# Patient Record
Sex: FEMALE | Race: WHITE | NOT HISPANIC OR LATINO | Employment: OTHER | URBAN - METROPOLITAN AREA
[De-identification: names, ages, dates, MRNs, and addresses within clinical notes are randomized per-mention and may not be internally consistent; named-entity substitution may affect disease eponyms.]

---

## 2018-07-18 ENCOUNTER — HISTORICAL (OUTPATIENT)
Dept: LAB | Facility: HOSPITAL | Age: 68
End: 2018-07-18

## 2018-07-18 LAB
ALBUMIN SERPL-MCNC: 3.3 GM/DL (ref 3.4–5)
ALBUMIN/GLOB SERPL: 0.8 RATIO (ref 1.1–2)
ALP SERPL-CCNC: 92 UNIT/L (ref 46–116)
ALT SERPL-CCNC: 22 UNIT/L (ref 12–78)
AST SERPL-CCNC: 18 UNIT/L (ref 10–37)
BILIRUB SERPL-MCNC: 0.4 MG/DL (ref 0.2–1)
BILIRUBIN DIRECT+TOT PNL SERPL-MCNC: 0.11 MG/DL (ref 0–0.2)
BILIRUBIN DIRECT+TOT PNL SERPL-MCNC: 0.29 MG/DL
BUN SERPL-MCNC: 25 MG/DL (ref 7–18)
CALCIUM SERPL-MCNC: 9 MG/DL (ref 8.5–10.1)
CHLORIDE SERPL-SCNC: 108 MMOL/L (ref 98–107)
CHOLEST SERPL-MCNC: 180 MG/DL (ref 50–200)
CHOLEST/HDLC SERPL: 5 {RATIO} (ref 0–5)
CO2 SERPL-SCNC: 29.7 MMOL/L (ref 21–32)
CREAT SERPL-MCNC: 0.51 MG/DL (ref 0.55–1.02)
GLOBULIN SER-MCNC: 3.9 GM/DL (ref 2.4–3.5)
GLUCOSE SERPL-MCNC: 93 MG/DL (ref 74–106)
HDLC SERPL-MCNC: 39 MG/DL (ref 35–60)
LDLC SERPL CALC-MCNC: 113 MG/DL (ref 50–140)
POTASSIUM SERPL-SCNC: 3.6 MMOL/L (ref 3.5–5.1)
PROT SERPL-MCNC: 7.2 GM/DL (ref 6.4–8.2)
SODIUM SERPL-SCNC: 143 MMOL/L (ref 136–145)
TRIGL SERPL-MCNC: 138 MG/DL (ref 30–150)
TSH SERPL-ACNC: 3.85 MIU/ML (ref 0.35–3.75)
VLDLC SERPL CALC-MCNC: 28 MG/DL

## 2019-01-21 ENCOUNTER — HISTORICAL (OUTPATIENT)
Dept: LAB | Facility: HOSPITAL | Age: 69
End: 2019-01-21

## 2019-01-21 LAB
ALBUMIN SERPL-MCNC: 3.4 GM/DL (ref 3.4–5)
ALBUMIN/GLOB SERPL: 0.9 RATIO (ref 1.1–2)
ALP SERPL-CCNC: 99 UNIT/L (ref 46–116)
ALT SERPL-CCNC: 22 UNIT/L (ref 12–78)
AST SERPL-CCNC: 21 UNIT/L (ref 10–37)
BILIRUB SERPL-MCNC: 0.5 MG/DL (ref 0.2–1)
BILIRUBIN DIRECT+TOT PNL SERPL-MCNC: 0.14 MG/DL (ref 0–0.2)
BILIRUBIN DIRECT+TOT PNL SERPL-MCNC: 0.36 MG/DL
BUN SERPL-MCNC: 21 MG/DL (ref 7–18)
CALCIUM SERPL-MCNC: 8.9 MG/DL (ref 8.5–10.1)
CHLORIDE SERPL-SCNC: 106 MMOL/L (ref 98–107)
CHOLEST SERPL-MCNC: 149 MG/DL (ref 50–200)
CHOLEST/HDLC SERPL: 4 {RATIO} (ref 0–5)
CO2 SERPL-SCNC: 28 MMOL/L (ref 21–32)
CREAT SERPL-MCNC: 0.54 MG/DL (ref 0.55–1.02)
GLOBULIN SER-MCNC: 3.8 GM/DL (ref 2.4–3.5)
GLUCOSE SERPL-MCNC: 99 MG/DL (ref 74–106)
HDLC SERPL-MCNC: 42 MG/DL (ref 35–60)
LDLC SERPL CALC-MCNC: 90 MG/DL (ref 50–140)
POTASSIUM SERPL-SCNC: 3.5 MMOL/L (ref 3.5–5.1)
PROT SERPL-MCNC: 7.2 GM/DL (ref 6.4–8.2)
SODIUM SERPL-SCNC: 144 MMOL/L (ref 136–145)
T4 FREE SERPL-MCNC: 1.2 NG/DL (ref 0.76–1.46)
TRIGL SERPL-MCNC: 85 MG/DL (ref 30–150)
TSH SERPL-ACNC: 2.74 MIU/ML (ref 0.35–3.75)
VLDLC SERPL CALC-MCNC: 17 MG/DL

## 2019-04-15 ENCOUNTER — HISTORICAL (OUTPATIENT)
Dept: LAB | Facility: HOSPITAL | Age: 69
End: 2019-04-15

## 2019-07-02 ENCOUNTER — HISTORICAL (OUTPATIENT)
Dept: RADIOLOGY | Facility: HOSPITAL | Age: 69
End: 2019-07-02

## 2020-05-26 ENCOUNTER — HISTORICAL (OUTPATIENT)
Dept: LAB | Facility: HOSPITAL | Age: 70
End: 2020-05-26

## 2020-05-26 LAB
ALBUMIN SERPL-MCNC: 3.5 GM/DL (ref 3.4–4.8)
ALBUMIN/GLOB SERPL: 1 RATIO (ref 1.1–2)
ALP SERPL-CCNC: 71 UNIT/L (ref 40–150)
ALT SERPL-CCNC: 14 UNIT/L (ref 0–55)
AST SERPL-CCNC: 19 UNIT/L (ref 5–34)
BILIRUB SERPL-MCNC: 0.5 MG/DL
BILIRUBIN DIRECT+TOT PNL SERPL-MCNC: 0.2 MG/DL (ref 0–0.5)
BILIRUBIN DIRECT+TOT PNL SERPL-MCNC: 0.3 MG/DL
BUN SERPL-MCNC: 25 MG/DL (ref 9.8–20.1)
CALCIUM SERPL-MCNC: 10 MG/DL (ref 8.4–10.2)
CHLORIDE SERPL-SCNC: 109 MMOL/L (ref 98–107)
CHOLEST SERPL-MCNC: 216 MG/DL
CHOLEST/HDLC SERPL: 5 {RATIO} (ref 0–5)
CO2 SERPL-SCNC: 27 MEQ/L (ref 23–31)
CREAT SERPL-MCNC: 0.69 MG/DL (ref 0.55–1.02)
GLOBULIN SER-MCNC: 3.5 GM/DL (ref 2.4–3.5)
GLUCOSE SERPL-MCNC: 95 MG/DL (ref 82–115)
HDLC SERPL-MCNC: 41 MG/DL (ref 35–60)
LDLC SERPL CALC-MCNC: 149 MG/DL (ref 50–140)
POTASSIUM SERPL-SCNC: 4.8 MMOL/L (ref 3.5–5.1)
PROT SERPL-MCNC: 7 GM/DL (ref 5.8–7.6)
SODIUM SERPL-SCNC: 145 MMOL/L (ref 136–145)
TRIGL SERPL-MCNC: 129 MG/DL (ref 37–140)
TSH SERPL-ACNC: 2.47 UIU/ML (ref 0.35–4.94)
VLDLC SERPL CALC-MCNC: 26 MG/DL

## 2020-09-09 ENCOUNTER — HISTORICAL (OUTPATIENT)
Dept: RADIOLOGY | Facility: HOSPITAL | Age: 70
End: 2020-09-09

## 2020-10-30 ENCOUNTER — HISTORICAL (OUTPATIENT)
Dept: RADIOLOGY | Facility: HOSPITAL | Age: 70
End: 2020-10-30

## 2020-10-30 LAB
BUN SERPL-MCNC: 15 MG/DL (ref 9.8–20.1)
CALCIUM SERPL-MCNC: 9.5 MG/DL (ref 8.4–10.2)
CHLORIDE SERPL-SCNC: 109 MMOL/L (ref 98–107)
CO2 SERPL-SCNC: 27 MEQ/L (ref 23–31)
CREAT SERPL-MCNC: 0.66 MG/DL (ref 0.55–1.02)
CREAT/UREA NIT SERPL: 23
GLUCOSE SERPL-MCNC: 104 MG/DL (ref 82–115)
POTASSIUM SERPL-SCNC: 4.6 MMOL/L (ref 3.5–5.1)
SODIUM SERPL-SCNC: 144 MMOL/L (ref 136–145)

## 2021-01-30 ENCOUNTER — HOSPITAL ENCOUNTER (INPATIENT)
Facility: HOSPITAL | Age: 71
LOS: 2 days | Discharge: HOME | DRG: 247 | End: 2021-02-02
Attending: EMERGENCY MEDICINE | Admitting: INTERNAL MEDICINE
Payer: MEDICARE

## 2021-01-30 DIAGNOSIS — I25.10 CORONARY ARTERY DISEASE, ANGINA PRESENCE UNSPECIFIED, UNSPECIFIED VESSEL OR LESION TYPE, UNSPECIFIED WHETHER NATIVE OR TRANSPLANTED HEART: ICD-10-CM

## 2021-01-30 DIAGNOSIS — I20.9 ANGINA PECTORIS: ICD-10-CM

## 2021-01-30 DIAGNOSIS — I21.4 NSTEMI (NON-ST ELEVATED MYOCARDIAL INFARCTION): ICD-10-CM

## 2021-01-30 DIAGNOSIS — R79.89 ELEVATED TROPONIN: Primary | ICD-10-CM

## 2021-01-30 DIAGNOSIS — R07.9 CHEST PAIN: ICD-10-CM

## 2021-01-30 PROBLEM — F17.210 CIGARETTE SMOKER: Chronic | Status: ACTIVE | Noted: 2021-01-30

## 2021-01-30 PROBLEM — E78.5 HYPERLIPIDEMIA: Status: ACTIVE | Noted: 2021-01-30

## 2021-01-30 PROBLEM — F41.9 ANXIETY DISORDER, UNSPECIFIED: Status: ACTIVE | Noted: 2021-01-30

## 2021-01-30 PROBLEM — F41.9 ANXIETY DISORDER, UNSPECIFIED: Chronic | Status: ACTIVE | Noted: 2021-01-30

## 2021-01-30 PROBLEM — J44.9 CHRONIC OBSTRUCTIVE PULMONARY DISEASE: Chronic | Status: ACTIVE | Noted: 2021-01-30

## 2021-01-30 PROBLEM — I10 ESSENTIAL HYPERTENSION: Status: ACTIVE | Noted: 2019-01-15

## 2021-01-30 PROBLEM — E78.5 HYPERLIPIDEMIA: Chronic | Status: ACTIVE | Noted: 2021-01-30

## 2021-01-30 PROBLEM — R56.9 SEIZURE: Status: ACTIVE | Noted: 2019-01-15

## 2021-01-30 PROBLEM — I21.9 ACUTE MYOCARDIAL INFARCTION: Status: ACTIVE | Noted: 2021-01-30

## 2021-01-30 PROBLEM — I10 ACCELERATED HYPERTENSION: Status: ACTIVE | Noted: 2021-01-30

## 2021-01-30 PROBLEM — J44.9 CHRONIC OBSTRUCTIVE PULMONARY DISEASE: Status: ACTIVE | Noted: 2021-01-30

## 2021-01-30 PROBLEM — E03.9 HYPOTHYROIDISM: Status: ACTIVE | Noted: 2019-01-15

## 2021-01-30 PROBLEM — H35.30 MACULAR DEGENERATION: Status: ACTIVE | Noted: 2019-01-15

## 2021-01-30 PROBLEM — I10 ESSENTIAL HYPERTENSION: Chronic | Status: ACTIVE | Noted: 2019-01-15

## 2021-01-30 LAB
ALBUMIN SERPL BCP-MCNC: 3.5 G/DL (ref 3.5–5.2)
ALP SERPL-CCNC: 69 U/L (ref 55–135)
ALT SERPL W/O P-5'-P-CCNC: 10 U/L (ref 10–44)
ANION GAP SERPL CALC-SCNC: 11 MMOL/L (ref 8–16)
AST SERPL-CCNC: 16 U/L (ref 10–40)
BASOPHILS # BLD AUTO: 0.03 K/UL (ref 0–0.2)
BASOPHILS NFR BLD: 0.5 % (ref 0–1.9)
BILIRUB SERPL-MCNC: 0.5 MG/DL (ref 0.1–1)
BNP SERPL-MCNC: 136 PG/ML (ref 0–99)
BUN SERPL-MCNC: 33 MG/DL (ref 8–23)
CALCIUM SERPL-MCNC: 9.2 MG/DL (ref 8.7–10.5)
CHLORIDE SERPL-SCNC: 109 MMOL/L (ref 95–110)
CK MB SERPL-MCNC: 1.4 NG/ML (ref 0.1–6.5)
CK MB SERPL-RTO: 3.6 % (ref 0–5)
CK SERPL-CCNC: 39 U/L (ref 20–180)
CK SERPL-CCNC: 39 U/L (ref 20–180)
CO2 SERPL-SCNC: 24 MMOL/L (ref 23–29)
CREAT SERPL-MCNC: 0.7 MG/DL (ref 0.5–1.4)
DIFFERENTIAL METHOD: NORMAL
EOSINOPHIL # BLD AUTO: 0.1 K/UL (ref 0–0.5)
EOSINOPHIL NFR BLD: 0.8 % (ref 0–8)
ERYTHROCYTE [DISTWIDTH] IN BLOOD BY AUTOMATED COUNT: 13.2 % (ref 11.5–14.5)
EST. GFR  (AFRICAN AMERICAN): >60 ML/MIN/1.73 M^2
EST. GFR  (NON AFRICAN AMERICAN): >60 ML/MIN/1.73 M^2
GLUCOSE SERPL-MCNC: 91 MG/DL (ref 70–110)
HCT VFR BLD AUTO: 46.6 % (ref 37–48.5)
HCV AB SERPL QL IA: NEGATIVE
HGB BLD-MCNC: 15 G/DL (ref 12–16)
IMM GRANULOCYTES # BLD AUTO: 0.02 K/UL (ref 0–0.04)
IMM GRANULOCYTES NFR BLD AUTO: 0.3 % (ref 0–0.5)
LYMPHOCYTES # BLD AUTO: 1.7 K/UL (ref 1–4.8)
LYMPHOCYTES NFR BLD: 27.2 % (ref 18–48)
MAGNESIUM SERPL-MCNC: 2.3 MG/DL (ref 1.6–2.6)
MCH RBC QN AUTO: 28.7 PG (ref 27–31)
MCHC RBC AUTO-ENTMCNC: 32.2 G/DL (ref 32–36)
MCV RBC AUTO: 89 FL (ref 82–98)
MONOCYTES # BLD AUTO: 0.7 K/UL (ref 0.3–1)
MONOCYTES NFR BLD: 10.7 % (ref 4–15)
NEUTROPHILS # BLD AUTO: 3.9 K/UL (ref 1.8–7.7)
NEUTROPHILS NFR BLD: 60.5 % (ref 38–73)
NRBC BLD-RTO: 0 /100 WBC
PLATELET # BLD AUTO: 172 K/UL (ref 150–350)
PMV BLD AUTO: 11.5 FL (ref 9.2–12.9)
POTASSIUM SERPL-SCNC: 4.2 MMOL/L (ref 3.5–5.1)
PROT SERPL-MCNC: 6.9 G/DL (ref 6–8.4)
RBC # BLD AUTO: 5.22 M/UL (ref 4–5.4)
SODIUM SERPL-SCNC: 144 MMOL/L (ref 136–145)
TROPONIN I SERPL DL<=0.01 NG/ML-MCNC: 0.2 NG/ML (ref 0–0.03)
WBC # BLD AUTO: 6.37 K/UL (ref 3.9–12.7)

## 2021-01-30 PROCEDURE — 83880 ASSAY OF NATRIURETIC PEPTIDE: CPT

## 2021-01-30 PROCEDURE — G0378 HOSPITAL OBSERVATION PER HR: HCPCS

## 2021-01-30 PROCEDURE — 82550 ASSAY OF CK (CPK): CPT

## 2021-01-30 PROCEDURE — 84484 ASSAY OF TROPONIN QUANT: CPT

## 2021-01-30 PROCEDURE — 82553 CREATINE MB FRACTION: CPT

## 2021-01-30 PROCEDURE — 80053 COMPREHEN METABOLIC PANEL: CPT

## 2021-01-30 PROCEDURE — 25000003 PHARM REV CODE 250: Performed by: EMERGENCY MEDICINE

## 2021-01-30 PROCEDURE — 93010 ELECTROCARDIOGRAM REPORT: CPT | Mod: ,,, | Performed by: INTERNAL MEDICINE

## 2021-01-30 PROCEDURE — 85025 COMPLETE CBC W/AUTO DIFF WBC: CPT

## 2021-01-30 PROCEDURE — 93005 ELECTROCARDIOGRAM TRACING: CPT

## 2021-01-30 PROCEDURE — 86803 HEPATITIS C AB TEST: CPT

## 2021-01-30 PROCEDURE — 25000003 PHARM REV CODE 250: Performed by: NURSE PRACTITIONER

## 2021-01-30 PROCEDURE — 83735 ASSAY OF MAGNESIUM: CPT

## 2021-01-30 PROCEDURE — 36415 COLL VENOUS BLD VENIPUNCTURE: CPT

## 2021-01-30 PROCEDURE — 80061 LIPID PANEL: CPT

## 2021-01-30 PROCEDURE — 93010 EKG 12-LEAD: ICD-10-PCS | Mod: ,,, | Performed by: INTERNAL MEDICINE

## 2021-01-30 PROCEDURE — 99285 EMERGENCY DEPT VISIT HI MDM: CPT | Mod: 25

## 2021-01-30 RX ORDER — LEVOTHYROXINE SODIUM 25 UG/1
25 TABLET ORAL
Status: DISCONTINUED | OUTPATIENT
Start: 2021-01-31 | End: 2021-02-02 | Stop reason: HOSPADM

## 2021-01-30 RX ORDER — METOPROLOL TARTRATE 50 MG/1
50 TABLET ORAL 2 TIMES DAILY
COMMUNITY
End: 2022-10-31 | Stop reason: SDUPTHER

## 2021-01-30 RX ORDER — ACETAMINOPHEN 325 MG/1
650 TABLET ORAL EVERY 6 HOURS PRN
Status: DISCONTINUED | OUTPATIENT
Start: 2021-01-30 | End: 2021-02-02 | Stop reason: HOSPADM

## 2021-01-30 RX ORDER — DOCUSATE SODIUM 100 MG/1
100 CAPSULE, LIQUID FILLED ORAL 2 TIMES DAILY PRN
COMMUNITY

## 2021-01-30 RX ORDER — LISINOPRIL 20 MG/1
20 TABLET ORAL 2 TIMES DAILY
Status: DISCONTINUED | OUTPATIENT
Start: 2021-01-30 | End: 2021-02-02 | Stop reason: HOSPADM

## 2021-01-30 RX ORDER — ONDANSETRON 2 MG/ML
4 INJECTION INTRAMUSCULAR; INTRAVENOUS EVERY 8 HOURS PRN
Status: DISCONTINUED | OUTPATIENT
Start: 2021-01-30 | End: 2021-02-02 | Stop reason: HOSPADM

## 2021-01-30 RX ORDER — CLONIDINE HYDROCHLORIDE 0.2 MG/1
0.2 TABLET ORAL EVERY 6 HOURS PRN
Status: DISCONTINUED | OUTPATIENT
Start: 2021-01-30 | End: 2021-02-02

## 2021-01-30 RX ORDER — LISINOPRIL 20 MG/1
20 TABLET ORAL 2 TIMES DAILY
COMMUNITY
End: 2023-02-27 | Stop reason: SDUPTHER

## 2021-01-30 RX ORDER — CLOPIDOGREL BISULFATE 75 MG/1
75 TABLET ORAL DAILY
Status: ON HOLD | COMMUNITY
End: 2021-02-02

## 2021-01-30 RX ORDER — PRAVASTATIN SODIUM 40 MG/1
40 TABLET ORAL DAILY
COMMUNITY
End: 2023-02-27 | Stop reason: SDUPTHER

## 2021-01-30 RX ORDER — DOCUSATE SODIUM 100 MG/1
100 CAPSULE, LIQUID FILLED ORAL 2 TIMES DAILY
Status: DISCONTINUED | OUTPATIENT
Start: 2021-01-30 | End: 2021-02-02 | Stop reason: HOSPADM

## 2021-01-30 RX ORDER — PRAVASTATIN SODIUM 20 MG/1
40 TABLET ORAL DAILY
Status: DISCONTINUED | OUTPATIENT
Start: 2021-01-31 | End: 2021-02-02 | Stop reason: HOSPADM

## 2021-01-30 RX ORDER — SODIUM CHLORIDE 0.9 % (FLUSH) 0.9 %
10 SYRINGE (ML) INJECTION
Status: DISCONTINUED | OUTPATIENT
Start: 2021-01-30 | End: 2021-02-02 | Stop reason: HOSPADM

## 2021-01-30 RX ORDER — TALC
6 POWDER (GRAM) TOPICAL NIGHTLY PRN
Status: DISCONTINUED | OUTPATIENT
Start: 2021-01-30 | End: 2021-02-02 | Stop reason: HOSPADM

## 2021-01-30 RX ORDER — LEVOTHYROXINE SODIUM 25 UG/1
25 TABLET ORAL
COMMUNITY
End: 2023-02-27 | Stop reason: SDUPTHER

## 2021-01-30 RX ORDER — HYDROCHLOROTHIAZIDE 25 MG/1
25 TABLET ORAL DAILY
Status: ON HOLD | COMMUNITY
End: 2021-02-02

## 2021-01-30 RX ORDER — LORAZEPAM 0.5 MG/1
0.5 TABLET ORAL EVERY 6 HOURS PRN
COMMUNITY
End: 2022-10-28 | Stop reason: SDUPTHER

## 2021-01-30 RX ORDER — NITROGLYCERIN 0.4 MG/1
0.4 TABLET SUBLINGUAL EVERY 5 MIN PRN
Status: DISCONTINUED | OUTPATIENT
Start: 2021-01-30 | End: 2021-02-02 | Stop reason: HOSPADM

## 2021-01-30 RX ORDER — HYDROCHLOROTHIAZIDE 25 MG/1
25 TABLET ORAL DAILY
Status: DISCONTINUED | OUTPATIENT
Start: 2021-01-31 | End: 2021-02-02 | Stop reason: HOSPADM

## 2021-01-30 RX ORDER — ASPIRIN 81 MG/1
81 TABLET ORAL DAILY
Status: DISCONTINUED | OUTPATIENT
Start: 2021-01-31 | End: 2021-02-02 | Stop reason: HOSPADM

## 2021-01-30 RX ORDER — METOPROLOL TARTRATE 50 MG/1
50 TABLET ORAL 2 TIMES DAILY
Status: DISCONTINUED | OUTPATIENT
Start: 2021-01-30 | End: 2021-02-02 | Stop reason: HOSPADM

## 2021-01-30 RX ORDER — ASPIRIN 81 MG/1
81 TABLET ORAL DAILY
Status: ON HOLD | COMMUNITY
End: 2023-11-17 | Stop reason: HOSPADM

## 2021-01-30 RX ORDER — CLOPIDOGREL BISULFATE 75 MG/1
75 TABLET ORAL DAILY
Status: DISCONTINUED | OUTPATIENT
Start: 2021-01-31 | End: 2021-02-02 | Stop reason: HOSPADM

## 2021-01-30 RX ORDER — LORAZEPAM 0.5 MG/1
0.5 TABLET ORAL EVERY 6 HOURS PRN
Status: DISCONTINUED | OUTPATIENT
Start: 2021-01-30 | End: 2021-02-02 | Stop reason: HOSPADM

## 2021-01-30 RX ADMIN — DOCUSATE SODIUM 100 MG: 100 CAPSULE ORAL at 09:01

## 2021-01-30 RX ADMIN — ACETAMINOPHEN 650 MG: 325 TABLET ORAL at 07:01

## 2021-01-30 RX ADMIN — METOPROLOL TARTRATE 50 MG: 50 TABLET, FILM COATED ORAL at 09:01

## 2021-01-30 RX ADMIN — LISINOPRIL 20 MG: 20 TABLET ORAL at 09:01

## 2021-01-30 RX ADMIN — NITROGLYCERIN 1 INCH: 20 OINTMENT TOPICAL at 06:01

## 2021-01-31 PROBLEM — R79.89 ELEVATED TROPONIN: Status: ACTIVE | Noted: 2021-01-31

## 2021-01-31 PROBLEM — I48.0 PAROXYSMAL ATRIAL FIBRILLATION: Status: ACTIVE | Noted: 2021-01-31

## 2021-01-31 LAB
ANION GAP SERPL CALC-SCNC: 7 MMOL/L (ref 8–16)
APTT BLDCRRT: 28.5 SEC (ref 21–32)
APTT BLDCRRT: 34 SEC (ref 21–32)
APTT BLDCRRT: 35.2 SEC (ref 21–32)
APTT BLDCRRT: 52.8 SEC (ref 21–32)
ASCENDING AORTA: 2.14 CM
AV INDEX (PROSTH): 0.56
AV MEAN GRADIENT: 3 MMHG
AV PEAK GRADIENT: 5 MMHG
AV VALVE AREA: 1.48 CM2
AV VELOCITY RATIO: 0.54
BACTERIA #/AREA URNS HPF: NORMAL /HPF
BASOPHILS # BLD AUTO: 0.02 K/UL (ref 0–0.2)
BASOPHILS NFR BLD: 0.3 % (ref 0–1.9)
BILIRUB UR QL STRIP: NEGATIVE
BSA FOR ECHO PROCEDURE: 1.38 M2
BUN SERPL-MCNC: 29 MG/DL (ref 8–23)
CALCIUM SERPL-MCNC: 9.3 MG/DL (ref 8.7–10.5)
CHLORIDE SERPL-SCNC: 111 MMOL/L (ref 95–110)
CHOLEST SERPL-MCNC: 147 MG/DL (ref 120–199)
CHOLEST/HDLC SERPL: 3.4 {RATIO} (ref 2–5)
CLARITY UR: CLEAR
CO2 SERPL-SCNC: 24 MMOL/L (ref 23–29)
COLOR UR: YELLOW
CREAT SERPL-MCNC: 0.6 MG/DL (ref 0.5–1.4)
CV ECHO LV RWT: 0.57 CM
DIFFERENTIAL METHOD: ABNORMAL
DOP CALC AO PEAK VEL: 1.13 M/S
DOP CALC AO VTI: 20.38 CM
DOP CALC LVOT AREA: 2.7 CM2
DOP CALC LVOT DIAMETER: 1.84 CM
DOP CALC LVOT PEAK VEL: 0.61 M/S
DOP CALC LVOT STROKE VOLUME: 30.24 CM3
DOP CALC RVOT PEAK VEL: 1.03 M/S
DOP CALC RVOT VTI: 17.71 CM
DOP CALCLVOT PEAK VEL VTI: 11.38 CM
E/E' RATIO: 10.25 M/S
ECHO LV POSTERIOR WALL: 0.93 CM (ref 0.6–1.1)
EOSINOPHIL # BLD AUTO: 0.1 K/UL (ref 0–0.5)
EOSINOPHIL NFR BLD: 2.4 % (ref 0–8)
ERYTHROCYTE [DISTWIDTH] IN BLOOD BY AUTOMATED COUNT: 13.3 % (ref 11.5–14.5)
EST. GFR  (AFRICAN AMERICAN): >60 ML/MIN/1.73 M^2
EST. GFR  (NON AFRICAN AMERICAN): >60 ML/MIN/1.73 M^2
FRACTIONAL SHORTENING: 36 % (ref 28–44)
GLUCOSE SERPL-MCNC: 86 MG/DL (ref 70–110)
GLUCOSE UR QL STRIP: NEGATIVE
HCT VFR BLD AUTO: 46.8 % (ref 37–48.5)
HDLC SERPL-MCNC: 43 MG/DL (ref 40–75)
HDLC SERPL: 29.3 % (ref 20–50)
HGB BLD-MCNC: 14.6 G/DL (ref 12–16)
HGB UR QL STRIP: NEGATIVE
IMM GRANULOCYTES # BLD AUTO: 0.02 K/UL (ref 0–0.04)
IMM GRANULOCYTES NFR BLD AUTO: 0.3 % (ref 0–0.5)
INR PPP: 1 (ref 0.8–1.2)
INTERVENTRICULAR SEPTUM: 1 CM (ref 0.6–1.1)
IVRT: 65.65 MSEC
KETONES UR QL STRIP: NEGATIVE
LA MAJOR: 4.27 CM
LA MINOR: 5.99 CM
LA WIDTH: 3.76 CM
LDLC SERPL CALC-MCNC: 83.6 MG/DL (ref 63–159)
LEFT ATRIUM SIZE: 2.88 CM
LEFT ATRIUM VOLUME INDEX: 32.5 ML/M2
LEFT ATRIUM VOLUME: 45.89 CM3
LEFT INTERNAL DIMENSION IN SYSTOLE: 2.09 CM (ref 2.1–4)
LEFT VENTRICLE DIASTOLIC VOLUME INDEX: 30.18 ML/M2
LEFT VENTRICLE DIASTOLIC VOLUME: 42.55 ML
LEFT VENTRICLE MASS INDEX: 62 G/M2
LEFT VENTRICLE SYSTOLIC VOLUME INDEX: 10.1 ML/M2
LEFT VENTRICLE SYSTOLIC VOLUME: 14.21 ML
LEFT VENTRICULAR INTERNAL DIMENSION IN DIASTOLE: 3.25 CM (ref 3.5–6)
LEFT VENTRICULAR MASS: 87.68 G
LEUKOCYTE ESTERASE UR QL STRIP: ABNORMAL
LV LATERAL E/E' RATIO: 9.11 M/S
LV SEPTAL E/E' RATIO: 11.71 M/S
LYMPHOCYTES # BLD AUTO: 2.4 K/UL (ref 1–4.8)
LYMPHOCYTES NFR BLD: 40.7 % (ref 18–48)
MCH RBC QN AUTO: 28.3 PG (ref 27–31)
MCHC RBC AUTO-ENTMCNC: 31.2 G/DL (ref 32–36)
MCV RBC AUTO: 91 FL (ref 82–98)
MICROSCOPIC COMMENT: NORMAL
MONOCYTES # BLD AUTO: 0.6 K/UL (ref 0.3–1)
MONOCYTES NFR BLD: 9.9 % (ref 4–15)
MV PEAK E VEL: 0.82 M/S
NEUTROPHILS # BLD AUTO: 2.7 K/UL (ref 1.8–7.7)
NEUTROPHILS NFR BLD: 46.4 % (ref 38–73)
NITRITE UR QL STRIP: NEGATIVE
NONHDLC SERPL-MCNC: 104 MG/DL
NRBC BLD-RTO: 0 /100 WBC
PH UR STRIP: 6 [PH] (ref 5–8)
PISA TR MAX VEL: 2.17 M/S
PLATELET # BLD AUTO: 159 K/UL (ref 150–350)
PMV BLD AUTO: 12 FL (ref 9.2–12.9)
POTASSIUM SERPL-SCNC: 4.4 MMOL/L (ref 3.5–5.1)
PROT UR QL STRIP: NEGATIVE
PROTHROMBIN TIME: 10.5 SEC (ref 9–12.5)
PV MEAN GRADIENT: 2 MMHG
RA MAJOR: 4.56 CM
RA PRESSURE: 8 MMHG
RA WIDTH: 3.06 CM
RBC # BLD AUTO: 5.16 M/UL (ref 4–5.4)
RIGHT VENTRICULAR END-DIASTOLIC DIMENSION: 2.92 CM
SINUS: 2.68 CM
SODIUM SERPL-SCNC: 142 MMOL/L (ref 136–145)
SP GR UR STRIP: 1.02 (ref 1–1.03)
TDI LATERAL: 0.09 M/S
TDI SEPTAL: 0.07 M/S
TDI: 0.08 M/S
TR MAX PG: 19 MMHG
TRIGL SERPL-MCNC: 102 MG/DL (ref 30–150)
TROPONIN I SERPL DL<=0.01 NG/ML-MCNC: 0.16 NG/ML (ref 0–0.03)
TROPONIN I SERPL DL<=0.01 NG/ML-MCNC: 0.18 NG/ML (ref 0–0.03)
TV REST PULMONARY ARTERY PRESSURE: 27 MMHG
URN SPEC COLLECT METH UR: ABNORMAL
UROBILINOGEN UR STRIP-ACNC: NEGATIVE EU/DL
WBC # BLD AUTO: 5.77 K/UL (ref 3.9–12.7)
WBC #/AREA URNS HPF: 3 /HPF (ref 0–5)
WBC CLUMPS URNS QL MICRO: NORMAL

## 2021-01-31 PROCEDURE — 25000003 PHARM REV CODE 250: Performed by: NURSE PRACTITIONER

## 2021-01-31 PROCEDURE — 99233 SBSQ HOSP IP/OBS HIGH 50: CPT | Mod: ,,, | Performed by: FAMILY MEDICINE

## 2021-01-31 PROCEDURE — 99223 1ST HOSP IP/OBS HIGH 75: CPT | Mod: 25,,, | Performed by: INTERNAL MEDICINE

## 2021-01-31 PROCEDURE — 80048 BASIC METABOLIC PNL TOTAL CA: CPT

## 2021-01-31 PROCEDURE — 25000003 PHARM REV CODE 250: Performed by: FAMILY MEDICINE

## 2021-01-31 PROCEDURE — 99233 PR SUBSEQUENT HOSPITAL CARE,LEVL III: ICD-10-PCS | Mod: ,,, | Performed by: FAMILY MEDICINE

## 2021-01-31 PROCEDURE — 36415 COLL VENOUS BLD VENIPUNCTURE: CPT

## 2021-01-31 PROCEDURE — 85025 COMPLETE CBC W/AUTO DIFF WBC: CPT

## 2021-01-31 PROCEDURE — 21400001 HC TELEMETRY ROOM

## 2021-01-31 PROCEDURE — 99223 PR INITIAL HOSPITAL CARE,LEVL III: ICD-10-PCS | Mod: 25,,, | Performed by: INTERNAL MEDICINE

## 2021-01-31 PROCEDURE — 63600175 PHARM REV CODE 636 W HCPCS: Performed by: NURSE PRACTITIONER

## 2021-01-31 PROCEDURE — 85730 THROMBOPLASTIN TIME PARTIAL: CPT | Mod: 91

## 2021-01-31 PROCEDURE — 85610 PROTHROMBIN TIME: CPT | Mod: ER

## 2021-01-31 PROCEDURE — 81000 URINALYSIS NONAUTO W/SCOPE: CPT

## 2021-01-31 PROCEDURE — 84484 ASSAY OF TROPONIN QUANT: CPT

## 2021-01-31 PROCEDURE — 85730 THROMBOPLASTIN TIME PARTIAL: CPT | Mod: ER

## 2021-01-31 RX ORDER — MUPIROCIN 20 MG/G
OINTMENT TOPICAL 2 TIMES DAILY
Status: DISCONTINUED | OUTPATIENT
Start: 2021-01-31 | End: 2021-02-02 | Stop reason: HOSPADM

## 2021-01-31 RX ORDER — HEPARIN SODIUM,PORCINE/D5W 25000/250
0-40 INTRAVENOUS SOLUTION INTRAVENOUS CONTINUOUS
Status: DISCONTINUED | OUTPATIENT
Start: 2021-01-31 | End: 2021-02-02

## 2021-01-31 RX ORDER — SODIUM CHLORIDE 9 MG/ML
INJECTION, SOLUTION INTRAVENOUS CONTINUOUS
Status: DISCONTINUED | OUTPATIENT
Start: 2021-01-31 | End: 2021-02-02

## 2021-01-31 RX ADMIN — SODIUM CHLORIDE: 0.9 INJECTION, SOLUTION INTRAVENOUS at 06:01

## 2021-01-31 RX ADMIN — HYDROCHLOROTHIAZIDE 25 MG: 25 TABLET ORAL at 09:01

## 2021-01-31 RX ADMIN — LISINOPRIL 20 MG: 20 TABLET ORAL at 09:01

## 2021-01-31 RX ADMIN — ACETAMINOPHEN 650 MG: 325 TABLET ORAL at 08:01

## 2021-01-31 RX ADMIN — METOPROLOL TARTRATE 50 MG: 50 TABLET, FILM COATED ORAL at 08:01

## 2021-01-31 RX ADMIN — ASPIRIN 81 MG: 81 TABLET, COATED ORAL at 09:01

## 2021-01-31 RX ADMIN — METOPROLOL TARTRATE 50 MG: 50 TABLET, FILM COATED ORAL at 09:01

## 2021-01-31 RX ADMIN — LISINOPRIL 20 MG: 20 TABLET ORAL at 08:01

## 2021-01-31 RX ADMIN — DOCUSATE SODIUM 100 MG: 100 CAPSULE ORAL at 09:01

## 2021-01-31 RX ADMIN — LEVOTHYROXINE SODIUM 25 MCG: 25 TABLET ORAL at 05:01

## 2021-01-31 RX ADMIN — PRAVASTATIN SODIUM 40 MG: 20 TABLET ORAL at 09:01

## 2021-01-31 RX ADMIN — DOCUSATE SODIUM 100 MG: 100 CAPSULE ORAL at 08:01

## 2021-01-31 RX ADMIN — HEPARIN SODIUM 14 UNITS/KG/HR: 10000 INJECTION, SOLUTION INTRAVENOUS at 12:01

## 2021-01-31 RX ADMIN — CLOPIDOGREL 75 MG: 75 TABLET, FILM COATED ORAL at 09:01

## 2021-01-31 RX ADMIN — HEPARIN SODIUM 12 UNITS/KG/HR: 10000 INJECTION, SOLUTION INTRAVENOUS at 03:01

## 2021-01-31 RX ADMIN — MUPIROCIN: 20 OINTMENT TOPICAL at 08:01

## 2021-02-01 LAB
ANION GAP SERPL CALC-SCNC: 8 MMOL/L (ref 8–16)
APTT BLDCRRT: 45.7 SEC (ref 21–32)
APTT BLDCRRT: 45.7 SEC (ref 21–32)
BASOPHILS # BLD AUTO: 0.02 K/UL (ref 0–0.2)
BASOPHILS NFR BLD: 0.5 % (ref 0–1.9)
BUN SERPL-MCNC: 20 MG/DL (ref 8–23)
CALCIUM SERPL-MCNC: 8.8 MG/DL (ref 8.7–10.5)
CATH EF QUANTITATIVE: 70 %
CHLORIDE SERPL-SCNC: 111 MMOL/L (ref 95–110)
CO2 SERPL-SCNC: 22 MMOL/L (ref 23–29)
CREAT SERPL-MCNC: 0.6 MG/DL (ref 0.5–1.4)
DIFFERENTIAL METHOD: ABNORMAL
EOSINOPHIL # BLD AUTO: 0.2 K/UL (ref 0–0.5)
EOSINOPHIL NFR BLD: 3.5 % (ref 0–8)
ERYTHROCYTE [DISTWIDTH] IN BLOOD BY AUTOMATED COUNT: 13.2 % (ref 11.5–14.5)
EST. GFR  (AFRICAN AMERICAN): >60 ML/MIN/1.73 M^2
EST. GFR  (NON AFRICAN AMERICAN): >60 ML/MIN/1.73 M^2
GLUCOSE SERPL-MCNC: 92 MG/DL (ref 70–110)
HCT VFR BLD AUTO: 44.9 % (ref 37–48.5)
HGB BLD-MCNC: 14.1 G/DL (ref 12–16)
IMM GRANULOCYTES # BLD AUTO: 0.01 K/UL (ref 0–0.04)
IMM GRANULOCYTES NFR BLD AUTO: 0.2 % (ref 0–0.5)
LYMPHOCYTES # BLD AUTO: 1.6 K/UL (ref 1–4.8)
LYMPHOCYTES NFR BLD: 36.2 % (ref 18–48)
MCH RBC QN AUTO: 28.4 PG (ref 27–31)
MCHC RBC AUTO-ENTMCNC: 31.4 G/DL (ref 32–36)
MCV RBC AUTO: 91 FL (ref 82–98)
MONOCYTES # BLD AUTO: 0.5 K/UL (ref 0.3–1)
MONOCYTES NFR BLD: 11.1 % (ref 4–15)
NEUTROPHILS # BLD AUTO: 2.1 K/UL (ref 1.8–7.7)
NEUTROPHILS NFR BLD: 48.5 % (ref 38–73)
NRBC BLD-RTO: 0 /100 WBC
PLATELET # BLD AUTO: 182 K/UL (ref 150–350)
PMV BLD AUTO: 10.6 FL (ref 9.2–12.9)
POC ACTIVATED CLOTTING TIME K: 235 SEC (ref 74–137)
POC ACTIVATED CLOTTING TIME K: 301 SEC (ref 74–137)
POC ACTIVATED CLOTTING TIME K: 307 SEC (ref 74–137)
POTASSIUM SERPL-SCNC: 4.2 MMOL/L (ref 3.5–5.1)
RBC # BLD AUTO: 4.96 M/UL (ref 4–5.4)
SAMPLE: ABNORMAL
SARS-COV-2 RDRP RESP QL NAA+PROBE: NEGATIVE
SODIUM SERPL-SCNC: 141 MMOL/L (ref 136–145)
WBC # BLD AUTO: 4.31 K/UL (ref 3.9–12.7)

## 2021-02-01 PROCEDURE — 93010 EKG 12-LEAD: ICD-10-PCS | Mod: 59,,, | Performed by: INTERNAL MEDICINE

## 2021-02-01 PROCEDURE — 99152 MOD SED SAME PHYS/QHP 5/>YRS: CPT | Performed by: INTERNAL MEDICINE

## 2021-02-01 PROCEDURE — 99233 SBSQ HOSP IP/OBS HIGH 50: CPT | Mod: 25,,, | Performed by: INTERNAL MEDICINE

## 2021-02-01 PROCEDURE — C1874 STENT, COATED/COV W/DEL SYS: HCPCS | Performed by: INTERNAL MEDICINE

## 2021-02-01 PROCEDURE — C1894 INTRO/SHEATH, NON-LASER: HCPCS | Performed by: INTERNAL MEDICINE

## 2021-02-01 PROCEDURE — 92928 PR STENT: ICD-10-PCS | Mod: RC,,, | Performed by: INTERNAL MEDICINE

## 2021-02-01 PROCEDURE — 25500020 PHARM REV CODE 255: Performed by: INTERNAL MEDICINE

## 2021-02-01 PROCEDURE — 63600175 PHARM REV CODE 636 W HCPCS: Performed by: INTERNAL MEDICINE

## 2021-02-01 PROCEDURE — C1725 CATH, TRANSLUMIN NON-LASER: HCPCS | Performed by: INTERNAL MEDICINE

## 2021-02-01 PROCEDURE — U0002 COVID-19 LAB TEST NON-CDC: HCPCS

## 2021-02-01 PROCEDURE — 99233 SBSQ HOSP IP/OBS HIGH 50: CPT | Mod: ,,, | Performed by: FAMILY MEDICINE

## 2021-02-01 PROCEDURE — 93005 ELECTROCARDIOGRAM TRACING: CPT

## 2021-02-01 PROCEDURE — 93458 PR CATH PLACE/CORON ANGIO, IMG SUPER/INTERP,W LEFT HEART VENTRICULOGRAPHY: ICD-10-PCS | Mod: 26,59,, | Performed by: INTERNAL MEDICINE

## 2021-02-01 PROCEDURE — 25000003 PHARM REV CODE 250: Performed by: FAMILY MEDICINE

## 2021-02-01 PROCEDURE — 93010 ELECTROCARDIOGRAM REPORT: CPT | Mod: 59,,, | Performed by: INTERNAL MEDICINE

## 2021-02-01 PROCEDURE — 94761 N-INVAS EAR/PLS OXIMETRY MLT: CPT

## 2021-02-01 PROCEDURE — C1769 GUIDE WIRE: HCPCS | Performed by: INTERNAL MEDICINE

## 2021-02-01 PROCEDURE — 63600175 PHARM REV CODE 636 W HCPCS: Mod: JG

## 2021-02-01 PROCEDURE — 85730 THROMBOPLASTIN TIME PARTIAL: CPT

## 2021-02-01 PROCEDURE — 25000003 PHARM REV CODE 250: Performed by: INTERNAL MEDICINE

## 2021-02-01 PROCEDURE — 93458 L HRT ARTERY/VENTRICLE ANGIO: CPT | Mod: 26,59,, | Performed by: INTERNAL MEDICINE

## 2021-02-01 PROCEDURE — C9600 PERC DRUG-EL COR STENT SING: HCPCS | Mod: RC | Performed by: INTERNAL MEDICINE

## 2021-02-01 PROCEDURE — 92928 PRQ TCAT PLMT NTRAC ST 1 LES: CPT | Mod: RC,,, | Performed by: INTERNAL MEDICINE

## 2021-02-01 PROCEDURE — 93458 L HRT ARTERY/VENTRICLE ANGIO: CPT | Mod: 59 | Performed by: INTERNAL MEDICINE

## 2021-02-01 PROCEDURE — 25000003 PHARM REV CODE 250: Performed by: NURSE PRACTITIONER

## 2021-02-01 PROCEDURE — 99152 MOD SED SAME PHYS/QHP 5/>YRS: CPT | Mod: ,,, | Performed by: INTERNAL MEDICINE

## 2021-02-01 PROCEDURE — 85025 COMPLETE CBC W/AUTO DIFF WBC: CPT

## 2021-02-01 PROCEDURE — 99153 MOD SED SAME PHYS/QHP EA: CPT | Performed by: INTERNAL MEDICINE

## 2021-02-01 PROCEDURE — 99152 PR MOD CONSCIOUS SEDATION, SAME PHYS, 5+ YRS, FIRST 15 MIN: ICD-10-PCS | Mod: ,,, | Performed by: INTERNAL MEDICINE

## 2021-02-01 PROCEDURE — 85347 COAGULATION TIME ACTIVATED: CPT | Performed by: INTERNAL MEDICINE

## 2021-02-01 PROCEDURE — 21400001 HC TELEMETRY ROOM

## 2021-02-01 PROCEDURE — 36415 COLL VENOUS BLD VENIPUNCTURE: CPT

## 2021-02-01 PROCEDURE — C1887 CATHETER, GUIDING: HCPCS | Performed by: INTERNAL MEDICINE

## 2021-02-01 PROCEDURE — 99233 PR SUBSEQUENT HOSPITAL CARE,LEVL III: ICD-10-PCS | Mod: ,,, | Performed by: FAMILY MEDICINE

## 2021-02-01 PROCEDURE — 99233 PR SUBSEQUENT HOSPITAL CARE,LEVL III: ICD-10-PCS | Mod: 25,,, | Performed by: INTERNAL MEDICINE

## 2021-02-01 PROCEDURE — 80048 BASIC METABOLIC PNL TOTAL CA: CPT

## 2021-02-01 PROCEDURE — 27201423 OPTIME MED/SURG SUP & DEVICES STERILE SUPPLY: Performed by: INTERNAL MEDICINE

## 2021-02-01 DEVICE — IMPLANTABLE DEVICE
Type: IMPLANTABLE DEVICE | Site: HEART | Status: FUNCTIONAL
Brand: ORSIRO

## 2021-02-01 RX ORDER — SODIUM CHLORIDE 9 MG/ML
INJECTION, SOLUTION INTRAVENOUS CONTINUOUS
Status: ACTIVE | OUTPATIENT
Start: 2021-02-01 | End: 2021-02-02

## 2021-02-01 RX ORDER — MORPHINE SULFATE 2 MG/ML
2 INJECTION, SOLUTION INTRAMUSCULAR; INTRAVENOUS EVERY 4 HOURS PRN
Status: DISCONTINUED | OUTPATIENT
Start: 2021-02-01 | End: 2021-02-02 | Stop reason: HOSPADM

## 2021-02-01 RX ORDER — LIDOCAINE HYDROCHLORIDE 20 MG/ML
INJECTION, SOLUTION EPIDURAL; INFILTRATION; INTRACAUDAL; PERINEURAL
Status: DISCONTINUED | OUTPATIENT
Start: 2021-02-01 | End: 2021-02-01 | Stop reason: HOSPADM

## 2021-02-01 RX ORDER — MIDAZOLAM HYDROCHLORIDE 1 MG/ML
INJECTION, SOLUTION INTRAMUSCULAR; INTRAVENOUS
Status: DISCONTINUED | OUTPATIENT
Start: 2021-02-01 | End: 2021-02-01 | Stop reason: HOSPADM

## 2021-02-01 RX ORDER — HYDROMORPHONE HYDROCHLORIDE 1 MG/ML
0.5 INJECTION, SOLUTION INTRAMUSCULAR; INTRAVENOUS; SUBCUTANEOUS ONCE
Status: COMPLETED | OUTPATIENT
Start: 2021-02-01 | End: 2021-02-01

## 2021-02-01 RX ORDER — FENTANYL CITRATE 50 UG/ML
INJECTION, SOLUTION INTRAMUSCULAR; INTRAVENOUS
Status: DISCONTINUED | OUTPATIENT
Start: 2021-02-01 | End: 2021-02-01 | Stop reason: HOSPADM

## 2021-02-01 RX ORDER — CLOPIDOGREL 300 MG/1
TABLET, FILM COATED ORAL
Status: DISCONTINUED | OUTPATIENT
Start: 2021-02-01 | End: 2021-02-01 | Stop reason: HOSPADM

## 2021-02-01 RX ORDER — HEPARIN SODIUM 1000 [USP'U]/ML
INJECTION INTRAVENOUS; SUBCUTANEOUS
Status: DISCONTINUED | OUTPATIENT
Start: 2021-02-01 | End: 2021-02-01 | Stop reason: HOSPADM

## 2021-02-01 RX ORDER — HYDRALAZINE HYDROCHLORIDE 20 MG/ML
INJECTION INTRAMUSCULAR; INTRAVENOUS
Status: DISCONTINUED | OUTPATIENT
Start: 2021-02-01 | End: 2021-02-01 | Stop reason: HOSPADM

## 2021-02-01 RX ORDER — ONDANSETRON 8 MG/1
8 TABLET, ORALLY DISINTEGRATING ORAL EVERY 8 HOURS PRN
Status: DISCONTINUED | OUTPATIENT
Start: 2021-02-01 | End: 2021-02-02 | Stop reason: HOSPADM

## 2021-02-01 RX ORDER — TIROFIBAN HYDROCHLORIDE 50 UG/ML
INJECTION INTRAVENOUS
Status: DISCONTINUED | OUTPATIENT
Start: 2021-02-01 | End: 2021-02-02 | Stop reason: HOSPADM

## 2021-02-01 RX ORDER — NITROGLYCERIN 5 MG/ML
INJECTION, SOLUTION INTRAVENOUS
Status: DISCONTINUED | OUTPATIENT
Start: 2021-02-01 | End: 2021-02-01 | Stop reason: HOSPADM

## 2021-02-01 RX ORDER — HYDROCODONE BITARTRATE AND ACETAMINOPHEN 5; 325 MG/1; MG/1
1 TABLET ORAL EVERY 4 HOURS PRN
Status: DISCONTINUED | OUTPATIENT
Start: 2021-02-01 | End: 2021-02-02 | Stop reason: HOSPADM

## 2021-02-01 RX ADMIN — ACETAMINOPHEN 650 MG: 325 TABLET ORAL at 09:02

## 2021-02-01 RX ADMIN — MORPHINE SULFATE 2 MG: 2 INJECTION, SOLUTION INTRAMUSCULAR; INTRAVENOUS at 11:02

## 2021-02-01 RX ADMIN — LISINOPRIL 20 MG: 20 TABLET ORAL at 09:02

## 2021-02-01 RX ADMIN — METOPROLOL TARTRATE 50 MG: 50 TABLET, FILM COATED ORAL at 11:02

## 2021-02-01 RX ADMIN — DOCUSATE SODIUM 100 MG: 100 CAPSULE ORAL at 09:02

## 2021-02-01 RX ADMIN — LORAZEPAM 0.5 MG: 0.5 TABLET ORAL at 09:02

## 2021-02-01 RX ADMIN — METOPROLOL TARTRATE 50 MG: 50 TABLET, FILM COATED ORAL at 09:02

## 2021-02-01 RX ADMIN — CLOPIDOGREL 75 MG: 75 TABLET, FILM COATED ORAL at 09:02

## 2021-02-01 RX ADMIN — MUPIROCIN: 20 OINTMENT TOPICAL at 11:02

## 2021-02-01 RX ADMIN — LEVOTHYROXINE SODIUM 25 MCG: 25 TABLET ORAL at 05:02

## 2021-02-01 RX ADMIN — HYDROCHLOROTHIAZIDE 25 MG: 25 TABLET ORAL at 09:02

## 2021-02-01 RX ADMIN — MUPIROCIN: 20 OINTMENT TOPICAL at 09:02

## 2021-02-01 RX ADMIN — LISINOPRIL 20 MG: 20 TABLET ORAL at 11:02

## 2021-02-01 RX ADMIN — HYDROMORPHONE HYDROCHLORIDE 0.5 MG: 1 INJECTION, SOLUTION INTRAMUSCULAR; INTRAVENOUS; SUBCUTANEOUS at 05:02

## 2021-02-01 RX ADMIN — DOCUSATE SODIUM 100 MG: 100 CAPSULE ORAL at 11:02

## 2021-02-01 RX ADMIN — PRAVASTATIN SODIUM 40 MG: 20 TABLET ORAL at 09:02

## 2021-02-01 RX ADMIN — MORPHINE SULFATE 2 MG: 2 INJECTION, SOLUTION INTRAMUSCULAR; INTRAVENOUS at 02:02

## 2021-02-01 RX ADMIN — ASPIRIN 81 MG: 81 TABLET, COATED ORAL at 09:02

## 2021-02-02 VITALS
WEIGHT: 91.5 LBS | RESPIRATION RATE: 18 BRPM | HEART RATE: 52 BPM | SYSTOLIC BLOOD PRESSURE: 112 MMHG | BODY MASS INDEX: 16.21 KG/M2 | OXYGEN SATURATION: 96 % | HEIGHT: 63 IN | TEMPERATURE: 98 F | DIASTOLIC BLOOD PRESSURE: 58 MMHG

## 2021-02-02 PROBLEM — I95.2 HYPOTENSION DUE TO DRUGS: Status: ACTIVE | Noted: 2021-02-02

## 2021-02-02 PROBLEM — I10 ACCELERATED HYPERTENSION: Status: RESOLVED | Noted: 2021-01-30 | Resolved: 2021-02-02

## 2021-02-02 PROBLEM — I21.4 NSTEMI (NON-ST ELEVATED MYOCARDIAL INFARCTION): Status: RESOLVED | Noted: 2021-01-30 | Resolved: 2021-02-02

## 2021-02-02 PROBLEM — I95.2 HYPOTENSION DUE TO DRUGS: Status: RESOLVED | Noted: 2021-02-02 | Resolved: 2021-02-02

## 2021-02-02 LAB
ALBUMIN SERPL BCP-MCNC: 2.7 G/DL (ref 3.5–5.2)
ALP SERPL-CCNC: 51 U/L (ref 55–135)
ALT SERPL W/O P-5'-P-CCNC: 11 U/L (ref 10–44)
ANION GAP SERPL CALC-SCNC: 9 MMOL/L (ref 8–16)
AST SERPL-CCNC: 15 U/L (ref 10–40)
BASOPHILS # BLD AUTO: 0.03 K/UL (ref 0–0.2)
BASOPHILS NFR BLD: 0.5 % (ref 0–1.9)
BILIRUB SERPL-MCNC: 0.4 MG/DL (ref 0.1–1)
BUN SERPL-MCNC: 14 MG/DL (ref 8–23)
CALCIUM SERPL-MCNC: 8.2 MG/DL (ref 8.7–10.5)
CHLORIDE SERPL-SCNC: 108 MMOL/L (ref 95–110)
CO2 SERPL-SCNC: 22 MMOL/L (ref 23–29)
CREAT SERPL-MCNC: 0.6 MG/DL (ref 0.5–1.4)
DIFFERENTIAL METHOD: ABNORMAL
EOSINOPHIL # BLD AUTO: 0.1 K/UL (ref 0–0.5)
EOSINOPHIL NFR BLD: 1 % (ref 0–8)
ERYTHROCYTE [DISTWIDTH] IN BLOOD BY AUTOMATED COUNT: 13.2 % (ref 11.5–14.5)
EST. GFR  (AFRICAN AMERICAN): >60 ML/MIN/1.73 M^2
EST. GFR  (NON AFRICAN AMERICAN): >60 ML/MIN/1.73 M^2
GLUCOSE SERPL-MCNC: 96 MG/DL (ref 70–110)
HCT VFR BLD AUTO: 37.7 % (ref 37–48.5)
HGB BLD-MCNC: 11.8 G/DL (ref 12–16)
IMM GRANULOCYTES # BLD AUTO: 0.01 K/UL (ref 0–0.04)
IMM GRANULOCYTES NFR BLD AUTO: 0.2 % (ref 0–0.5)
LYMPHOCYTES # BLD AUTO: 1.3 K/UL (ref 1–4.8)
LYMPHOCYTES NFR BLD: 22.5 % (ref 18–48)
MCH RBC QN AUTO: 28.3 PG (ref 27–31)
MCHC RBC AUTO-ENTMCNC: 31.3 G/DL (ref 32–36)
MCV RBC AUTO: 90 FL (ref 82–98)
MONOCYTES # BLD AUTO: 0.5 K/UL (ref 0.3–1)
MONOCYTES NFR BLD: 9.3 % (ref 4–15)
NEUTROPHILS # BLD AUTO: 3.9 K/UL (ref 1.8–7.7)
NEUTROPHILS NFR BLD: 66.5 % (ref 38–73)
NRBC BLD-RTO: 0 /100 WBC
PLATELET # BLD AUTO: 153 K/UL (ref 150–350)
PMV BLD AUTO: 10.9 FL (ref 9.2–12.9)
POCT GLUCOSE: 85 MG/DL (ref 70–110)
POTASSIUM SERPL-SCNC: 4.1 MMOL/L (ref 3.5–5.1)
PROT SERPL-MCNC: 5.3 G/DL (ref 6–8.4)
RBC # BLD AUTO: 4.17 M/UL (ref 4–5.4)
SODIUM SERPL-SCNC: 139 MMOL/L (ref 136–145)
WBC # BLD AUTO: 5.83 K/UL (ref 3.9–12.7)

## 2021-02-02 PROCEDURE — 99239 HOSP IP/OBS DSCHRG MGMT >30: CPT | Mod: ,,, | Performed by: FAMILY MEDICINE

## 2021-02-02 PROCEDURE — 99233 SBSQ HOSP IP/OBS HIGH 50: CPT | Mod: ,,, | Performed by: INTERNAL MEDICINE

## 2021-02-02 PROCEDURE — 25000003 PHARM REV CODE 250: Performed by: INTERNAL MEDICINE

## 2021-02-02 PROCEDURE — 99239 PR HOSPITAL DISCHARGE DAY,>30 MIN: ICD-10-PCS | Mod: ,,, | Performed by: FAMILY MEDICINE

## 2021-02-02 PROCEDURE — 25000003 PHARM REV CODE 250: Performed by: FAMILY MEDICINE

## 2021-02-02 PROCEDURE — 36415 COLL VENOUS BLD VENIPUNCTURE: CPT

## 2021-02-02 PROCEDURE — 93010 ELECTROCARDIOGRAM REPORT: CPT | Mod: ,,, | Performed by: INTERNAL MEDICINE

## 2021-02-02 PROCEDURE — 99233 PR SUBSEQUENT HOSPITAL CARE,LEVL III: ICD-10-PCS | Mod: ,,, | Performed by: INTERNAL MEDICINE

## 2021-02-02 PROCEDURE — 93005 ELECTROCARDIOGRAM TRACING: CPT

## 2021-02-02 PROCEDURE — 93010 EKG 12-LEAD: ICD-10-PCS | Mod: ,,, | Performed by: INTERNAL MEDICINE

## 2021-02-02 PROCEDURE — 83036 HEMOGLOBIN GLYCOSYLATED A1C: CPT

## 2021-02-02 PROCEDURE — 85025 COMPLETE CBC W/AUTO DIFF WBC: CPT

## 2021-02-02 PROCEDURE — 80053 COMPREHEN METABOLIC PANEL: CPT

## 2021-02-02 PROCEDURE — 25000003 PHARM REV CODE 250: Performed by: NURSE PRACTITIONER

## 2021-02-02 RX ORDER — FAMOTIDINE 20 MG/50ML
20 INJECTION, SOLUTION INTRAVENOUS ONCE AS NEEDED
Status: DISCONTINUED | OUTPATIENT
Start: 2021-02-02 | End: 2021-02-02 | Stop reason: HOSPADM

## 2021-02-02 RX ORDER — CLOPIDOGREL BISULFATE 75 MG/1
TABLET ORAL
Qty: 14 TABLET | Refills: 0 | Status: SHIPPED | OUTPATIENT
Start: 2021-02-02 | End: 2022-10-28

## 2021-02-02 RX ORDER — NAPROXEN 250 MG/1
250 TABLET ORAL 2 TIMES DAILY WITH MEALS
Status: DISCONTINUED | OUTPATIENT
Start: 2021-02-02 | End: 2021-02-02 | Stop reason: HOSPADM

## 2021-02-02 RX ORDER — ISOSORBIDE MONONITRATE 30 MG/1
30 TABLET, EXTENDED RELEASE ORAL DAILY
Status: DISCONTINUED | OUTPATIENT
Start: 2021-02-02 | End: 2021-02-02 | Stop reason: HOSPADM

## 2021-02-02 RX ORDER — HYDRALAZINE HYDROCHLORIDE 20 MG/ML
10 INJECTION INTRAMUSCULAR; INTRAVENOUS EVERY 4 HOURS PRN
Status: DISCONTINUED | OUTPATIENT
Start: 2021-02-02 | End: 2021-02-02 | Stop reason: HOSPADM

## 2021-02-02 RX ORDER — TIROFIBAN HYDROCHLORIDE 50 UG/ML
0.07 INJECTION INTRAVENOUS CONTINUOUS
Status: ACTIVE | OUTPATIENT
Start: 2021-02-02 | End: 2021-02-02

## 2021-02-02 RX ADMIN — NAPROXEN 250 MG: 250 TABLET ORAL at 08:02

## 2021-02-02 RX ADMIN — CLONIDINE HYDROCHLORIDE 0.2 MG: 0.2 TABLET ORAL at 05:02

## 2021-02-02 RX ADMIN — LEVOTHYROXINE SODIUM 25 MCG: 25 TABLET ORAL at 05:02

## 2021-02-02 RX ADMIN — DOCUSATE SODIUM 100 MG: 100 CAPSULE ORAL at 10:02

## 2021-02-02 RX ADMIN — PRAVASTATIN SODIUM 40 MG: 20 TABLET ORAL at 10:02

## 2021-02-02 RX ADMIN — HYDROCODONE BITARTRATE AND ACETAMINOPHEN 1 TABLET: 5; 325 TABLET ORAL at 11:02

## 2021-02-02 RX ADMIN — MUPIROCIN: 20 OINTMENT TOPICAL at 10:02

## 2021-02-02 RX ADMIN — HYDROCHLOROTHIAZIDE 25 MG: 25 TABLET ORAL at 11:02

## 2021-02-02 RX ADMIN — ASPIRIN 81 MG: 81 TABLET, COATED ORAL at 10:02

## 2021-02-02 RX ADMIN — CLOPIDOGREL 75 MG: 75 TABLET, FILM COATED ORAL at 10:02

## 2021-02-03 ENCOUNTER — PATIENT OUTREACH (OUTPATIENT)
Dept: ADMINISTRATIVE | Facility: CLINIC | Age: 71
End: 2021-02-03

## 2021-02-03 LAB
ESTIMATED AVG GLUCOSE: 108 MG/DL (ref 68–131)
HBA1C MFR BLD: 5.4 % (ref 4–5.6)

## 2021-02-04 ENCOUNTER — HISTORICAL (OUTPATIENT)
Dept: RESPIRATORY THERAPY | Facility: HOSPITAL | Age: 71
End: 2021-02-04

## 2021-02-18 ENCOUNTER — TELEPHONE (OUTPATIENT)
Dept: CARDIOLOGY | Facility: HOSPITAL | Age: 71
End: 2021-02-18

## 2021-04-14 ENCOUNTER — HISTORICAL (OUTPATIENT)
Dept: RADIOLOGY | Facility: HOSPITAL | Age: 71
End: 2021-04-14

## 2021-06-24 ENCOUNTER — HISTORICAL (OUTPATIENT)
Dept: RADIOLOGY | Facility: HOSPITAL | Age: 71
End: 2021-06-24

## 2021-06-24 LAB
ABS NEUT (OLG): 4.75
ALBUMIN SERPL-MCNC: 3.5 GM/DL (ref 3.4–4.8)
ALBUMIN/GLOB SERPL: 1 RATIO (ref 1.1–2)
ALP SERPL-CCNC: 72 UNIT/L (ref 40–150)
ALT SERPL-CCNC: 13 UNIT/L (ref 0–55)
AST SERPL-CCNC: 23 UNIT/L (ref 5–34)
BASOPHILS # BLD AUTO: 0.04 X10(3)/MCL
BASOPHILS NFR BLD AUTO: 0.5 %
BILIRUB SERPL-MCNC: 0.4 MG/DL
BILIRUBIN DIRECT+TOT PNL SERPL-MCNC: 0.2 MG/DL
BILIRUBIN DIRECT+TOT PNL SERPL-MCNC: 0.2 MG/DL (ref 0–0.5)
BUN SERPL-MCNC: 16 MG/DL (ref 9.8–20.1)
CALCIUM SERPL-MCNC: 9.4 MG/DL (ref 8.4–10.2)
CHLORIDE SERPL-SCNC: 107 MMOL/L (ref 98–107)
CHOLEST SERPL-MCNC: 144 MG/DL
CHOLEST/HDLC SERPL: 4 {RATIO} (ref 0–5)
CO2 SERPL-SCNC: 29 MEQ/L (ref 23–31)
CREAT SERPL-MCNC: 0.64 MG/DL (ref 0.55–1.02)
EOSINOPHIL # BLD AUTO: 0.15 X10(3)/MCL
EOSINOPHIL NFR BLD AUTO: 2 %
ERYTHROCYTE [DISTWIDTH] IN BLOOD BY AUTOMATED COUNT: 15 %
GLOBULIN SER-MCNC: 3.6 GM/DL (ref 2.4–3.5)
GLUCOSE SERPL-MCNC: 88 MG/DL (ref 82–115)
HCT VFR BLD AUTO: 47.6 % (ref 34–46)
HDLC SERPL-MCNC: 36 MG/DL (ref 35–60)
HGB BLD-MCNC: 15.1 GM/DL (ref 11.3–15.4)
IMM GRANULOCYTES # BLD AUTO: 0.16 10*3/UL (ref 0–0.1)
IMM GRANULOCYTES NFR BLD AUTO: 2.2 % (ref 0–1)
LDLC SERPL CALC-MCNC: 76 MG/DL (ref 50–140)
LYMPHOCYTES # BLD AUTO: 1.64 X10(3)/MCL
LYMPHOCYTES NFR BLD AUTO: 22.3 %
MCH RBC QN AUTO: 27.9 PG (ref 27–33)
MCHC RBC AUTO-ENTMCNC: 31.7 GM/DL (ref 32–35)
MCV RBC AUTO: 88 FL (ref 81–97)
MONOCYTES # BLD AUTO: 0.63 X10(3)/MCL
MONOCYTES NFR BLD AUTO: 8.5 %
NEUTROPHILS # BLD AUTO: 4.75 X10(3)/MCL
NEUTROPHILS NFR BLD AUTO: 64.5 %
PLATELET # BLD AUTO: 245 X10(3)/MCL (ref 140–450)
PMV BLD AUTO: 11 FL
POTASSIUM SERPL-SCNC: 3.9 MMOL/L (ref 3.5–5.1)
PROT SERPL-MCNC: 7.1 GM/DL (ref 5.8–7.6)
RBC # BLD AUTO: 5.41 X10(6)/MCL (ref 3.9–5)
SODIUM SERPL-SCNC: 144 MMOL/L (ref 136–145)
TRIGL SERPL-MCNC: 162 MG/DL (ref 37–140)
TSH SERPL-ACNC: 2.15 UIU/ML (ref 0.35–4.94)
VLDLC SERPL CALC-MCNC: 32 MG/DL
WBC # SPEC AUTO: 7.37 X10(3)/MCL (ref 3.4–9.2)

## 2022-10-28 ENCOUNTER — OFFICE VISIT (OUTPATIENT)
Dept: FAMILY MEDICINE | Facility: CLINIC | Age: 72
End: 2022-10-28
Payer: MEDICARE

## 2022-10-28 VITALS
SYSTOLIC BLOOD PRESSURE: 134 MMHG | RESPIRATION RATE: 18 BRPM | OXYGEN SATURATION: 96 % | HEIGHT: 63 IN | WEIGHT: 90 LBS | TEMPERATURE: 96 F | BODY MASS INDEX: 15.95 KG/M2 | DIASTOLIC BLOOD PRESSURE: 88 MMHG | HEART RATE: 67 BPM

## 2022-10-28 DIAGNOSIS — M81.0 AGE-RELATED OSTEOPOROSIS WITHOUT CURRENT PATHOLOGICAL FRACTURE: ICD-10-CM

## 2022-10-28 DIAGNOSIS — F41.9 ANXIETY DISORDER, UNSPECIFIED TYPE: Chronic | ICD-10-CM

## 2022-10-28 DIAGNOSIS — E03.9 HYPOTHYROIDISM, UNSPECIFIED TYPE: ICD-10-CM

## 2022-10-28 DIAGNOSIS — J44.9 CHRONIC OBSTRUCTIVE PULMONARY DISEASE, UNSPECIFIED COPD TYPE: Chronic | ICD-10-CM

## 2022-10-28 DIAGNOSIS — I20.9 ANGINA PECTORIS: ICD-10-CM

## 2022-10-28 DIAGNOSIS — I25.10 CORONARY ARTERY DISEASE, UNSPECIFIED VESSEL OR LESION TYPE, UNSPECIFIED WHETHER ANGINA PRESENT, UNSPECIFIED WHETHER NATIVE OR TRANSPLANTED HEART: ICD-10-CM

## 2022-10-28 DIAGNOSIS — Z00.00 WELLNESS EXAMINATION: Primary | ICD-10-CM

## 2022-10-28 DIAGNOSIS — Z12.11 COLON CANCER SCREENING: ICD-10-CM

## 2022-10-28 DIAGNOSIS — F17.210 CIGARETTE SMOKER: ICD-10-CM

## 2022-10-28 PROCEDURE — 3079F DIAST BP 80-89 MM HG: CPT | Mod: CPTII,,, | Performed by: FAMILY MEDICINE

## 2022-10-28 PROCEDURE — 1125F PR PAIN SEVERITY QUANTIFIED, PAIN PRESENT: ICD-10-PCS | Mod: CPTII,,, | Performed by: FAMILY MEDICINE

## 2022-10-28 PROCEDURE — 3288F PR FALLS RISK ASSESSMENT DOCUMENTED: ICD-10-PCS | Mod: CPTII,,, | Performed by: FAMILY MEDICINE

## 2022-10-28 PROCEDURE — G0439 PR MEDICARE ANNUAL WELLNESS SUBSEQUENT VISIT: ICD-10-PCS | Mod: ,,, | Performed by: FAMILY MEDICINE

## 2022-10-28 PROCEDURE — 1100F PR PT FALLS ASSESS DOC 2+ FALLS/FALL W/INJURY/YR: ICD-10-PCS | Mod: CPTII,,, | Performed by: FAMILY MEDICINE

## 2022-10-28 PROCEDURE — 1100F PTFALLS ASSESS-DOCD GE2>/YR: CPT | Mod: CPTII,,, | Performed by: FAMILY MEDICINE

## 2022-10-28 PROCEDURE — 3288F FALL RISK ASSESSMENT DOCD: CPT | Mod: CPTII,,, | Performed by: FAMILY MEDICINE

## 2022-10-28 PROCEDURE — 1159F PR MEDICATION LIST DOCUMENTED IN MEDICAL RECORD: ICD-10-PCS | Mod: CPTII,,, | Performed by: FAMILY MEDICINE

## 2022-10-28 PROCEDURE — 3075F SYST BP GE 130 - 139MM HG: CPT | Mod: CPTII,,, | Performed by: FAMILY MEDICINE

## 2022-10-28 PROCEDURE — 1125F AMNT PAIN NOTED PAIN PRSNT: CPT | Mod: CPTII,,, | Performed by: FAMILY MEDICINE

## 2022-10-28 PROCEDURE — 3079F PR MOST RECENT DIASTOLIC BLOOD PRESSURE 80-89 MM HG: ICD-10-PCS | Mod: CPTII,,, | Performed by: FAMILY MEDICINE

## 2022-10-28 PROCEDURE — 3075F PR MOST RECENT SYSTOLIC BLOOD PRESS GE 130-139MM HG: ICD-10-PCS | Mod: CPTII,,, | Performed by: FAMILY MEDICINE

## 2022-10-28 PROCEDURE — G0439 PPPS, SUBSEQ VISIT: HCPCS | Mod: ,,, | Performed by: FAMILY MEDICINE

## 2022-10-28 PROCEDURE — 4010F ACE/ARB THERAPY RXD/TAKEN: CPT | Mod: CPTII,,, | Performed by: FAMILY MEDICINE

## 2022-10-28 PROCEDURE — 4010F PR ACE/ARB THEARPY RXD/TAKEN: ICD-10-PCS | Mod: CPTII,,, | Performed by: FAMILY MEDICINE

## 2022-10-28 PROCEDURE — 1159F MED LIST DOCD IN RCRD: CPT | Mod: CPTII,,, | Performed by: FAMILY MEDICINE

## 2022-10-28 RX ORDER — TRAMADOL HYDROCHLORIDE 50 MG/1
TABLET ORAL
COMMUNITY
End: 2022-10-28

## 2022-10-28 RX ORDER — LORAZEPAM 0.5 MG/1
0.5 TABLET ORAL EVERY 6 HOURS PRN
Qty: 90 TABLET | Refills: 1 | Status: SHIPPED | OUTPATIENT
Start: 2022-10-28 | End: 2023-05-15

## 2022-10-28 RX ORDER — NITROGLYCERIN 400 UG/1
SPRAY ORAL
COMMUNITY

## 2022-10-28 RX ORDER — METOPROLOL SUCCINATE 25 MG/1
TABLET, EXTENDED RELEASE ORAL
COMMUNITY
End: 2022-10-28 | Stop reason: SDUPTHER

## 2022-10-28 RX ORDER — METOPROLOL SUCCINATE 25 MG/1
TABLET, EXTENDED RELEASE ORAL
Qty: 90 TABLET | Refills: 1 | Status: SHIPPED | OUTPATIENT
Start: 2022-10-28 | End: 2022-10-31 | Stop reason: SDUPTHER

## 2022-10-28 NOTE — PROGRESS NOTES
Patient ID: [id number]     Chief Complaint: Establish Care, Medication Management, daily am HA, and Dizziness      HPI:     Cierra Main is a 72 y.o. female here today for a Medicare Wellness.       Opioid Screening: Patient medication list reviewed, patient is not taking prescription opioids. Patient is not using additional opioids than prescribed. Patient is not at low risk of substance abuse based on this opioid use history.       ----------------------------  Acute MI  Anxiety  Cardiac disease  COPD (chronic obstructive pulmonary disease)  Coronary artery disease  Hyperlipidemia  Hypertension  Hypothyroidism  Other specified noninfective gastroenteritis and colitis  Unspecified macular degeneration     Past Surgical History:   Procedure Laterality Date    CORONARY ANGIOGRAPHY N/A 2/1/2021    Procedure: ANGIOGRAM, CORONARY ARTERY;  Surgeon: Devon Medeiros MD;  Location: [facility] CATH LAB;  Service: Cardiology;  Laterality: N/A;    CORONARY ANGIOPLASTY WITH STENT PLACEMENT      LEFT HEART CATHETERIZATION Left 2/1/2021    Procedure: CATHETERIZATION, HEART, LEFT;  Surgeon: Devon Medeiros MD;  Location: [facility] CATH LAB;  Service: Cardiology;  Laterality: Left;       Review of patient's allergies indicates:   Allergen Reactions    Amlodipine     Losartan        Outpatient Medications Marked as Taking for the 10/28/22 encounter (Office Visit) with Rafiq Eller MD   Medication Sig Dispense Refill    aspirin (ECOTRIN) 81 MG EC tablet Take 81 mg by mouth once daily.      levothyroxine (SYNTHROID) 25 MCG tablet Take 25 mcg by mouth before breakfast.      lisinopriL (PRINIVIL,ZESTRIL) 20 MG tablet Take 20 mg by mouth 2 (two) times daily.      metoprolol tartrate (LOPRESSOR) 50 MG tablet Take 50 mg by mouth 2 (two) times daily.      nitroGLYCERIN 0.4 MG/DOSE TL SPRY (NITROLINGUAL) 400 mcg/spray spray nitroglycerin 400 mcg/spray translingual aerosol   at onset of chest pain may take up to three sprays every 5min during a  15 min period      pravastatin (PRAVACHOL) 40 MG tablet Take 40 mg by mouth once daily.      [DISCONTINUED] LORazepam (ATIVAN) 0.5 MG tablet Take 0.5 mg by mouth every 6 (six) hours as needed for Anxiety.         Social History     Socioeconomic History    Marital status:    Tobacco Use    Smoking status: Every Day     Packs/day: 0.50     Years: 55.00     Pack years: 27.50     Types: Cigarettes    Smokeless tobacco: Never   Substance and Sexual Activity    Alcohol use: Never    Drug use: Never        Family History   Problem Relation Age of Onset    Diabetes Mother     Heart disease Mother     Hypertension Father     Heart disease Father         Patient Care Team:  Wai Hand MD as PCP - General (Family Medicine)  Jaime Horan MD as Consulting Physician (Cardiology)  Arnold Herrera MD as Consulting Physician (Vascular Surgery)  Rafiq Eller MD as Consulting Physician (Family Medicine)       Subjective:     Review of Systems   Constitutional: Negative.    Genitourinary: Negative.    Musculoskeletal: Negative.    Review of Systems   Respiratory:          COPD: emphysema, currently smokin .5 pk/day/55 years   Cardiovascular:         CAD: hx of MI, stent placements   Neurological:  Positive for dizziness.   Psychiatric/Behavioral:          Anxiety: taking medication as needed, needs Rx     Patient Reported Health Risk Assessment  What [age] 70-79  Are you male or female?: Female  During the past four weeks, how much have you been bothered by emotional problems such as feeling anxious, depressed, irritable, sad, or downhearted and blue?: Not at all  During the past five weeks, has your physical and/or emotional health limited your social activities with family, friends, neighbors, or groups?: Not at all  During the past four weeks, how much bodily pain have you generally had?: No pain  During the past four weeks, was someone available to help if you needed and wanted help?: No,  not at all  During the past four weeks, what was the hardest physical activity you could do for at least two minutes?: Light  Can you get to places out of walking distance without help?  (For example, can you travel alone on buses or taxis, or drive your own car?): No  Can you go shopping for groceries or clothes without someone's help?: No  Can you prepare your own meals?: No  Can you do your own housework without help?: No  Because of any health problems, do you need the help of another person with your personal care needs such as eating, bathing, dressing, or getting around the house?: Yes  Can you handle your own money without help?: No  During the past four weeks, how would you rate your health in general?: Good  How have things been going for you during the past four weeks?: Good and bad parts about equal  Are you having difficulties driving your car?: No  Do you always fasten your seat belt when you are in a car?: Yes, sometimes  How often in the past four weeks have you been bothered by falling or dizzy when standing up?: Never  How often in the past four weeks have you been bothered by sexual problems?: Never  How often in the past four weeks have you been bothered by trouble eating well?: Never  How often in the past four weeks have you been bothered by teeth or denture problems?: Never  How often in the past four weeks have you been bothered with problems using the telephone?: Never  How often in the past four weeks have you been bothered by tiredness or fatigue?: Never  Have you fallen two or more times in the past year?: No  Are you afraid of falling?: No  Are you a smoker?: No  During the past four weeks, how many drinks of wine, beer, or other alcoholic beverages did you have?: No alcohol at all  Do you exercise for about 20 minutes three or more days a week?: No, I usually do not exercise this much  Have you been given any information to help you with hazards in your house that might hurt you?:  "Yes  Have you been given any information to help you with keeping track of your medications?: Yes  How often do you have trouble taking medicines the way you've been told to take them?: I always take them as prescribed  How confident are you that you can control and manage most of your health problems?: Very confident  What is your race? (Check all that apply.):     Objective:     /88 (BP Location: Right arm, Patient Position: Sitting, BP Method: Large (Manual))   Pulse 67   Temp 96 °F (35.6 °C)   Resp 18   Ht 5' 3" (1.6 m)   Wt 40.8 kg (90 lb)   SpO2 96%   BMI 15.94 kg/m²     Physical Exam  Constitutional:       General: She is not in acute distress.     Appearance: Normal appearance.   Cardiovascular:      Rate and Rhythm: Normal rate and regular rhythm.   Pulmonary:      Effort: Pulmonary effort is normal.      Breath sounds: Normal breath sounds.   Abdominal:      General: Abdomen is flat. Bowel sounds are normal.      Palpations: Abdomen is soft.   Musculoskeletal:         General: Normal range of motion.   Neurological:      Mental Status: She is alert.   Psychiatric:         Mood and Affect: Mood normal.         Behavior: Behavior normal.         Thought Content: Thought content normal.         Judgment: Judgment normal.         No flowsheet data found.  Fall Risk Assessment - Outpatient 10/28/2022   Mobility Status Ambulatory   Number of falls 1 with injury   Identified as fall risk 1           Depression Screening  Over the past two weeks, has the patient felt down, depressed, or hopeless?: No  Over the past two weeks, has the patient felt little interest or pleasure in doing things?: No  Functional Ability/Safety Screening  Was the patient's timed Up & Go test unsteady or longer than 30 seconds?: No  Does the patient need help with phone, transportation, shopping, preparing meals, housework, laundry, meds, or managing money?: No  Does the patient's home have rugs in the hallway, lack " "grab bars in the bathroom, lack handrails on the stairs or have poor lighting?: No  Have you noticed any hearing difficulties?: No  A "Yes" response to any of the above questions should trigger further investigation.: n  Cognitive Function (Assessed through direct observation with due consideration of information obtained by way of patient reports and/or concerns raised by family, friends, caretakers, or others)    Does the patient repeat questions/statements in the same day?: No  Does the patient have trouble remembering the date, year, and time?: No  Does the patient have difficulty managing finances?: No  Does the patient have a decreased sense of direction?: No  A "Yes" response to any of the above questions could indicate mild cognitive impairment and should trigger further investigation.: n  Assessment/Plan:     1. Wellness examination  -     CT Chest Lung Screening Low Dose; Future; Expected date: 10/28/2022  -     DXA Bone Density Spine And Hip; Future; Expected date: 10/28/2022  -     CBC Auto Differential; Future; Expected date: 10/28/2022  -     Comprehensive Metabolic Panel; Future; Expected date: 10/28/2022  -     Lipid Panel; Future; Expected date: 10/28/2022  -     TSH; Future; Expected date: 10/28/2022  Lab work scheduled  Continue current medication  Continue diet/exercise  Advanced directive discussed with patient  Patient defers mammogram  Patient defers flu shot  Return to clinic with any concerns    2. Coronary artery disease, unspecified vessel or lesion type, unspecified whether angina present, unspecified whether native or transplanted heart  -     metoprolol succinate (TOPROL-XL) 25 MG 24 hr tablet; metoprolol succinate ER 25 mg tablet,extended release 24 hr Strength: 25 mg  Dispense: 90 tablet; Refill: 1  -     Ambulatory referral/consult to Cardiology; Future; Expected date: 11/04/2022  -     US Carotid Bilateral; Future; Expected date: 10/28/2022  -     CBC Auto Differential; Future; " Expected date: 10/28/2022  -     Comprehensive Metabolic Panel; Future; Expected date: 10/28/2022  Schedule carotid ultrasound   Continue current medication   Refer patient to [facility]    3. Hypothyroidism, unspecified type  Continue current medication   Check TSH    4. Anxiety disorder, unspecified type  Controlled  Continue current Rx medication  Return to clinic with any concerns    5. Chronic obstructive pulmonary disease, unspecified COPD type  Schedule lung cancer screening   Patient defers tobacco cessation program at this time    6. Angina pectoris  Continue current medication    7. Cigarette smoker  -     CT Chest Lung Screening Low Dose; Future; Expected date: 10/28/2022    8. Colon cancer screening  -     Cologuard Screening (Multitarget Stool DNA); Future; Expected date: 10/28/2022  Schedule Cologuard    9. Age-related osteoporosis without current pathological fracture  -     DXA Bone Density Spine And Hip; Future; Expected date: 10/28/2022  Schedule bone density scan    Other orders  -     LORazepam (ATIVAN) 0.5 MG tablet; Take 1 tablet (0.5 mg total) by mouth every 6 (six) hours as needed for Anxiety.  Dispense: 90 tablet; Refill: 1         Medicare Annual Wellness and Personalized Prevention Plan:   Fall Risk + Home Safety + Hearing Impairment + Depression Screen + Opioid and Substance Abuse Screening + Cognitive Impairment Screen + Health Risk Assessment all reviewed.     Health Maintenance Topics with due status: Not Due       Topic Last Completion Date    Lipid Panel 06/24/2021    High Dose Statin 10/28/2022    Aspirin/Antiplatelet Therapy 10/28/2022      The patient's Health Maintenance was reviewed and the following appears to be due at this time:   Health Maintenance Due   Topic Date Due    Pneumococcal Vaccines (Age 65+) (1 - PCV) Never done    TETANUS VACCINE  Never done    Mammogram  Never done    DEXA Scan  Never done    Colorectal Cancer Screening  Never done    LDCT Lung Screen  Never done     Shingles Vaccine (1 of 2) Never done    COVID-19 Vaccine (3 - Booster for Pfizer series) 05/14/2021    Influenza Vaccine (1) Never done       Advance Care Planning   I attest to discussing Advance Care Planning with patient and/or family member.  Education was provided including the importance of the Health Care Power of , Advance Directives, and/or [place] documentation.  The patient expressed understanding to the importance of this information and discussion.         Follow up in about 4 months (around 2/28/2023). In addition to their scheduled follow up, the patient has also been instructed to follow up on as needed basis.

## 2022-10-31 DIAGNOSIS — I10 ESSENTIAL HYPERTENSION: Primary | Chronic | ICD-10-CM

## 2022-10-31 RX ORDER — METOPROLOL TARTRATE 50 MG/1
50 TABLET ORAL 2 TIMES DAILY
Qty: 60 TABLET | Refills: 3 | Status: SHIPPED | OUTPATIENT
Start: 2022-10-31 | End: 2023-02-27 | Stop reason: SDUPTHER

## 2022-10-31 RX ORDER — METOPROLOL TARTRATE 50 MG/1
50 TABLET ORAL 2 TIMES DAILY
Qty: 60 TABLET | Refills: 3 | Status: SHIPPED | OUTPATIENT
Start: 2022-10-31 | End: 2022-10-31 | Stop reason: SDUPTHER

## 2022-11-03 ENCOUNTER — LAB VISIT (OUTPATIENT)
Dept: LAB | Facility: HOSPITAL | Age: 72
End: 2022-11-03
Attending: FAMILY MEDICINE
Payer: MEDICARE

## 2022-11-03 DIAGNOSIS — Z00.00 WELLNESS EXAMINATION: ICD-10-CM

## 2022-11-03 DIAGNOSIS — I25.10 CORONARY ARTERY DISEASE, UNSPECIFIED VESSEL OR LESION TYPE, UNSPECIFIED WHETHER ANGINA PRESENT, UNSPECIFIED WHETHER NATIVE OR TRANSPLANTED HEART: ICD-10-CM

## 2022-11-03 LAB
ALBUMIN SERPL-MCNC: 3.8 GM/DL (ref 3.4–4.8)
ALBUMIN/GLOB SERPL: 1 RATIO (ref 1.1–2)
ALP SERPL-CCNC: 74 UNIT/L (ref 40–150)
ALT SERPL-CCNC: 12 UNIT/L (ref 0–55)
AST SERPL-CCNC: 21 UNIT/L (ref 5–34)
BASOPHILS # BLD AUTO: 0.05 X10(3)/MCL (ref 0–0.2)
BASOPHILS NFR BLD AUTO: 0.7 %
BILIRUBIN DIRECT+TOT PNL SERPL-MCNC: 0.4 MG/DL
BUN SERPL-MCNC: 23 MG/DL (ref 9.8–20.1)
CALCIUM SERPL-MCNC: 9.9 MG/DL (ref 8.4–10.2)
CHLORIDE SERPL-SCNC: 112 MMOL/L (ref 98–107)
CHOLEST SERPL-MCNC: 170 MG/DL
CHOLEST/HDLC SERPL: 4 {RATIO} (ref 0–5)
CO2 SERPL-SCNC: 28 MMOL/L (ref 23–31)
CREAT SERPL-MCNC: 0.66 MG/DL (ref 0.55–1.02)
EOSINOPHIL # BLD AUTO: 0.22 X10(3)/MCL (ref 0–0.9)
EOSINOPHIL NFR BLD AUTO: 3.1 %
ERYTHROCYTE [DISTWIDTH] IN BLOOD BY AUTOMATED COUNT: 14 % (ref 11.5–17)
GFR SERPLBLD CREATININE-BSD FMLA CKD-EPI: >60 MLS/MIN/1.73/M2
GLOBULIN SER-MCNC: 3.7 GM/DL (ref 2.4–3.5)
GLUCOSE SERPL-MCNC: 115 MG/DL (ref 82–115)
HCT VFR BLD AUTO: 46.7 % (ref 37–47)
HDLC SERPL-MCNC: 44 MG/DL (ref 35–60)
HGB BLD-MCNC: 15 GM/DL (ref 12–16)
IMM GRANULOCYTES # BLD AUTO: 0.02 X10(3)/MCL (ref 0–0.04)
IMM GRANULOCYTES NFR BLD AUTO: 0.3 %
LDLC SERPL CALC-MCNC: 106 MG/DL (ref 50–140)
LYMPHOCYTES # BLD AUTO: 1.9 X10(3)/MCL (ref 0.6–4.6)
LYMPHOCYTES NFR BLD AUTO: 26.8 %
MCH RBC QN AUTO: 28.5 PG (ref 27–31)
MCHC RBC AUTO-ENTMCNC: 32.1 MG/DL (ref 33–36)
MCV RBC AUTO: 88.8 FL (ref 80–94)
MONOCYTES # BLD AUTO: 0.64 X10(3)/MCL (ref 0.1–1.3)
MONOCYTES NFR BLD AUTO: 9 %
NEUTROPHILS # BLD AUTO: 4.3 X10(3)/MCL (ref 2.1–9.2)
NEUTROPHILS NFR BLD AUTO: 60.1 %
NRBC BLD AUTO-RTO: 0 %
PLATELET # BLD AUTO: 245 X10(3)/MCL (ref 130–400)
PMV BLD AUTO: 11.3 FL (ref 7.4–10.4)
POTASSIUM SERPL-SCNC: 4.5 MMOL/L (ref 3.5–5.1)
PROT SERPL-MCNC: 7.5 GM/DL (ref 5.8–7.6)
RBC # BLD AUTO: 5.26 X10(6)/MCL (ref 4.2–5.4)
SODIUM SERPL-SCNC: 147 MMOL/L (ref 136–145)
TRIGL SERPL-MCNC: 98 MG/DL (ref 37–140)
TSH SERPL-ACNC: 2.14 UIU/ML (ref 0.35–4.94)
VLDLC SERPL CALC-MCNC: 20 MG/DL
WBC # SPEC AUTO: 7.1 X10(3)/MCL (ref 4.5–11.5)

## 2022-11-03 PROCEDURE — 85025 COMPLETE CBC W/AUTO DIFF WBC: CPT

## 2022-11-03 PROCEDURE — 80061 LIPID PANEL: CPT

## 2022-11-03 PROCEDURE — 84443 ASSAY THYROID STIM HORMONE: CPT

## 2022-11-03 PROCEDURE — 80053 COMPREHEN METABOLIC PANEL: CPT

## 2022-11-03 PROCEDURE — 36415 COLL VENOUS BLD VENIPUNCTURE: CPT

## 2022-11-04 ENCOUNTER — TELEPHONE (OUTPATIENT)
Dept: FAMILY MEDICINE | Facility: CLINIC | Age: 72
End: 2022-11-04
Payer: MEDICARE

## 2022-11-15 ENCOUNTER — HOSPITAL ENCOUNTER (OUTPATIENT)
Dept: RADIOLOGY | Facility: HOSPITAL | Age: 72
Discharge: HOME | End: 2022-11-15
Attending: FAMILY MEDICINE
Payer: MEDICARE

## 2022-11-15 DIAGNOSIS — F17.210 CIGARETTE SMOKER: ICD-10-CM

## 2022-11-15 DIAGNOSIS — I25.10 CORONARY ARTERY DISEASE, UNSPECIFIED VESSEL OR LESION TYPE, UNSPECIFIED WHETHER ANGINA PRESENT, UNSPECIFIED WHETHER NATIVE OR TRANSPLANTED HEART: ICD-10-CM

## 2022-11-15 DIAGNOSIS — Z00.00 WELLNESS EXAMINATION: ICD-10-CM

## 2022-11-15 PROCEDURE — 93880 EXTRACRANIAL BILAT STUDY: CPT | Mod: TC

## 2022-11-15 PROCEDURE — 71271 CT THORAX LUNG CANCER SCR C-: CPT | Mod: TC

## 2022-11-23 ENCOUNTER — TELEPHONE (OUTPATIENT)
Dept: FAMILY MEDICINE | Facility: CLINIC | Age: 72
End: 2022-11-23
Payer: MEDICARE

## 2022-11-23 NOTE — TELEPHONE ENCOUNTER
----- Message from Rafiq Eller MD sent at 11/21/2022  5:50 PM CST -----  Please inform patient of lab results, which are all within acceptable ranges.

## 2023-02-27 ENCOUNTER — OFFICE VISIT (OUTPATIENT)
Dept: FAMILY MEDICINE | Facility: CLINIC | Age: 73
End: 2023-02-27
Payer: MEDICARE

## 2023-02-27 VITALS
SYSTOLIC BLOOD PRESSURE: 122 MMHG | WEIGHT: 90.81 LBS | HEIGHT: 63 IN | RESPIRATION RATE: 18 BRPM | HEART RATE: 55 BPM | TEMPERATURE: 97 F | DIASTOLIC BLOOD PRESSURE: 74 MMHG | OXYGEN SATURATION: 96 % | BODY MASS INDEX: 16.09 KG/M2

## 2023-02-27 DIAGNOSIS — E03.9 HYPOTHYROIDISM, UNSPECIFIED TYPE: ICD-10-CM

## 2023-02-27 DIAGNOSIS — J30.9 ALLERGIC RHINITIS, UNSPECIFIED SEASONALITY, UNSPECIFIED TRIGGER: ICD-10-CM

## 2023-02-27 DIAGNOSIS — I10 ESSENTIAL HYPERTENSION: Chronic | ICD-10-CM

## 2023-02-27 DIAGNOSIS — I25.10 CORONARY ARTERY DISEASE, UNSPECIFIED VESSEL OR LESION TYPE, UNSPECIFIED WHETHER ANGINA PRESENT, UNSPECIFIED WHETHER NATIVE OR TRANSPLANTED HEART: ICD-10-CM

## 2023-02-27 DIAGNOSIS — E78.5 HYPERLIPIDEMIA, UNSPECIFIED HYPERLIPIDEMIA TYPE: Primary | Chronic | ICD-10-CM

## 2023-02-27 DIAGNOSIS — H35.30 MACULAR DEGENERATION, UNSPECIFIED LATERALITY, UNSPECIFIED TYPE: ICD-10-CM

## 2023-02-27 DIAGNOSIS — H61.23 HEARING LOSS OF BOTH EARS DUE TO CERUMEN IMPACTION: ICD-10-CM

## 2023-02-27 PROCEDURE — 3078F DIAST BP <80 MM HG: CPT | Mod: CPTII,,, | Performed by: FAMILY MEDICINE

## 2023-02-27 PROCEDURE — 1101F PT FALLS ASSESS-DOCD LE1/YR: CPT | Mod: CPTII,,, | Performed by: FAMILY MEDICINE

## 2023-02-27 PROCEDURE — 1101F PR PT FALLS ASSESS DOC 0-1 FALLS W/OUT INJ PAST YR: ICD-10-PCS | Mod: CPTII,,, | Performed by: FAMILY MEDICINE

## 2023-02-27 PROCEDURE — 69210 REMOVE IMPACTED EAR WAX UNI: CPT | Mod: ,,, | Performed by: FAMILY MEDICINE

## 2023-02-27 PROCEDURE — 3078F PR MOST RECENT DIASTOLIC BLOOD PRESSURE < 80 MM HG: ICD-10-PCS | Mod: CPTII,,, | Performed by: FAMILY MEDICINE

## 2023-02-27 PROCEDURE — 1159F PR MEDICATION LIST DOCUMENTED IN MEDICAL RECORD: ICD-10-PCS | Mod: CPTII,,, | Performed by: FAMILY MEDICINE

## 2023-02-27 PROCEDURE — 3008F BODY MASS INDEX DOCD: CPT | Mod: CPTII,,, | Performed by: FAMILY MEDICINE

## 2023-02-27 PROCEDURE — 69210 PR REMOVAL IMPACTED CERUMEN REQUIRING INSTRUMENTATION, UNILATERAL: ICD-10-PCS | Mod: ,,, | Performed by: FAMILY MEDICINE

## 2023-02-27 PROCEDURE — 1159F MED LIST DOCD IN RCRD: CPT | Mod: CPTII,,, | Performed by: FAMILY MEDICINE

## 2023-02-27 PROCEDURE — 3074F PR MOST RECENT SYSTOLIC BLOOD PRESSURE < 130 MM HG: ICD-10-PCS | Mod: CPTII,,, | Performed by: FAMILY MEDICINE

## 2023-02-27 PROCEDURE — 3288F FALL RISK ASSESSMENT DOCD: CPT | Mod: CPTII,,, | Performed by: FAMILY MEDICINE

## 2023-02-27 PROCEDURE — 1160F RVW MEDS BY RX/DR IN RCRD: CPT | Mod: CPTII,,, | Performed by: FAMILY MEDICINE

## 2023-02-27 PROCEDURE — 99214 PR OFFICE/OUTPT VISIT, EST, LEVL IV, 30-39 MIN: ICD-10-PCS | Mod: 25,,, | Performed by: FAMILY MEDICINE

## 2023-02-27 PROCEDURE — 3288F PR FALLS RISK ASSESSMENT DOCUMENTED: ICD-10-PCS | Mod: CPTII,,, | Performed by: FAMILY MEDICINE

## 2023-02-27 PROCEDURE — 3074F SYST BP LT 130 MM HG: CPT | Mod: CPTII,,, | Performed by: FAMILY MEDICINE

## 2023-02-27 PROCEDURE — 99214 OFFICE O/P EST MOD 30 MIN: CPT | Mod: 25,,, | Performed by: FAMILY MEDICINE

## 2023-02-27 PROCEDURE — 3008F PR BODY MASS INDEX (BMI) DOCUMENTED: ICD-10-PCS | Mod: CPTII,,, | Performed by: FAMILY MEDICINE

## 2023-02-27 PROCEDURE — 1160F PR REVIEW ALL MEDS BY PRESCRIBER/CLIN PHARMACIST DOCUMENTED: ICD-10-PCS | Mod: CPTII,,, | Performed by: FAMILY MEDICINE

## 2023-02-27 RX ORDER — PRAVASTATIN SODIUM 40 MG/1
40 TABLET ORAL DAILY
Qty: 90 TABLET | Refills: 1 | Status: SHIPPED | OUTPATIENT
Start: 2023-02-27 | End: 2023-10-27

## 2023-02-27 RX ORDER — MONTELUKAST SODIUM 10 MG/1
10 TABLET ORAL NIGHTLY
Qty: 90 TABLET | Refills: 1 | Status: SHIPPED | OUTPATIENT
Start: 2023-02-27 | End: 2023-03-29

## 2023-02-27 RX ORDER — METOPROLOL TARTRATE 50 MG/1
50 TABLET ORAL 2 TIMES DAILY
Qty: 180 TABLET | Refills: 1 | Status: SHIPPED | OUTPATIENT
Start: 2023-02-27 | End: 2023-10-01 | Stop reason: ALTCHOICE

## 2023-02-27 RX ORDER — LISINOPRIL 20 MG/1
20 TABLET ORAL 2 TIMES DAILY
Qty: 90 TABLET | Refills: 1 | Status: SHIPPED | OUTPATIENT
Start: 2023-02-27

## 2023-02-27 RX ORDER — LEVOTHYROXINE SODIUM 25 UG/1
25 TABLET ORAL
Qty: 90 TABLET | Refills: 1 | Status: ON HOLD | OUTPATIENT
Start: 2023-02-27 | End: 2023-07-25 | Stop reason: HOSPADM

## 2023-02-27 NOTE — PROGRESS NOTES
"Subjective:       Patient ID: Cierra Main is a 72 y.o. female.    Chief Complaint: 4 month follow up and New onset of abnormal HR with a severe migraine x Saturday       Routine      Review of Systems   Constitutional:  Positive for chills. Negative for fever.   HENT:  Positive for ear pain (left), postnasal drip, rhinorrhea (using Allegra) and sinus pressure/congestion. Negative for nasal congestion and tinnitus.    Respiratory:  Positive for shortness of breath.    Cardiovascular:  Positive for palpitations. Negative for chest pain.   Neurological:  Positive for dizziness and headaches (made better with BC powder).         Objective:      /74 (BP Location: Left arm, Patient Position: Sitting, BP Method: Large (Manual))   Pulse (!) 55   Temp 97.2 °F (36.2 °C)   Resp 18   Ht 5' 3" (1.6 m)   Wt 41.2 kg (90 lb 12.8 oz)   SpO2 96%   BMI 16.08 kg/m²    Physical Exam  Constitutional:       Appearance: Normal appearance.   HENT:      Right Ear: There is impacted cerumen.      Left Ear: There is impacted cerumen.   Cardiovascular:      Rate and Rhythm: Normal rate and regular rhythm.      Heart sounds: Normal heart sounds.   Pulmonary:      Effort: Pulmonary effort is normal.      Breath sounds: Normal breath sounds.   Neurological:      Mental Status: She is alert.   Psychiatric:         Mood and Affect: Mood normal.         Behavior: Behavior normal.         Thought Content: Thought content normal.         Judgment: Judgment normal.             Assessment:       Problem List Items Addressed This Visit          Ophtho    Macular degeneration    Relevant Orders    Ambulatory referral/consult to Ophthalmology       Cardiac/Vascular    Coronary artery disease    Relevant Orders    Ambulatory referral/consult to Cardiology    Hyperlipidemia - Primary (Chronic)    Relevant Medications    pravastatin (PRAVACHOL) 40 MG tablet    Essential hypertension (Chronic)    Relevant Medications    lisinopriL " (PRINIVIL,ZESTRIL) 20 MG tablet    metoprolol tartrate (LOPRESSOR) 50 MG tablet       Endocrine    Hypothyroidism    Relevant Medications    levothyroxine (SYNTHROID) 25 MCG tablet     Other Visit Diagnoses       Allergic rhinitis, unspecified seasonality, unspecified trigger        Relevant Medications    montelukast (SINGULAIR) 10 mg tablet    Hearing loss of both ears due to cerumen impaction                   Plan:   1. Hyperlipidemia, unspecified hyperlipidemia type  -     pravastatin (PRAVACHOL) 40 MG tablet; Take 1 tablet (40 mg total) by mouth once daily.  Dispense: 90 tablet; Refill: 1  Continue current medication  FLP with next lab work    2. Allergic rhinitis, unspecified seasonality, unspecified trigger  -     montelukast (SINGULAIR) 10 mg tablet; Take 1 tablet (10 mg total) by mouth every evening.  Dispense: 90 tablet; Refill: 1  Start Singulair 10 mg q.h.s.  OTC Allegra p.r.n.  Avoid triggers  Monitor  Return to clinic with any concerns    3. Essential hypertension  -     lisinopriL (PRINIVIL,ZESTRIL) 20 MG tablet; Take 1 tablet (20 mg total) by mouth 2 (two) times daily.  Dispense: 90 tablet; Refill: 1  -     metoprolol tartrate (LOPRESSOR) 50 MG tablet; Take 1 tablet (50 mg total) by mouth 2 (two) times daily.  Dispense: 180 tablet; Refill: 1  Controlled  Continue current Rx medication  Return to clinic with any concerns    4. Hypothyroidism, unspecified type  -     levothyroxine (SYNTHROID) 25 MCG tablet; Take 1 tablet (25 mcg total) by mouth before breakfast.  Dispense: 90 tablet; Refill: 1  Continue current medication  TSH with next lab draw    5. Hearing loss of both ears due to cerumen impaction  Cerumen Impaction bilateral  *Procedure Note*  Posterior traction applied on helix  Bionix lighted ear currette placed in ear canal  Cerumen impaction removed  Ear canal re-examined  Canal normal  Tympanic membrane clear    6. Macular degeneration, unspecified laterality, unspecified type  -      Ambulatory referral/consult to Ophthalmology; Future; Expected date: 03/06/2023  Refer patient to Dr. Bauer    7. Coronary artery disease, unspecified vessel or lesion type, unspecified whether angina present, unspecified whether native or transplanted heart  -     Ambulatory referral/consult to Cardiology; Future; Expected date: 03/06/2023  Refer patient to [facility]

## 2023-02-28 ENCOUNTER — PATIENT OUTREACH (OUTPATIENT)
Dept: ADMINISTRATIVE | Facility: HOSPITAL | Age: 73
End: 2023-02-28
Payer: MEDICARE

## 2023-02-28 NOTE — PROGRESS NOTES
[facility], [facility]. [facility]. Reviewing patient's chart for quality metrics. I called and spoke to patients sister to see if she has had a recent mmg. Sister reports patient is not willing to do mmg. Patients sister is going to encourage patient to complete the cologuard that was sent in Oct.

## 2023-03-02 ENCOUNTER — PATIENT MESSAGE (OUTPATIENT)
Dept: ADMINISTRATIVE | Facility: HOSPITAL | Age: 73
End: 2023-03-02
Payer: MEDICARE

## 2023-04-03 ENCOUNTER — PATIENT MESSAGE (OUTPATIENT)
Dept: ADMINISTRATIVE | Facility: HOSPITAL | Age: 73
End: 2023-04-03
Payer: MEDICARE

## 2023-05-01 ENCOUNTER — PATIENT MESSAGE (OUTPATIENT)
Dept: ADMINISTRATIVE | Facility: HOSPITAL | Age: 73
End: 2023-05-01
Payer: MEDICARE

## 2023-05-15 RX ORDER — LORAZEPAM 0.5 MG/1
TABLET ORAL
Qty: 30 TABLET | Refills: 1 | Status: SHIPPED | OUTPATIENT
Start: 2023-05-15 | End: 2023-08-09

## 2023-05-30 ENCOUNTER — PATIENT MESSAGE (OUTPATIENT)
Dept: ADMINISTRATIVE | Facility: HOSPITAL | Age: 73
End: 2023-05-30
Payer: MEDICARE

## 2023-06-12 ENCOUNTER — TELEPHONE (OUTPATIENT)
Dept: FAMILY MEDICINE | Facility: CLINIC | Age: 73
End: 2023-06-12
Payer: MEDICARE

## 2023-06-19 ENCOUNTER — HOSPITAL ENCOUNTER (OUTPATIENT)
Dept: RADIOLOGY | Facility: HOSPITAL | Age: 73
Discharge: HOME | End: 2023-06-19
Attending: NURSE PRACTITIONER
Payer: MEDICARE

## 2023-06-19 ENCOUNTER — HOSPITAL ENCOUNTER (OUTPATIENT)
Dept: CARDIOLOGY | Facility: HOSPITAL | Age: 73
Discharge: HOME | End: 2023-06-19
Attending: NURSE PRACTITIONER
Payer: MEDICARE

## 2023-06-19 DIAGNOSIS — I21.9 ACUTE MI: ICD-10-CM

## 2023-06-19 DIAGNOSIS — I25.110 ATHEROSCLEROSIS OF CORONARY ARTERY OF NATIVE HEART WITH UNSTABLE ANGINA PECTORIS: ICD-10-CM

## 2023-06-19 DIAGNOSIS — I10 HTN (HYPERTENSION): ICD-10-CM

## 2023-06-19 DIAGNOSIS — I51.9 HEART DISEASE, UNSPECIFIED: ICD-10-CM

## 2023-06-19 DIAGNOSIS — R07.9 CHEST PAIN, UNSPECIFIED: ICD-10-CM

## 2023-06-19 DIAGNOSIS — I73.9 CLAUDICATION, INTERMITTENT: ICD-10-CM

## 2023-06-19 DIAGNOSIS — I65.23 BILATERAL CAROTID ARTERY STENOSIS: ICD-10-CM

## 2023-06-19 DIAGNOSIS — J44.9 COPD (CHRONIC OBSTRUCTIVE PULMONARY DISEASE): ICD-10-CM

## 2023-06-19 DIAGNOSIS — R60.9 EDEMA: ICD-10-CM

## 2023-06-19 LAB
AORTIC ROOT ANNULUS: 2.9 CM
AORTIC VALVE CUSP SEPERATION: 0.83 CM
AV PEAK GRADIENT: 8 MMHG
AV REGURGITATION PRESSURE HALF TIME: 636.44 MS
CV ECHO LV RWT: 0.7 CM
DOP CALC AO PEAK VEL: 1.44 M/S
E WAVE DECELERATION TIME: 233.35 MSEC
E/A RATIO: 1.33
ECHO LV POSTERIOR WALL: 1.31 CM (ref 0.6–1.1)
EJECTION FRACTION: 65 %
FRACTIONAL SHORTENING: 35 % (ref 28–44)
INTERVENTRICULAR SEPTUM: 1.26 CM (ref 0.6–1.1)
LEFT INTERNAL DIMENSION IN SYSTOLE: 2.44 CM (ref 2.1–4)
LEFT VENTRICLE DIASTOLIC VOLUME: 60.38 ML
LEFT VENTRICLE SYSTOLIC VOLUME: 20.98 ML
LEFT VENTRICULAR INTERNAL DIMENSION IN DIASTOLE: 3.76 CM (ref 3.5–6)
LEFT VENTRICULAR MASS: 167.4 G
MV PEAK A VEL: 0.7 M/S
MV PEAK E VEL: 0.93 M/S
MV STENOSIS PRESSURE HALF TIME: 67.67 MS
MV VALVE AREA P 1/2 METHOD: 3.25 CM2
PISA AR MAX VEL: 2.4 M/S
PISA MRMAX VEL: 5.59 M/S
PISA TR MAX VEL: 2.49 M/S
PV PEAK VELOCITY: 0.74 CM/S
RIGHT VENTRICULAR END-DIASTOLIC DIMENSION: 2.49 CM
TR MAX PG: 25 MMHG
TRICUSPID VALVE PEAK A WAVE VELOCITY: 0.35 M/S
TV PEAK E VEL: 0.4 M/S
TV STENOSIS PRESSURE HALF TIME: 59.2 MS
TV VALVE AREA P 1/2 METHOD: 3.21 CM2

## 2023-06-19 PROCEDURE — 93925 LOWER EXTREMITY STUDY: CPT | Mod: TC

## 2023-06-19 PROCEDURE — 93306 TTE W/DOPPLER COMPLETE: CPT

## 2023-06-19 PROCEDURE — 93970 EXTREMITY STUDY: CPT | Mod: TC

## 2023-06-19 PROCEDURE — 93880 EXTRACRANIAL BILAT STUDY: CPT | Mod: TC

## 2023-06-28 ENCOUNTER — HOSPITAL ENCOUNTER (EMERGENCY)
Facility: HOSPITAL | Age: 73
Discharge: HOME | End: 2023-06-28
Attending: STUDENT IN AN ORGANIZED HEALTH CARE EDUCATION/TRAINING PROGRAM
Payer: MEDICARE

## 2023-06-28 VITALS
HEIGHT: 63 IN | RESPIRATION RATE: 18 BRPM | DIASTOLIC BLOOD PRESSURE: 60 MMHG | HEART RATE: 67 BPM | BODY MASS INDEX: 15.95 KG/M2 | SYSTOLIC BLOOD PRESSURE: 111 MMHG | TEMPERATURE: 100 F | WEIGHT: 90 LBS | OXYGEN SATURATION: 94 %

## 2023-06-28 DIAGNOSIS — J98.01 BRONCHOSPASM: ICD-10-CM

## 2023-06-28 DIAGNOSIS — N30.00 ACUTE CYSTITIS WITHOUT HEMATURIA: ICD-10-CM

## 2023-06-28 DIAGNOSIS — M54.2 NECK PAIN: ICD-10-CM

## 2023-06-28 DIAGNOSIS — I16.0 HYPERTENSIVE URGENCY: Primary | ICD-10-CM

## 2023-06-28 DIAGNOSIS — R07.9 CHEST PAIN: ICD-10-CM

## 2023-06-28 LAB
ALBUMIN SERPL-MCNC: 3.7 G/DL (ref 3.4–4.8)
ALBUMIN/GLOB SERPL: 0.9 RATIO (ref 1.1–2)
ALP SERPL-CCNC: 80 UNIT/L (ref 40–150)
ALT SERPL-CCNC: 11 UNIT/L (ref 0–55)
AMPHET UR QL SCN: NEGATIVE
APPEARANCE UR: CLEAR
AST SERPL-CCNC: 20 UNIT/L (ref 5–34)
BACTERIA #/AREA URNS AUTO: ABNORMAL /HPF
BARBITURATE SCN PRESENT UR: NEGATIVE
BASOPHILS # BLD AUTO: 0.02 X10(3)/MCL
BASOPHILS NFR BLD AUTO: 0.2 %
BENZODIAZ UR QL SCN: NEGATIVE
BILIRUB UR QL STRIP.AUTO: NEGATIVE MG/DL
BILIRUBIN DIRECT+TOT PNL SERPL-MCNC: 0.5 MG/DL
BUN SERPL-MCNC: 14 MG/DL (ref 9.8–20.1)
CALCIUM SERPL-MCNC: 10.1 MG/DL (ref 8.4–10.2)
CANNABINOIDS UR QL SCN: NEGATIVE
CHLORIDE SERPL-SCNC: 106 MMOL/L (ref 98–107)
CO2 SERPL-SCNC: 26 MMOL/L (ref 23–31)
COCAINE UR QL SCN: NEGATIVE
COLOR UR: YELLOW
CREAT SERPL-MCNC: 0.65 MG/DL (ref 0.55–1.02)
EOSINOPHIL # BLD AUTO: 0.02 X10(3)/MCL (ref 0–0.9)
EOSINOPHIL NFR BLD AUTO: 0.2 %
ERYTHROCYTE [DISTWIDTH] IN BLOOD BY AUTOMATED COUNT: 13.4 % (ref 11.5–17)
FENTANYL UR QL SCN: NEGATIVE
GFR SERPLBLD CREATININE-BSD FMLA CKD-EPI: >60 MLS/MIN/1.73/M2
GLOBULIN SER-MCNC: 3.9 GM/DL (ref 2.4–3.5)
GLUCOSE SERPL-MCNC: 96 MG/DL (ref 82–115)
GLUCOSE UR QL STRIP.AUTO: NEGATIVE MG/DL
HCT VFR BLD AUTO: 47.8 % (ref 37–47)
HGB BLD-MCNC: 15.5 G/DL (ref 12–16)
HYALINE CASTS URNS QL MICRO: ABNORMAL /LPF
IMM GRANULOCYTES # BLD AUTO: 0.02 X10(3)/MCL (ref 0–0.04)
IMM GRANULOCYTES NFR BLD AUTO: 0.2 %
KETONES UR QL STRIP.AUTO: NEGATIVE MG/DL
LEUKOCYTE ESTERASE UR QL STRIP.AUTO: ABNORMAL UNIT/L
LYMPHOCYTES # BLD AUTO: 1.23 X10(3)/MCL (ref 0.6–4.6)
LYMPHOCYTES NFR BLD AUTO: 13.6 %
MAGNESIUM SERPL-MCNC: 1.8 MG/DL (ref 1.6–2.6)
MCH RBC QN AUTO: 28.4 PG (ref 27–31)
MCHC RBC AUTO-ENTMCNC: 32.4 G/DL (ref 33–36)
MCV RBC AUTO: 87.5 FL (ref 80–94)
MDMA UR QL SCN: NEGATIVE
MONOCYTES # BLD AUTO: 0.78 X10(3)/MCL (ref 0.1–1.3)
MONOCYTES NFR BLD AUTO: 8.6 %
MUCOUS THREADS URNS QL MICRO: ABNORMAL /LPF
NEUTROPHILS # BLD AUTO: 7 X10(3)/MCL (ref 2.1–9.2)
NEUTROPHILS NFR BLD AUTO: 77.2 %
NITRITE UR QL STRIP.AUTO: NEGATIVE
NRBC BLD AUTO-RTO: 0 %
OPIATES UR QL SCN: NEGATIVE
PCP UR QL: NEGATIVE
PH UR STRIP.AUTO: 6 [PH]
PH UR: 6 [PH] (ref 3–11)
PLATELET # BLD AUTO: 224 X10(3)/MCL (ref 130–400)
PMV BLD AUTO: 10.9 FL (ref 7.4–10.4)
POTASSIUM SERPL-SCNC: 3.9 MMOL/L (ref 3.5–5.1)
PROT SERPL-MCNC: 7.6 GM/DL (ref 5.8–7.6)
PROT UR QL STRIP.AUTO: ABNORMAL MG/DL
RBC # BLD AUTO: 5.46 X10(6)/MCL (ref 4.2–5.4)
RBC #/AREA URNS AUTO: ABNORMAL /HPF
RBC UR QL AUTO: ABNORMAL UNIT/L
SODIUM SERPL-SCNC: 143 MMOL/L (ref 136–145)
SP GR UR STRIP.AUTO: 1.02
SQUAMOUS #/AREA URNS AUTO: ABNORMAL /HPF
TROPONIN I SERPL-MCNC: 0.01 NG/ML (ref 0–0.04)
TSH SERPL-ACNC: 1.43 UIU/ML (ref 0.35–4.94)
UROBILINOGEN UR STRIP-ACNC: 0.2 MG/DL
WBC # SPEC AUTO: 9.07 X10(3)/MCL (ref 4.5–11.5)
WBC #/AREA URNS AUTO: ABNORMAL /HPF

## 2023-06-28 PROCEDURE — 83735 ASSAY OF MAGNESIUM: CPT | Performed by: STUDENT IN AN ORGANIZED HEALTH CARE EDUCATION/TRAINING PROGRAM

## 2023-06-28 PROCEDURE — 80053 COMPREHEN METABOLIC PANEL: CPT | Performed by: STUDENT IN AN ORGANIZED HEALTH CARE EDUCATION/TRAINING PROGRAM

## 2023-06-28 PROCEDURE — 84443 ASSAY THYROID STIM HORMONE: CPT | Performed by: STUDENT IN AN ORGANIZED HEALTH CARE EDUCATION/TRAINING PROGRAM

## 2023-06-28 PROCEDURE — 85025 COMPLETE CBC W/AUTO DIFF WBC: CPT | Performed by: STUDENT IN AN ORGANIZED HEALTH CARE EDUCATION/TRAINING PROGRAM

## 2023-06-28 PROCEDURE — 99285 EMERGENCY DEPT VISIT HI MDM: CPT | Mod: 25

## 2023-06-28 PROCEDURE — 80307 DRUG TEST PRSMV CHEM ANLYZR: CPT | Performed by: STUDENT IN AN ORGANIZED HEALTH CARE EDUCATION/TRAINING PROGRAM

## 2023-06-28 PROCEDURE — 84484 ASSAY OF TROPONIN QUANT: CPT | Performed by: STUDENT IN AN ORGANIZED HEALTH CARE EDUCATION/TRAINING PROGRAM

## 2023-06-28 PROCEDURE — 93005 ELECTROCARDIOGRAM TRACING: CPT

## 2023-06-28 PROCEDURE — 81001 URINALYSIS AUTO W/SCOPE: CPT | Mod: 59 | Performed by: STUDENT IN AN ORGANIZED HEALTH CARE EDUCATION/TRAINING PROGRAM

## 2023-06-28 PROCEDURE — 25000003 PHARM REV CODE 250: Performed by: STUDENT IN AN ORGANIZED HEALTH CARE EDUCATION/TRAINING PROGRAM

## 2023-06-28 PROCEDURE — 63600175 PHARM REV CODE 636 W HCPCS: Performed by: STUDENT IN AN ORGANIZED HEALTH CARE EDUCATION/TRAINING PROGRAM

## 2023-06-28 PROCEDURE — 96372 THER/PROPH/DIAG INJ SC/IM: CPT | Performed by: STUDENT IN AN ORGANIZED HEALTH CARE EDUCATION/TRAINING PROGRAM

## 2023-06-28 PROCEDURE — 96375 TX/PRO/DX INJ NEW DRUG ADDON: CPT

## 2023-06-28 PROCEDURE — 96374 THER/PROPH/DIAG INJ IV PUSH: CPT

## 2023-06-28 RX ORDER — NITROFURANTOIN 25; 75 MG/1; MG/1
100 CAPSULE ORAL
Status: COMPLETED | OUTPATIENT
Start: 2023-06-28 | End: 2023-06-28

## 2023-06-28 RX ORDER — KETOROLAC TROMETHAMINE 30 MG/ML
30 INJECTION, SOLUTION INTRAMUSCULAR; INTRAVENOUS
Status: COMPLETED | OUTPATIENT
Start: 2023-06-28 | End: 2023-06-28

## 2023-06-28 RX ORDER — CLONIDINE HYDROCHLORIDE 0.1 MG/1
0.2 TABLET ORAL
Status: COMPLETED | OUTPATIENT
Start: 2023-06-28 | End: 2023-06-28

## 2023-06-28 RX ORDER — NITROFURANTOIN 25; 75 MG/1; MG/1
100 CAPSULE ORAL 2 TIMES DAILY
Qty: 6 CAPSULE | Refills: 0 | Status: SHIPPED | OUTPATIENT
Start: 2023-06-28 | End: 2023-07-01

## 2023-06-28 RX ORDER — HYDRALAZINE HYDROCHLORIDE 20 MG/ML
10 INJECTION INTRAMUSCULAR; INTRAVENOUS
Status: COMPLETED | OUTPATIENT
Start: 2023-06-28 | End: 2023-06-28

## 2023-06-28 RX ADMIN — LORAZEPAM 1 MG: 2 INJECTION INTRAMUSCULAR; INTRAVENOUS at 04:06

## 2023-06-28 RX ADMIN — NITROFURANTOIN MONOHYDRATE/MACROCRYSTALS 100 MG: 25; 75 CAPSULE ORAL at 04:06

## 2023-06-28 RX ADMIN — HYDRALAZINE HYDROCHLORIDE 10 MG: 20 INJECTION INTRAMUSCULAR; INTRAVENOUS at 04:06

## 2023-06-28 RX ADMIN — KETOROLAC TROMETHAMINE 30 MG: 30 INJECTION INTRAMUSCULAR; INTRAVENOUS at 04:06

## 2023-06-28 RX ADMIN — CLONIDINE HYDROCHLORIDE 0.2 MG: 0.1 TABLET ORAL at 05:06

## 2023-06-28 NOTE — ED PROVIDER NOTES
Encounter Date: 6/28/2023       History     Chief Complaint   Patient presents with    Neck Pain     Arrived via [facility] for neck pain radiating to head x 1 week.       72-year-old female past medical history of COPD, mi, hyperlipidemia, hypertension, hypothyroidism, CAD brought in by ambulance with complaints of neck pain, chest pains beginning 3 days ago.  Patient reports she initially had left shoulder pain which has progressively worsened and migrated to include the left side of her neck.  She states chest pains or separate and started yesterday.  Describes them as dull, constant, worse with deep inspiration.  She has not had any vision changes, headaches, lightheadedness, dizziness, urinary symptoms, nausea, vomiting, abdominal pains.  She is not taken anything for relief.  There are no exacerbating factors.  Of note, upon presentation to the ED it was noted that the blood pressure was 240/111.  Patient reports taking her antihypertensives today, lisinopril 20 mg.    The history is provided by the patient and a relative.   Review of patient's allergies indicates:   Allergen Reactions    Amlodipine     Losartan      Past Medical History:   Diagnosis Date    Acute MI     Anxiety     Cardiac disease     COPD (chronic obstructive pulmonary disease)     Coronary artery disease     Hyperlipidemia     Hypertension     Hypothyroidism     Other specified noninfective gastroenteritis and colitis     Unspecified macular degeneration      Past Surgical History:   Procedure Laterality Date    CORONARY ANGIOGRAPHY N/A 2/1/2021    Procedure: ANGIOGRAM, CORONARY ARTERY;  Surgeon: Devon Medeiros MD;  Location: [facility] CATH LAB;  Service: Cardiology;  Laterality: N/A;    CORONARY ANGIOPLASTY WITH STENT PLACEMENT      LEFT HEART CATHETERIZATION Left 2/1/2021    Procedure: CATHETERIZATION, HEART, LEFT;  Surgeon: Devon Medeiros MD;  Location: [facility] CATH LAB;  Service: Cardiology;  Laterality: Left;     Family History   Problem Relation  Age of Onset    Diabetes Mother     Heart disease Mother     Hypertension Father     Heart disease Father      Social History     Tobacco Use    Smoking status: Every Day     Packs/day: 0.50     Years: 55.00     Pack years: 27.50     Types: Cigarettes    Smokeless tobacco: Never   Substance Use Topics    Alcohol use: Never    Drug use: Never     Review of Systems   Constitutional:  Negative for appetite change, chills, fatigue and fever.   HENT:  Negative for congestion, ear pain, rhinorrhea and sore throat.    Eyes:  Negative for visual disturbance.        History of macular degeneration.  No change from baseline   Respiratory:  Negative for cough, chest tightness, shortness of breath and wheezing.    Cardiovascular:  Positive for chest pain. Negative for palpitations and leg swelling.   Gastrointestinal:  Negative for abdominal pain, constipation, diarrhea, nausea and vomiting.   Endocrine: Negative for polydipsia and polyuria.   Genitourinary:  Negative for decreased urine volume, dysuria and flank pain.   Musculoskeletal:  Positive for neck pain. Negative for arthralgias, back pain, myalgias and neck stiffness.   Skin:  Negative for rash.   Neurological:  Negative for dizziness, weakness, light-headedness and headaches.   Psychiatric/Behavioral:  Negative for agitation. The patient is not nervous/anxious.    All other systems reviewed and are negative.    Physical Exam     Initial Vitals [06/28/23 1521]   BP Pulse Resp Temp SpO2   (!) 240/111 83 18 99.9 °F (37.7 °C) (!) 94 %      MAP       --         Physical Exam    Nursing note and vitals reviewed.  Constitutional: She appears well-developed and well-nourished. She is not diaphoretic. No distress.   HENT:   Head: Normocephalic and atraumatic.   Eyes: EOM are normal. Pupils are equal, round, and reactive to light.   Neck: Trachea normal. Neck supple. No thyroid mass and no thyromegaly present. No stridor present. Normal carotid pulses and no JVD present.    Tenderness to palpation over the anterolateral aspect over the left SCM.  No midline tenderness.  No step-offs.   Normal range of motion.  Cardiovascular:  Normal rate, regular rhythm and normal heart sounds.     Exam reveals no gallop and no friction rub.       No murmur heard.  Pulmonary/Chest: Breath sounds normal. No respiratory distress. She has no wheezes. She has no rhonchi. She has no rales.   Abdominal: Abdomen is soft. Bowel sounds are normal. She exhibits no distension. There is no abdominal tenderness. There is no rebound and no guarding.   Musculoskeletal:         General: No tenderness or edema. Normal range of motion.      Cervical back: Normal range of motion and neck supple. No edema, erythema or rigidity. Muscular tenderness present. No spinous process tenderness.     Neurological: She is alert and oriented to person, place, and time. She has normal strength. No cranial nerve deficit or sensory deficit. Coordination and gait normal. GCS score is 15. GCS eye subscore is 4. GCS verbal subscore is 5. GCS motor subscore is 6.   Skin: Skin is warm and dry. Capillary refill takes less than 2 seconds. No rash noted.   Psychiatric: She has a normal mood and affect.       ED Course   Procedures  Labs Reviewed   COMPREHENSIVE METABOLIC PANEL - Abnormal; Notable for the following components:       Result Value    Globulin 3.9 (*)     Albumin/Globulin Ratio 0.9 (*)     All other components within normal limits   URINALYSIS, REFLEX TO URINE CULTURE - Abnormal; Notable for the following components:    Protein, UA Trace (*)     Blood, UA Trace (*)     Leukocyte Esterase, UA 1+ (*)     All other components within normal limits   CBC WITH DIFFERENTIAL - Abnormal; Notable for the following components:    RBC 5.46 (*)     Hct 47.8 (*)     MCHC 32.4 (*)     MPV 10.9 (*)     All other components within normal limits   URINALYSIS, MICROSCOPIC - Abnormal; Notable for the following components:    Bacteria, UA Few (*)      Hyaline Casts, UA Rare (*)     Mucous, UA Small (*)     WBC, UA 6-10 (*)     Squamous Epithelial Cells, UA Few (*)     All other components within normal limits   DRUG SCREEN, URINE (BEAKER) - Normal    Narrative:     Cut off concentrations:    Amphetamines - 1000 ng/ml  Barbiturates - 200 ng/ml  Benzodiazepine - 200 ng/ml  Cannabinoids (THC) - 50 ng/ml  Cocaine - 300 ng/ml  Fentanyl - 1.0 ng/ml  MDMA - 500 ng/ml  Opiates - 300 ng/ml   Phencyclidine (PCP) - 25 ng/ml    Specimen submitted for drug analysis and tested for pH and specific gravity in order to evaluate sample integrity. Suspect tampering if specific gravity is <1.003 and/or pH is not within the range of 4.5 - 8.0  False negatives may result form substances such as bleach added to urine.  False positives may result for the presence of a substance with similar chemical structure to the drug or its metabolite.    This test provides only a PRELIMINARY analytical test result. A more specific alternate chemical method must be used in order to obtain a confirmed analytical result. Gas chromatography/mass spectrometry (GC/MS) is the preferred confirmatory method. Other chemical confirmation methods are available. Clinical consideration and professional judgement should be applied to any drug of abuse test result, particularly when preliminary positive results are used.    Positive results will be confirmed only at the physicians request. Unconfirmed screening results are to be used only for medical purposes (treatment).        TSH - Normal   TROPONIN I - Normal   MAGNESIUM - Normal   CBC W/ AUTO DIFFERENTIAL    Narrative:     The following orders were created for panel order CBC auto differential.  Procedure                               Abnormality         Status                     ---------                               -----------         ------                     CBC with Differential[[id number]]        Abnormal            Final result                  Please view results for these tests on the individual orders.     EKG Readings: (Independently Interpreted)   Initial Reading: No STEMI. Rhythm: Junctional Tachycardia. Conduction: RBBB. Clinical Impression: with PACs   Possible junctional tachycardia with PACs.  Also with right bundle-branch block and LVH.     Imaging Results              X-Ray Neck Soft Tissue (Final result)  Result time 06/28/23 18:16:34      Final result by Latrice Zhou MD (06/28/23 18:16:34)                   Impression:      No appreciable plain radiographic abnormality      Electronically signed by: Latrice Zhou  Date:    06/28/2023  Time:    18:16               Narrative:    EXAMINATION:  XR NECK SOFT TISSUE    CLINICAL HISTORY:  Cervicalgia    TECHNIQUE:  AP and lateral soft tissue views the neck were performed.    COMPARISON:  None.    FINDINGS:  The airway is patent.    Normal plain radiographic appearance of the epiglottis and aryepiglottic folds.    Prevertebral soft tissues are normal.    There are calcifications in the expected region of the left carotid bulb.    No appreciable acute osseous abnormality.  There is trace retrolisthesis of C5 on C6.  Moderate to severe disc space narrowing at C5-C6 with anterior and posterior disc osteophytes.  Mild disc space narrowing at C4-C5 with anterior disc osteophyte.  Mild disc space narrowing at C6-C7.                                       X-Ray Chest PA And Lateral (Final result)  Result time 06/28/23 16:33:55      Final result by Latrice Zhou MD (06/28/23 16:33:55)                   Impression:      No acute cardiopulmonary abnormality.      Electronically signed by: Latrice Zhou  Date:    06/28/2023  Time:    16:33               Narrative:    EXAMINATION:  XR CHEST PA AND LATERAL    CLINICAL HISTORY:  Chest pain, unspecified    TECHNIQUE:  Two views of the chest    COMPARISON:  01/30/2021    FINDINGS:  LINES AND TUBES: None    MEDIASTINUM AND XIOMARA: The cardiac  silhouette is normal. Aortic atherosclerosis.    LUNGS: No lobar consolidation. No edema.  Nipple shadows project over the lung bases.    PLEURA:No pleural effusion. No pneumothorax.    BONES: No acute osseous abnormality.                                       Medications   hydrALAZINE injection 10 mg (10 mg Intravenous Given 6/28/23 1604)   ketorolac injection 30 mg (30 mg Intramuscular Given 6/28/23 1639)   LORazepam (ATIVAN) injection 1 mg (1 mg Intravenous Given 6/28/23 1638)   nitrofurantoin (macrocrystal-monohydrate) 100 MG capsule 100 mg (100 mg Oral Given 6/28/23 1652)   cloNIDine tablet 0.2 mg (0.2 mg Oral Given 6/28/23 1729)     Medical Decision Making:   ED Management:  72-year-old female with hypertensive urgency, neck pain, chest pains for the past 3 days.  With the exception of significantly elevated blood pressures, vital signs are within normal limits.  She has had intermittent tachypnea, however appears comfortable.  Patient does have some tenderness over the sternocleidomastoid muscle and trapezius muscle on the left consistent with muscle strain.  Cardiovascular, and pulmonary exam are benign.  EKG demonstrates right bundle branch block, junctional tachycardia with PACs and some LVH.  No STEMI.  Labs overall unremarkable with the exception of mild acute urinary cystitis.  Treated with Macrobid.  No evidence of end-organ damage with normal BUN creatinine, and normal troponin.  Patient is also awake alert and oriented.  Hypertensive urgency treated initially with 10 mg IV hydralazine with no significant improvement.  Patient was then given 0.2 mg clonidine with repeat manual blood pressure of 175/94.           ED Course as of 06/28/23 1909 Wed Jun 28, 2023   1634 Patient given 10 mg of IV hydralazine.  No improvement in blood pressure.  We will treat neck pain with ketorolac 30 mg IM.  Patient also seems to have anxious affect and I did see she takes Ativan at home.  We will give 1 mg IV Ativan  and reassess blood pressure and 15 minutes. [LH]   1831 No improvement in blood pressure following initial dose of 10 mg IV hydralazine.  We then have tried .2 mg clonidine.  Repeat blood pressure, manual, is now 175/94.  We will recheck again in 20-30 minutes. [LH]   1908 Repeat /60.  Patient asymptomatic.  Ambulating without difficulty.  No lightheaded or dizziness.  Patient is safe for discharge. [LH]      ED Course User Index  [LH] Jayesh Alford MD                 Clinical Impression:   Final diagnoses:  [R07.9] Chest pain  [J98.01] Bronchospasm  [N30.00] Acute cystitis without hematuria  [I16.0] Hypertensive urgency (Primary)  [M54.2] Neck pain        ED Disposition Condition    Discharge Stable          ED Prescriptions       Medication Sig Dispense Start Date End Date Auth. Provider    nitrofurantoin, macrocrystal-monohydrate, (MACROBID) 100 MG capsule Take 1 capsule (100 mg total) by mouth 2 (two) times daily. for 3 days 6 capsule 6/28/2023 7/1/2023 Jayesh Alford MD          Follow-up Information       Follow up With Specialties Details Why Contact Info    Rafiq Eller MD Family Medicine Schedule an appointment as soon as possible for a visit   [address]  [place] [address]  [phone]      [facility] - Emergency Dept Emergency Medicine  If symptoms worsen [address] [facility] [id number] [phone]             Jayesh Alford MD  06/28/23 0648

## 2023-06-28 NOTE — ED NOTES
Pt fell. Radiology in room. Radiology states she went to grab gloves and pt tried to get up without assistance and fell. Pt states she did not hit her head but hit both upper arms and also hit her heels of her feet. Pt examined by RN and MD. Pt resting comfortably in bed now.

## 2023-06-28 NOTE — ED NOTES
Pt brought into room 2 via ambulance. Pt states she has had neck pain (left side) radiating to head since Sunday. Pt states she has had a hx of acute MI. Pt sister at bedside. Pt denies needs at time.

## 2023-07-23 ENCOUNTER — HOSPITAL ENCOUNTER (EMERGENCY)
Facility: HOSPITAL | Age: 73
Discharge: ANOTHER HOSPITAL | End: 2023-07-23
Attending: FAMILY MEDICINE
Payer: MEDICARE

## 2023-07-23 ENCOUNTER — HOSPITAL ENCOUNTER (INPATIENT)
Facility: HOSPITAL | Age: 73
LOS: 2 days | Discharge: PSYCHIATRIC OR SUBSTANCE USE FACILITY | DRG: 917 | End: 2023-07-25
Attending: STUDENT IN AN ORGANIZED HEALTH CARE EDUCATION/TRAINING PROGRAM | Admitting: INTERNAL MEDICINE
Payer: MEDICARE

## 2023-07-23 VITALS
HEIGHT: 64 IN | SYSTOLIC BLOOD PRESSURE: 153 MMHG | BODY MASS INDEX: 15.36 KG/M2 | OXYGEN SATURATION: 96 % | RESPIRATION RATE: 26 BRPM | TEMPERATURE: 98 F | WEIGHT: 90 LBS | HEART RATE: 122 BPM | DIASTOLIC BLOOD PRESSURE: 101 MMHG

## 2023-07-23 DIAGNOSIS — T42.4X2A SUICIDE ATTEMPT BY BENZODIAZEPINE OVERDOSE: Primary | ICD-10-CM

## 2023-07-23 DIAGNOSIS — I16.1 HYPERTENSIVE EMERGENCY: ICD-10-CM

## 2023-07-23 DIAGNOSIS — I21.4 NSTEMI (NON-ST ELEVATED MYOCARDIAL INFARCTION): ICD-10-CM

## 2023-07-23 DIAGNOSIS — R79.89 ELEVATED TROPONIN: ICD-10-CM

## 2023-07-23 DIAGNOSIS — R41.82 ALTERED MENTAL STATUS, UNSPECIFIED ALTERED MENTAL STATUS TYPE: ICD-10-CM

## 2023-07-23 DIAGNOSIS — T42.4X2A INTENTIONAL BENZODIAZEPINE OVERDOSE, INITIAL ENCOUNTER: ICD-10-CM

## 2023-07-23 DIAGNOSIS — R07.9 CHEST PAIN: ICD-10-CM

## 2023-07-23 DIAGNOSIS — R41.82 AMS (ALTERED MENTAL STATUS): ICD-10-CM

## 2023-07-23 DIAGNOSIS — R45.851 DEPRESSION WITH SUICIDAL IDEATION: Primary | ICD-10-CM

## 2023-07-23 DIAGNOSIS — R50.9 FEVER: ICD-10-CM

## 2023-07-23 DIAGNOSIS — F32.A DEPRESSION WITH SUICIDAL IDEATION: Primary | ICD-10-CM

## 2023-07-23 DIAGNOSIS — T50.901A OVERDOSE: ICD-10-CM

## 2023-07-23 PROBLEM — T14.91XA SUICIDE ATTEMPT: Status: ACTIVE | Noted: 2023-07-23

## 2023-07-23 PROBLEM — E87.20 METABOLIC ACIDOSIS: Status: ACTIVE | Noted: 2023-07-23

## 2023-07-23 LAB
ALBUMIN SERPL-MCNC: 2.7 G/DL (ref 3.4–4.8)
ALBUMIN SERPL-MCNC: 2.8 G/DL (ref 3.4–4.8)
ALBUMIN/GLOB SERPL: 0.6 RATIO (ref 1.1–2)
ALLENS TEST BLOOD GAS (OHS): ABNORMAL
ALP SERPL-CCNC: 75 UNIT/L (ref 40–150)
ALP SERPL-CCNC: 83 UNIT/L (ref 40–150)
ALT SERPL-CCNC: 9 UNIT/L (ref 0–55)
ALT SERPL-CCNC: 9 UNIT/L (ref 0–55)
AMPHET UR QL SCN: NEGATIVE
AMPHET UR QL SCN: NEGATIVE
ANION GAP SERPL CALC-SCNC: 13 MEQ/L
APAP SERPL-MCNC: <17.4 UG/ML (ref 17.4–30)
APAP SERPL-MCNC: <17.4 UG/ML (ref 17.4–30)
APPEARANCE UR: CLEAR
APPEARANCE UR: CLEAR
AST SERPL-CCNC: 18 UNIT/L (ref 5–34)
AST SERPL-CCNC: 18 UNIT/L (ref 5–34)
BACTERIA #/AREA URNS AUTO: ABNORMAL /HPF
BACTERIA #/AREA URNS AUTO: ABNORMAL /HPF
BARBITURATE SCN PRESENT UR: NEGATIVE
BARBITURATE SCN PRESENT UR: NEGATIVE
BASE EXCESS BLD CALC-SCNC: -3.5 MMOL/L
BASOPHILS # BLD AUTO: 0.02 X10(3)/MCL
BASOPHILS # BLD AUTO: 0.03 X10(3)/MCL
BASOPHILS NFR BLD AUTO: 0.3 %
BASOPHILS NFR BLD AUTO: 0.4 %
BENZODIAZ UR QL SCN: NEGATIVE
BENZODIAZ UR QL SCN: NEGATIVE
BILIRUB UR QL STRIP.AUTO: NEGATIVE
BILIRUB UR QL STRIP.AUTO: NEGATIVE
BILIRUBIN DIRECT+TOT PNL SERPL-MCNC: 0.2 MG/DL (ref 0–0.8)
BILIRUBIN DIRECT+TOT PNL SERPL-MCNC: 0.2 MG/DL (ref 0–?)
BILIRUBIN DIRECT+TOT PNL SERPL-MCNC: 0.3 MG/DL
BILIRUBIN DIRECT+TOT PNL SERPL-MCNC: 0.4 MG/DL
BLOOD GAS SAMPLE TYPE (OHS): ABNORMAL
BUN SERPL-MCNC: 11.9 MG/DL (ref 9.8–20.1)
BUN SERPL-MCNC: 16 MG/DL (ref 9.8–20.1)
CALCIUM SERPL-MCNC: 8.7 MG/DL (ref 8.4–10.2)
CALCIUM SERPL-MCNC: 9.3 MG/DL (ref 8.4–10.2)
CANNABINOIDS UR QL SCN: NEGATIVE
CANNABINOIDS UR QL SCN: NEGATIVE
CHLORIDE SERPL-SCNC: 111 MMOL/L (ref 98–107)
CHLORIDE SERPL-SCNC: 113 MMOL/L (ref 98–107)
CHLORIDE UR-SCNC: 148.5 MMOL/L
CK MB SERPL-MCNC: 1.5 NG/ML
CK SERPL-CCNC: 42 U/L (ref 29–168)
CO2 BLDA-SCNC: 18.6 MMOL/L
CO2 SERPL-SCNC: 16 MMOL/L (ref 23–31)
CO2 SERPL-SCNC: 23 MMOL/L (ref 23–31)
COCAINE UR QL SCN: NEGATIVE
COCAINE UR QL SCN: NEGATIVE
COHGB MFR BLDA: 4.3 %
COLOR UR: ABNORMAL
COLOR UR: YELLOW
CREAT SERPL-MCNC: 0.5 MG/DL (ref 0.55–1.02)
CREAT SERPL-MCNC: 0.53 MG/DL (ref 0.55–1.02)
CREAT/UREA NIT SERPL: 24
DRAWN BY BLOOD GAS (OHS): ABNORMAL
EOSINOPHIL # BLD AUTO: 0.02 X10(3)/MCL (ref 0–0.9)
EOSINOPHIL # BLD AUTO: 0.05 X10(3)/MCL (ref 0–0.9)
EOSINOPHIL NFR BLD AUTO: 0.2 %
EOSINOPHIL NFR BLD AUTO: 0.7 %
ERYTHROCYTE [DISTWIDTH] IN BLOOD BY AUTOMATED COUNT: 13.3 % (ref 11.5–17)
ERYTHROCYTE [DISTWIDTH] IN BLOOD BY AUTOMATED COUNT: 13.6 % (ref 11.5–17)
ETHANOL SERPL-MCNC: <10 MG/DL
ETHANOL SERPL-MCNC: <10 MG/DL
FENTANYL UR QL SCN: NEGATIVE
FENTANYL UR QL SCN: NEGATIVE
FIO2 BLOOD GAS (OHS): 21 %
FLUAV AG UPPER RESP QL IA.RAPID: NOT DETECTED
FLUBV AG UPPER RESP QL IA.RAPID: NOT DETECTED
GFR SERPLBLD CREATININE-BSD FMLA CKD-EPI: >60 MLS/MIN/1.73/M2
GFR SERPLBLD CREATININE-BSD FMLA CKD-EPI: >60 MLS/MIN/1.73/M2
GLOBULIN SER-MCNC: 4.4 GM/DL (ref 2.4–3.5)
GLUCOSE SERPL-MCNC: 109 MG/DL (ref 82–115)
GLUCOSE SERPL-MCNC: 87 MG/DL (ref 82–115)
GLUCOSE UR QL STRIP.AUTO: NEGATIVE
GLUCOSE UR QL STRIP.AUTO: NORMAL
HCO3 BLDA-SCNC: 17.9 MMOL/L
HCT VFR BLD AUTO: 48.5 % (ref 37–47)
HCT VFR BLD AUTO: 52.5 % (ref 37–47)
HGB BLD-MCNC: 15.6 G/DL (ref 12–16)
HGB BLD-MCNC: 16.8 G/DL (ref 12–16)
HYALINE CASTS #/AREA URNS LPF: ABNORMAL /LPF
HYALINE CASTS URNS QL MICRO: ABNORMAL /LPF
IMM GRANULOCYTES # BLD AUTO: 0.01 X10(3)/MCL (ref 0–0.04)
IMM GRANULOCYTES # BLD AUTO: 0.02 X10(3)/MCL (ref 0–0.04)
IMM GRANULOCYTES NFR BLD AUTO: 0.1 %
IMM GRANULOCYTES NFR BLD AUTO: 0.2 %
INR BLD: 1 (ref 0–1.3)
KETONES UR QL STRIP.AUTO: ABNORMAL
KETONES UR QL STRIP.AUTO: ABNORMAL
LACTATE SERPL-SCNC: 1.2 MMOL/L (ref 0.5–2.2)
LEUKOCYTE ESTERASE UR QL STRIP.AUTO: NEGATIVE
LEUKOCYTE ESTERASE UR QL STRIP.AUTO: NEGATIVE
LYMPHOCYTES # BLD AUTO: 0.87 X10(3)/MCL (ref 0.6–4.6)
LYMPHOCYTES # BLD AUTO: 1.1 X10(3)/MCL (ref 0.6–4.6)
LYMPHOCYTES NFR BLD AUTO: 10.8 %
LYMPHOCYTES NFR BLD AUTO: 15.5 %
MAGNESIUM SERPL-MCNC: 1.6 MG/DL (ref 1.6–2.6)
MCH RBC QN AUTO: 28.4 PG (ref 27–31)
MCH RBC QN AUTO: 28.5 PG (ref 27–31)
MCHC RBC AUTO-ENTMCNC: 32 G/DL (ref 33–36)
MCHC RBC AUTO-ENTMCNC: 32.2 G/DL (ref 33–36)
MCV RBC AUTO: 88.5 FL (ref 80–94)
MCV RBC AUTO: 88.7 FL (ref 80–94)
MDMA UR QL SCN: NEGATIVE
MDMA UR QL SCN: NEGATIVE
METHGB MFR BLDA: 0.9 %
MONOCYTES # BLD AUTO: 0.59 X10(3)/MCL (ref 0.1–1.3)
MONOCYTES # BLD AUTO: 0.6 X10(3)/MCL (ref 0.1–1.3)
MONOCYTES NFR BLD AUTO: 7.3 %
MONOCYTES NFR BLD AUTO: 8.4 %
MUCOUS THREADS URNS QL MICRO: ABNORMAL /LPF
MUCOUS THREADS URNS QL MICRO: ABNORMAL /LPF
NEUTROPHILS # BLD AUTO: 5.33 X10(3)/MCL (ref 2.1–9.2)
NEUTROPHILS # BLD AUTO: 6.52 X10(3)/MCL (ref 2.1–9.2)
NEUTROPHILS NFR BLD AUTO: 75 %
NEUTROPHILS NFR BLD AUTO: 81.1 %
NITRITE UR QL STRIP.AUTO: NEGATIVE
NITRITE UR QL STRIP.AUTO: NEGATIVE
NRBC BLD AUTO-RTO: 0 %
NRBC BLD AUTO-RTO: 0 %
O2 HB BLOOD GAS (OHS): 93.3 %
OPIATES UR QL SCN: NEGATIVE
OPIATES UR QL SCN: NEGATIVE
OXYGEN DEVICE BLOOD GAS (OHS): ABNORMAL
OXYHGB MFR BLDA: 15.4 G/DL
PCO2 BLDA: 24 MMHG (ref 40–50)
PCP UR QL: NEGATIVE
PCP UR QL: NEGATIVE
PH BLDA: 7.48 [PH] (ref 7.3–7.4)
PH UR STRIP.AUTO: 5.5 [PH]
PH UR STRIP.AUTO: 5.5 [PH]
PH UR: 5.5 [PH] (ref 3–11)
PH UR: 5.5 [PH] (ref 3–11)
PHOSPHATE SERPL-MCNC: 2 MG/DL (ref 2.3–4.7)
PLATELET # BLD AUTO: 190 X10(3)/MCL (ref 130–400)
PLATELET # BLD AUTO: 204 X10(3)/MCL (ref 130–400)
PMV BLD AUTO: 11.2 FL (ref 7.4–10.4)
PMV BLD AUTO: 11.5 FL (ref 7.4–10.4)
PO2 BLDA: 162 MMHG (ref 30–40)
POTASSIUM SERPL-SCNC: 4.1 MMOL/L (ref 3.5–5.1)
POTASSIUM SERPL-SCNC: 4.2 MMOL/L (ref 3.5–5.1)
POTASSIUM UR-SCNC: 41 MMOL/L
PROT SERPL-MCNC: 6.6 GM/DL (ref 5.8–7.6)
PROT SERPL-MCNC: 7.2 GM/DL (ref 5.8–7.6)
PROT UR QL STRIP.AUTO: ABNORMAL
PROT UR QL STRIP.AUTO: ABNORMAL
PROTHROMBIN TIME: 10 SECONDS (ref 9.1–10.9)
RBC # BLD AUTO: 5.48 X10(6)/MCL (ref 4.2–5.4)
RBC # BLD AUTO: 5.92 X10(6)/MCL (ref 4.2–5.4)
RBC #/AREA URNS AUTO: ABNORMAL /HPF
RBC #/AREA URNS AUTO: ABNORMAL /HPF
RBC UR QL AUTO: NEGATIVE
RBC UR QL AUTO: NEGATIVE
RSV A 5' UTR RNA NPH QL NAA+PROBE: NOT DETECTED
SALICYLATES SERPL-MCNC: <5 MG/DL
SALICYLATES SERPL-MCNC: <5 MG/DL
SAO2 % BLDA: 98.4 %
SARS-COV-2 RNA RESP QL NAA+PROBE: NOT DETECTED
SODIUM SERPL-SCNC: 142 MMOL/L (ref 136–145)
SODIUM SERPL-SCNC: 145 MMOL/L (ref 136–145)
SODIUM UR-SCNC: 121.3 MMOL/L
SP GR UR STRIP.AUTO: 1.02
SP GR UR STRIP.AUTO: >=1.03
SPECIFIC GRAVITY, URINE AUTO (.000) (OHS): >=1.03 (ref 1–1.03)
SQUAMOUS #/AREA URNS AUTO: ABNORMAL /HPF
SQUAMOUS #/AREA URNS LPF: ABNORMAL /HPF
T4 FREE SERPL-MCNC: 1.12 NG/DL (ref 0.7–1.48)
TROPONIN I SERPL-MCNC: 0.65 NG/ML (ref 0–0.04)
TROPONIN I SERPL-MCNC: 0.68 NG/ML (ref 0–0.04)
TROPONIN I SERPL-MCNC: 0.81 NG/ML (ref 0–0.04)
TSH SERPL-ACNC: 0.13 UIU/ML (ref 0.35–4.94)
UROBILINOGEN UR STRIP-ACNC: 0.2
UROBILINOGEN UR STRIP-ACNC: NORMAL
WBC # SPEC AUTO: 7.11 X10(3)/MCL (ref 4.5–11.5)
WBC # SPEC AUTO: 8.05 X10(3)/MCL (ref 4.5–11.5)
WBC #/AREA URNS AUTO: ABNORMAL /HPF
WBC #/AREA URNS AUTO: ABNORMAL /HPF

## 2023-07-23 PROCEDURE — 25000003 PHARM REV CODE 250: Performed by: STUDENT IN AN ORGANIZED HEALTH CARE EDUCATION/TRAINING PROGRAM

## 2023-07-23 PROCEDURE — 11000001 HC ACUTE MED/SURG PRIVATE ROOM

## 2023-07-23 PROCEDURE — 51702 INSERT TEMP BLADDER CATH: CPT

## 2023-07-23 PROCEDURE — 25000003 PHARM REV CODE 250

## 2023-07-23 PROCEDURE — 99291 CRITICAL CARE FIRST HOUR: CPT

## 2023-07-23 PROCEDURE — 80076 HEPATIC FUNCTION PANEL: CPT | Performed by: STUDENT IN AN ORGANIZED HEALTH CARE EDUCATION/TRAINING PROGRAM

## 2023-07-23 PROCEDURE — 85025 COMPLETE CBC W/AUTO DIFF WBC: CPT | Performed by: FAMILY MEDICINE

## 2023-07-23 PROCEDURE — 84300 ASSAY OF URINE SODIUM: CPT | Performed by: STUDENT IN AN ORGANIZED HEALTH CARE EDUCATION/TRAINING PROGRAM

## 2023-07-23 PROCEDURE — 83605 ASSAY OF LACTIC ACID: CPT | Performed by: STUDENT IN AN ORGANIZED HEALTH CARE EDUCATION/TRAINING PROGRAM

## 2023-07-23 PROCEDURE — 82077 ASSAY SPEC XCP UR&BREATH IA: CPT | Performed by: STUDENT IN AN ORGANIZED HEALTH CARE EDUCATION/TRAINING PROGRAM

## 2023-07-23 PROCEDURE — 25000003 PHARM REV CODE 250: Performed by: FAMILY MEDICINE

## 2023-07-23 PROCEDURE — 96361 HYDRATE IV INFUSION ADD-ON: CPT

## 2023-07-23 PROCEDURE — 84100 ASSAY OF PHOSPHORUS: CPT | Performed by: STUDENT IN AN ORGANIZED HEALTH CARE EDUCATION/TRAINING PROGRAM

## 2023-07-23 PROCEDURE — 85610 PROTHROMBIN TIME: CPT | Performed by: FAMILY MEDICINE

## 2023-07-23 PROCEDURE — 81001 URINALYSIS AUTO W/SCOPE: CPT | Performed by: STUDENT IN AN ORGANIZED HEALTH CARE EDUCATION/TRAINING PROGRAM

## 2023-07-23 PROCEDURE — 84133 ASSAY OF URINE POTASSIUM: CPT | Performed by: STUDENT IN AN ORGANIZED HEALTH CARE EDUCATION/TRAINING PROGRAM

## 2023-07-23 PROCEDURE — 82436 ASSAY OF URINE CHLORIDE: CPT | Performed by: STUDENT IN AN ORGANIZED HEALTH CARE EDUCATION/TRAINING PROGRAM

## 2023-07-23 PROCEDURE — 80307 DRUG TEST PRSMV CHEM ANLYZR: CPT | Performed by: STUDENT IN AN ORGANIZED HEALTH CARE EDUCATION/TRAINING PROGRAM

## 2023-07-23 PROCEDURE — 80053 COMPREHEN METABOLIC PANEL: CPT | Performed by: FAMILY MEDICINE

## 2023-07-23 PROCEDURE — 21400001 HC TELEMETRY ROOM

## 2023-07-23 PROCEDURE — 80307 DRUG TEST PRSMV CHEM ANLYZR: CPT | Performed by: FAMILY MEDICINE

## 2023-07-23 PROCEDURE — 82077 ASSAY SPEC XCP UR&BREATH IA: CPT | Performed by: FAMILY MEDICINE

## 2023-07-23 PROCEDURE — 96374 THER/PROPH/DIAG INJ IV PUSH: CPT

## 2023-07-23 PROCEDURE — 80143 DRUG ASSAY ACETAMINOPHEN: CPT | Performed by: FAMILY MEDICINE

## 2023-07-23 PROCEDURE — 80179 DRUG ASSAY SALICYLATE: CPT | Performed by: STUDENT IN AN ORGANIZED HEALTH CARE EDUCATION/TRAINING PROGRAM

## 2023-07-23 PROCEDURE — 84484 ASSAY OF TROPONIN QUANT: CPT | Performed by: STUDENT IN AN ORGANIZED HEALTH CARE EDUCATION/TRAINING PROGRAM

## 2023-07-23 PROCEDURE — 84484 ASSAY OF TROPONIN QUANT: CPT | Performed by: FAMILY MEDICINE

## 2023-07-23 PROCEDURE — 83735 ASSAY OF MAGNESIUM: CPT | Performed by: STUDENT IN AN ORGANIZED HEALTH CARE EDUCATION/TRAINING PROGRAM

## 2023-07-23 PROCEDURE — 96365 THER/PROPH/DIAG IV INF INIT: CPT | Mod: 59

## 2023-07-23 PROCEDURE — 94761 N-INVAS EAR/PLS OXIMETRY MLT: CPT

## 2023-07-23 PROCEDURE — 84439 ASSAY OF FREE THYROXINE: CPT | Performed by: STUDENT IN AN ORGANIZED HEALTH CARE EDUCATION/TRAINING PROGRAM

## 2023-07-23 PROCEDURE — 85025 COMPLETE CBC W/AUTO DIFF WBC: CPT | Performed by: STUDENT IN AN ORGANIZED HEALTH CARE EDUCATION/TRAINING PROGRAM

## 2023-07-23 PROCEDURE — 82550 ASSAY OF CK (CPK): CPT | Performed by: STUDENT IN AN ORGANIZED HEALTH CARE EDUCATION/TRAINING PROGRAM

## 2023-07-23 PROCEDURE — 96374 THER/PROPH/DIAG INJ IV PUSH: CPT | Mod: 59

## 2023-07-23 PROCEDURE — 63600175 PHARM REV CODE 636 W HCPCS: Performed by: STUDENT IN AN ORGANIZED HEALTH CARE EDUCATION/TRAINING PROGRAM

## 2023-07-23 PROCEDURE — 96375 TX/PRO/DX INJ NEW DRUG ADDON: CPT | Mod: 59

## 2023-07-23 PROCEDURE — 93005 ELECTROCARDIOGRAM TRACING: CPT

## 2023-07-23 PROCEDURE — 81001 URINALYSIS AUTO W/SCOPE: CPT | Mod: 59 | Performed by: FAMILY MEDICINE

## 2023-07-23 PROCEDURE — 82553 CREATINE MB FRACTION: CPT | Performed by: FAMILY MEDICINE

## 2023-07-23 PROCEDURE — 80143 DRUG ASSAY ACETAMINOPHEN: CPT | Performed by: STUDENT IN AN ORGANIZED HEALTH CARE EDUCATION/TRAINING PROGRAM

## 2023-07-23 PROCEDURE — 93005 ELECTROCARDIOGRAM TRACING: CPT | Mod: 59

## 2023-07-23 PROCEDURE — 99900035 HC TECH TIME PER 15 MIN (STAT)

## 2023-07-23 PROCEDURE — 82550 ASSAY OF CK (CPK): CPT | Performed by: FAMILY MEDICINE

## 2023-07-23 PROCEDURE — 96376 TX/PRO/DX INJ SAME DRUG ADON: CPT

## 2023-07-23 PROCEDURE — 87040 BLOOD CULTURE FOR BACTERIA: CPT | Performed by: STUDENT IN AN ORGANIZED HEALTH CARE EDUCATION/TRAINING PROGRAM

## 2023-07-23 PROCEDURE — 0241U COVID/RSV/FLU A&B PCR: CPT | Performed by: STUDENT IN AN ORGANIZED HEALTH CARE EDUCATION/TRAINING PROGRAM

## 2023-07-23 PROCEDURE — 63600175 PHARM REV CODE 636 W HCPCS: Performed by: FAMILY MEDICINE

## 2023-07-23 PROCEDURE — 82803 BLOOD GASES ANY COMBINATION: CPT

## 2023-07-23 PROCEDURE — 80074 ACUTE HEPATITIS PANEL: CPT | Performed by: STUDENT IN AN ORGANIZED HEALTH CARE EDUCATION/TRAINING PROGRAM

## 2023-07-23 PROCEDURE — 80179 DRUG ASSAY SALICYLATE: CPT | Performed by: FAMILY MEDICINE

## 2023-07-23 PROCEDURE — 84443 ASSAY THYROID STIM HORMONE: CPT | Performed by: STUDENT IN AN ORGANIZED HEALTH CARE EDUCATION/TRAINING PROGRAM

## 2023-07-23 RX ORDER — ATORVASTATIN CALCIUM 20 MG/1
20 TABLET, FILM COATED ORAL NIGHTLY
Status: DISCONTINUED | OUTPATIENT
Start: 2023-07-24 | End: 2023-07-25 | Stop reason: HOSPADM

## 2023-07-23 RX ORDER — METOPROLOL TARTRATE 1 MG/ML
5 INJECTION, SOLUTION INTRAVENOUS
Status: COMPLETED | OUTPATIENT
Start: 2023-07-23 | End: 2023-07-23

## 2023-07-23 RX ORDER — SODIUM CHLORIDE 0.9 % (FLUSH) 0.9 %
2 SYRINGE (ML) INJECTION EVERY 12 HOURS PRN
Status: DISCONTINUED | OUTPATIENT
Start: 2023-07-23 | End: 2023-07-25 | Stop reason: HOSPADM

## 2023-07-23 RX ORDER — METOPROLOL TARTRATE 50 MG/1
50 TABLET ORAL 2 TIMES DAILY
Status: DISCONTINUED | OUTPATIENT
Start: 2023-07-24 | End: 2023-07-25 | Stop reason: HOSPADM

## 2023-07-23 RX ORDER — NALOXONE HCL 0.4 MG/ML
0.02 VIAL (ML) INJECTION
Status: DISCONTINUED | OUTPATIENT
Start: 2023-07-23 | End: 2023-07-25 | Stop reason: HOSPADM

## 2023-07-23 RX ORDER — FLUMAZENIL 0.1 MG/ML
0.2 INJECTION INTRAVENOUS ONCE
Status: DISCONTINUED | OUTPATIENT
Start: 2023-07-23 | End: 2023-07-23

## 2023-07-23 RX ORDER — NALOXONE HCL 0.4 MG/ML
0.4 VIAL (ML) INJECTION
Status: COMPLETED | OUTPATIENT
Start: 2023-07-23 | End: 2023-07-23

## 2023-07-23 RX ORDER — GLUCAGON 1 MG
1 KIT INJECTION
Status: DISCONTINUED | OUTPATIENT
Start: 2023-07-23 | End: 2023-07-25 | Stop reason: HOSPADM

## 2023-07-23 RX ORDER — FLUMAZENIL 0.1 MG/ML
INJECTION INTRAVENOUS
Status: COMPLETED
Start: 2023-07-23 | End: 2023-07-23

## 2023-07-23 RX ORDER — NICARDIPINE HYDROCHLORIDE 0.2 MG/ML
5 INJECTION INTRAVENOUS CONTINUOUS
Status: DISCONTINUED | OUTPATIENT
Start: 2023-07-23 | End: 2023-07-23 | Stop reason: HOSPADM

## 2023-07-23 RX ORDER — FLUMAZENIL 0.1 MG/ML
0.2 INJECTION INTRAVENOUS ONCE
Status: COMPLETED | OUTPATIENT
Start: 2023-07-23 | End: 2023-07-23

## 2023-07-23 RX ORDER — IBUPROFEN 200 MG
24 TABLET ORAL
Status: DISCONTINUED | OUTPATIENT
Start: 2023-07-23 | End: 2023-07-25 | Stop reason: HOSPADM

## 2023-07-23 RX ORDER — LEVOTHYROXINE SODIUM 25 UG/1
25 TABLET ORAL
Status: DISCONTINUED | OUTPATIENT
Start: 2023-07-24 | End: 2023-07-24

## 2023-07-23 RX ORDER — SODIUM CHLORIDE 9 MG/ML
1000 INJECTION, SOLUTION INTRAVENOUS
Status: COMPLETED | OUTPATIENT
Start: 2023-07-23 | End: 2023-07-23

## 2023-07-23 RX ORDER — FLUMAZENIL 0.1 MG/ML
0.3 INJECTION INTRAVENOUS ONCE
Status: COMPLETED | OUTPATIENT
Start: 2023-07-23 | End: 2023-07-23

## 2023-07-23 RX ORDER — NICARDIPINE HYDROCHLORIDE 0.2 MG/ML
INJECTION INTRAVENOUS
Status: DISPENSED
Start: 2023-07-23 | End: 2023-07-24

## 2023-07-23 RX ORDER — LISINOPRIL 10 MG/1
20 TABLET ORAL 2 TIMES DAILY
Status: DISCONTINUED | OUTPATIENT
Start: 2023-07-24 | End: 2023-07-25 | Stop reason: HOSPADM

## 2023-07-23 RX ORDER — TETRACAINE HYDROCHLORIDE 5 MG/ML
2 SOLUTION OPHTHALMIC
Status: COMPLETED | OUTPATIENT
Start: 2023-07-23 | End: 2023-07-23

## 2023-07-23 RX ORDER — ENOXAPARIN SODIUM 100 MG/ML
40 INJECTION SUBCUTANEOUS EVERY 24 HOURS
Status: DISCONTINUED | OUTPATIENT
Start: 2023-07-24 | End: 2023-07-24

## 2023-07-23 RX ORDER — KETOROLAC TROMETHAMINE 30 MG/ML
15 INJECTION, SOLUTION INTRAMUSCULAR; INTRAVENOUS
Status: COMPLETED | OUTPATIENT
Start: 2023-07-23 | End: 2023-07-23

## 2023-07-23 RX ORDER — ROSUVASTATIN CALCIUM 20 MG/1
20 TABLET, COATED ORAL
Status: ON HOLD | COMMUNITY
Start: 2023-05-15 | End: 2023-07-25 | Stop reason: HOSPADM

## 2023-07-23 RX ORDER — IBUPROFEN 200 MG
16 TABLET ORAL
Status: DISCONTINUED | OUTPATIENT
Start: 2023-07-23 | End: 2023-07-25 | Stop reason: HOSPADM

## 2023-07-23 RX ORDER — MONTELUKAST SODIUM 10 MG/1
10 TABLET ORAL
COMMUNITY
Start: 2023-05-15

## 2023-07-23 RX ADMIN — FLUORESCEIN SODIUM 1 EACH: 1 STRIP OPHTHALMIC at 07:07

## 2023-07-23 RX ADMIN — METOPROLOL TARTRATE 5 MG: 1 INJECTION, SOLUTION INTRAVENOUS at 05:07

## 2023-07-23 RX ADMIN — SODIUM CHLORIDE 1000 ML: 9 INJECTION, SOLUTION INTRAVENOUS at 07:07

## 2023-07-23 RX ADMIN — NALOXONE HYDROCHLORIDE 0.4 MG: 0.4 INJECTION, SOLUTION INTRAMUSCULAR; INTRAVENOUS; SUBCUTANEOUS at 11:07

## 2023-07-23 RX ADMIN — SODIUM CHLORIDE 1000 ML: 9 INJECTION, SOLUTION INTRAVENOUS at 05:07

## 2023-07-23 RX ADMIN — KETOROLAC TROMETHAMINE 15 MG: 30 INJECTION, SOLUTION INTRAMUSCULAR; INTRAVENOUS at 07:07

## 2023-07-23 RX ADMIN — NICARDIPINE HYDROCHLORIDE 5 MG/HR: 0.2 INJECTION, SOLUTION INTRAVENOUS at 01:07

## 2023-07-23 RX ADMIN — SODIUM CHLORIDE 1000 ML: 9 INJECTION, SOLUTION INTRAVENOUS at 12:07

## 2023-07-23 RX ADMIN — METOPROLOL TARTRATE 5 MG: 1 INJECTION, SOLUTION INTRAVENOUS at 12:07

## 2023-07-23 RX ADMIN — FLUMAZENIL 0.3 MG: 0.1 INJECTION, SOLUTION INTRAVENOUS at 12:07

## 2023-07-23 RX ADMIN — FLUMAZENIL 0.2 MG: 0.1 INJECTION INTRAVENOUS at 11:07

## 2023-07-23 RX ADMIN — TETRACAINE HYDROCHLORIDE 2 DROP: 5 SOLUTION OPHTHALMIC at 07:07

## 2023-07-23 RX ADMIN — FLUMAZENIL 0.2 MG: 0.1 INJECTION, SOLUTION INTRAVENOUS at 11:07

## 2023-07-23 NOTE — ED NOTES
"Pt brought into ER via EMS.  EMS report that pt has been lethargic.  Upon arrival pt speech is slurred, responds to sternum rub and is oriented only to the fact that she is in [place].  Family brought new information to nurses attention that she had lost her  on her birthday, which is today.  Nurse asks pt if she took some medication or pills.  Pt nods head.  When asked how many pt holds out her palm and says "this many"  pt did not state what medication she took.  Asked family what she might have taken and how many, sister states that she probably took her Ativan 0.5mg.  Sister Krystin stated that last night at 2100 pt went to bed and son Aly could not wake her up.  It was reported that Aly watched her all night and this morning he could not wake he.  EMS was called at 11:00 today    When speaking to pt she stated the following: "I took more then 10 pills, almost the whole bottle, (crying) I don't want to be here!  I can't believe I did this again and wasn't successful"    Nurse notified Dr. Cobian.   "

## 2023-07-23 NOTE — ED NOTES
Notified Andre at [facility] that pt's transportation was here now via EMS. PEC sent with EMS.

## 2023-07-23 NOTE — ED NOTES
Spoke to Aly at the transfer center.  Stated that we would wait for the repeat troponin and then make a decision on placement.

## 2023-07-23 NOTE — ED NOTES
Posion control called at this time.  Spoke with [facility] at this time.  Pts condition and events surrounding condition relayed to Poison control.  Stated that medication should be out of her system by now, but to conduct drug, tylenol, and alcohol screen.  Control blood pressure, symptomatic care.

## 2023-07-23 NOTE — ED PROVIDER NOTES
Encounter Date: 7/23/2023       History     Chief Complaint   Patient presents with    Fatigue     [facility] brought patient to ER with lethargy, decreased LOC, family believes she took too much ativan       This patient is a 73-year-old female who comes in by ambulance for altered mental status.  As per the patient's family, she took some Ativan last night around 9:00 p.m. and after that became very sleepy.  Patient states that she took a large dose while her son states that he does not believe it was a large dose.  Patient's medicines that are with her do not include the Ativan bottle.  Patient's sister states that the son has it in his possession.  Patient's sisters power of .  Patient very sleepy but arousable upon arrival.  Blood pressure is very high, patient is known to have very high blood pressure every time she comes to the ER.  Blood pressure has gone up to 249/131 today.  Initially, patient is given a dose of Narcan with no response for which after words we gave a dose of flumazenil and patient was able to carry a conversation.  After approximately 25 minutes, the patient became sleepy again and was given another dose of flumazenil.  Sister does believe that she did take the Ativan on purpose because the patient has been talking about killing herself over the past few weeks.  Sister states that the patient has been depressed for a long time    The history is provided by the patient and a relative.   Review of patient's allergies indicates:   Allergen Reactions    Amlodipine     Losartan      Past Medical History:   Diagnosis Date    Acute MI     Anxiety     Cardiac disease     COPD (chronic obstructive pulmonary disease)     Coronary artery disease     Hyperlipidemia     Hypertension     Hypothyroidism     Other specified noninfective gastroenteritis and colitis     Unspecified macular degeneration      Past Surgical History:   Procedure Laterality Date    CORONARY ANGIOGRAPHY N/A 2/1/2021     Procedure: ANGIOGRAM, CORONARY ARTERY;  Surgeon: Devon Medeiros MD;  Location: [facility] CATH LAB;  Service: Cardiology;  Laterality: N/A;    CORONARY ANGIOPLASTY WITH STENT PLACEMENT      LEFT HEART CATHETERIZATION Left 2/1/2021    Procedure: CATHETERIZATION, HEART, LEFT;  Surgeon: Devon Medeiros MD;  Location: [facility] CATH LAB;  Service: Cardiology;  Laterality: Left;     Family History   Problem Relation Age of Onset    Diabetes Mother     Heart disease Mother     Hypertension Father     Heart disease Father      Social History     Tobacco Use    Smoking status: Every Day     Packs/day: 0.50     Years: 55.00     Pack years: 27.50     Types: Cigarettes    Smokeless tobacco: Never   Substance Use Topics    Alcohol use: Never    Drug use: Never     Review of Systems   Constitutional: Negative.    HENT: Negative.     Respiratory: Negative.     Cardiovascular: Negative.    Endocrine: Negative.    Musculoskeletal: Negative.    Skin: Negative.    Neurological:         Altered mental stats   Psychiatric/Behavioral: Negative.     All other systems reviewed and are negative.    Physical Exam     Initial Vitals [07/23/23 1145]   BP Pulse Resp Temp SpO2   (!) 230/121 100 18 97.5 °F (36.4 °C) (!) 88 %      MAP       --         Physical Exam    Nursing note and vitals reviewed.  Constitutional: She appears lethargic. She appears cachectic. She is easily aroused. She is sedated.   HENT:   Head: Normocephalic.   Eyes: Conjunctivae, EOM and lids are normal. Pupils are equal, round, and reactive to light.   Neck:   Normal range of motion.  Cardiovascular:  Regular rhythm, normal heart sounds and normal pulses.   Tachycardia present.         Pulmonary/Chest: Breath sounds normal.   Abdominal: Abdomen is soft.   Musculoskeletal:         General: Normal range of motion.      Cervical back: Normal range of motion.     Neurological: She is easily aroused. She has normal strength and normal reflexes. She appears lethargic. She is disoriented.  No cranial nerve deficit or sensory deficit. GCS eye subscore is 4. GCS verbal subscore is 5. GCS motor subscore is 6.   Patient is sleeping lethargic but easily arousable and completely awake to pain.  Patient is able to follow all commands and answers questions.  She does not remember which hospital that she is at but she knows that she is at a hospital.  She knows her name and her sister's name.  She admits that she was trying to kill herself because she is hopeless about her living situation and because she is estranged from her children   Skin: Skin is warm and dry.   Psychiatric: She has a normal mood and affect. Her speech is slurred. She is slowed. She expresses impulsivity. She expresses suicidal ideation. She expresses suicidal plans.   Patient admits to us that she was trying to kill herself with an overdose of Ativan and states that she has had frequent thoughts of harming herself prior to today.  Patient is that due to the estrangement of her daughter, the death of her  19 years ago, and her living situation.  Patient's son and grandson live with her but apparently did not take care of her as per the patient's sister.       ED Course   Critical Care    Date/Time: 7/23/2023 4:33 PM  Performed by: Lavelle Georges MD  Authorized by: Lavelle Georges MD   Direct patient critical care time: 20 minutes  Additional history critical care time: 10 minutes  Ordering / reviewing critical care time: 10 minutes  Documentation critical care time: 10 minutes  Consulting other physicians critical care time: 10 minutes  Total critical care time (exclusive of procedural time) : 60 minutes  Critical care was necessary to treat or prevent imminent or life-threatening deterioration of the following conditions: toxidrome.  Critical care was time spent personally by me on the following activities: examination of patient, evaluation of patient's response to treatment, obtaining history from patient or  surrogate, ordering and performing treatments and interventions, ordering and review of laboratory studies, ordering and review of radiographic studies, pulse oximetry and re-evaluation of patient's condition.      Labs Reviewed   COMPREHENSIVE METABOLIC PANEL - Abnormal; Notable for the following components:       Result Value    Chloride 111 (*)     Creatinine 0.53 (*)     Albumin Level 2.8 (*)     Globulin 4.4 (*)     Albumin/Globulin Ratio 0.6 (*)     All other components within normal limits   URINALYSIS, REFLEX TO URINE CULTURE - Abnormal; Notable for the following components:    Protein, UA Trace (*)     Ketones, UA Trace (*)     All other components within normal limits   CBC WITH DIFFERENTIAL - Abnormal; Notable for the following components:    RBC 5.92 (*)     Hgb 16.8 (*)     Hct 52.5 (*)     MCHC 32.0 (*)     MPV 11.2 (*)     All other components within normal limits   ACETAMINOPHEN LEVEL - Abnormal; Notable for the following components:    Acetaminophen Level <17.4 (*)     All other components within normal limits   TROPONIN I - Abnormal; Notable for the following components:    Troponin-I 0.805 (*)     All other components within normal limits   URINALYSIS, MICROSCOPIC - Abnormal; Notable for the following components:    Hyaline Casts, UA Rare (*)     Mucous, UA Moderate (*)     All other components within normal limits   TROPONIN I - Abnormal; Notable for the following components:    Troponin-I 0.646 (*)     All other components within normal limits   PROTIME-INR - Normal   DRUG SCREEN, URINE (BEAKER) - Normal    Narrative:     Cut off concentrations:    Amphetamines - 1000 ng/ml  Barbiturates - 200 ng/ml  Benzodiazepine - 200 ng/ml  Cannabinoids (THC) - 50 ng/ml  Cocaine - 300 ng/ml  Fentanyl - 1.0 ng/ml  MDMA - 500 ng/ml  Opiates - 300 ng/ml   Phencyclidine (PCP) - 25 ng/ml    Specimen submitted for drug analysis and tested for pH and specific gravity in order to evaluate sample integrity. Suspect  tampering if specific gravity is <1.003 and/or pH is not within the range of 4.5 - 8.0  False negatives may result form substances such as bleach added to urine.  False positives may result for the presence of a substance with similar chemical structure to the drug or its metabolite.    This test provides only a PRELIMINARY analytical test result. A more specific alternate chemical method must be used in order to obtain a confirmed analytical result. Gas chromatography/mass spectrometry (GC/MS) is the preferred confirmatory method. Other chemical confirmation methods are available. Clinical consideration and professional judgement should be applied to any drug of abuse test result, particularly when preliminary positive results are used.    Positive results will be confirmed only at the physicians request. Unconfirmed screening results are to be used only for medical purposes (treatment).        ALCOHOL,MEDICAL (ETHANOL) - Normal   CK - Normal   CK-MB - Normal   CBC W/ AUTO DIFFERENTIAL    Narrative:     The following orders were created for panel order CBC W/ AUTO DIFFERENTIAL.  Procedure                               Abnormality         Status                     ---------                               -----------         ------                     CBC with Differential[[id number]]        Abnormal            Final result                 Please view results for these tests on the individual orders.   SALICYLATE LEVEL   POCT GLUCOSE, HAND-HELD DEVICE     EKG Readings: (Independently Interpreted)   Initial Reading: No STEMI. Rhythm: Sinus Tachycardia. Heart Rate: 105. Ectopy: PACs. Conduction: RBBB. ST Segments: Non-Specific ST Segment Depression. ST Segment Depression: III, AVF, V3, V4 and V5. T Waves Flipped: II, III and AVF. Clinical Impression: Sinus Tachycardia with RBBB          Imaging Results              CT Head Without Contrast (Final result)  Result time 07/23/23 14:01:24      Final result by Joel GAN  MD Alexa (07/23/23 14:01:24)                   Impression:      No acute intracranial abnormality identified.  Findings of chronic microvascular ischemic disease.      Electronically signed by: Joel Liu  Date:    07/23/2023  Time:    14:01               Narrative:    EXAMINATION:  CT HEAD WITHOUT CONTRAST    CLINICAL HISTORY:  Neuro deficit, acute, stroke suspected;    TECHNIQUE:  Low dose axial images were obtained through the head.  Coronal and sagittal reformations were also performed. Contrast was not administered.    Automatic exposure control was utilized to reduce the patient's radiation dose.    DLP= 778    COMPARISON:  None.    FINDINGS:  No acute intracranial hemorrhage, edema or mass. No acute parenchymal abnormality.    Mild cerebral atrophy with concordant ventricular enlargement.    Scattered hypodensities throughout the deep periventricular white matter.    Encephalomalacia of the left frontal lobe.    The osseous structures are normal.    The mastoid air cells are clear.    The auditory canals are patent bilaterally.    The globes and orbital contents are normal bilaterally.    Opacification of the left maxillary and sphenoid sinus.                                       Medications   niCARdipine 40 mg/200 mL (0.2 mg/mL) infusion (7.5 mg/hr Intravenous Rate/Dose Change 7/23/23 1414)   naloxone 0.4 mg/mL injection 0.4 mg (0.4 mg Intravenous Given 7/23/23 1156)   sodium chloride 0.9% bolus 1,000 mL 1,000 mL (0 mLs Intravenous Stopped 7/23/23 1330)   flumazeniL injection 0.2 mg (0.2 mg Intravenous Given 7/23/23 1157)   metoprolol injection 5 mg (5 mg Intravenous Given 7/23/23 1220)   flumazeniL injection 0.3 mg (0.3 mg Intravenous Given 7/23/23 1235)     Medical Decision Making:   Initial Assessment:   This patient is a 73-year-old female that took a handful of 0.5 mg Ativan at 9:00 p.m. last night.  Her son noticed that she was sleepy but for some reason he does not believe that she took the  Ativan.  He washed her all night at home and this morning called his and to tell her that her status had not changed.  They brought her into the emergency room where she was sleepy but responsive.  Patient admits that she was trying to kill herself.  Patient has never been diagnosed officially with depression and is not on depression medicine.  Patient's blood pressure is high as it was a few weeks ago when she was in the ER, patient has a history of malignant hypertension.  Differential Diagnosis:   Benzodiazepine overdose, Hypertensive emergency,  ED Management:  Poison control was immediately called for this patient and stated that we did not have to give activated charcoal because too much time had passed since the patient took the Ativan.  It was basically supportive care blood pressure and psych evaluation.  Patient's blood pressure has been very high since she came in but patient has a history of malignant hypertension.  Patient got 1 dose of flumazenil 0.2 mg upon arrival to the ER and she did wake up sufficiently to have a conversation.  Patient was placed on 3 L of oxygen and is satting 100%.  Patient became sleepy again and received a 2nd dose of flumazenil 0 point 3 mg and again became more awake.  Dr Swenson called in , he is in agreement with the Cardene drip and states that the patient should be put back on her regular medicines wants to the drip is weaned off.  She is on lisinopril 20 mg p.o. b.i.d. and metoprolol 50 mg p.o. b.i.d..           ED Course as of 07/23/23 1653   Sun Jul 23, 2023   1418 Troponin I(!): 0.805 [KS]      ED Course User Index  [KS] Lavelle Georges MD                 Clinical Impression:   Final diagnoses:  [R41.82] AMS (altered mental status)  [I16.1] Hypertensive emergency  [T42.4X2A] Intentional benzodiazepine overdose, initial encounter  [T42.4X2A] Suicide attempt by benzodiazepine overdose (Primary)  [R77.8] Elevated troponin        ED Disposition Condition     Transfer to Another Facility Stable                Lavelle Georges MD  07/23/23 1336       Lavelle Georges MD  07/23/23 1633       Lavelle Georges MD  07/23/23 1634       Lavelle Georges MD  07/23/23 [id number]

## 2023-07-24 ENCOUNTER — PATIENT MESSAGE (OUTPATIENT)
Dept: ADMINISTRATIVE | Facility: HOSPITAL | Age: 73
End: 2023-07-24
Payer: MEDICARE

## 2023-07-24 LAB
APTT PPP: 38.9 SECONDS
APTT PPP: 43.8 SECONDS
CK SERPL-CCNC: 45 U/L (ref 29–168)
CV STRESS BASE HR: 90 BPM
D DIMER PPP IA.FEU-MCNC: 0.88 UG/ML FEU (ref 0–0.5)
DIASTOLIC BLOOD PRESSURE: 97 MMHG
HAV IGM SERPL QL IA: NONREACTIVE
HBV CORE IGM SERPL QL IA: NONREACTIVE
HBV SURFACE AG SERPL QL IA: NONREACTIVE
HCV AB SERPL QL IA: NONREACTIVE
HIV 1+2 AB+HIV1 P24 AG SERPL QL IA: NONREACTIVE
INR BLD: 1.1 (ref 0–1.3)
NUC REST EJECTION FRACTION: 59
NUC STRESS EJECTION FRACTION: 59 %
OHS CV CPX 85 PERCENT MAX PREDICTED HEART RATE MALE: 120
OHS CV CPX ESTIMATED METS: 1
OHS CV CPX MAX PREDICTED HEART RATE: 141
OHS CV CPX PATIENT IS FEMALE: 1
OHS CV CPX PATIENT IS MALE: 0
OHS CV CPX PEAK DIASTOLIC BLOOD PRESSURE: 107 MMHG
OHS CV CPX PEAK HEAR RATE: 129 BPM
OHS CV CPX PEAK RATE PRESSURE PRODUCT: NORMAL
OHS CV CPX PEAK SYSTOLIC BLOOD PRESSURE: 195 MMHG
OHS CV CPX PERCENT MAX PREDICTED HEART RATE ACHIEVED: 92
OHS CV CPX RATE PRESSURE PRODUCT PRESENTING: NORMAL
PROTHROMBIN TIME: 13.9 SECONDS (ref 11.4–14)
STRESS ECHO POST EXERCISE DUR MIN: 0 MINUTES
STRESS ECHO POST EXERCISE DUR SEC: 46 SECONDS
SYSTOLIC BLOOD PRESSURE: 165 MMHG
T PALLIDUM AB SER QL: NONREACTIVE
TROPONIN I SERPL-MCNC: 0.42 NG/ML (ref 0–0.04)
TROPONIN I SERPL-MCNC: 0.44 NG/ML (ref 0–0.04)
TROPONIN I SERPL-MCNC: 0.5 NG/ML (ref 0–0.04)
TROPONIN I SERPL-MCNC: 0.5 NG/ML (ref 0–0.04)

## 2023-07-24 PROCEDURE — 86780 TREPONEMA PALLIDUM: CPT | Performed by: STUDENT IN AN ORGANIZED HEALTH CARE EDUCATION/TRAINING PROGRAM

## 2023-07-24 PROCEDURE — 25000003 PHARM REV CODE 250: Performed by: INTERNAL MEDICINE

## 2023-07-24 PROCEDURE — 87389 HIV-1 AG W/HIV-1&-2 AB AG IA: CPT | Performed by: STUDENT IN AN ORGANIZED HEALTH CARE EDUCATION/TRAINING PROGRAM

## 2023-07-24 PROCEDURE — 21400001 HC TELEMETRY ROOM

## 2023-07-24 PROCEDURE — 25000003 PHARM REV CODE 250: Performed by: STUDENT IN AN ORGANIZED HEALTH CARE EDUCATION/TRAINING PROGRAM

## 2023-07-24 PROCEDURE — 85610 PROTHROMBIN TIME: CPT | Performed by: STUDENT IN AN ORGANIZED HEALTH CARE EDUCATION/TRAINING PROGRAM

## 2023-07-24 PROCEDURE — 85730 THROMBOPLASTIN TIME PARTIAL: CPT | Performed by: STUDENT IN AN ORGANIZED HEALTH CARE EDUCATION/TRAINING PROGRAM

## 2023-07-24 PROCEDURE — 85379 FIBRIN DEGRADATION QUANT: CPT | Performed by: STUDENT IN AN ORGANIZED HEALTH CARE EDUCATION/TRAINING PROGRAM

## 2023-07-24 PROCEDURE — 85730 THROMBOPLASTIN TIME PARTIAL: CPT | Performed by: INTERNAL MEDICINE

## 2023-07-24 PROCEDURE — 11000001 HC ACUTE MED/SURG PRIVATE ROOM

## 2023-07-24 PROCEDURE — 25000003 PHARM REV CODE 250

## 2023-07-24 PROCEDURE — 99223 1ST HOSP IP/OBS HIGH 75: CPT | Mod: ,,, | Performed by: STUDENT IN AN ORGANIZED HEALTH CARE EDUCATION/TRAINING PROGRAM

## 2023-07-24 PROCEDURE — 63600175 PHARM REV CODE 636 W HCPCS: Performed by: STUDENT IN AN ORGANIZED HEALTH CARE EDUCATION/TRAINING PROGRAM

## 2023-07-24 PROCEDURE — 84484 ASSAY OF TROPONIN QUANT: CPT | Performed by: STUDENT IN AN ORGANIZED HEALTH CARE EDUCATION/TRAINING PROGRAM

## 2023-07-24 PROCEDURE — 99223 PR INITIAL HOSPITAL CARE,LEVL III: ICD-10-PCS | Mod: ,,, | Performed by: STUDENT IN AN ORGANIZED HEALTH CARE EDUCATION/TRAINING PROGRAM

## 2023-07-24 PROCEDURE — 63600175 PHARM REV CODE 636 W HCPCS: Performed by: INTERNAL MEDICINE

## 2023-07-24 PROCEDURE — 94761 N-INVAS EAR/PLS OXIMETRY MLT: CPT

## 2023-07-24 RX ORDER — MUPIROCIN 20 MG/G
OINTMENT TOPICAL 2 TIMES DAILY
Status: DISCONTINUED | OUTPATIENT
Start: 2023-07-24 | End: 2023-07-25 | Stop reason: HOSPADM

## 2023-07-24 RX ORDER — ASPIRIN 325 MG
325 TABLET ORAL ONCE
Status: COMPLETED | OUTPATIENT
Start: 2023-07-24 | End: 2023-07-25

## 2023-07-24 RX ORDER — REGADENOSON 0.08 MG/ML
0.4 INJECTION, SOLUTION INTRAVENOUS ONCE
Status: COMPLETED | OUTPATIENT
Start: 2023-07-24 | End: 2023-07-24

## 2023-07-24 RX ORDER — HEPARIN SODIUM,PORCINE/D5W 25000/250
0-40 INTRAVENOUS SOLUTION INTRAVENOUS CONTINUOUS
Status: DISCONTINUED | OUTPATIENT
Start: 2023-07-24 | End: 2023-07-25 | Stop reason: HOSPADM

## 2023-07-24 RX ORDER — HYDRALAZINE HYDROCHLORIDE 50 MG/1
50 TABLET, FILM COATED ORAL 3 TIMES DAILY
Status: DISCONTINUED | OUTPATIENT
Start: 2023-07-24 | End: 2023-07-25 | Stop reason: HOSPADM

## 2023-07-24 RX ORDER — HYDRALAZINE HYDROCHLORIDE 20 MG/ML
10 INJECTION INTRAMUSCULAR; INTRAVENOUS
Status: DISCONTINUED | OUTPATIENT
Start: 2023-07-24 | End: 2023-07-24

## 2023-07-24 RX ORDER — LABETALOL HCL 20 MG/4 ML
20 SYRINGE (ML) INTRAVENOUS
Status: DISCONTINUED | OUTPATIENT
Start: 2023-07-24 | End: 2023-07-24

## 2023-07-24 RX ORDER — REGADENOSON 0.08 MG/ML
INJECTION, SOLUTION INTRAVENOUS
Status: DISPENSED
Start: 2023-07-24 | End: 2023-07-25

## 2023-07-24 RX ORDER — HYDRALAZINE HYDROCHLORIDE 20 MG/ML
10 INJECTION INTRAMUSCULAR; INTRAVENOUS EVERY 4 HOURS PRN
Status: DISCONTINUED | OUTPATIENT
Start: 2023-07-24 | End: 2023-07-25 | Stop reason: HOSPADM

## 2023-07-24 RX ORDER — LABETALOL HCL 20 MG/4 ML
10 SYRINGE (ML) INTRAVENOUS EVERY 4 HOURS PRN
Status: DISCONTINUED | OUTPATIENT
Start: 2023-07-24 | End: 2023-07-25 | Stop reason: HOSPADM

## 2023-07-24 RX ADMIN — REGADENOSON 0.4 MG: 0.08 INJECTION, SOLUTION INTRAVENOUS at 02:07

## 2023-07-24 RX ADMIN — ATORVASTATIN CALCIUM 20 MG: 20 TABLET, FILM COATED ORAL at 08:07

## 2023-07-24 RX ADMIN — LISINOPRIL 20 MG: 10 TABLET ORAL at 12:07

## 2023-07-24 RX ADMIN — HEPARIN SODIUM AND DEXTROSE 12 UNITS/KG/HR: 10000; 5 INJECTION INTRAVENOUS at 08:07

## 2023-07-24 RX ADMIN — SODIUM BICARBONATE: 84 INJECTION, SOLUTION INTRAVENOUS at 12:07

## 2023-07-24 RX ADMIN — METOPROLOL TARTRATE 50 MG: 50 TABLET, FILM COATED ORAL at 12:07

## 2023-07-24 RX ADMIN — LEVOTHYROXINE SODIUM 25 MCG: 0.03 TABLET ORAL at 06:07

## 2023-07-24 RX ADMIN — MUPIROCIN: 20 OINTMENT TOPICAL at 08:07

## 2023-07-24 RX ADMIN — HYDRALAZINE HYDROCHLORIDE 50 MG: 50 TABLET, FILM COATED ORAL at 08:07

## 2023-07-24 RX ADMIN — LABETALOL HYDROCHLORIDE 10 MG: 5 INJECTION, SOLUTION INTRAVENOUS at 07:07

## 2023-07-24 RX ADMIN — METOPROLOL TARTRATE 50 MG: 50 TABLET, FILM COATED ORAL at 08:07

## 2023-07-24 RX ADMIN — LABETALOL HYDROCHLORIDE 20 MG: 5 INJECTION, SOLUTION INTRAVENOUS at 03:07

## 2023-07-24 RX ADMIN — LISINOPRIL 20 MG: 10 TABLET ORAL at 08:07

## 2023-07-24 RX ADMIN — HYDRALAZINE HYDROCHLORIDE 50 MG: 50 TABLET, FILM COATED ORAL at 05:07

## 2023-07-24 RX ADMIN — HYDRALAZINE HYDROCHLORIDE 10 MG: 20 INJECTION INTRAMUSCULAR; INTRAVENOUS at 07:07

## 2023-07-24 RX ADMIN — MUPIROCIN 1 G: 20 OINTMENT TOPICAL at 08:07

## 2023-07-24 RX ADMIN — LABETALOL HYDROCHLORIDE 10 MG: 5 INJECTION, SOLUTION INTRAVENOUS at 04:07

## 2023-07-24 NOTE — H&P
[facility] Medicine  History & Physical Note      Patient Name: Cierra Main  : 1950  MRN: [id number]  Patient Class: IP- Inpatient   Admission Date: 2023   Length of Stay: 0  Admitting Service: Hospital Medicine  Attending Physician:   PCP: Rafiq Eller MD  Code status: DNR    Chief Complaint   Drug Overdose (Pt transfer from [facility] for ICU. Pt on cardene gtt @ 5. Pt is letharic. Pt is under a PEC. )      History of Present Illness   74 yo F with PMH of previous MI, COPD, hypothyroidism, HTN, macular degeneration and severe depression presented to [facility] ED as a transfer from [facility] Emergency department due to suicide attempt by drug overdose. Pt does not give a consistent history, at first she state she took several tablets of Tylenol 650, then she stated she took a large fist full of Ativan in an attempt to harm herself. Contacted pt's sister Ms Krystin Brown ([phone]) who stated patient has been really depressed due to her  passing several years ago and also due to her children and grandchildren not taking care of her given her worsening macular degeneration. Ms. Mcclelland stated that pt called her around 2100 the night before presentation and admitted taking a handful or ativan and pain killers and that she stated it would be best if she wasn't around anymore. Apparently this morning the patient appeared very groggy and was not arousable prompting family to call the ambulance.     In [facility] ED, pt's blood pressure severely elevated at 230/121 with initial SpO2 of 88% on room air. Pt placed on cardene drip, which improved the BP. CT head negative was acute intracranial changes. Pt was given Narcan, to which she showed no response. She was then subsequently given flumazenil, which aroused her enough to have a conversation. She later required another dose of flumazenil as she was becoming more groggy. Interestingly the UDS, acetaminophen,  salicylate and ethanol levels were  negative. Pt did have elevated troponin levels at 0.8, which later trended down to 0.6. EKG with tachycardia and PVCs. Labs also consistent with non-anion gap metabolic acidosis with bicarb of 16. Electrolytes wnl.     In our ED, patient arrived being PEC'd. BP is better controlled patient is more arousable, resting comfortably in no acute distress, alert and oriented. Repeat UDS and repeat acetaminophen remains negative.   Internal medicine team consulted for admission in PEC'd status for NSTEMI and suicidalilty.     ROS   Review of Systems   Constitutional:  Negative for fever.   Respiratory:  Negative for shortness of breath.    Cardiovascular:  Negative for chest pain.   Gastrointestinal:  Negative for abdominal pain, diarrhea, nausea and vomiting.   Genitourinary:  Negative for dysuria.   Neurological:  Positive for headaches. Negative for dizziness.   Psychiatric/Behavioral:  Positive for depression and suicidal ideas.      Past Medical History     Past Medical History:   Diagnosis Date    Acute MI     Anxiety     Cardiac disease     COPD (chronic obstructive pulmonary disease)     Coronary artery disease     Hyperlipidemia     Hypertension     Hypothyroidism     Other specified noninfective gastroenteritis and colitis     Unspecified macular degeneration        Past Surgical History     Past Surgical History:   Procedure Laterality Date    CORONARY ANGIOGRAPHY N/A 2/1/2021    Procedure: ANGIOGRAM, CORONARY ARTERY;  Surgeon: Devon Medeiros MD;  Location: [facility] [place] CATH LAB;  Service: Cardiology;  Laterality: N/A;    CORONARY ANGIOPLASTY WITH STENT PLACEMENT      LEFT HEART CATHETERIZATION Left 2/1/2021    Procedure: CATHETERIZATION, HEART, LEFT;  Surgeon: Devon Medeiros MD;  Location: [facility] [place] CATH LAB;  Service: Cardiology;  Laterality: Left;       Social History     Social History     Tobacco Use    Smoking status: Every Day     Packs/day: 0.50     Years: 55.00     Pack years:  27.50     Types: Cigarettes    Smokeless tobacco: Never   Substance Use Topics    Alcohol use: Never        Family History   Reviewed , pertinent as noted in HPI    Allergies   Amlodipine and Losartan    Home Medications     Prior to Admission medications    Medication Sig Start Date End Date Taking? Authorizing Provider   aspirin (ECOTRIN) 81 MG EC tablet Take 81 mg by mouth once daily.    Historical Provider   docusate sodium (COLACE) 100 MG capsule Take 100 mg by mouth 2 (two) times daily.    Historical Provider   levothyroxine (SYNTHROID) 25 MCG tablet Take 1 tablet (25 mcg total) by mouth before breakfast. 2/27/23   Rafiq Eller MD   lisinopriL (PRINIVIL,ZESTRIL) 20 MG tablet Take 1 tablet (20 mg total) by mouth 2 (two) times daily. 2/27/23   Rafiq Eller MD   LORazepam (ATIVAN) 0.5 MG tablet TAKE 1 TABLET BY MOUTH EVERY 6 HOURS AS NEEDED FOR ANXIETY. 5/15/23   Rafiq Eller MD   metoprolol tartrate (LOPRESSOR) 50 MG tablet Take 1 tablet (50 mg total) by mouth 2 (two) times daily. 2/27/23   Rafiq Eller MD   montelukast (SINGULAIR) 10 mg tablet Take 10 mg by mouth. 5/15/23   Historical Provider   nitroGLYCERIN 0.4 MG/DOSE TL SPRY (NITROLINGUAL) 400 mcg/spray spray nitroglycerin 400 mcg/spray translingual aerosol   at onset of chest pain may take up to three sprays every 5min during a 15 min period    Historical Provider   pravastatin (PRAVACHOL) 40 MG tablet Take 1 tablet (40 mg total) by mouth once daily. 2/27/23   Rafiq Eller MD   rosuvastatin (CRESTOR) 20 MG tablet Take 20 mg by mouth. 5/15/23   Historical Provider        Inpatient Medications   Scheduled Meds   [START ON 7/24/2023] atorvastatin  20 mg Oral QHS    [START ON 7/24/2023] enoxparin  40 mg Subcutaneous Daily    [START ON 7/24/2023] levothyroxine  25 mcg Oral Before breakfast    [START ON 7/24/2023] lisinopriL  20 mg Oral BID    [START ON 7/24/2023] metoprolol tartrate  50 mg Oral BID    niCARdipine           Continuous  Infusions   [START ON 7/24/2023] sodium bicarbonate drip         PRN Meds  dextrose 10%, dextrose 10%, glucagon (human recombinant), glucose, glucose, naloxone, sodium chloride 0.9%    Physical Exam   Vital Signs  Temp:  [97.5 °F (36.4 °C)-100.9 °F (38.3 °C)]   Pulse:  []   Resp:  [13-29]   BP: (113-251)/()   SpO2:  [88 %-100 %]       Physical Exam  Vitals and nursing note reviewed.   Constitutional:       General: She is not in acute distress.     Appearance: She is underweight.   HENT:      Mouth/Throat:      Mouth: Mucous membranes are dry.      Dentition: Abnormal dentition. No gingival swelling.   Eyes:      Conjunctiva/sclera: Conjunctivae normal.      Pupils: Pupils are equal, round, and reactive to light.   Cardiovascular:      Rate and Rhythm: Tachycardia present. Rhythm irregularly irregular. FrequentExtrasystoles are present.     Heart sounds: Normal heart sounds.   Pulmonary:      Effort: Pulmonary effort is normal.      Breath sounds: Normal breath sounds.   Abdominal:      General: Abdomen is flat.      Tenderness: There is abdominal tenderness (mild, right sided). There is no guarding or rebound.   Musculoskeletal:      Right lower leg: No edema.      Left lower leg: No edema.   Neurological:      General: No focal deficit present.      Mental Status: She is oriented to person, place, and time and easily aroused. She is lethargic.      Cranial Nerves: Cranial nerves 2-12 are intact.      Sensory: Sensation is intact.      Motor: Motor function is intact.   Psychiatric:         Mood and Affect: Mood is depressed. Affect is labile.         Behavior: Behavior is slowed. Behavior is cooperative.         Thought Content: Thought content is not paranoid or delusional. Thought content does not include homicidal ideation.      Comments: Denies active suicidal thoughts at present time       Labs     Recent Labs     07/23/23  1320 07/23/23  1939   WBC 7.11 8.05   RBC 5.92* 5.48*   HGB 16.8* 15.6    HCT 52.5* 48.5*   MCV 88.7 88.5   MCH 28.4 28.5   MCHC 32.0* 32.2*   RDW 13.6 13.3    204     Recent Labs     07/23/23  1939   LACTIC 1.2     Recent Labs     07/23/23  1320   INR 1.00     No results for input(s): HGBA1C, CHOL, TRIG, LDL, VLDL, HDL in the last 72 hours.  Recent Labs     07/23/23 2120   PH 7.480*   PO2 162.0*   HCO3 17.9       Recent Labs     07/23/23  1320 07/23/23  1938    142   K 4.2 4.1   CHLORIDE 111* 113*   CO2 23 16*   BUN 16.0 11.9   CREATININE 0.53* 0.50*   GLUCOSE 87 109   CALCIUM 9.3 8.7   MG  --  1.60   PHOS  --  2.0*   ALBUMIN 2.8* 2.7*   GLOBULIN 4.4*  --    ALKPHOS 83 75   ALT 9 9   AST 18 18   BILITOT 0.3 0.4   TSH  --  0.133*     Recent Labs     07/23/23  1320 07/23/23  1551 07/23/23 1939   CPK 42  --   --    TROPONINI 0.805* 0.646* 0.678*          Microbiology Results (last 7 days)       Procedure Component Value Units Date/Time    Blood Culture #1 **CANNOT BE ORDERED STAT** [[id number]] Collected: 07/23/23 2103    Order Status: Sent Specimen: Blood Updated: 07/23/23 2106    Blood Culture #2 **CANNOT BE ORDERED STAT** [[id number]] Collected: 07/23/23 2103    Order Status: Sent Specimen: Blood Updated: 07/23/23 2106             Imaging     X-Ray Chest AP Portable   Final Result      NO ACUTE CARDIOPULMONARY PROCESS IDENTIFIED.         Electronically signed by: Yusuf Fleming   Date:    07/23/2023   Time:    20:51          Assessment & Plan     NSTEMI  -troponin uptrended initially, most recent value  down to 0.5 at 0130  -unlikely to be of cardiac origin given pt is asymptomatic but will keep trending  -d-dimer pending to evaluate for probability of PE as a cause, howevever again unlikely as pt is not short of breath and not hypoxic  -could also be secondary to drug intoxication, however UDS and workup so far has been negative  -EKG with frequent PVCs  -most recent echo 1 month ago normal  -CK pending    Transient suicidal thoughts with suspected drug  overdose  AMS-improved  Depression  -pt is PEC'd  -no active suicidal thoughts  -psych consult placed, appreciate recommendations  -HIV, RPR negative  -hepatitis panel negative    NAGMA likely due to renal tubular acidosis with positive urine anion gap  -initiate bicarb drip with NS and 3 amps of bicarb  -can discontinue once bicarb improved    Hx of Hypothyroidism  -continue home levothyroxine 25  mcg starting in the morning    HTN  -initially required cardizem drip  -now BP better controlled off the drip  -resume home meds in the morning metoprolol tartrate 50mg bid and lisinopril 20mg daily        Access: pIV  Antibiotics: none  Diet: NPO until pass bedside swallow  DVT Prophylaxis: Lovenox 40  GI Prophylaxis: none  Fluids: D5 1/2NS at 100cc /hr    Chel Morse MD  [employer] [facility], PGY-III

## 2023-07-24 NOTE — ASSESSMENT & PLAN NOTE
ASSESSMENT     Cierra Main is a 73 y.o. female with a past psychiatric history of anxiety, currently presenting with NSTEMI (non-ST elevated myocardial infarction). Psychiatry was originally consulted to address the patient's symptoms of suicidal behavior.  Reports intentional overdose with prescribed ativan though denies suicidal intention.  Pt with conflicting story since initially presenting to outside facility.  Would recommend to continue PEC for dangerousness to self.  See below for further recommendations.     IMPRESSION  Benzodiazepine overdose, resolved  Depression, unspecified  Anxiety    RECOMMENDATION(S)      1. Scheduled Medication(s):  None at this time, defer to future IP psych provider    2. PRN Medication(s):  Recommend Ativan 2mg PO or IV q2 hrs witnessed seizures or any 2 of the following: SBP>160, DBP>100,  (if sedative withdrawal is suspected)    3.  Monitor:  EKG 7/23/2023 w/ qtc 515    4. Legal Status/Precaution(s):  Continue PEC  Please ensure that 's office is notified of need for CEC evaluation  Continue 1:1    5. Disposition:  Recommend IP psychiatric hospitalization when medically cleared (Cierra psych), please enlist help of CM team to assist with arranging this

## 2023-07-24 NOTE — CONSULTS
Cardiology   Ms. Cierra Main is a 73 y.o. female with:   Chief Complaint   Patient presents with    Drug Overdose     Pt transfer from [facility] for ICU. Pt on cardene gtt @ 5. Pt is letharic. Pt is under a PEC.          [facility]  Cardiology was consulted for Ms. Cierra Main 72 yo F with PMH of previous MI, CAD s/p stent in  RCA( proximal and distal mid segment) LAD(D1), COPD, hypothyroidism, HTN, macular degeneration and severe depression presents to ED after attempted suicide with Ativan. Per chart review patient reported taking a fist full of ativan in attempt to kill her self. Patient was hard to arouse until flumazenil was given.. Since admission patient blood pressure has been uncontrolled. Troponins were positive during time of admission at 0.8. They have trended down to 0.502.  EKG did who t wave inversions in lead III avf v1-v3 which were also present in an ekg from 6/28/2023.   Patient has PMH of MI s/p stents in RCA.     Patient is confused. Bad historian. She is concentrated on leaving. She reports feeling short of breath and chest discomfort several weeks ago with exertion. Patient reports little to no physical activity. She is able to complete household chores.  She denies syncope, palpitations, nausea vomiting. Patient is agreeable for a cardiac work up.      ROS:  Negative for all symptoms other than those listed in HPI    PMH:    Patient Active Problem List   Diagnosis    NSTEMI (non-ST elevated myocardial infarction)    Anxiety disorder, unspecified    Acute myocardial infarction    Chronic obstructive pulmonary disease    Coronary arteriosclerosis    Hyperlipidemia    Essential hypertension    Hypothyroidism    Macular degeneration    Seizure    Coronary artery disease    Cigarette smoker    Elevated troponin    Paroxysmal atrial fibrillation    Angina pectoris    Suicide attempt    Metabolic acidosis     Surgical Hx:    Past Surgical History:   Procedure Laterality Date    CORONARY  ANGIOGRAPHY N/A 2/1/2021    Procedure: ANGIOGRAM, CORONARY ARTERY;  Surgeon: Devon Medeiros MD;  Location: [facility] CATH LAB;  Service: Cardiology;  Laterality: N/A;    CORONARY ANGIOPLASTY WITH STENT PLACEMENT      LEFT HEART CATHETERIZATION Left 2/1/2021    Procedure: CATHETERIZATION, HEART, LEFT;  Surgeon: Devon Medeiros MD;  Location: [facility] CATH LAB;  Service: Cardiology;  Laterality: Left;       Social History     Socioeconomic History    Marital status:    Tobacco Use    Smoking status: Every Day     Packs/day: 0.50     Years: 55.00     Pack years: 27.50     Types: Cigarettes    Smokeless tobacco: Never   Substance and Sexual Activity    Alcohol use: Never    Drug use: Never       Family History   Problem Relation Age of Onset    Diabetes Mother     Heart disease Mother     Hypertension Father     Heart disease Father        Review of patient's allergies indicates:   Allergen Reactions    Amlodipine     Losartan        Current Medications:    Current Outpatient Medications   Medication Instructions    aspirin (ECOTRIN) 81 mg, Oral, Daily    docusate sodium (COLACE) 100 mg, Oral, 2 times daily    levothyroxine (SYNTHROID) 25 mcg, Oral, Before breakfast    lisinopriL (PRINIVIL,ZESTRIL) 20 mg, Oral, 2 times daily    LORazepam (ATIVAN) 0.5 MG tablet TAKE 1 TABLET BY MOUTH EVERY 6 HOURS AS NEEDED FOR ANXIETY.    metoprolol tartrate (LOPRESSOR) 50 mg, Oral, 2 times daily    montelukast (SINGULAIR) 10 mg, Oral    nitroGLYCERIN 0.4 MG/DOSE TL SPRY (NITROLINGUAL) 400 mcg/spray spray nitroglycerin 400 mcg/spray translingual aerosol   at onset of chest pain may take up to three sprays every 5min during a 15 min period    pravastatin (PRAVACHOL) 40 mg, Oral, Daily    rosuvastatin (CRESTOR) 20 mg, Oral           BP Readings from Last 3 Encounters:   07/24/23 (!) 189/88   07/23/23 (!) 153/101   06/28/23 111/60        Pulse Readings from Last 3 Encounters:   07/24/23 69   07/23/23 (!) 122   06/28/23 67        Temp  "Readings from Last 3 Encounters:   07/24/23 99.4 °F (37.4 °C) (Oral)   07/23/23 97.5 °F (36.4 °C) (Temporal)   06/28/23 99.9 °F (37.7 °C)     Wt Readings from Last 3 Encounters:   07/23/23 44.9 kg (98 lb 15.8 oz)   07/23/23 40.8 kg (90 lb)   06/28/23 40.8 kg (90 lb)     BMI:  16.99 kg/m^2    PE  Blood pressure (!) 189/88, pulse 69, temperature 99.4 °F (37.4 °C), temperature source Oral, resp. rate 20, height 5' 4" (1.626 m), weight 44.9 kg (98 lb 15.8 oz), SpO2 98 %.  CONSTITUTIONAL:  No acute distress.  Underweight.  NECK:  Supple.    CARDIOVASCULAR: Tachycardiac, regular rhythm, no gallops rubs or murmurs  PULMONARY:  No respiratory distress.  No audible wheezing.    ABDOMINAL:  No distention. No guarding,     MUSCULOSKELETAL:  No deformity.  No edema or erythema  SKIN:  No bruising.  No rash.  NEURO:     CARDIAC TESTS  ECHO  Results for orders placed during the hospital encounter of 06/19/23    Echo    Interpretation Summary  · The left ventricle is normal in size with normal systolic function.  · The estimated ejection fraction is 65%.  · Mild aortic regurgitation.  · Mild-to-moderate mitral regurgitation.  · Mild tricuspid regurgitation.  · Normal right ventricular size with normal right ventricular systolic function.    STRESS TEST  No results found for this or any previous visit.    [facility]  Results for orders placed during the hospital encounter of 01/30/21    Cardiac catheterization    Conclusion  · Successful PCI 90% mid RCA stenosis with Stent Orsiro VICKIE x one.  · Normal LVEF.  · Maximize medical treatment for CAD.    The procedure log was documented by Documenter: Quiana Waller RN and verified by Devon Medeiros MD.    Date: 2/1/2021  Time: 12:53 PM      LABS  Last BMP  Lab Results   Component Value Date     07/23/2023    K 4.1 07/23/2023     02/02/2021    CO2 16 (L) 07/23/2023    BUN 11.9 07/23/2023    CREATININE 0.50 (L) 07/23/2023    CALCIUM 8.7 07/23/2023    ANIONGAP 9 02/02/2021    " EGFRNORACEVR >60 07/23/2023       Lab Results   Component Value Date    CREATININE 0.50 (L) 07/23/2023    CREATININE 0.53 (L) 07/23/2023    CREATININE 0.65 06/28/2023     Lab Results   Component Value Date     (H) 01/30/2021     Lab Results   Component Value Date    TROPONINI 0.419 (H) 07/24/2023    TROPONINI 0.502 (H) 07/24/2023    TROPONINI 0.678 (H) 07/23/2023     Last CBC     Lab Results   Component Value Date    WBC 8.05 07/23/2023    HGB 15.6 07/23/2023    HCT 48.5 (H) 07/23/2023    MCV 88.5 07/23/2023     07/23/2023           Last lipids    Lab Results   Component Value Date    CHOL 170 11/03/2022    CHOL 144 06/24/2021    CHOL 147 01/30/2021    HDL 44 11/03/2022    HDL 36 06/24/2021    HDL 43 01/30/2021    .00 11/03/2022    LDL 76.00 06/24/2021    .00 (H) 05/26/2020    TRIG 98 11/03/2022    TRIG 162 (H) 06/24/2021    TRIG 102 01/30/2021    TOTALCHOLEST 4 11/03/2022    TOTALCHOLEST 4 06/24/2021    TOTALCHOLEST 3.4 01/30/2021       LFT       ASSESSMENT/PLAN    73 y.o with PMHx of MI, CAD s/p stent in RCA and LAD    CAD/MI/HTN  -Troponin +ve 0.805  -T wave inversion in leads III, avf, v1-v3,  -Patient denies current chest pain  -Admits dyspnea on exertion  -Echo 5/23 EF 65%  -Nuclear stress test to be done  - /85  -Start hydralazine 50 mg PO TID  -10 mg hydralazine IV PRN   -continue lisinopril 20, lopressor 50, atorvastatin 20      Cardiology to follow  Thank you for your consult    Curly Momin M.D  [facility] Internal Medicine PGY-1

## 2023-07-24 NOTE — CARE UPDATE
Care update:    Agreed with admitting team's H&P    HPI: Cierra Main is a 73 year-old female with PMH of Cad s/p stent in 2021, COPD, hypothyroidism, HTN, macular degeneration, and severe depression, who presented to [facility] ED as a transfer from [facility] department due to suicide attempt by drug overdose. Pt does not give a consistent history, at first she state she took several tablets of Tylenol 650, then she stated she took a large fist full of Ativan in an attempt to harm herself. Contacted pt's sister Ms Krystin Brown ([phone]) who stated patient has been really depressed due to her  passing several years ago and also due to her children and grandchildren not taking care of her given her worsening macular degeneration. Ms. Mcclelland stated that pt called her around 2100 the night before presentation and admitted taking a handful or ativan and pain killers and that she stated it would be best if she wasn't around anymore. Apparently this morning the patient appeared very groggy and was not arousable prompting family to call the ambulance. In the ED, patient arrived being PEC'd. BP is better controlled patient is more arousable, resting comfortably in no acute distress, alert and oriented. Repeat UDS and repeat acetaminophen remains negative. Internal medicine team consulted for admission in PEC'd status for NSTEMI and suicidalilty.     Physical examination:  General: Thin w/ mild distress  HEENT: NC/AT; PERRL; nasal and oral mucosa moist and clear  Pulm: CTA bilaterally, normal work of breathing on room air  CV: S1, S2 w/o murmurs or gallops; no edema noted  GI: Soft with normal bowel sounds in all quadrants, no masses on palpation  MSK: Full ROM of all extremities   Neuro: AAOx4; motor/sensory function intact  Psych: Depressed    Assessment & Plan:    NSTEMI   Suicidal attempt  CAD s/p stent in RCA in 2021  Depression  HTN  Hypothyroidism    -EKG 7/23/2023 revealed new T wave inversion  on leads V3 and V4; loaded with  x1 and started heparin drip  -Placed patient on NPO status with the plan to pursue lexiscan  -Consulted cardiology team for further assistance  -Troponin down trend    Jill Roth, DO  [facility] Internal [facility] PGY-II

## 2023-07-24 NOTE — ED PROVIDER NOTES
Encounter Date: 7/23/2023       History     Chief Complaint   Patient presents with    Drug Overdose     Pt transfer from [facility] for ICU. Pt on cardene gtt @ 5. Pt is letharic. Pt is under a PEC.      73-year-old female presents to ED as transfer for ICU admission.  Per report patient actively overdosed on Ativan after making suicidal statements all week.  Patient arrived to outside hospital with depressed mental status.  Narcan did not approve mentation however flumazenil did.  After was between GCS 14 and 15.  Ultimately required ICU given overdose status so transferred here. Alex received transfer request.  Patient additionally had suspected hypertensive urgency versus emergency so placed on Cardene drip at OSH.  Their labs grossly unremarkable including a UDS negative for benzos.  Patient later voiced taking ten 650 mg Tylenol pills. Is reporting 2 weeks bilateral eye pain and headache.  Denies any trauma, no neck pain, no fevers or chills recently.  Patient has changing story in department.  No other complaints or concerns at this time.    Review of patient's allergies indicates:   Allergen Reactions    Amlodipine     Losartan      Past Medical History:   Diagnosis Date    Acute MI     Anxiety     Cardiac disease     COPD (chronic obstructive pulmonary disease)     Coronary artery disease     Hyperlipidemia     Hypertension     Hypothyroidism     Other specified noninfective gastroenteritis and colitis     Unspecified macular degeneration      Past Surgical History:   Procedure Laterality Date    CORONARY ANGIOGRAPHY N/A 2/1/2021    Procedure: ANGIOGRAM, CORONARY ARTERY;  Surgeon: Devon Medeiros MD;  Location: [facility] CATH LAB;  Service: Cardiology;  Laterality: N/A;    CORONARY ANGIOPLASTY WITH STENT PLACEMENT      LEFT HEART CATHETERIZATION Left 2/1/2021    Procedure: CATHETERIZATION, HEART, LEFT;  Surgeon: Devon Medeiros MD;  Location: [facility] CATH LAB;  Service: Cardiology;  Laterality:  Left;     Family History   Problem Relation Age of Onset    Diabetes Mother     Heart disease Mother     Hypertension Father     Heart disease Father      Social History     Tobacco Use    Smoking status: Every Day     Packs/day: 0.50     Years: 55.00     Pack years: 27.50     Types: Cigarettes    Smokeless tobacco: Never   Substance Use Topics    Alcohol use: Never    Drug use: Never     Review of Systems   Constitutional:  Negative for chills, diaphoresis and fever.   HENT:  Negative for congestion, rhinorrhea, sinus pain and sore throat.    Eyes:  Positive for pain. Negative for photophobia, discharge, redness, itching and visual disturbance.   Respiratory:  Negative for cough, chest tightness and shortness of breath.    Cardiovascular:  Negative for chest pain and palpitations.   Gastrointestinal:  Negative for abdominal pain, nausea and vomiting.   Genitourinary:  Negative for dysuria, flank pain and hematuria.   Musculoskeletal:  Negative for back pain and myalgias.   Skin:  Negative for color change and rash.   Neurological:  Positive for headaches. Negative for dizziness and weakness.        Sleepy   Psychiatric/Behavioral:  Negative for confusion. The patient is not hyperactive.      Physical Exam     Initial Vitals   BP Pulse Resp Temp SpO2   07/23/23 1815 07/23/23 1823 07/23/23 1823 07/23/23 1826 07/23/23 1823   113/76 108 (!) 22 99 °F (37.2 °C) (!) 93 %      MAP       --                Physical Exam    Vitals reviewed.  Constitutional: She appears well-developed and well-nourished. She is not diaphoretic. No distress.   Resting comfortably in no acute distress.   HENT:   Head: Normocephalic and atraumatic.   Eyes: Conjunctivae and EOM are normal. Pupils are equal, round, and reactive to light.   Fluorescein uptake neg, pressure 13 bilaterally.    Neck: Neck supple. No tracheal deviation present.   Normal range of motion.  Cardiovascular:  Normal heart sounds and intact distal pulses. An irregular  rhythm present.  Frequent extrasystoles are present.  Tachycardia present.         Pulses:       Radial pulses are 1+ on the right side and 1+ on the left side.        Dorsalis pedis pulses are 2+ on the right side and 2+ on the left side.   Pulmonary/Chest: Breath sounds normal. No respiratory distress.   Abdominal: Abdomen is soft. There is no abdominal tenderness. There is no rebound and no guarding.   Musculoskeletal:         General: Normal range of motion.      Cervical back: Normal range of motion and neck supple.     Neurological: She is alert and oriented to person, place, and time. She has normal strength. She displays no atrophy and no tremor. No cranial nerve deficit or sensory deficit. She exhibits normal muscle tone. She displays no seizure activity. GCS score is 15. GCS eye subscore is 3. GCS verbal subscore is 5. GCS motor subscore is 6.   Has excellent strength of all extremities.  Opens eyes to questioning.  Has rambling stories are not entirely coherent.  Is alert and oriented.   Skin: Skin is warm and dry. Capillary refill takes less than 2 seconds. No rash noted.   Psychiatric: She has a normal mood and affect. Her behavior is normal. Judgment and thought content normal.       ED Course   Critical Care    Date/Time: 7/23/2023 8:15 PM  Performed by: Aly Nath MD  Authorized by: Aly Nath MD   Total critical care time (exclusive of procedural time) : 45 minutes  Critical care time was exclusive of separately billable procedures and treating other patients.  Critical care was necessary to treat or prevent imminent or life-threatening deterioration of the following conditions: CNS failure or compromise.  Critical care was time spent personally by me on the following activities: evaluation of patient's response to treatment, obtaining history from patient or surrogate, ordering and review of laboratory studies, pulse oximetry, review of old charts, development of treatment plan with patient  or surrogate, examination of patient, ordering and performing treatments and interventions, ordering and review of radiographic studies and re-evaluation of patient's condition.      Labs Reviewed   TSH - Abnormal; Notable for the following components:       Result Value    Thyroid Stimulating Hormone 0.133 (*)     All other components within normal limits   URINALYSIS, REFLEX TO URINE CULTURE - Abnormal; Notable for the following components:    Protein, UA Trace (*)     Ketones, UA 1+ (*)     Mucous, UA Trace (*)     Hyaline Casts, UA 3-5 (*)     All other components within normal limits   ACETAMINOPHEN LEVEL - Abnormal; Notable for the following components:    Acetaminophen Level <17.4 (*)     All other components within normal limits   CBC WITH DIFFERENTIAL - Abnormal; Notable for the following components:    RBC 5.48 (*)     Hct 48.5 (*)     MCHC 32.2 (*)     MPV 11.5 (*)     All other components within normal limits   TROPONIN I - Abnormal; Notable for the following components:    Troponin-I 0.678 (*)     All other components within normal limits   PHOSPHORUS - Abnormal; Notable for the following components:    Phosphorus Level 2.0 (*)     All other components within normal limits   BASIC METABOLIC PANEL - Abnormal; Notable for the following components:    Chloride 113 (*)     Carbon Dioxide 16 (*)     Creatinine 0.50 (*)     All other components within normal limits   HEPATIC FUNCTION PANEL - Abnormal; Notable for the following components:    Albumin Level 2.7 (*)     All other components within normal limits   DRUG SCREEN, URINE (BEAKER) - Normal    Narrative:     Cut off concentrations:    Amphetamines - 1000 ng/ml  Barbiturates - 200 ng/ml  Benzodiazepine - 200 ng/ml  Cannabinoids (THC) - 50 ng/ml  Cocaine - 300 ng/ml  Fentanyl - 1.0 ng/ml  MDMA - 500 ng/ml  Opiates - 300 ng/ml   Phencyclidine (PCP) - 25 ng/ml    Specimen submitted for drug analysis and tested for pH and specific gravity in order to  evaluate sample integrity. Suspect tampering if specific gravity is <1.003 and/or pH is not within the range of 4.5 - 8.0  False negatives may result form substances such as bleach added to urine.  False positives may result for the presence of a substance with similar chemical structure to the drug or its metabolite.    This test provides only a PRELIMINARY analytical test result. A more specific alternate chemical method must be used in order to obtain a confirmed analytical result. Gas chromatography/mass spectrometry (GC/MS) is the preferred confirmatory method. Other chemical confirmation methods are available. Clinical consideration and professional judgement should be applied to any drug of abuse test result, particularly when preliminary positive results are used.    Positive results will be confirmed only at the physicians request. Unconfirmed screening results are to be used only for medical purposes (treatment).        ALCOHOL,MEDICAL (ETHANOL) - Normal   COVID/RSV/FLU A&B PCR - Normal    Narrative:     The Xpert Xpress SARS-CoV-2/FLU/RSV plus is a rapid, multiplexed real-time PCR test intended for the simultaneous qualitative detection and differentiation of SARS-CoV-2, Influenza A, Influenza B, and respiratory syncytial virus (RSV) viral RNA in either nasopharyngeal swab or nasal swab specimens.         LACTIC ACID, PLASMA - Normal   MAGNESIUM - Normal   BLOOD CULTURE OLG   BLOOD CULTURE [facility]   CBC W/ AUTO DIFFERENTIAL    Narrative:     The following orders were created for panel order CBC auto differential.  Procedure                               Abnormality         Status                     ---------                               -----------         ------                     CBC with Differential[[id number]]        Abnormal            Final result                 Please view results for these tests on the individual orders.   SALICYLATE LEVEL   BLOOD GAS   EXTRA TUBES    Narrative:     The following  orders were created for panel order EXTRA TUBES.  Procedure                               Abnormality         Status                     ---------                               -----------         ------                     Light Blue Top Hold[[id number]]                                                           Please view results for these tests on the individual orders.   LIGHT BLUE TOP HOLD   PATHOLOGIST INTERPRETATION   T4, FREE     EKG Readings: (Independently Interpreted)   Initial Reading: No STEMI. Rhythm: Sinus Tachycardia. Ectopy: Frequent. Conduction: Normal. Axis: Right Axis Deviation. Clinical Impression: Sinus Tachycardia with PACs Other Impression: Compared to previous EKGs chronic inferior lateral T-wave and ST changes     Imaging Results              X-Ray Chest AP Portable (Final result)  Result time 07/23/23 20:51:55      Final result by Yusuf Fleming MD (07/23/23 20:51:55)                   Impression:      NO ACUTE CARDIOPULMONARY PROCESS IDENTIFIED.      Electronically signed by: Yusuf Fleming  Date:    07/23/2023  Time:    20:51               Narrative:    EXAMINATION:  XR CHEST AP PORTABLE    CLINICAL HISTORY:  Fever, unspecified    TECHNIQUE:  One view    COMPARISON:  June 28, 2023    FINDINGS:  Cardiopericardial silhouette is within normal limits. Lungs are without dense focal or segmental consolidation, congestive process, pleural effusions or pneumothorax.                                       Medications   niCARdipine (CARDENE) 40 mg/200 mL (0.2 mg/mL) infusion (  Not Given 7/23/23 1845)   sodium chloride 0.9% bolus 1,000 mL 1,000 mL (0 mLs Intravenous Stopped 7/23/23 2032)   ketorolac injection 15 mg (15 mg Intravenous Given 7/23/23 1930)   TETRAcaine HCl (PF) 0.5 % Drop 2 drop (2 drops Both Eyes Given by Provider 7/23/23 1954)   fluorescein ophthalmic strip 1 each (1 each Both Eyes Given by Provider 7/23/23 1954)     Medical Decision Making:   History:   Old Medical Records: I decided  to obtain old medical records.  Initial Assessment:   73-year-old female presents to ED as transfer from outside hospital for ICU admission following suspected benzodiazepine and Tylenol overdose  Differential Diagnosis:   Polysubstance abuse  Alcohol intoxication  Withdrawal  Suicidal ideation  Suicidal attempt  Benzodiazepine overdose  Schizophrenia  Bipolar  Meningitis versus encephalitis  NSTEMI  Hypertensive urgency versus emergency  Clinical Tests:   Lab Tests: Reviewed and Ordered  Radiological Study: Ordered and Reviewed  Medical Tests: Reviewed and Ordered  ED Management:  OS Head CT negative.  Additionally initial outside labs grossly unremarkable besides elevated troponin.  Rechecked and essentially unchanged at 0.6.  GCS 14 however alert and oriented.  Did complain of 2 weeks bilateral eye pain so comprehensive eye exam performed.  no evidence of infection, trauma or elevated pressure with normal vision.  No decline in mentation.  Labs rechecked and grossly unremarkable.  No evidence of obvious infection.  Had sinus tachycardia with frequent irregularities.  Blood pressure stable.  Rate improved following fluid administration.  Significant time was spent in isolation managing this patient's care.  Ultimately required ICU placement, neuro monitoring and suspected Cardiology and Psychiatry evaluation. ([place])                        Clinical Impression:   Final diagnoses:  [T50.901A] Overdose  [I21.4] NSTEMI (non-ST elevated myocardial infarction) (Primary)  [R50.9] Fever  [R41.82] Altered mental status, unspecified altered mental status type  [F32.A, R45.851] Depression with suicidal ideation        ED Disposition Condition    Observation Stable                Aly Nath MD  07/24/23 8448

## 2023-07-24 NOTE — HPI
"Per Primary MD:  "74 yo F with PMH of previous MI, COPD, hypothyroidism, HTN, macular degeneration and severe depression presented to [facility] ED as a transfer from [facility] Emergency department due to suicide attempt by drug overdose. Pt does not give a consistent history, at first she state she took several tablets of Tylenol 650, then she stated she took a large fist full of Ativan in an attempt to harm herself. Contacted pt's sister Ms Krystin Brown ([phone]) who stated patient has been really depressed due to her  passing several years ago and also due to her children and grandchildren not taking care of her given her worsening macular degeneration. Ms. Mcclelland stated that pt called her around 2100 the night before presentation and admitted taking a handful or ativan and pain killers and that she stated it would be best if she wasn't around anymore. Apparently this morning the patient appeared very groggy and was not arousable prompting family to call the ambulance.      In [facility], pt's blood pressure severely elevated at 230/121 with initial SpO2 of 88% on room air. Pt placed on cardene drip, which improved the BP. CT head negative was acute intracranial changes. Pt was given Narcan, to which she showed no response. She was then subsequently given flumazenil, which aroused her enough to have a conversation. She later required another dose of flumazenil as she was becoming more groggy. Interestingly the UDS, acetaminophen, salicylate and ethanol levels were  negative. Pt did have elevated troponin levels at 0.8, which later trended down to 0.6. EKG with tachycardia and PVCs. Labs also consistent with non-anion gap metabolic acidosis with bicarb of 16. Electrolytes wnl.      In our ED, patient arrived being PEC'd. BP is better controlled patient is more arousable, resting comfortably in no acute distress, alert and oriented. Repeat UDS and repeat acetaminophen remains negative.   Internal medicine " "team consulted for admission in PEC'd status for NSTEMI and suicidalilty. "    Per Psychiatry MD:   Cierra Main is a 73 y.o. female with a past psychiatric history of depression, currently presenting with suicide attempt by medication ingestion with secondary NSTEMI.  Psychiatry was consulted to address the patient's symptoms of depression with suicidal ideation.     Patient calm and cooperative with interview.  Patient appears to minimize symptoms throughout.  Reports she was brought to medical treatment by family member for sleepiness.  Notes that she is been struggling with a severe headache and a toothache for days.  Notes she tried to self medicate with Tylenol and BC powder.  Eventually she became frustrated and took 5-6 Ativan in an effort to sleep.  Her son had difficulty arousing her the following day and she was brought for medical treatment because of this.  She says she was being sarcastic when she told nurse on presentation she was feeling suicidal.  She denies any suicidal plan or intentions.  She denies any history similar behavior in the past.  She denies recently feeling depressed.  She reports that the Ativan is prescribed to her for anxiety, voices that she normally takes a half tablet on very rare occasions.  She notes recently struggling with multiple ongoing family stressors.  She says I am the last person you would expect to have done this.     Per chart review:  Patient is given multiple conflicting stories related to events prior to presentation.  Initially she reported taking Tylenol then reported taking a large quantity of Ativan.  At points says that she was depressed and wanted to harm herself.  Patient originally presented to outside emergency room for treatment both transferred to current hospital for management of NSTEMI.    SUBJECTIVE   Currently, the patient is endorsing the following:    Medical Review Of Systems:  Constitutional: denies fevers, denies chills, denies recent " weight change  Eyes: denies pain in eyes or loss of vision  Ears: denies tinnitis, denies loss of hearing  Mouth/throat: denies difficulty with speaking, denies difficulty with swallowing  Respiratory: denies SOB, denies cough  Gastrointestinal: denies abdominal pain, denies nausea/vomiting, denies constipation/diarrhea  Genitourinary: denies urinary frequency, denies burning on urination  Dermatologic: denies rash, denies erythema  Musculoskeletal: denies myalgias, denies arthralgias  Hematologic: denies easy bleeding/bruising, denies enlarged lymph nodes  Neurologic: denies seizures, denies headaches, denies loss of sensation, denies weakness  Psychiatric: see HPI    Psychiatric Review Of Systems:  Please see HPI above    Past Medical/Surgical History:  Past Medical History:   Diagnosis Date    Acute MI     Anxiety     Cardiac disease     COPD (chronic obstructive pulmonary disease)     Coronary artery disease     Hyperlipidemia     Hypertension     Hypothyroidism     Other specified noninfective gastroenteritis and colitis     Unspecified macular degeneration       has a past medical history of Acute MI, Anxiety, Cardiac disease, COPD (chronic obstructive pulmonary disease), Coronary artery disease, Hyperlipidemia, Hypertension, Hypothyroidism, Other specified noninfective gastroenteritis and colitis, and Unspecified macular degeneration.  Past Surgical History:   Procedure Laterality Date    CORONARY ANGIOGRAPHY N/A 2/1/2021    Procedure: ANGIOGRAM, CORONARY ARTERY;  Surgeon: Devon Medeiros MD;  Location: [facility] CATH LAB;  Service: Cardiology;  Laterality: N/A;    CORONARY ANGIOPLASTY WITH STENT PLACEMENT      LEFT HEART CATHETERIZATION Left 2/1/2021    Procedure: CATHETERIZATION, HEART, LEFT;  Surgeon: Devon Medeiros MD;  Location: [facility] CATH LAB;  Service: Cardiology;  Laterality: Left;     Past Psychiatric History:  Previous Medication Trials: ativan  Previous Psychiatric Hospitalizations: denies  Previous  Suicide Attempts: denies  History of Violence: denies  Outpatient MH Provider: denies  Access to Gun: denies    Social History:  Housing/Lives with: house with son and grandchild  Marital Status:   Children: yes   Employment Status/Info: not working  Education: did not assess  History of phys/sexual abuse: denies    Substance Abuse History:  Tobacco Use:0.5ppd  Use of Alcohol: denies  Recreational Drugs: denies  Rehab History:denies     Legal History:  Past Charges/Incarcerations:denies  Pending charges:denies     Family Psychiatric History:   denies

## 2023-07-24 NOTE — CONSULTS
"[facility]  Psychiatry  Consult Note    Patient Name: Cierra Main  MRN: [id number]   Code Status: DNR  Admission Date: 7/23/2023  Hospital Length of Stay: 1 days  Attending Physician: Mireya Lin MD  Primary Care Provider: Rafiq Eller MD    Current Legal Status: [facility]    Patient information was obtained from patient, past medical records and ER records.   Inpatient consult to Psychiatry  Consult performed by: Chuck Merchant MD  Consult ordered by: Chel Morse MD        Subjective:     Principal Problem:NSTEMI (non-ST elevated myocardial infarction)    Chief Complaint:  "I'm doing fine now," intentional ingestion of ativan (suspected SA)    HPI:   Per Primary MD:  "72 yo F with PMH of previous MI, COPD, hypothyroidism, HTN, macular degeneration and severe depression presented to [facility] ED as a transfer from [facility] department due to suicide attempt by drug overdose. Pt does not give a consistent history, at first she state she took several tablets of Tylenol 650, then she stated she took a large fist full of Ativan in an attempt to harm herself. Contacted pt's sister Ms Krystin Brown ([phone]) who stated patient has been really depressed due to her  passing several years ago and also due to her children and grandchildren not taking care of her given her worsening macular degeneration. Ms. Mcclelland stated that pt called her around 2100 the night before presentation and admitted taking a handful or ativan and pain killers and that she stated it would be best if she wasn't around anymore. Apparently this morning the patient appeared very groggy and was not arousable prompting family to call the ambulance.      In [place] ED, pt's blood pressure severely elevated at 230/121 with initial SpO2 of 88% on room air. Pt placed on cardene drip, which improved the BP. CT head negative was acute intracranial changes. Pt was given Narcan, to which she " "showed no response. She was then subsequently given flumazenil, which aroused her enough to have a conversation. She later required another dose of flumazenil as she was becoming more groggy. Interestingly the UDS, acetaminophen, salicylate and ethanol levels were  negative. Pt did have elevated troponin levels at 0.8, which later trended down to 0.6. EKG with tachycardia and PVCs. Labs also consistent with non-anion gap metabolic acidosis with bicarb of 16. Electrolytes wnl.      In our ED, patient arrived being PEC'd. BP is better controlled patient is more arousable, resting comfortably in no acute distress, alert and oriented. Repeat UDS and repeat acetaminophen remains negative.   Internal medicine team consulted for admission in PEC'd status for NSTEMI and suicidalilty. "    Per Psychiatry MD:   Cierra Main is a 73 y.o. female with a past psychiatric history of depression, currently presenting with suicide attempt by medication ingestion with secondary NSTEMI.  Psychiatry was consulted to address the patient's symptoms of depression with suicidal ideation.     Patient calm and cooperative with interview.  Patient appears to minimize symptoms throughout.  Reports she was brought to medical treatment by family member for sleepiness.  Notes that she is been struggling with a severe headache and a toothache for days.  Notes she tried to self medicate with Tylenol and BC powder.  Eventually she became frustrated and took 5-6 Ativan in an effort to sleep.  Her son had difficulty arousing her the following day and she was brought for medical treatment because of this.  She says she was being sarcastic when she told nurse on presentation she was feeling suicidal.  She denies any suicidal plan or intentions.  She denies any history similar behavior in the past.  She denies recently feeling depressed.  She reports that the Ativan is prescribed to her for anxiety, voices that she normally takes a half tablet on very " rare occasions.  She notes recently struggling with multiple ongoing family stressors.  She says I am the last person you would expect to have done this.     Per chart review:  Patient is given multiple conflicting stories related to events prior to presentation.  Initially she reported taking Tylenol then reported taking a large quantity of Ativan.  At points says that she was depressed and wanted to harm herself.  Patient originally presented to outside emergency room for treatment both transferred to current hospital for management of NSTEMI.    SUBJECTIVE   Currently, the patient is endorsing the following:    Medical Review Of Systems:  Constitutional: denies fevers, denies chills, denies recent weight change  Eyes: denies pain in eyes or loss of vision  Ears: denies tinnitis, denies loss of hearing  Mouth/throat: denies difficulty with speaking, denies difficulty with swallowing  Respiratory: denies SOB, denies cough  Gastrointestinal: denies abdominal pain, denies nausea/vomiting, denies constipation/diarrhea  Genitourinary: denies urinary frequency, denies burning on urination  Dermatologic: denies rash, denies erythema  Musculoskeletal: denies myalgias, denies arthralgias  Hematologic: denies easy bleeding/bruising, denies enlarged lymph nodes  Neurologic: denies seizures, denies headaches, denies loss of sensation, denies weakness  Psychiatric: see HPI    Psychiatric Review Of Systems:  Please see HPI above    Past Medical/Surgical History:  Past Medical History:   Diagnosis Date    Acute MI     Anxiety     Cardiac disease     COPD (chronic obstructive pulmonary disease)     Coronary artery disease     Hyperlipidemia     Hypertension     Hypothyroidism     Other specified noninfective gastroenteritis and colitis     Unspecified macular degeneration       has a past medical history of Acute MI, Anxiety, Cardiac disease, COPD (chronic obstructive pulmonary disease), Coronary artery disease,  Hyperlipidemia, Hypertension, Hypothyroidism, Other specified noninfective gastroenteritis and colitis, and Unspecified macular degeneration.  Past Surgical History:   Procedure Laterality Date    CORONARY ANGIOGRAPHY N/A 2/1/2021    Procedure: ANGIOGRAM, CORONARY ARTERY;  Surgeon: Devon Medeiros MD;  Location: [facility] CATH LAB;  Service: Cardiology;  Laterality: N/A;    CORONARY ANGIOPLASTY WITH STENT PLACEMENT      LEFT HEART CATHETERIZATION Left 2/1/2021    Procedure: CATHETERIZATION, HEART, LEFT;  Surgeon: Devon Medeiros MD;  Location: [facility] CATH LAB;  Service: Cardiology;  Laterality: Left;     Past Psychiatric History:  Previous Medication Trials: ativan  Previous Psychiatric Hospitalizations: denies  Previous Suicide Attempts: denies  History of Violence: denies  Outpatient  Provider: denies  Access to Gun: denies    Social History:  Housing/Lives with: house with son and grandchild  Marital Status:   Children: yes   Employment Status/Info: not working  Education: did not assess  History of phys/sexual abuse: denies    Substance Abuse History:  Tobacco Use:0.5ppd  Use of Alcohol: denies  Recreational Drugs: denies  Rehab History:denies     Legal History:  Past Charges/Incarcerations:denies  Pending charges:denies     Family Psychiatric History:   denies        Hospital Course: See HPI above.           Patient History               Medical as of 7/24/2023       Past Medical History       Diagnosis Date Comments Source    Acute MI -- -- Provider    Anxiety -- -- Provider    Cardiac disease -- -- Provider    COPD (chronic obstructive pulmonary disease) -- -- Provider    Coronary artery disease -- -- Provider    Hyperlipidemia -- -- Provider    Hypertension -- -- Provider    Hypothyroidism -- -- Provider    Other specified noninfective gastroenteritis and colitis -- -- Provider    Unspecified macular degeneration -- -- Provider                          Surgical as of 7/24/2023       Past Surgical  History       Procedure Laterality Date Comments Source    CORONARY ANGIOPLASTY WITH STENT PLACEMENT -- -- -- Provider    LEFT HEART CATHETERIZATION Left 2/1/2021 Procedure: CATHETERIZATION, HEART, LEFT;  Surgeon: Devon Medeiros MD;  Location: [facility] CATH LAB;  Service: Cardiology;  Laterality: Left; Provider    CORONARY ANGIOGRAPHY N/A 2/1/2021 Procedure: ANGIOGRAM, CORONARY ARTERY;  Surgeon: Devon Medeiros MD;  Location: [facility] CATH LAB;  Service: Cardiology;  Laterality: N/A; Provider                          Family as of 7/24/2023       Problem Relation Name Age of Onset Comments Source    Diabetes Mother -- -- -- Provider    Heart disease Mother -- -- -- Provider    Hypertension Father -- -- -- Provider    Heart disease Father -- -- -- Provider                  Tobacco Use as of 7/24/2023       Smoking Status Smoking Start Date Last Attempt to Quit Smoking Frequency    Every Day -- -- 0.50 packs/day for 55.00 years (27.50 pk-yrs)      Smokeless Status Smokeless Type Smokeless Quit Date    Never -- --      Source    Provider                  Alcohol Use as of 7/24/2023       Alcohol Use Drinks/Week Alcohol/Week Comments Source    Never   -- -- Provider                  Drug Use as of 7/24/2023       Drug Use Types Frequency Comments Source    Never -- -- -- Provider                  Sexual Activity as of 7/24/2023       Sexually Active Birth Control Partners Comments Source    -- -- -- -- Provider                  Activities of Daily Living as of 7/24/2023    None               Social Documentation as of 7/24/2023    None               Occupational as of 7/24/2023    None               Socioeconomic as of 7/24/2023       Marital Status Spouse Name Number of Children Years Education Education Level Preferred Language Ethnicity Race Source     -- -- -- -- English Not  or /a White --                  Pertinent History       Question Response Comments    Lives with -- --    Place in Birth Order --  --    Lives in -- --    Number of Siblings -- --    Raised by -- --    Legal Involvement -- --    Childhood Trauma -- --    Criminal History of -- --    Financial Status -- --    Highest Level of Education -- --    Does patient have access to a firearm? -- --     Service -- --    Primary Leisure Activity -- --    Spirituality -- --          Past Medical History:   Diagnosis Date    Acute MI     Anxiety     Cardiac disease     COPD (chronic obstructive pulmonary disease)     Coronary artery disease     Hyperlipidemia     Hypertension     Hypothyroidism     Other specified noninfective gastroenteritis and colitis     Unspecified macular degeneration      Past Surgical History:   Procedure Laterality Date    CORONARY ANGIOGRAPHY N/A 2/1/2021    Procedure: ANGIOGRAM, CORONARY ARTERY;  Surgeon: Devon Medeiros MD;  Location: [facility] CATH LAB;  Service: Cardiology;  Laterality: N/A;    CORONARY ANGIOPLASTY WITH STENT PLACEMENT      LEFT HEART CATHETERIZATION Left 2/1/2021    Procedure: CATHETERIZATION, HEART, LEFT;  Surgeon: Devon Medeiros MD;  Location: [facility] CATH LAB;  Service: Cardiology;  Laterality: Left;     Family History       Problem Relation (Age of Onset)    Diabetes Mother    Heart disease Mother, Father    Hypertension Father          Tobacco Use    Smoking status: Every Day     Packs/day: 0.50     Years: 55.00     Pack years: 27.50     Types: Cigarettes    Smokeless tobacco: Never   Substance and Sexual Activity    Alcohol use: Never    Drug use: Never    Sexual activity: Not on file     Review of patient's allergies indicates:   Allergen Reactions    Amlodipine     Losartan        Current Facility-Administered Medications on File Prior to Encounter   Medication    [COMPLETED] 0.9%  NaCl infusion    [COMPLETED] metoprolol injection 5 mg    [DISCONTINUED] niCARdipine 40 mg/200 mL (0.2 mg/mL) infusion     Current Outpatient Medications on File Prior to Encounter   Medication Sig     "aspirin (ECOTRIN) 81 MG EC tablet Take 81 mg by mouth once daily.    docusate sodium (COLACE) 100 MG capsule Take 100 mg by mouth 2 (two) times daily.    levothyroxine (SYNTHROID) 25 MCG tablet Take 1 tablet (25 mcg total) by mouth before breakfast.    lisinopriL (PRINIVIL,ZESTRIL) 20 MG tablet Take 1 tablet (20 mg total) by mouth 2 (two) times daily.    LORazepam (ATIVAN) 0.5 MG tablet TAKE 1 TABLET BY MOUTH EVERY 6 HOURS AS NEEDED FOR ANXIETY.    metoprolol tartrate (LOPRESSOR) 50 MG tablet Take 1 tablet (50 mg total) by mouth 2 (two) times daily.    montelukast (SINGULAIR) 10 mg tablet Take 10 mg by mouth.    nitroGLYCERIN 0.4 MG/DOSE TL SPRY (NITROLINGUAL) 400 mcg/spray spray nitroglycerin 400 mcg/spray translingual aerosol   at onset of chest pain may take up to three sprays every 5min during a 15 min period    pravastatin (PRAVACHOL) 40 MG tablet Take 1 tablet (40 mg total) by mouth once daily.    rosuvastatin (CRESTOR) 20 MG tablet Take 20 mg by mouth.     Psychotherapeutics (From admission, onward)      None          Review of Systems  See hpi above.     Strengths and Liabilities: Strength: Patient is expressive/articulate., Strength: Patient is intelligent., Liability: Patient is impulsive., Liability: Patient lacks coping skills.    Objective:     Vital Signs (Most Recent):  Temp: 99.4 °F (37.4 °C) (07/24/23 0507)  Pulse: 89 (07/24/23 1339)  Resp: 18 (07/24/23 1339)  BP: (!) 165/97 (07/24/23 1339)  SpO2: 96 % (07/24/23 1300) Vital Signs (24h Range):  Temp:  [98.6 °F (37 °C)-100.9 °F (38.3 °C)] 99.4 °F (37.4 °C)  Pulse:  [] 89  Resp:  [13-28] 18  SpO2:  [90 %-100 %] 96 %  BP: (113-214)/() 165/97     Height: 5' 4" (162.6 cm)  Weight: 44.9 kg (98 lb 15.8 oz)  Body mass index is 16.99 kg/m².      Intake/Output Summary (Last 24 hours) at 7/24/2023 1507  Last data filed at 7/24/2023 1224  Gross per 24 hour   Intake 1000 ml   Output 2300 ml   Net -1300 ml      Physical Exam     Appearance: " "unremarkable, age appropriate, thin appearing  Level of Consciousness: awake and alert  Behavior/Cooperation: calm and cooperative  Psychomotor: unremarkable   Speech: normal tone, normal rate, normal pitch, normal volume  Language: english, fluid  Orientation: grossly intact, person, place, situation, day of week, month of year, year  Attention Span/Concentration: intact to interview and spells "WORLD" forwards and backwards without error  Memory: Registers 3/3 objects, recalls 3/3 objects at 5 minutes without cuing  Mood: "peaceful"  Affect: constricted and irritable  Thought Process: linear, goal-directed  Associations: Logical and appropriate  Thought Content: denies SI/HI/paranoia, no delusional ideation volunteered, denies plan or desire for self harm or harm to others  Fund of Knowledge: Aware of current events  Abstraction: similarities were abstract  Insight: limited  Judgment: fair     Significant Labs: All pertinent labs within the past 24 hours have been reviewed.    Significant Imaging: I have reviewed all pertinent imaging results/findings within the past 24 hours.    Assessment/Plan:     Suicide attempt  ASSESSMENT     Cierra Main is a 73 y.o. female with a past psychiatric history of anxiety, currently presenting with NSTEMI (non-ST elevated myocardial infarction). Psychiatry was originally consulted to address the patient's symptoms of suicidal behavior.  Reports intentional overdose with prescribed ativan though denies suicidal intention.  Pt with conflicting story since initially presenting to outside facility.  Would recommend to continue PEC for dangerousness to self.  See below for further recommendations.     IMPRESSION  Benzodiazepine overdose, resolved  Depression, unspecified  Anxiety    RECOMMENDATION(S)      1. Scheduled Medication(s):  None at this time, defer to future IP psych provider    2. PRN Medication(s):  Recommend Ativan 2mg PO or IV q2 hrs witnessed seizures or any 2 of the " following: SBP>160, DBP>100,  (if sedative withdrawal is suspected)    3.  Monitor:  EKG 7/23/2023 w/ qtc 515    4. Legal Status/Precaution(s):  Continue PEC  Please ensure that 's office is notified of need for CEC evaluation  Continue 1:1    5. Disposition:  Recommend IP psychiatric hospitalization when medically cleared (Cierra psych), please enlist help of CM team to assist with arranging this       Total Time:  60 minutes      Chuck Merchant MD   Psychiatry  [facility]

## 2023-07-24 NOTE — SUBJECTIVE & OBJECTIVE
Patient History               Medical as of 7/24/2023       Past Medical History       Diagnosis Date Comments Source    Acute MI -- -- Provider    Anxiety -- -- Provider    Cardiac disease -- -- Provider    COPD (chronic obstructive pulmonary disease) -- -- Provider    Coronary artery disease -- -- Provider    Hyperlipidemia -- -- Provider    Hypertension -- -- Provider    Hypothyroidism -- -- Provider    Other specified noninfective gastroenteritis and colitis -- -- Provider    Unspecified macular degeneration -- -- Provider                          Surgical as of 7/24/2023       Past Surgical History       Procedure Laterality Date Comments Source    CORONARY ANGIOPLASTY WITH STENT PLACEMENT -- -- -- Provider    LEFT HEART CATHETERIZATION Left 2/1/2021 Procedure: CATHETERIZATION, HEART, LEFT;  Surgeon: Devon Medeiros MD;  Location: [facility] CATH LAB;  Service: Cardiology;  Laterality: Left; Provider    CORONARY ANGIOGRAPHY N/A 2/1/2021 Procedure: ANGIOGRAM, CORONARY ARTERY;  Surgeon: Devon Medeiros MD;  Location: [facility] CATH LAB;  Service: Cardiology;  Laterality: N/A; Provider                          Family as of 7/24/2023       Problem Relation Name Age of Onset Comments Source    Diabetes Mother -- -- -- Provider    Heart disease Mother -- -- -- Provider    Hypertension Father -- -- -- Provider    Heart disease Father -- -- -- Provider                  Tobacco Use as of 7/24/2023       Smoking Status Smoking Start Date Last Attempt to Quit Smoking Frequency    Every Day -- -- 0.50 packs/day for 55.00 years (27.50 pk-yrs)      Smokeless Status Smokeless Type Smokeless Quit Date    Never -- --      Source    Provider                  Alcohol Use as of 7/24/2023       Alcohol Use Drinks/Week Alcohol/Week Comments Source    Never   -- -- Provider                  Drug Use as of 7/24/2023       Drug Use Types Frequency Comments Source    Never -- -- -- Provider                  Sexual Activity as of 7/24/2023        Sexually Active Birth Control Partners Comments Source    -- -- -- -- Provider                  Activities of Daily Living as of 7/24/2023    None               Social Documentation as of 7/24/2023    None               Occupational as of 7/24/2023    None               Socioeconomic as of 7/24/2023       Marital Status Spouse Name Number of Children Years Education Education Level Preferred Language Ethnicity Race Source     -- -- -- -- English Not  or /a White --                  Pertinent History       Question Response Comments    Lives with -- --    Place in Birth Order -- --    Lives in -- --    Number of Siblings -- --    Raised by -- --    Legal Involvement -- --    Childhood Trauma -- --    Criminal History of -- --    Financial Status -- --    Highest Level of Education -- --    Does patient have access to a firearm? -- --     Service -- --    Primary Leisure Activity -- --    Spirituality -- --          Past Medical History:   Diagnosis Date    Acute MI     Anxiety     Cardiac disease     COPD (chronic obstructive pulmonary disease)     Coronary artery disease     Hyperlipidemia     Hypertension     Hypothyroidism     Other specified noninfective gastroenteritis and colitis     Unspecified macular degeneration      Past Surgical History:   Procedure Laterality Date    CORONARY ANGIOGRAPHY N/A 2/1/2021    Procedure: ANGIOGRAM, CORONARY ARTERY;  Surgeon: Devon Medeiros MD;  Location: [facility] CATH LAB;  Service: Cardiology;  Laterality: N/A;    CORONARY ANGIOPLASTY WITH STENT PLACEMENT      LEFT HEART CATHETERIZATION Left 2/1/2021    Procedure: CATHETERIZATION, HEART, LEFT;  Surgeon: Devon Medeiros MD;  Location: [facility] CATH LAB;  Service: Cardiology;  Laterality: Left;     Family History       Problem Relation (Age of Onset)    Diabetes Mother    Heart disease Mother, Father    Hypertension Father          Tobacco Use    Smoking status: Every Day     Packs/day: 0.50     Years:  55.00     Pack years: 27.50     Types: Cigarettes    Smokeless tobacco: Never   Substance and Sexual Activity    Alcohol use: Never    Drug use: Never    Sexual activity: Not on file     Review of patient's allergies indicates:   Allergen Reactions    Amlodipine     Losartan        Current Facility-Administered Medications on File Prior to Encounter   Medication    [COMPLETED] 0.9%  NaCl infusion    [COMPLETED] metoprolol injection 5 mg    [DISCONTINUED] niCARdipine 40 mg/200 mL (0.2 mg/mL) infusion     Current Outpatient Medications on File Prior to Encounter   Medication Sig    aspirin (ECOTRIN) 81 MG EC tablet Take 81 mg by mouth once daily.    docusate sodium (COLACE) 100 MG capsule Take 100 mg by mouth 2 (two) times daily.    levothyroxine (SYNTHROID) 25 MCG tablet Take 1 tablet (25 mcg total) by mouth before breakfast.    lisinopriL (PRINIVIL,ZESTRIL) 20 MG tablet Take 1 tablet (20 mg total) by mouth 2 (two) times daily.    LORazepam (ATIVAN) 0.5 MG tablet TAKE 1 TABLET BY MOUTH EVERY 6 HOURS AS NEEDED FOR ANXIETY.    metoprolol tartrate (LOPRESSOR) 50 MG tablet Take 1 tablet (50 mg total) by mouth 2 (two) times daily.    montelukast (SINGULAIR) 10 mg tablet Take 10 mg by mouth.    nitroGLYCERIN 0.4 MG/DOSE TL SPRY (NITROLINGUAL) 400 mcg/spray spray nitroglycerin 400 mcg/spray translingual aerosol   at onset of chest pain may take up to three sprays every 5min during a 15 min period    pravastatin (PRAVACHOL) 40 MG tablet Take 1 tablet (40 mg total) by mouth once daily.    rosuvastatin (CRESTOR) 20 MG tablet Take 20 mg by mouth.     Psychotherapeutics (From admission, onward)      None          Review of Systems  See hpi above.     Strengths and Liabilities: Strength: Patient is expressive/articulate., Strength: Patient is intelligent., Liability: Patient is impulsive., Liability: Patient lacks coping skills.    Objective:     Vital Signs (Most Recent):  Temp: 99.4 °F (37.4 °C) (07/24/23 0507)  Pulse: 89  "(07/24/23 1339)  Resp: 18 (07/24/23 1339)  BP: (!) 165/97 (07/24/23 1339)  SpO2: 96 % (07/24/23 1300) Vital Signs (24h Range):  Temp:  [98.6 °F (37 °C)-100.9 °F (38.3 °C)] 99.4 °F (37.4 °C)  Pulse:  [] 89  Resp:  [13-28] 18  SpO2:  [90 %-100 %] 96 %  BP: (113-214)/() 165/97     Height: 5' 4" (162.6 cm)  Weight: 44.9 kg (98 lb 15.8 oz)  Body mass index is 16.99 kg/m².      Intake/Output Summary (Last 24 hours) at 7/24/2023 1507  Last data filed at 7/24/2023 1224  Gross per 24 hour   Intake 1000 ml   Output 2300 ml   Net -1300 ml      Physical Exam     Appearance: unremarkable, age appropriate, thin appearing  Level of Consciousness: awake and alert  Behavior/Cooperation: calm and cooperative  Psychomotor: unremarkable   Speech: normal tone, normal rate, normal pitch, normal volume  Language: english, fluid  Orientation: grossly intact, person, place, situation, day of week, month of year, year  Attention Span/Concentration: intact to interview and spells "WORLD" forwards and backwards without error  Memory: Registers 3/3 objects, recalls 3/3 objects at 5 minutes without cuing  Mood: "peaceful"  Affect: constricted and irritable  Thought Process: linear, goal-directed  Associations: Logical and appropriate  Thought Content: denies SI/HI/paranoia, no delusional ideation volunteered, denies plan or desire for self harm or harm to others  Fund of Knowledge: Aware of current events  Abstraction: similarities were abstract  Insight: limited  Judgment: fair     Significant Labs: All pertinent labs within the past 24 hours have been reviewed.    Significant Imaging: I have reviewed all pertinent imaging results/findings within the past 24 hours.  "

## 2023-07-24 NOTE — NURSING
Spoke with Vitaly on List 4 about patients urine being a bright pink color. Hold heparin drip for now per Vitaly.

## 2023-07-24 NOTE — PT/OT/SLP PROGRESS
Physical Therapy    Missed Treatment Session    Patient Name:  Cierra Main   MRN:  [id number]      Patient not seen at this time secondary to Testing/imaging (xray/CT/MRI).    Will follow-up as patient is available to participate and as therapists' schedule allows.

## 2023-07-25 VITALS
OXYGEN SATURATION: 94 % | TEMPERATURE: 100 F | WEIGHT: 99 LBS | DIASTOLIC BLOOD PRESSURE: 73 MMHG | HEART RATE: 78 BPM | SYSTOLIC BLOOD PRESSURE: 149 MMHG | HEIGHT: 64 IN | RESPIRATION RATE: 18 BRPM | BODY MASS INDEX: 16.9 KG/M2

## 2023-07-25 LAB
ALBUMIN SERPL-MCNC: 2.4 G/DL (ref 3.4–4.8)
ALBUMIN/GLOB SERPL: 0.6 RATIO (ref 1.1–2)
ALP SERPL-CCNC: 67 UNIT/L (ref 40–150)
ALT SERPL-CCNC: 11 UNIT/L (ref 0–55)
APTT PPP: 38.6 SECONDS
AST SERPL-CCNC: 23 UNIT/L (ref 5–34)
BASOPHILS # BLD AUTO: 0.03 X10(3)/MCL
BASOPHILS NFR BLD AUTO: 0.4 %
BILIRUBIN DIRECT+TOT PNL SERPL-MCNC: 0.4 MG/DL
BUN SERPL-MCNC: 22.7 MG/DL (ref 9.8–20.1)
CALCIUM SERPL-MCNC: 9.2 MG/DL (ref 8.4–10.2)
CHLORIDE SERPL-SCNC: 106 MMOL/L (ref 98–107)
CO2 SERPL-SCNC: 25 MMOL/L (ref 23–31)
CREAT SERPL-MCNC: 0.58 MG/DL (ref 0.55–1.02)
EOSINOPHIL # BLD AUTO: 0.11 X10(3)/MCL (ref 0–0.9)
EOSINOPHIL NFR BLD AUTO: 1.3 %
ERYTHROCYTE [DISTWIDTH] IN BLOOD BY AUTOMATED COUNT: 13.2 % (ref 11.5–17)
GFR SERPLBLD CREATININE-BSD FMLA CKD-EPI: >60 MLS/MIN/1.73/M2
GLOBULIN SER-MCNC: 3.7 GM/DL (ref 2.4–3.5)
GLUCOSE SERPL-MCNC: 83 MG/DL (ref 82–115)
HCT VFR BLD AUTO: 40.3 % (ref 37–47)
HGB BLD-MCNC: 13.3 G/DL (ref 12–16)
IMM GRANULOCYTES # BLD AUTO: 0.02 X10(3)/MCL (ref 0–0.04)
IMM GRANULOCYTES NFR BLD AUTO: 0.2 %
LYMPHOCYTES # BLD AUTO: 1.63 X10(3)/MCL (ref 0.6–4.6)
LYMPHOCYTES NFR BLD AUTO: 19.4 %
MCH RBC QN AUTO: 28.5 PG (ref 27–31)
MCHC RBC AUTO-ENTMCNC: 33 G/DL (ref 33–36)
MCV RBC AUTO: 86.3 FL (ref 80–94)
MONOCYTES # BLD AUTO: 0.86 X10(3)/MCL (ref 0.1–1.3)
MONOCYTES NFR BLD AUTO: 10.2 %
NEUTROPHILS # BLD AUTO: 5.75 X10(3)/MCL (ref 2.1–9.2)
NEUTROPHILS NFR BLD AUTO: 68.5 %
NRBC BLD AUTO-RTO: 0 %
PATH REV: NORMAL
PHOSPHATE SERPL-MCNC: 2.5 MG/DL (ref 2.3–4.7)
PLATELET # BLD AUTO: 269 X10(3)/MCL (ref 130–400)
PMV BLD AUTO: 10.5 FL (ref 7.4–10.4)
POTASSIUM SERPL-SCNC: 3.5 MMOL/L (ref 3.5–5.1)
PROT SERPL-MCNC: 6.1 GM/DL (ref 5.8–7.6)
RBC # BLD AUTO: 4.67 X10(6)/MCL (ref 4.2–5.4)
SODIUM SERPL-SCNC: 143 MMOL/L (ref 136–145)
WBC # SPEC AUTO: 8.4 X10(3)/MCL (ref 4.5–11.5)

## 2023-07-25 PROCEDURE — 80053 COMPREHEN METABOLIC PANEL: CPT | Performed by: STUDENT IN AN ORGANIZED HEALTH CARE EDUCATION/TRAINING PROGRAM

## 2023-07-25 PROCEDURE — 85730 THROMBOPLASTIN TIME PARTIAL: CPT | Performed by: STUDENT IN AN ORGANIZED HEALTH CARE EDUCATION/TRAINING PROGRAM

## 2023-07-25 PROCEDURE — 25000003 PHARM REV CODE 250

## 2023-07-25 PROCEDURE — 25000003 PHARM REV CODE 250: Performed by: STUDENT IN AN ORGANIZED HEALTH CARE EDUCATION/TRAINING PROGRAM

## 2023-07-25 PROCEDURE — 94761 N-INVAS EAR/PLS OXIMETRY MLT: CPT

## 2023-07-25 PROCEDURE — 85025 COMPLETE CBC W/AUTO DIFF WBC: CPT | Performed by: STUDENT IN AN ORGANIZED HEALTH CARE EDUCATION/TRAINING PROGRAM

## 2023-07-25 PROCEDURE — 97162 PT EVAL MOD COMPLEX 30 MIN: CPT

## 2023-07-25 PROCEDURE — 25000003 PHARM REV CODE 250: Performed by: INTERNAL MEDICINE

## 2023-07-25 PROCEDURE — 84100 ASSAY OF PHOSPHORUS: CPT | Performed by: STUDENT IN AN ORGANIZED HEALTH CARE EDUCATION/TRAINING PROGRAM

## 2023-07-25 PROCEDURE — 63600175 PHARM REV CODE 636 W HCPCS: Performed by: STUDENT IN AN ORGANIZED HEALTH CARE EDUCATION/TRAINING PROGRAM

## 2023-07-25 RX ORDER — ACETAMINOPHEN 325 MG/1
650 TABLET ORAL ONCE
Status: COMPLETED | OUTPATIENT
Start: 2023-07-25 | End: 2023-07-25

## 2023-07-25 RX ORDER — HYDRALAZINE HYDROCHLORIDE 50 MG/1
50 TABLET, FILM COATED ORAL 3 TIMES DAILY
Qty: 90 TABLET | Refills: 11 | Status: ON HOLD | OUTPATIENT
Start: 2023-07-25 | End: 2023-11-17 | Stop reason: HOSPADM

## 2023-07-25 RX ADMIN — ASPIRIN 325 MG ORAL TABLET 325 MG: 325 PILL ORAL at 05:07

## 2023-07-25 RX ADMIN — MUPIROCIN: 20 OINTMENT TOPICAL at 09:07

## 2023-07-25 RX ADMIN — METOPROLOL TARTRATE 50 MG: 50 TABLET, FILM COATED ORAL at 08:07

## 2023-07-25 RX ADMIN — ACETAMINOPHEN 650 MG: 325 TABLET ORAL at 06:07

## 2023-07-25 RX ADMIN — HYDRALAZINE HYDROCHLORIDE 50 MG: 50 TABLET, FILM COATED ORAL at 05:07

## 2023-07-25 RX ADMIN — HYDRALAZINE HYDROCHLORIDE 50 MG: 50 TABLET, FILM COATED ORAL at 08:07

## 2023-07-25 RX ADMIN — LISINOPRIL 20 MG: 10 TABLET ORAL at 08:07

## 2023-07-25 RX ADMIN — HYDRALAZINE HYDROCHLORIDE 10 MG: 20 INJECTION INTRAMUSCULAR; INTRAVENOUS at 07:07

## 2023-07-25 RX ADMIN — LABETALOL HYDROCHLORIDE 10 MG: 5 INJECTION, SOLUTION INTRAVENOUS at 05:07

## 2023-07-25 NOTE — CONSULTS
Referral sent via [facility] for Cierra-Psych to [facility]; also called [facility] and provided necessary information for placement assistance.

## 2023-07-25 NOTE — PLAN OF CARE
Problem: Physical Therapy  Goal: Physical Therapy Goal  Description: Goals to be met by: dc     Patient will increase functional independence with mobility by performin. Sit to stand transfer with Modified [place]  2. Gait  x 130 feet with Modified [place] using Rolling Walker.     Outcome: Ongoing, Progressing

## 2023-07-25 NOTE — PROGRESS NOTES
07/25/23 0430 [place] with Poison controlled to check on patient status.  Patient only complaints has been body aches and pain.

## 2023-07-25 NOTE — PT/OT/SLP PROGRESS
Physical Therapy    Missed Treatment Session    Patient Name:  Cierra Main   MRN:  [id number]      Patient not seen at this time secondary to Other (Comment). Offered 2nd treatment today, pt. Politely declined and stated that she was worn out. Notified nurse.    Will follow-up as patient is available to participate and as therapists' schedule allows.

## 2023-07-25 NOTE — DISCHARGE SUMMARY
[facility] INTERNAL MEDICINE  INPATIENT DISCHARGE SUMMARY    Admitting Physician: Mireya Lin MD  Attending Physician: Mireya Lin MD  Date of Admit: 7/23/2023  Date of Discharge: 7/25/2023    [facility]   Condition: Stable    Discharge Diagnoses     Patient Active Problem List   Diagnosis    NSTEMI (non-ST elevated myocardial infarction)    Anxiety disorder, unspecified    Acute myocardial infarction    Chronic obstructive pulmonary disease    Coronary arteriosclerosis    Hyperlipidemia    Essential hypertension    Hypothyroidism    Macular degeneration    Seizure    Coronary artery disease    Cigarette smoker    Elevated troponin    Paroxysmal atrial fibrillation    Angina pectoris    Suicide attempt    Metabolic acidosis       Consultants and Procedures     Consultants:  Consults (From admission, onward)          Status Ordering Provider     Inpatient consult to Social Work/Case Management  Once        Provider:  (Not yet assigned)    Completed ADRIANNA REEVES KECHANDNI     Inpatient consult to Cardiology  Once        Provider:  Thomas Verma MD    Completed ADRIANNA REEVES     Inpatient consult to Psychiatry  Once        Provider:  Chuck Merchant MD    Completed MAY RIVAS     Inpatient consult to Hospitalist  Once        Provider:  Josi Raines MD    Acknowledged NIKI LOPEZ             Procedures:   None    Brief History of Present Illness      Cierra Main is a 73 year-old female with PMH of Cad s/p stent in 2021, COPD, hypothyroidism, HTN, macular degeneration, and severe depression, who presented to [facility] ED as a transfer from [facility] department due to suicide attempt by drug overdose. Pt does not give a consistent history, at first she state she took several tablets of Tylenol 650, then she stated she took a large fist full of Ativan in an attempt to harm herself. Contacted pt's sister Ms Krystin Brown ([phone]) who stated patient has been really depressed  due to her  passing several years ago and also due to her children and grandchildren not taking care of her given her worsening macular degeneration. Ms. Mcclelland stated that pt called her around 2100 the night before presentation and admitted taking a handful or ativan and pain killers and that she stated it would be best if she wasn't around anymore. Apparently this morning the patient appeared very groggy and was not arousable prompting family to call the ambulance. In the ED, patient arrived being PEC'd. BP is better controlled patient is more arousable, resting comfortably in no acute distress, alert and oriented. Repeat UDS and repeat acetaminophen remains negative. Internal medicine team consulted for admission in PEC'd status for NSTEMI and suicidalilty.     Hospital Course with Pertinent Findings     Admitted to inpatient unit for ongoing monitoring and medical therapy on 7/23/2023. Inpatient imaging included: Lexiscan revealed no evidence of myocardial ischemia or infection; CT head w/o contrast revealed no acute intracranial abnormality but Findings of chronic microvascular ischemic disease; CXR revealed no acute cardiopulmonary diseases. Hospital course: clinical improvement observed as evidenced by improved mental status and hemodynamic stability. Patient is medically cleared to be transferred to an  psych facility for further therapy.    Discharge physical exam:  Vitals:    07/25/23 1110   BP: (!) 135/57   Pulse: 74   Resp:    Temp: 98.7 °F (37.1 °C)     General: Thin w/ mild distress  HEENT: NC/AT; PERRL; nasal and oral mucosa moist and clear  Pulm: CTA bilaterally, normal work of breathing on room air  CV: S1, S2 w/o murmurs or gallops; no edema noted  GI: Soft with normal bowel sounds in all quadrants, no masses on palpation  MSK: Full ROM of all extremities   Neuro: AAOx4; motor/sensory function intact  Psych: Depressed     TIME SPENT ON DISCHARGE: 60 minutes    Discharge Medications         Medication List        START taking these medications      hydrALAZINE 50 MG tablet  Commonly known as: APRESOLINE  Take 1 tablet (50 mg total) by mouth 3 (three) times daily.            CONTINUE taking these medications      aspirin 81 MG EC tablet  Commonly known as: ECOTRIN     docusate sodium 100 MG capsule  Commonly known as: COLACE     lisinopriL 20 MG tablet  Commonly known as: PRINIVIL,ZESTRIL  Take 1 tablet (20 mg total) by mouth 2 (two) times daily.     LORazepam 0.5 MG tablet  Commonly known as: ATIVAN  TAKE 1 TABLET BY MOUTH EVERY 6 HOURS AS NEEDED FOR ANXIETY.     metoprolol tartrate 50 MG tablet  Commonly known as: LOPRESSOR  Take 1 tablet (50 mg total) by mouth 2 (two) times daily.     montelukast 10 mg tablet  Commonly known as: SINGULAIR     nitroGLYCERIN 0.4 MG/DOSE TL SPRY 400 mcg/spray spray  Commonly known as: NITROLINGUAL     pravastatin 40 MG tablet  Commonly known as: PRAVACHOL  Take 1 tablet (40 mg total) by mouth once daily.            STOP taking these medications      levothyroxine 25 MCG tablet  Commonly known as: SYNTHROID     rosuvastatin 20 MG tablet  Commonly known as: CRESTOR               Where to Get Your Medications        These medications were sent to [facility]/pharmacy #[id number] - [place], [place] - [address] [address]      Phone: [phone]   hydrALAZINE 50 MG tablet         Discharge Information:     -Patient will be transferred to [facility] facility   -ED precautions provided    Jill Roth DO  [facility] Internal Medicine PGY-II

## 2023-07-25 NOTE — CONSULTS
[facility] [place], nurse can call report to [phone].

## 2023-07-25 NOTE — PT/OT/SLP EVAL
Physical Therapy Evaluation    Patient Name:  Cierra Main   MRN:  [id number]    Recommendations:     Discharge Recommendations:  other (see comments) (TBD, possibly psych faciltiy?? if not HH with PT)   Discharge Equipment Recommendations: walker, rolling   Equipment to be obtained for discharge: rollator.  Barriers to discharge:  suicidal and fall risk    Assessment:     Cierra Main is a 73 y.o. female admitted with a medical diagnosis of NSTEMI (non-ST elevated myocardial infarction).  She presents with the following impairments/functional limitations:  weakness, gait instability, impaired endurance, impaired functional mobility.  1. NSTEMI (non-ST elevated myocardial infarction)    2. Overdose    3. Fever    4. Altered mental status, unspecified altered mental status type    5. Depression with suicidal ideation    6. Chest pain        Rehab Prognosis: Good from physical stand point.    Patient would benefit from continued skilled acute PT services to: address above listed impairments/functional limitations; receive patient/caregiver education; reduce fall risk; and maximize independency/safety with functional mobility.    Recent Surgery: * No surgery found *      Plan:     During this hospitalization, patient to be seen 3 x/week to address the identified impairments/functional limitations via gait training, therapeutic activities and progress toward the established goals.    Plan of Care Expires:  08/24/23    Subjective     Communicated with patient's nurse Ny prior to session.    Patient agreeable to participate in evaluation.     Chief Complaint: aching allover  Patient/Family Comments/goals: none stated during session  Pain/Comfort:  Pain Rating 1: 9/10  Location - Orientation 1: generalized  Pain Addressed 1: Nurse notified  Pain Rating Post-Intervention 1: 9/10    Patients cultural, spiritual, Amish conflicts given the current situation: no    Social History  Living Environment: Patient  lives  "with her son, who is "in and out" and is working  in a single level home, with no steps, with tub-shower combo.  Functional Level: Prior to admission patient ambulated without assistive device, was independent in ADL's, and was independent in IADL's.  Equipment Used at Home: none  Equipment owned (not currently used): none.  Assistance Upon Discharge: family.    Objective:     Patient found supine in bed and with HOB elevated with telemetry, peripheral IV  upon PT entry to room.    General Precautions: Standard, fall   Orthopedic Precautions:N/A   Braces: N/A  Respiratory Status: room air    Exams:  Orientation: Patient is oriented to Person, Place, Time, Situation  Commands: Patient follows commands consistently  RUE ROM: WFL  LUE ROM: WFL  RLE ROM: WFL  RLE Strength: WFL  LLE ROM: WFL  LLE Strength: WFL    Functional Mobility:    Bed Mobility:  Supine to Sit: supervision    Transfers:  Sit to Stand: contact guard assistance with rolling walker    Gait:  Patient ambulated 130 ft with rolling walker and contact guard assistance.  Patient demonstrates no loss of balance, occasional unsteady gait, and decreased tami.    Other Mobility:  not assessed    Balance:  Sit  Static: NORMAL: No deviations seen in posture held statically  Dynamic: GOOD: Maintains balance through MODERATE excursions of active trunk movement  Stand  Static: FAIR+: Takes MINIMAL challenges from all directions  Dynamic: FAIR: Needs CONTACT GUARD during gait    Patient left supine in bed and with HOB elevated with call button in reach, nurse notified, and sister present.    Education     Patient was instructed to utilize staff assistance for mobility/transfers.  White board updated regarding patient's safest level of mobility with staff assistance.    Goals     Multidisciplinary Problems       Physical Therapy Goals          Problem: Physical Therapy    Goal Priority Disciplines Outcome Goal Variances Interventions   Physical Therapy Goal     PT, " PT/OT Ongoing, Progressing     Description: Goals to be met by: dc     Patient will increase functional independence with mobility by performin. Sit to stand transfer with Modified [place]  2. Gait  x 130 feet with Modified [place] using Rolling Walker.                        History:     Past Medical History:   Diagnosis Date    Acute MI     Anxiety     Cardiac disease     COPD (chronic obstructive pulmonary disease)     Coronary artery disease     Hyperlipidemia     Hypertension     Hypothyroidism     Other specified noninfective gastroenteritis and colitis     Unspecified macular degeneration      Past Surgical History:   Procedure Laterality Date    CORONARY ANGIOGRAPHY N/A 2021    Procedure: ANGIOGRAM, CORONARY ARTERY;  Surgeon: Devon Medeiros MD;  Location: [facility] CATH LAB;  Service: Cardiology;  Laterality: N/A;    CORONARY ANGIOPLASTY WITH STENT PLACEMENT      LEFT HEART CATHETERIZATION Left 2021    Procedure: CATHETERIZATION, HEART, LEFT;  Surgeon: Devon Medeiros MD;  Location: [facility] CATH LAB;  Service: Cardiology;  Laterality: Left;     Time Tracking:     PT Received On: 23  PT Start Time: 1130     PT Stop Time: 1155  PT Total Time (min): 25 min     Billable Minutes: Evaluation 25    2023

## 2023-07-26 NOTE — PT/OT/SLP DISCHARGE
POST DISCHARGE DOCUMENTATION - 2023 9:22 AM    Physical Therapy Discharge Summary    Name: Cierra Main  MRN: [id number]   Principal Problem: Suicide attempt       Recommendations - per last treatment session     Discharge Recommendations:  other (see comments) (TBD, possibly psych faciltiy?? if not HH with PT)   Discharge Equipment Recommendations: walker, rolling     Assessment:     Refer to prior Physical Therapy note for last known functional status of patient.  1. Depression with suicidal ideation    2. Overdose    3. NSTEMI (non-ST elevated myocardial infarction)    4. Fever    5. Altered mental status, unspecified altered mental status type    6. Chest pain          Patient was unexpectedly discharged from hospital.  Refer to therapy's initial evaluation or last treatment note for patient's most recent functional status and goal achievement and therapists' recommendations.    Objective     GOALS:  Multidisciplinary Problems       Physical Therapy Goals          Problem: Physical Therapy    Goal Priority Disciplines Outcome Goal Variances Interventions   Physical Therapy Goal     PT, PT/OT Ongoing, Progressing     Description: Goals to be met by: dc     Patient will increase functional independence with mobility by performin. Sit to stand transfer with Modified [place]  2. Gait  x 130 feet with Modified [place] using Rolling Walker.   NOT MET                         Plan     Patient Discharged to:  unknown    2023

## 2023-07-29 LAB
BACTERIA BLD CULT: NORMAL
BACTERIA BLD CULT: NORMAL

## 2023-07-31 ENCOUNTER — TELEPHONE (OUTPATIENT)
Dept: FAMILY MEDICINE | Facility: CLINIC | Age: 73
End: 2023-07-31
Payer: MEDICARE

## 2023-07-31 NOTE — TELEPHONE ENCOUNTER
Spoke with bennett this morning about sisters care. She is in patient at [facility] and was diagnosed with pneumonia. She is having complaints about her sister care at [facility]. I instructed her to address her concerns with their facility as we have no right there. She verbalized understanding.

## 2023-07-31 NOTE — TELEPHONE ENCOUNTER
----- Message from Georgina Cortez sent at 7/29/2023  7:05 AM CDT -----  Regarding: Medical Advice  Type:  Needs Medical Advice    Who Called: Krystin Brown  Would the patient rather a call back or a response via [facility]? Call back  Best Call Back Number: [phone]  Additional Information:Krystin is requesting a call back from a Dr regarding her sister care.  She stated her sister is in the hospital with pneumonia.  She is in the hospital. .

## 2023-08-01 NOTE — PHYSICIAN QUERY
PT Name: Cierra Main  MR #: [id number]     Documentation Clarification      CDS/: Angelina Frederick RN              Contact information: [email or web address]    This form is a permanent document in the medical record.     Query Date: August 1, 2023    By submitting this query, we are merely seeking further clarification of documentation. Please utilize your independent clinical judgment when addressing the question(s) below.    The Medical Record reflects the following:    Supporting Clinical Findings Location in Medical Record       Patient additionally had suspected hypertensive urgency versus emergency so placed on Cardene drip at OSH    Differential Diagnosis:  Hypertensive urgency versus emergency    lethargic  Opens eyes to questioning.    Has rambling stories are not entirely coherent  Impression:  AMS      In [place] ED, pt's blood pressure severely elevated at 230/121   Pt placed on cardene drip, which improved the BP    NSTEMI  AMS- Improved   7/23 ED MD [place]VAR[place]O                    7/23 HP         /72, 187/98, 214/84, 198/91, 205/84    Hydralazine 10mg IVP x 2 doses 7/24-25  Hydralazine 50mg PO 7/24-25  Labetalol 20mg IVP 7/24  Labetalol 10mg IVP x 3 doses 7/24-25  Lisinopril 20mg PO  7/24-25  Metoprolol 50mg PO 7/24-25  Nicardipine gtt 7/23 7/23-24 Flowsheet    7/23-25 MAR                                                                              Provider, please provide diagnosis or diagnoses associated with above clinical findings.    [   ] HTN Emergency - with end organ dysfunction:  NSTEMI, AMS     [ X ]  HTN urgency     [   ] Other (please specify): ____________     [  ] Clinically undetermined

## 2023-08-01 NOTE — PHYSICIAN QUERY
PT Name: Cierra Main  MR #: [id number]    DOCUMENTATION CLARIFICATION     CDS/: Angelina Frederick RN              Contact information: [email or web address]  This form is a permanent document in the medical record.     Query Date: August 1, 2023    By submitting this query, we are merely seeking further clarification of documentation. Please utilize your independent clinical judgment when addressing the question(s) below.    The Medical Record contains the following:   Indicators   Supporting Clinical Findings Location in Medical Record   x AMS, Confusion,  LOC, etc.  Per report patient actively overdosed on Ativan after making suicidal statements all week.    Patient arrived to outside hospital with depressed mental status.  Narcan did not approve mentation however flumazenil did.  After was between GCS 14 and 15.  Ultimately required ICU given overdose status so transferred here    lethargic  Opens eyes to questioning.  Has rambling stories are not entirely coherent.       Lethargic  Behavior is slowed    AMS-improved    Patient is confused    clinical improvement observed as evidenced by improved mental status and hemodynamic stability    7/23 ED MD PN              7/23 HP          7/24 Cardiology MD PN      7/25 DC summary   x Acute/Chronic Illness  Patient additionally had suspected hypertensive urgency versus emergency so placed on Cardene drip at OSH.    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  After was between GCS 14 and 15.      NS 1000mL bolus 7/23  Heparin gtt 7/24  Sodium bicarbonate gtt 7/24  Hydralazine 10mg IVP x 2 doses 7/24-25  Hydralazine 50mg PO 7/24-25  Labetalol 20mg IVP 7/24  Labetalol 10mg IVP x 3 doses 7/24-25  Lisinopril 20mg PO  7/24-25  Metoprolol 50mg PO 7/24-25  Nicardipine gtt 7/23 7/23 ED MD ALVARADO    7/23-25 MAR                         x Treatment         CT head  CXR  Toxicology  BMP  Psych consult   7/23-25 orders   x Other /72, 187/98, 214/84, 198/91, 205/84     presented to [facility] ED as a transfer from [facility] department due to suicide attempt by drug overdose. Pt does not give a consistent history, at first she state she took several tablets of Tylenol 650, then she stated she took a large fist full of Ativan in an attempt to harm herself 7/23-24 Flowsheet    7/23 HP     The noted clinical guidelines are only system guidelines and do not replace the providers clinical judgment.    The National [place] of Neurologic Disorders and Stroke (NINDS) of the NIH describes encephalopathy as any diffuse disease of the brain that alters brain function or structure.    Provider, please specify the diagnosis or diagnoses associated with above clinical findings.    Yehuda all that apply    [   ] Metabolic Encephalopathy -   Due to electrolyte imbalance, metabolic derangements, or infectious processes, includes Septic Encephalopathy, Uremic Encephalopathy     [ X ] Toxic Encephalopathy - Due to drugs, chemicals, or other toxic substances     [   ] Hypertensive Encephalopathy - Signs and/or symptoms of cerebral edema caused by severe and/or sudden rises in blood pressure     [   ] Encephalopathy, unspecified        [   ] Other Encephalopathy (please specify): ____________________     [   ] Other neurological condition-(please specify condition): __________     [   ]  Clinically Undetermined         Please document  in your progress notes daily for the duration of treatment until resolved, and include in your discharge summary.    References:  NORMAN King RN, CCDS. (2018, June 9). Notes from the Instructor: Encephalopathy tips. Retrieved October 22, 2020, from [email or web address]://acdis.[email or web address]/articles/note-instructor-encephalopathy-tips    ICD-9-CM Coding Clinic First Quarter 2013, Effective with discharges: October 21, 2013 [facility] § Seizure with encephalopathy due to postictal state (2013).    ICD-10-CM/PCS [employer] Integrated Codebook (Version V.20.8.10.0) [Computer software]. (2020). Retrieved October 21, 2020.    National [place] of Neurological Disorders and Stroke. (2019, March 27). Retrieved October 22, 2020, from https://www.ninds.nih.gov/Disorders/All-Disorders/Cowfhvmcjkhiqy-Oybqxjjoief-Szqq    Form No. 32681

## 2023-08-09 ENCOUNTER — OFFICE VISIT (OUTPATIENT)
Dept: FAMILY MEDICINE | Facility: CLINIC | Age: 73
End: 2023-08-09
Payer: MEDICARE

## 2023-08-09 VITALS
SYSTOLIC BLOOD PRESSURE: 120 MMHG | WEIGHT: 93.31 LBS | OXYGEN SATURATION: 97 % | HEART RATE: 67 BPM | BODY MASS INDEX: 15.93 KG/M2 | DIASTOLIC BLOOD PRESSURE: 82 MMHG | RESPIRATION RATE: 20 BRPM | TEMPERATURE: 97 F | HEIGHT: 64 IN

## 2023-08-09 DIAGNOSIS — Z13.820 ENCOUNTER FOR OSTEOPOROSIS SCREENING IN ASYMPTOMATIC POSTMENOPAUSAL PATIENT: ICD-10-CM

## 2023-08-09 DIAGNOSIS — Z78.0 ENCOUNTER FOR OSTEOPOROSIS SCREENING IN ASYMPTOMATIC POSTMENOPAUSAL PATIENT: ICD-10-CM

## 2023-08-09 DIAGNOSIS — Z12.31 BREAST CANCER SCREENING BY MAMMOGRAM: ICD-10-CM

## 2023-08-09 DIAGNOSIS — F32.A DEPRESSION, UNSPECIFIED DEPRESSION TYPE: Primary | ICD-10-CM

## 2023-08-09 DIAGNOSIS — I48.0 PAROXYSMAL ATRIAL FIBRILLATION: ICD-10-CM

## 2023-08-09 DIAGNOSIS — J18.9 PNEUMONIA DUE TO INFECTIOUS ORGANISM, UNSPECIFIED LATERALITY, UNSPECIFIED PART OF LUNG: ICD-10-CM

## 2023-08-09 PROBLEM — R56.9 SEIZURE: Status: RESOLVED | Noted: 2019-01-15 | Resolved: 2023-08-09

## 2023-08-09 PROCEDURE — 99214 OFFICE O/P EST MOD 30 MIN: CPT | Mod: ,,, | Performed by: FAMILY MEDICINE

## 2023-08-09 PROCEDURE — 1111F DSCHRG MED/CURRENT MED MERGE: CPT | Mod: CPTII,,, | Performed by: FAMILY MEDICINE

## 2023-08-09 PROCEDURE — 3288F PR FALLS RISK ASSESSMENT DOCUMENTED: ICD-10-PCS | Mod: CPTII,,, | Performed by: FAMILY MEDICINE

## 2023-08-09 PROCEDURE — 1157F PR ADVANCE CARE PLAN OR EQUIV PRESENT IN MEDICAL RECORD: ICD-10-PCS | Mod: CPTII,,, | Performed by: FAMILY MEDICINE

## 2023-08-09 PROCEDURE — 1101F PT FALLS ASSESS-DOCD LE1/YR: CPT | Mod: CPTII,,, | Performed by: FAMILY MEDICINE

## 2023-08-09 PROCEDURE — 3008F PR BODY MASS INDEX (BMI) DOCUMENTED: ICD-10-PCS | Mod: CPTII,,, | Performed by: FAMILY MEDICINE

## 2023-08-09 PROCEDURE — 3079F DIAST BP 80-89 MM HG: CPT | Mod: CPTII,,, | Performed by: FAMILY MEDICINE

## 2023-08-09 PROCEDURE — 1126F PR PAIN SEVERITY QUANTIFIED, NO PAIN PRESENT: ICD-10-PCS | Mod: CPTII,,, | Performed by: FAMILY MEDICINE

## 2023-08-09 PROCEDURE — 1101F PR PT FALLS ASSESS DOC 0-1 FALLS W/OUT INJ PAST YR: ICD-10-PCS | Mod: CPTII,,, | Performed by: FAMILY MEDICINE

## 2023-08-09 PROCEDURE — 3288F FALL RISK ASSESSMENT DOCD: CPT | Mod: CPTII,,, | Performed by: FAMILY MEDICINE

## 2023-08-09 PROCEDURE — 99214 PR OFFICE/OUTPT VISIT, EST, LEVL IV, 30-39 MIN: ICD-10-PCS | Mod: ,,, | Performed by: FAMILY MEDICINE

## 2023-08-09 PROCEDURE — 4010F PR ACE/ARB THEARPY RXD/TAKEN: ICD-10-PCS | Mod: CPTII,,, | Performed by: FAMILY MEDICINE

## 2023-08-09 PROCEDURE — 1160F RVW MEDS BY RX/DR IN RCRD: CPT | Mod: CPTII,,, | Performed by: FAMILY MEDICINE

## 2023-08-09 PROCEDURE — 3008F BODY MASS INDEX DOCD: CPT | Mod: CPTII,,, | Performed by: FAMILY MEDICINE

## 2023-08-09 PROCEDURE — 1159F MED LIST DOCD IN RCRD: CPT | Mod: CPTII,,, | Performed by: FAMILY MEDICINE

## 2023-08-09 PROCEDURE — 1126F AMNT PAIN NOTED NONE PRSNT: CPT | Mod: CPTII,,, | Performed by: FAMILY MEDICINE

## 2023-08-09 PROCEDURE — 3074F SYST BP LT 130 MM HG: CPT | Mod: CPTII,,, | Performed by: FAMILY MEDICINE

## 2023-08-09 PROCEDURE — 1111F PR DISCHARGE MEDS RECONCILED W/ CURRENT OUTPATIENT MED LIST: ICD-10-PCS | Mod: CPTII,,, | Performed by: FAMILY MEDICINE

## 2023-08-09 PROCEDURE — 3074F PR MOST RECENT SYSTOLIC BLOOD PRESSURE < 130 MM HG: ICD-10-PCS | Mod: CPTII,,, | Performed by: FAMILY MEDICINE

## 2023-08-09 PROCEDURE — 4010F ACE/ARB THERAPY RXD/TAKEN: CPT | Mod: CPTII,,, | Performed by: FAMILY MEDICINE

## 2023-08-09 PROCEDURE — 1160F PR REVIEW ALL MEDS BY PRESCRIBER/CLIN PHARMACIST DOCUMENTED: ICD-10-PCS | Mod: CPTII,,, | Performed by: FAMILY MEDICINE

## 2023-08-09 PROCEDURE — 3079F PR MOST RECENT DIASTOLIC BLOOD PRESSURE 80-89 MM HG: ICD-10-PCS | Mod: CPTII,,, | Performed by: FAMILY MEDICINE

## 2023-08-09 PROCEDURE — 1157F ADVNC CARE PLAN IN RCRD: CPT | Mod: CPTII,,, | Performed by: FAMILY MEDICINE

## 2023-08-09 PROCEDURE — 1159F PR MEDICATION LIST DOCUMENTED IN MEDICAL RECORD: ICD-10-PCS | Mod: CPTII,,, | Performed by: FAMILY MEDICINE

## 2023-08-09 RX ORDER — SERTRALINE HYDROCHLORIDE 50 MG/1
50 TABLET, FILM COATED ORAL DAILY
Qty: 30 TABLET | Refills: 1 | Status: SHIPPED | OUTPATIENT
Start: 2023-08-09 | End: 2023-08-31

## 2023-08-09 RX ORDER — PREDNISONE 10 MG/1
1 TABLET ORAL DAILY
Status: ON HOLD | COMMUNITY
Start: 2023-07-31 | End: 2023-11-17 | Stop reason: HOSPADM

## 2023-08-09 NOTE — PROGRESS NOTES
Subjective:       Patient ID: Cierra Main is a 73 y.o. female.    Chief Complaint: Follow-up (Hospital follow up from [facility]/Prescribed zoloft for 2 days) and pneumonia (Admitted to [facility])      TCM    Follow-up       Cierra Main is a 73 year-old female with PMH of Cad s/p stent in 2021, COPD, hypothyroidism, HTN, macular degeneration, and severe depression, who presented to [facility] ED as a transfer from [facility] department due to suicide attempt by drug overdose. Pt does not give a consistent history, at first she state she took several tablets of Tylenol 650, then she stated she took a large fist full of Ativan in an attempt to harm herself. Contacted pt's sister Ms Krystin Brown ([phone]) who stated patient has been really depressed due to her  passing several years ago and also due to her children and grandchildren not taking care of her given her worsening macular degeneration. Ms. Mcclelland stated that pt called her around 2100 the night before presentation and admitted taking a handful or ativan and pain killers and that she stated it would be best if she wasn't around anymore. Apparently this morning the patient appeared very groggy and was not arousable prompting family to call the ambulance. In the ED, patient arrived being PEC'd. BP is better controlled patient is more arousable, resting comfortably in no acute distress, alert and oriented. Repeat UDS and repeat acetaminophen remains negative. Internal medicine team consulted for admission in PEC'd status for NSTEMI and suicidalilty.      Hospital Course with Pertinent Findings      Admitted to inpatient unit for ongoing monitoring and medical therapy on 7/23/2023. Inpatient imaging included: Lexiscan revealed no evidence of myocardial ischemia or infection; CT head w/o contrast revealed no acute intracranial abnormality but Findings of chronic microvascular ischemic disease; CXR revealed no acute cardiopulmonary  "diseases. Hospital course: clinical improvement observed as evidenced by improved mental status and hemodynamic stability. Patient is medically cleared to be transferred to an  psych facility for further therapy.    Review of Systems   Constitutional: Negative.    Respiratory: Negative.          Admit for pneumonia   Cardiovascular: Negative.    Psychiatric/Behavioral:          Depression: recent admit to [facility]           Objective:      /82 (BP Location: Left arm, Patient Position: Sitting, BP Method: Large (Manual))   Pulse 67   Temp 97.2 °F (36.2 °C)   Resp 20   Ht 5' 4" (1.626 m)   Wt 42.3 kg (93 lb 4.8 oz)   SpO2 97%   BMI 16.01 kg/m²    Physical Exam  Constitutional:       Appearance: Normal appearance.   Cardiovascular:      Rate and Rhythm: Normal rate and regular rhythm.      Heart sounds: Normal heart sounds.   Pulmonary:      Effort: Pulmonary effort is normal.      Breath sounds: Normal breath sounds.   Neurological:      Mental Status: She is alert.   Psychiatric:         Mood and Affect: Mood normal.         Behavior: Behavior normal.         Thought Content: Thought content normal.         Judgment: Judgment normal.               Assessment:       Problem List Items Addressed This Visit          Cardiac/Vascular    Paroxysmal atrial fibrillation    Current Assessment & Plan     Controlled  Continue current Rx medication  Return to clinic with any concerns              Other Visit Diagnoses       Depression, unspecified depression type    -  Primary    Relevant Medications    sertraline (ZOLOFT) 50 MG tablet    Pneumonia due to infectious organism, unspecified laterality, unspecified part of lung        Breast cancer screening by mammogram        Relevant Orders    Mammo Digital Screening Bilat w/ Marcial    Encounter for osteoporosis screening in asymptomatic postmenopausal patient        Relevant Orders    DXA Bone Density Axial Skeleton 1 or more sites               Plan:   1. " Depression, unspecified depression type  -     sertraline (ZOLOFT) 50 MG tablet; Take 1 tablet (50 mg total) by mouth once daily.  Dispense: 30 tablet; Refill: 1  Start Zoloft 50 mg q.h.s.  Relaxation technique  Monitor   Return to clinic with any concerns     2. Pneumonia due to infectious organism, unspecified laterality, unspecified part of lung  Patient to take prednisone to completion    3. Paroxysmal atrial fibrillation  Assessment & Plan:  Controlled  Continue current Rx medication  Return to clinic with any concerns    4. Breast cancer screening by mammogram  -     Mammo Digital Screening Bilat w/ Marcial; Future; Expected date: 08/09/2023  Mammogram scheduled     5. Encounter for osteoporosis screening in asymptomatic postmenopausal patient  -     DXA Bone Density Axial Skeleton 1 or more sites; Future; Expected date: 08/09/2023  Bone density scan scheduled

## 2023-08-29 ENCOUNTER — PATIENT MESSAGE (OUTPATIENT)
Dept: ADMINISTRATIVE | Facility: HOSPITAL | Age: 73
End: 2023-08-29
Payer: MEDICARE

## 2023-08-31 DIAGNOSIS — F32.A DEPRESSION, UNSPECIFIED DEPRESSION TYPE: ICD-10-CM

## 2023-08-31 RX ORDER — SERTRALINE HYDROCHLORIDE 50 MG/1
50 TABLET, FILM COATED ORAL
Qty: 30 TABLET | Refills: 1 | Status: SHIPPED | OUTPATIENT
Start: 2023-08-31 | End: 2023-09-06 | Stop reason: SDUPTHER

## 2023-09-06 DIAGNOSIS — F32.A DEPRESSION, UNSPECIFIED DEPRESSION TYPE: ICD-10-CM

## 2023-09-06 RX ORDER — SERTRALINE HYDROCHLORIDE 50 MG/1
50 TABLET, FILM COATED ORAL DAILY
Qty: 30 TABLET | Refills: 1 | Status: SHIPPED | OUTPATIENT
Start: 2023-09-06 | End: 2023-09-20 | Stop reason: SDUPTHER

## 2023-09-19 ENCOUNTER — PATIENT MESSAGE (OUTPATIENT)
Dept: ADMINISTRATIVE | Facility: HOSPITAL | Age: 73
End: 2023-09-19
Payer: MEDICARE

## 2023-09-20 ENCOUNTER — OFFICE VISIT (OUTPATIENT)
Dept: FAMILY MEDICINE | Facility: CLINIC | Age: 73
End: 2023-09-20
Payer: MEDICARE

## 2023-09-20 VITALS
TEMPERATURE: 97 F | DIASTOLIC BLOOD PRESSURE: 80 MMHG | SYSTOLIC BLOOD PRESSURE: 128 MMHG | HEART RATE: 60 BPM | WEIGHT: 90.19 LBS | OXYGEN SATURATION: 94 % | BODY MASS INDEX: 15.4 KG/M2 | HEIGHT: 64 IN | RESPIRATION RATE: 18 BRPM

## 2023-09-20 DIAGNOSIS — R55 SYNCOPE, UNSPECIFIED SYNCOPE TYPE: ICD-10-CM

## 2023-09-20 DIAGNOSIS — F41.9 ANXIETY DISORDER, UNSPECIFIED TYPE: Primary | Chronic | ICD-10-CM

## 2023-09-20 DIAGNOSIS — Z12.11 COLON CANCER SCREENING: ICD-10-CM

## 2023-09-20 DIAGNOSIS — Z12.31 SCREENING MAMMOGRAM, ENCOUNTER FOR: ICD-10-CM

## 2023-09-20 DIAGNOSIS — E03.9 HYPOTHYROIDISM, UNSPECIFIED TYPE: ICD-10-CM

## 2023-09-20 PROCEDURE — 1160F PR REVIEW ALL MEDS BY PRESCRIBER/CLIN PHARMACIST DOCUMENTED: ICD-10-PCS | Mod: CPTII,,, | Performed by: FAMILY MEDICINE

## 2023-09-20 PROCEDURE — 3079F PR MOST RECENT DIASTOLIC BLOOD PRESSURE 80-89 MM HG: ICD-10-PCS | Mod: CPTII,,, | Performed by: FAMILY MEDICINE

## 2023-09-20 PROCEDURE — 3008F PR BODY MASS INDEX (BMI) DOCUMENTED: ICD-10-PCS | Mod: CPTII,,, | Performed by: FAMILY MEDICINE

## 2023-09-20 PROCEDURE — 3288F FALL RISK ASSESSMENT DOCD: CPT | Mod: CPTII,,, | Performed by: FAMILY MEDICINE

## 2023-09-20 PROCEDURE — 3079F DIAST BP 80-89 MM HG: CPT | Mod: CPTII,,, | Performed by: FAMILY MEDICINE

## 2023-09-20 PROCEDURE — 1100F PTFALLS ASSESS-DOCD GE2>/YR: CPT | Mod: CPTII,,, | Performed by: FAMILY MEDICINE

## 2023-09-20 PROCEDURE — 3074F PR MOST RECENT SYSTOLIC BLOOD PRESSURE < 130 MM HG: ICD-10-PCS | Mod: CPTII,,, | Performed by: FAMILY MEDICINE

## 2023-09-20 PROCEDURE — 99214 PR OFFICE/OUTPT VISIT, EST, LEVL IV, 30-39 MIN: ICD-10-PCS | Mod: ,,, | Performed by: FAMILY MEDICINE

## 2023-09-20 PROCEDURE — 3288F PR FALLS RISK ASSESSMENT DOCUMENTED: ICD-10-PCS | Mod: CPTII,,, | Performed by: FAMILY MEDICINE

## 2023-09-20 PROCEDURE — 1159F PR MEDICATION LIST DOCUMENTED IN MEDICAL RECORD: ICD-10-PCS | Mod: CPTII,,, | Performed by: FAMILY MEDICINE

## 2023-09-20 PROCEDURE — 1157F ADVNC CARE PLAN IN RCRD: CPT | Mod: CPTII,,, | Performed by: FAMILY MEDICINE

## 2023-09-20 PROCEDURE — 1159F MED LIST DOCD IN RCRD: CPT | Mod: CPTII,,, | Performed by: FAMILY MEDICINE

## 2023-09-20 PROCEDURE — 1157F PR ADVANCE CARE PLAN OR EQUIV PRESENT IN MEDICAL RECORD: ICD-10-PCS | Mod: CPTII,,, | Performed by: FAMILY MEDICINE

## 2023-09-20 PROCEDURE — 99214 OFFICE O/P EST MOD 30 MIN: CPT | Mod: ,,, | Performed by: FAMILY MEDICINE

## 2023-09-20 PROCEDURE — 3008F BODY MASS INDEX DOCD: CPT | Mod: CPTII,,, | Performed by: FAMILY MEDICINE

## 2023-09-20 PROCEDURE — 3074F SYST BP LT 130 MM HG: CPT | Mod: CPTII,,, | Performed by: FAMILY MEDICINE

## 2023-09-20 PROCEDURE — 1160F RVW MEDS BY RX/DR IN RCRD: CPT | Mod: CPTII,,, | Performed by: FAMILY MEDICINE

## 2023-09-20 PROCEDURE — 4010F ACE/ARB THERAPY RXD/TAKEN: CPT | Mod: CPTII,,, | Performed by: FAMILY MEDICINE

## 2023-09-20 PROCEDURE — 1100F PR PT FALLS ASSESS DOC 2+ FALLS/FALL W/INJURY/YR: ICD-10-PCS | Mod: CPTII,,, | Performed by: FAMILY MEDICINE

## 2023-09-20 PROCEDURE — 4010F PR ACE/ARB THEARPY RXD/TAKEN: ICD-10-PCS | Mod: CPTII,,, | Performed by: FAMILY MEDICINE

## 2023-09-20 RX ORDER — LEVOTHYROXINE SODIUM 25 UG/1
25 TABLET ORAL
Qty: 90 TABLET | Refills: 1 | Status: SHIPPED | OUTPATIENT
Start: 2023-09-20 | End: 2024-03-25 | Stop reason: SDUPTHER

## 2023-09-20 RX ORDER — SERTRALINE HYDROCHLORIDE 50 MG/1
50 TABLET, FILM COATED ORAL DAILY
Qty: 90 TABLET | Refills: 1 | Status: SHIPPED | OUTPATIENT
Start: 2023-09-20 | End: 2023-12-11 | Stop reason: SDUPTHER

## 2023-09-20 NOTE — PROGRESS NOTES
"Subjective:       Patient ID: Cierra Main is a 73 y.o. female.    Chief Complaint: 4M      Routine        Review of Systems   Constitutional: Negative.    Respiratory:  Positive for shortness of breath.    Cardiovascular:  Positive for chest pain.   Gastrointestinal:  Positive for nausea.   Endocrine: Negative for cold intolerance.        Hypothyroid: tolerating medication, medication working well, patient without any complaints     Neurological:  Positive for weakness (reports falls at home due to leg weakness).   Psychiatric/Behavioral: Negative.          Anxiety: tolerating medication, medication working well, patient without any complaints             Objective:      /80 (BP Location: Right arm, Patient Position: Sitting, BP Method: Large (Manual))   Pulse 60   Temp 97.2 °F (36.2 °C)   Resp 18   Ht 5' 4" (1.626 m)   Wt 40.9 kg (90 lb 3.2 oz)   SpO2 (!) 94%   BMI 15.48 kg/m²    Physical Exam  Constitutional:       Appearance: Normal appearance.   Neck:      Thyroid: No thyromegaly.   Cardiovascular:      Rate and Rhythm: Normal rate and regular rhythm.      Heart sounds: Normal heart sounds.   Pulmonary:      Effort: Pulmonary effort is normal.      Breath sounds: Normal breath sounds.   Neurological:      Mental Status: She is alert.   Psychiatric:         Mood and Affect: Mood normal.         Behavior: Behavior normal.         Thought Content: Thought content normal.         Judgment: Judgment normal.               Assessment:       Problem List Items Addressed This Visit          Psychiatric    Anxiety disorder, unspecified - Primary (Chronic)    Relevant Medications    sertraline (ZOLOFT) 50 MG tablet       Endocrine    Hypothyroidism    Relevant Medications    levothyroxine (SYNTHROID) 25 MCG tablet     Other Visit Diagnoses       Syncope, unspecified syncope type        Relevant Orders    Ambulatory referral/consult to Cardiology    Colon cancer screening        Relevant Orders    Cologuard " Screening (Multitarget Stool DNA)    Screening mammogram, encounter for                   Plan:   1. Anxiety disorder, unspecified type  -     sertraline (ZOLOFT) 50 MG tablet; Take 1 tablet (50 mg total) by mouth once daily.  Dispense: 90 tablet; Refill: 1  Controlled  Continue current Rx medication  Return to clinic with any concerns    2. Hypothyroidism, unspecified type  -     levothyroxine (SYNTHROID) 25 MCG tablet; Take 1 tablet (25 mcg total) by mouth before breakfast.  Dispense: 90 tablet; Refill: 1  Continue current medication  TSH with next lab work    3. Syncope  Refer patient to [facility]  ER precautions  Return to clinic with any concerns    4. Colon cancer screening  -     Cologuard Screening (Multitarget Stool DNA); Future; Expected date: 09/20/2023  Schedule cologuard    5. Screening mammogram, encounter for  Patient defers mammogram

## 2023-09-26 ENCOUNTER — PATIENT MESSAGE (OUTPATIENT)
Dept: ADMINISTRATIVE | Facility: HOSPITAL | Age: 73
End: 2023-09-26
Payer: MEDICARE

## 2023-09-27 DIAGNOSIS — Z00.00 WELLNESS EXAMINATION: Primary | ICD-10-CM

## 2023-09-27 DIAGNOSIS — E78.00 HYPERCHOLESTEREMIA: ICD-10-CM

## 2023-09-27 DIAGNOSIS — Z13.6 SCREENING FOR ISCHEMIC HEART DISEASE: ICD-10-CM

## 2023-09-27 DIAGNOSIS — Z11.4 ENCOUNTER FOR SCREENING FOR HIV: ICD-10-CM

## 2023-09-27 DIAGNOSIS — E03.9 HYPOTHYROIDISM, UNSPECIFIED TYPE: ICD-10-CM

## 2023-09-27 DIAGNOSIS — Z11.59 NEED FOR HEPATITIS C SCREENING TEST: ICD-10-CM

## 2023-10-01 ENCOUNTER — HOSPITAL ENCOUNTER (EMERGENCY)
Facility: HOSPITAL | Age: 73
Discharge: ANOTHER HOSPITAL | End: 2023-10-01
Attending: GENERAL PRACTICE
Payer: MEDICARE

## 2023-10-01 VITALS
HEART RATE: 61 BPM | SYSTOLIC BLOOD PRESSURE: 124 MMHG | OXYGEN SATURATION: 100 % | RESPIRATION RATE: 15 BRPM | BODY MASS INDEX: 15.95 KG/M2 | DIASTOLIC BLOOD PRESSURE: 69 MMHG | TEMPERATURE: 98 F | WEIGHT: 90 LBS | HEIGHT: 63 IN

## 2023-10-01 DIAGNOSIS — I48.0 PAROXYSMAL ATRIAL FIBRILLATION: ICD-10-CM

## 2023-10-01 DIAGNOSIS — I21.4 NSTEMI (NON-ST ELEVATED MYOCARDIAL INFARCTION): Primary | ICD-10-CM

## 2023-10-01 DIAGNOSIS — R07.9 CHEST PAIN: ICD-10-CM

## 2023-10-01 LAB
ALBUMIN SERPL-MCNC: 3.2 G/DL (ref 3.4–4.8)
ALBUMIN/GLOB SERPL: 1 RATIO (ref 1.1–2)
ALP SERPL-CCNC: 68 UNIT/L (ref 40–150)
ALT SERPL-CCNC: 7 UNIT/L (ref 0–55)
AST SERPL-CCNC: 16 UNIT/L (ref 5–34)
BASOPHILS # BLD AUTO: 0.05 X10(3)/MCL
BASOPHILS NFR BLD AUTO: 0.6 %
BILIRUB SERPL-MCNC: 0.5 MG/DL
BNP BLD-MCNC: 617.6 PG/ML
BUN SERPL-MCNC: 26 MG/DL (ref 9.8–20.1)
CALCIUM SERPL-MCNC: 9.3 MG/DL (ref 8.4–10.2)
CHLORIDE SERPL-SCNC: 105 MMOL/L (ref 98–107)
CK MB SERPL-MCNC: 1.7 NG/ML
CK SERPL-CCNC: 39 U/L (ref 29–168)
CO2 SERPL-SCNC: 22 MMOL/L (ref 23–31)
CREAT SERPL-MCNC: 0.85 MG/DL (ref 0.55–1.02)
EOSINOPHIL # BLD AUTO: 0.05 X10(3)/MCL (ref 0–0.9)
EOSINOPHIL NFR BLD AUTO: 0.6 %
ERYTHROCYTE [DISTWIDTH] IN BLOOD BY AUTOMATED COUNT: 14 % (ref 11.5–17)
GFR SERPLBLD CREATININE-BSD FMLA CKD-EPI: >60 MLS/MIN/1.73/M2
GLOBULIN SER-MCNC: 3.1 GM/DL (ref 2.4–3.5)
GLUCOSE SERPL-MCNC: 162 MG/DL (ref 82–115)
HCT VFR BLD AUTO: 44 % (ref 37–47)
HGB BLD-MCNC: 14.4 G/DL (ref 12–16)
IMM GRANULOCYTES # BLD AUTO: 0.03 X10(3)/MCL (ref 0–0.04)
IMM GRANULOCYTES NFR BLD AUTO: 0.3 %
LYMPHOCYTES # BLD AUTO: 1.47 X10(3)/MCL (ref 0.6–4.6)
LYMPHOCYTES NFR BLD AUTO: 16.8 %
MCH RBC QN AUTO: 29.1 PG (ref 27–31)
MCHC RBC AUTO-ENTMCNC: 32.7 G/DL (ref 33–36)
MCV RBC AUTO: 88.9 FL (ref 80–94)
MONOCYTES # BLD AUTO: 0.74 X10(3)/MCL (ref 0.1–1.3)
MONOCYTES NFR BLD AUTO: 8.5 %
NEUTROPHILS # BLD AUTO: 6.41 X10(3)/MCL (ref 2.1–9.2)
NEUTROPHILS NFR BLD AUTO: 73.2 %
NRBC BLD AUTO-RTO: 0 %
PLATELET # BLD AUTO: 266 X10(3)/MCL (ref 130–400)
PMV BLD AUTO: 10.9 FL (ref 7.4–10.4)
POTASSIUM SERPL-SCNC: 4 MMOL/L (ref 3.5–5.1)
PROT SERPL-MCNC: 6.3 GM/DL (ref 5.8–7.6)
RBC # BLD AUTO: 4.95 X10(6)/MCL (ref 4.2–5.4)
SODIUM SERPL-SCNC: 138 MMOL/L (ref 136–145)
TROPONIN I SERPL-MCNC: 0.08 NG/ML (ref 0–0.04)
TSH SERPL-ACNC: 1.75 UIU/ML (ref 0.35–4.94)
WBC # SPEC AUTO: 8.75 X10(3)/MCL (ref 4.5–11.5)

## 2023-10-01 PROCEDURE — 99285 EMERGENCY DEPT VISIT HI MDM: CPT | Mod: 25

## 2023-10-01 PROCEDURE — 82550 ASSAY OF CK (CPK): CPT | Performed by: GENERAL PRACTICE

## 2023-10-01 PROCEDURE — 63600175 PHARM REV CODE 636 W HCPCS: Performed by: GENERAL PRACTICE

## 2023-10-01 PROCEDURE — 84484 ASSAY OF TROPONIN QUANT: CPT | Performed by: GENERAL PRACTICE

## 2023-10-01 PROCEDURE — 80053 COMPREHEN METABOLIC PANEL: CPT | Performed by: GENERAL PRACTICE

## 2023-10-01 PROCEDURE — 96372 THER/PROPH/DIAG INJ SC/IM: CPT | Performed by: GENERAL PRACTICE

## 2023-10-01 PROCEDURE — 82553 CREATINE MB FRACTION: CPT | Performed by: GENERAL PRACTICE

## 2023-10-01 PROCEDURE — 84443 ASSAY THYROID STIM HORMONE: CPT | Performed by: GENERAL PRACTICE

## 2023-10-01 PROCEDURE — 85025 COMPLETE CBC W/AUTO DIFF WBC: CPT | Performed by: GENERAL PRACTICE

## 2023-10-01 PROCEDURE — 83880 ASSAY OF NATRIURETIC PEPTIDE: CPT | Performed by: GENERAL PRACTICE

## 2023-10-01 PROCEDURE — 93005 ELECTROCARDIOGRAM TRACING: CPT

## 2023-10-01 RX ORDER — METOPROLOL SUCCINATE 50 MG/1
25 TABLET, EXTENDED RELEASE ORAL DAILY
COMMUNITY
Start: 2023-08-14

## 2023-10-01 RX ORDER — ENOXAPARIN SODIUM 100 MG/ML
1 INJECTION SUBCUTANEOUS
Status: COMPLETED | OUTPATIENT
Start: 2023-10-01 | End: 2023-10-01

## 2023-10-01 RX ADMIN — ENOXAPARIN SODIUM 40 MG: 100 INJECTION SUBCUTANEOUS at 03:10

## 2023-10-01 NOTE — ED PROVIDER NOTES
Encounter Date: 10/1/2023       History     Chief Complaint   Patient presents with    Nausea    Vomiting     N/v started today     N/v started today.  History of MI in July of this year.  Sees Dr. Ramírez.  Had a cardiac catheterization in 2021 with stenting of the RCA.  Was on blood thinners for year but is no longer on blood thinners.  Also has a history of paroxysmal atrial fibrillation.    The history is provided by the patient.   Nausea  This is a new problem. The current episode started yesterday. The problem occurs constantly. The problem has been gradually worsening. Nothing aggravates the symptoms. Nothing relieves the symptoms.   Emesis   This is a new problem. The current episode started yesterday. The problem occurs 2 - 4 times per day. The problem has been waxing and waning.     Review of patient's allergies indicates:   Allergen Reactions    Amlodipine     Losartan      Past Medical History:   Diagnosis Date    Acute MI     Anxiety     Cardiac disease     COPD (chronic obstructive pulmonary disease)     Coronary artery disease     Hyperlipidemia     Hypertension     Hypothyroidism     Other specified noninfective gastroenteritis and colitis     Unspecified macular degeneration      Past Surgical History:   Procedure Laterality Date    CORONARY ANGIOGRAPHY N/A 2/1/2021    Procedure: ANGIOGRAM, CORONARY ARTERY;  Surgeon: Devon Medeiros MD;  Location: [facility] CATH LAB;  Service: Cardiology;  Laterality: N/A;    CORONARY ANGIOPLASTY WITH STENT PLACEMENT      LEFT HEART CATHETERIZATION Left 2/1/2021    Procedure: CATHETERIZATION, HEART, LEFT;  Surgeon: Devon Medeiros MD;  Location: [facility] CATH LAB;  Service: Cardiology;  Laterality: Left;     Family History   Problem Relation Age of Onset    Diabetes Mother     Heart disease Mother     Hypertension Father     Heart disease Father      Social History     Tobacco Use    Smoking status: Every Day     Current packs/day: 0.50     Average packs/day: 0.5 packs/day for  55.0 years (27.5 ttl pk-yrs)     Types: Cigarettes    Smokeless tobacco: Never   Substance Use Topics    Alcohol use: Never    Drug use: Never     Review of Systems   Constitutional:  Positive for fatigue.   HENT: Negative.     Eyes: Negative.    Respiratory: Negative.     Cardiovascular:  Positive for palpitations.   Gastrointestinal:  Positive for nausea and vomiting.   Endocrine: Negative.    Genitourinary: Negative.    Musculoskeletal: Negative.    Skin: Negative.    Allergic/Immunologic: Negative.    Neurological:  Positive for weakness.   Hematological: Negative.    Psychiatric/Behavioral: Negative.     All other systems reviewed and are negative.      Physical Exam     Initial Vitals [10/01/23 1203]   BP Pulse Resp Temp SpO2   101/64 100 18 97.9 °F (36.6 °C) 100 %      MAP       --         Physical Exam    Nursing note and vitals reviewed.  Constitutional: She appears well-developed and well-nourished.   HENT:   Head: Normocephalic and atraumatic.   Eyes: EOM are normal. Pupils are equal, round, and reactive to light.   Neck: Neck supple.   Normal range of motion.  Cardiovascular:  Normal heart sounds and intact distal pulses. An irregularly irregular rhythm present.  Frequent extrasystoles are present.          Pulmonary/Chest: Breath sounds normal.   Abdominal: Abdomen is soft. Bowel sounds are normal.   Musculoskeletal:         General: Normal range of motion.      Cervical back: Normal range of motion and neck supple.     Neurological: She is alert and oriented to person, place, and time. She has normal strength. GCS score is 15. GCS eye subscore is 4. GCS verbal subscore is 5. GCS motor subscore is 6.   Skin: Skin is warm and dry.   Psychiatric: She has a normal mood and affect. Her behavior is normal. Judgment and thought content normal.                                                                                                                                                                                                                                                                                                                                                                                                                                                                                                                                                                                                                   ED Course   Critical Care    Date/Time: 10/19/2023 8:29 PM    Performed by: Rancho Lopez MD  Authorized by: Rancho Lopez MD  Direct patient critical care time: 10 minutes  Additional history critical care time: 10 minutes  Ordering / reviewing critical care time: 10 minutes  Documentation critical care time: 10 minutes  Consulting other physicians critical care time: 5 minutes  Consult with family critical care time: 2 minutes  Total critical care time (exclusive of procedural time) : 47 minutes  Critical care was necessary to treat or prevent imminent or life-threatening deterioration of the following conditions: circulatory failure.  Critical care was time spent personally by me on the following activities: interpretation of cardiac output measurements, evaluation of patient's response to treatment, examination of patient, obtaining history from patient or surrogate, ordering and performing treatments and interventions, ordering and review of laboratory studies, ordering and review of radiographic studies, pulse oximetry, re-evaluation of patient's condition and review of old charts.        Labs Reviewed   COMPREHENSIVE METABOLIC PANEL - Abnormal; Notable for the following components:       Result Value    Carbon Dioxide 22 (*)     Glucose Level 162 (*)     Blood Urea Nitrogen 26.0 (*)     Albumin Level 3.2 (*)     Albumin/Globulin Ratio 1.0 (*)     All other components within normal limits   TROPONIN I - Abnormal; Notable for the following components:    Troponin-I 0.078 (*)     All other  components within normal limits   B-TYPE NATRIURETIC PEPTIDE - Abnormal; Notable for the following components:    Natriuretic Peptide 617.6 (*)     All other components within normal limits   CBC WITH DIFFERENTIAL - Abnormal; Notable for the following components:    MCHC 32.7 (*)     MPV 10.9 (*)     All other components within normal limits   CK-MB - Normal   CK - Normal   TSH - Normal   CBC W/ AUTO DIFFERENTIAL    Narrative:     The following orders were created for panel order CBC auto differential.  Procedure                               Abnormality         Status                     ---------                               -----------         ------                     CBC with Differential[[id number]]        Abnormal            Final result                 Please view results for these tests on the individual orders.     EKG Readings: (Independently Interpreted)   Rhythm: Atrial Fibrillation. Heart Rate: 88. Conduction: RBBB. ST Segments: Non-Specific ST Segment Depression. Axis: Normal.     ECG Results              EKG 12-lead (Final result)  Result time 10/01/23 21:50:39      Final result by Interface, Lab In [facility] (10/01/23 21:50:39)                   Narrative:    Test Reason : R07.9,    Vent. Rate : 088 BPM     Atrial Rate : 098 BPM     P-R Int : 000 ms          QRS Dur : 124 ms      QT Int : 394 ms       P-R-T Axes : 000 088 -68 degrees     QTc Int : 476 ms    Atrial fibrillation /AFL  Right bundle branch block  Marked ST abnormality, possible inferior subendocardial injury  Abnormal ECG  Confirmed by Timmy Paulson MD ([id number]) on 10/1/2023 9:50:34 PM    Referred By: [email or web address]   SELF           Confirmed By:Timmy Paulson MD                                  Imaging Results              X-Ray Chest PA And Lateral (Final result)  Result time 10/01/23 12:31:42      Final result by Joel Liu MD (10/01/23 12:31:42)                   Impression:      No acute cardiopulmonary process.      Electronically  signed by: Joel Liu  Date:    10/01/2023  Time:    12:31               Narrative:    EXAMINATION:  XR CHEST PA AND LATERAL    CLINICAL HISTORY:  Chest Pain;    TECHNIQUE:  Two views of the chest    COMPARISON:  07/23/2023    FINDINGS:  No focal opacification, pleural effusion, or pneumothorax.    The cardiomediastinal silhouette is within normal limits.    No acute osseous abnormality.                                       Medications   enoxaparin injection 40 mg (40 mg Subcutaneous Given 10/1/23 6278)     Medical Decision Making  Patient is in persistent atrial fibrillation at this time.  She has a history of paroxysmal atrial fibrillation and was not placed on continued anticoagulation therapy 1 year after her stent placement in 2021.  As such she is not on anticoagulation and still in atrial fibrillation at this time.  Additionally, she is also having elevated troponins of 0.04 although this is in line with previous troponin measurements done recently.  Given the complexity of this patient from a cardiac standpoint, we have initiated transfer but we have also initiated consult [facility] for further recommendations.    Amount and/or Complexity of Data Reviewed  Labs: ordered.  Radiology: ordered.    Risk  Prescription drug management.               ED Course as of 10/19/23 2033   Sun Oct 01, 2023   1358 Patient appears to be in persistent atrial fibrillation that is rate controlled at this time.  However her troponins are slightly elevated.  Her troponin is 0.04 however this is markedly less than when she had her MI and had a troponin of 0.4-0.5.  Given the fact that she is not on any anticoagulation currently because she was only on anticoagulation for 1 year following her stent placement in 2021, I believe that this patient is a more complex patient that will require specialist consultation.  As such, we have put in a request for transfer but [facility] is on diversion, and we have put in a consult to [facility]  for their recommendations as well. [PG]   1520 Case discussed with [facility].  The cardiologist agrees that the patient requires transfer.  It seems that the patient has converted to normal sinus rhythm shortly after the cardiologist entered the room.  It seems that this is indeed paroxysmal atrial fibrillation.  We will give the patient Lovenox 1 make per kg per the recommendations of [facility] and send the patient over for paroxysmal atrial fibrillation with slightly elevated troponins. [PG]      ED Course User Index  [PG] Rancho Lopez MD                      Clinical Impression:   Final diagnoses:  [R07.9] Chest pain  [I21.4] NSTEMI (non-ST elevated myocardial infarction) (Primary)  [I48.0] Paroxysmal atrial fibrillation        ED Disposition Condition    Transfer to Another Facility                 Rancho Lopez MD  10/01/23 [id number]       Rancho Lopez MD  10/19/23 2033

## 2023-10-01 NOTE — ED NOTES
[facility] consult at the bedside.  Spoke with Dr. Lopez.  [facility] also called and spoke with Dr. Lopez.  Accepting Dr. Hua to room 123.  EMS transport request placed.

## 2023-10-01 NOTE — ED NOTES
Monica from [facility] called and stated that doc will be calling soon.   Vaginal Delivery

## 2023-10-01 NOTE — ED NOTES
"Patient brought in by EMS.  Iv started to Left forearm 20G.  EMS stated that Monday pt started "feeling ill"  then started vomiting, denies any diarrhea.  Poor po intake.  Stated that she has been trying to take her medication, but throws up.  Family here  now and brought back to room.  [facility]  Family at the bedside.   "

## 2023-10-27 DIAGNOSIS — E78.5 HYPERLIPIDEMIA, UNSPECIFIED HYPERLIPIDEMIA TYPE: Chronic | ICD-10-CM

## 2023-10-27 RX ORDER — PRAVASTATIN SODIUM 40 MG/1
40 TABLET ORAL
Qty: 90 TABLET | Refills: 1 | Status: ON HOLD | OUTPATIENT
Start: 2023-10-27 | End: 2023-11-17 | Stop reason: HOSPADM

## 2023-10-30 PROBLEM — I21.4 NSTEMI (NON-ST ELEVATED MYOCARDIAL INFARCTION): Status: RESOLVED | Noted: 2021-01-30 | Resolved: 2023-10-30

## 2023-11-13 ENCOUNTER — HOSPITAL ENCOUNTER (INPATIENT)
Facility: HOSPITAL | Age: 73
LOS: 4 days | Discharge: NURSING, REHAB OR LONG-TERM CARE | DRG: 086 | End: 2023-11-17
Attending: EMERGENCY MEDICINE | Admitting: SURGERY
Payer: MEDICARE
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
## 2023-11-13 DIAGNOSIS — I61.9 INTRAPARENCHYMAL HEMORRHAGE OF BRAIN: Primary | ICD-10-CM

## 2023-11-13 DIAGNOSIS — Z92.29 HX OF LONG TERM USE OF BLOOD THINNERS: ICD-10-CM

## 2023-11-13 DIAGNOSIS — S09.90XA CLOSED HEAD INJURY, INITIAL ENCOUNTER: ICD-10-CM

## 2023-11-13 DIAGNOSIS — E46 MALNUTRITION, UNSPECIFIED TYPE: ICD-10-CM

## 2023-11-13 DIAGNOSIS — R55 SYNCOPE: ICD-10-CM
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
## 2023-11-13 DIAGNOSIS — Z86.73 HISTORY OF CEREBROVASCULAR ACCIDENT (CVA) DUE TO ISCHEMIA: ICD-10-CM

## 2023-11-13 DIAGNOSIS — Z79.01 ANTICOAGULATED BY ANTICOAGULATION TREATMENT: ICD-10-CM

## 2023-11-13 DIAGNOSIS — Z79.82 ASPIRIN LONG-TERM USE: ICD-10-CM

## 2023-11-13 DIAGNOSIS — M54.2 ACUTE NECK PAIN: ICD-10-CM

## 2023-11-13 DIAGNOSIS — S06.331A: ICD-10-CM

## 2023-11-13 PROBLEM — S06.33AA INTRAPARENCHYMAL HEMATOMA OF BRAIN: Status: ACTIVE | Noted: 2023-11-13

## 2023-11-13 LAB
ALBUMIN SERPL-MCNC: 3.4 G/DL (ref 3.4–4.8)
ALBUMIN/GLOB SERPL: 1.3 RATIO (ref 1.1–2)
ALP SERPL-CCNC: 69 UNIT/L (ref 40–150)
ALT SERPL-CCNC: 15 UNIT/L (ref 0–55)
APTT PPP: 34.2 SECONDS (ref 23.2–33.7)
AST SERPL-CCNC: 16 UNIT/L (ref 5–34)
BASOPHILS # BLD AUTO: 0.05 X10(3)/MCL
BASOPHILS NFR BLD AUTO: 0.7 %
BILIRUB SERPL-MCNC: 0.3 MG/DL
BUN SERPL-MCNC: 22.9 MG/DL (ref 9.8–20.1)
CALCIUM SERPL-MCNC: 9.5 MG/DL (ref 8.4–10.2)
CHLORIDE SERPL-SCNC: 108 MMOL/L (ref 98–107)
CO2 SERPL-SCNC: 23 MMOL/L (ref 23–31)
CREAT SERPL-MCNC: 0.76 MG/DL (ref 0.55–1.02)
EOSINOPHIL # BLD AUTO: 0.06 X10(3)/MCL (ref 0–0.9)
EOSINOPHIL NFR BLD AUTO: 0.8 %
ERYTHROCYTE [DISTWIDTH] IN BLOOD BY AUTOMATED COUNT: 14.2 % (ref 11.5–17)
GFR SERPLBLD CREATININE-BSD FMLA CKD-EPI: >60 MLS/MIN/1.73/M2
GLOBULIN SER-MCNC: 2.6 GM/DL (ref 2.4–3.5)
GLUCOSE SERPL-MCNC: 102 MG/DL (ref 82–115)
HCT VFR BLD AUTO: 40.4 % (ref 37–47)
HGB BLD-MCNC: 13.1 G/DL (ref 12–16)
IMM GRANULOCYTES # BLD AUTO: 0.03 X10(3)/MCL (ref 0–0.04)
IMM GRANULOCYTES NFR BLD AUTO: 0.4 %
INR PPP: 1.2
LYMPHOCYTES # BLD AUTO: 1.42 X10(3)/MCL (ref 0.6–4.6)
LYMPHOCYTES NFR BLD AUTO: 18.6 %
MCH RBC QN AUTO: 28.4 PG (ref 27–31)
MCHC RBC AUTO-ENTMCNC: 32.4 G/DL (ref 33–36)
MCV RBC AUTO: 87.6 FL (ref 80–94)
MONOCYTES # BLD AUTO: 0.93 X10(3)/MCL (ref 0.1–1.3)
MONOCYTES NFR BLD AUTO: 12.2 %
NEUTROPHILS # BLD AUTO: 5.15 X10(3)/MCL (ref 2.1–9.2)
NEUTROPHILS NFR BLD AUTO: 67.3 %
NRBC BLD AUTO-RTO: 0 %
PLATELET # BLD AUTO: 292 X10(3)/MCL (ref 130–400)
PMV BLD AUTO: 10 FL (ref 7.4–10.4)
POTASSIUM SERPL-SCNC: 3.7 MMOL/L (ref 3.5–5.1)
PROT SERPL-MCNC: 6 GM/DL (ref 5.8–7.6)
PROTHROMBIN TIME: 14.8 SECONDS (ref 12.5–14.5)
RBC # BLD AUTO: 4.61 X10(6)/MCL (ref 4.2–5.4)
SODIUM SERPL-SCNC: 141 MMOL/L (ref 136–145)
WBC # SPEC AUTO: 7.64 X10(3)/MCL (ref 4.5–11.5)

## 2023-11-13 PROCEDURE — 99291 CRITICAL CARE FIRST HOUR: CPT

## 2023-11-13 PROCEDURE — 85025 COMPLETE CBC W/AUTO DIFF WBC: CPT | Performed by: EMERGENCY MEDICINE

## 2023-11-13 PROCEDURE — 63600531 PHARM REV CODE 636 NO ALT 250 W HCPCS: Mod: JZ,JG | Performed by: EMERGENCY MEDICINE

## 2023-11-13 PROCEDURE — 96374 THER/PROPH/DIAG INJ IV PUSH: CPT

## 2023-11-13 PROCEDURE — 63600175 PHARM REV CODE 636 W HCPCS

## 2023-11-13 PROCEDURE — 11000001 HC ACUTE MED/SURG PRIVATE ROOM

## 2023-11-13 PROCEDURE — 85730 THROMBOPLASTIN TIME PARTIAL: CPT | Performed by: EMERGENCY MEDICINE

## 2023-11-13 PROCEDURE — 85610 PROTHROMBIN TIME: CPT | Performed by: EMERGENCY MEDICINE

## 2023-11-13 PROCEDURE — 80053 COMPREHEN METABOLIC PANEL: CPT | Performed by: EMERGENCY MEDICINE

## 2023-11-13 PROCEDURE — 96375 TX/PRO/DX INJ NEW DRUG ADDON: CPT

## 2023-11-13 RX ORDER — HYDRALAZINE HYDROCHLORIDE 20 MG/ML
10 INJECTION INTRAMUSCULAR; INTRAVENOUS EVERY 6 HOURS PRN
Status: DISCONTINUED | OUTPATIENT
Start: 2023-11-14 | End: 2023-11-14

## 2023-11-13 RX ORDER — LISINOPRIL 20 MG/1
20 TABLET ORAL 2 TIMES DAILY
Status: DISCONTINUED | OUTPATIENT
Start: 2023-11-14 | End: 2023-11-17 | Stop reason: HOSPADM

## 2023-11-13 RX ORDER — ATORVASTATIN CALCIUM 10 MG/1
10 TABLET, FILM COATED ORAL NIGHTLY
Status: DISCONTINUED | OUTPATIENT
Start: 2023-11-14 | End: 2023-11-17 | Stop reason: HOSPADM

## 2023-11-13 RX ORDER — METOPROLOL SUCCINATE 50 MG/1
50 TABLET, EXTENDED RELEASE ORAL EVERY 12 HOURS
Status: DISCONTINUED | OUTPATIENT
Start: 2023-11-14 | End: 2023-11-17 | Stop reason: HOSPADM

## 2023-11-13 RX ORDER — POLYETHYLENE GLYCOL 3350 17 G/17G
17 POWDER, FOR SOLUTION ORAL 2 TIMES DAILY
Status: DISCONTINUED | OUTPATIENT
Start: 2023-11-13 | End: 2023-11-17 | Stop reason: HOSPADM

## 2023-11-13 RX ORDER — HYDRALAZINE HYDROCHLORIDE 50 MG/1
50 TABLET, FILM COATED ORAL 3 TIMES DAILY
Status: DISCONTINUED | OUTPATIENT
Start: 2023-11-14 | End: 2023-11-17

## 2023-11-13 RX ORDER — SODIUM CHLORIDE 9 MG/ML
INJECTION, SOLUTION INTRAVENOUS CONTINUOUS
Status: DISCONTINUED | OUTPATIENT
Start: 2023-11-14 | End: 2023-11-15

## 2023-11-13 RX ORDER — SERTRALINE HYDROCHLORIDE 50 MG/1
50 TABLET, FILM COATED ORAL DAILY
Status: DISCONTINUED | OUTPATIENT
Start: 2023-11-14 | End: 2023-11-17 | Stop reason: HOSPADM

## 2023-11-13 RX ORDER — FAMOTIDINE 20 MG/1
20 TABLET, FILM COATED ORAL 2 TIMES DAILY
Status: DISCONTINUED | OUTPATIENT
Start: 2023-11-13 | End: 2023-11-14

## 2023-11-13 RX ORDER — TALC
6 POWDER (GRAM) TOPICAL NIGHTLY PRN
Status: DISCONTINUED | OUTPATIENT
Start: 2023-11-14 | End: 2023-11-17 | Stop reason: HOSPADM

## 2023-11-13 RX ORDER — AMIODARONE HYDROCHLORIDE 200 MG/1
200 TABLET ORAL 2 TIMES DAILY
Status: DISCONTINUED | OUTPATIENT
Start: 2023-11-14 | End: 2023-11-17 | Stop reason: HOSPADM

## 2023-11-13 RX ORDER — DOCUSATE SODIUM 100 MG/1
100 CAPSULE, LIQUID FILLED ORAL 2 TIMES DAILY
Status: DISCONTINUED | OUTPATIENT
Start: 2023-11-13 | End: 2023-11-17 | Stop reason: HOSPADM

## 2023-11-13 RX ORDER — OXYCODONE HYDROCHLORIDE 5 MG/1
5 TABLET ORAL EVERY 4 HOURS PRN
Status: DISCONTINUED | OUTPATIENT
Start: 2023-11-14 | End: 2023-11-17 | Stop reason: HOSPADM

## 2023-11-13 RX ORDER — HYDRALAZINE HYDROCHLORIDE 20 MG/ML
INJECTION INTRAMUSCULAR; INTRAVENOUS
Status: COMPLETED
Start: 2023-11-13 | End: 2023-11-13

## 2023-11-13 RX ORDER — LEVETIRACETAM 500 MG/1
500 TABLET ORAL 2 TIMES DAILY
Status: DISCONTINUED | OUTPATIENT
Start: 2023-11-13 | End: 2023-11-17 | Stop reason: HOSPADM

## 2023-11-13 RX ORDER — LEVOTHYROXINE SODIUM 25 UG/1
25 TABLET ORAL
Status: DISCONTINUED | OUTPATIENT
Start: 2023-11-14 | End: 2023-11-17 | Stop reason: HOSPADM

## 2023-11-13 RX ORDER — MELATONIN 3 MG
1 CAPSULE ORAL NIGHTLY
COMMUNITY

## 2023-11-13 RX ORDER — ATORVASTATIN CALCIUM 10 MG/1
10 TABLET, FILM COATED ORAL NIGHTLY
COMMUNITY
End: 2023-12-05 | Stop reason: SDUPTHER

## 2023-11-13 RX ORDER — LABETALOL HYDROCHLORIDE 5 MG/ML
10 INJECTION, SOLUTION INTRAVENOUS EVERY 4 HOURS PRN
Status: DISCONTINUED | OUTPATIENT
Start: 2023-11-14 | End: 2023-11-15

## 2023-11-13 RX ORDER — AMIODARONE HYDROCHLORIDE 200 MG/1
200 TABLET ORAL 2 TIMES DAILY
COMMUNITY

## 2023-11-13 RX ORDER — BISACODYL 10 MG
10 SUPPOSITORY, RECTAL RECTAL DAILY PRN
Status: DISCONTINUED | OUTPATIENT
Start: 2023-11-14 | End: 2023-11-17 | Stop reason: HOSPADM

## 2023-11-13 RX ORDER — HYDRALAZINE HYDROCHLORIDE 20 MG/ML
10 INJECTION INTRAMUSCULAR; INTRAVENOUS
Status: COMPLETED | OUTPATIENT
Start: 2023-11-13 | End: 2023-11-13

## 2023-11-13 RX ORDER — ACETAMINOPHEN 500 MG
1000 TABLET ORAL EVERY 4 HOURS PRN
Status: ON HOLD | COMMUNITY
End: 2023-11-17 | Stop reason: SDUPTHER

## 2023-11-13 RX ORDER — ZINC GLUCONATE 13.3 MG
200 LOZENGE ORAL NIGHTLY
Status: ON HOLD | COMMUNITY
End: 2024-01-10

## 2023-11-13 RX ORDER — ACETAMINOPHEN 325 MG/1
650 TABLET ORAL EVERY 4 HOURS
Status: DISCONTINUED | OUTPATIENT
Start: 2023-11-14 | End: 2023-11-17 | Stop reason: HOSPADM

## 2023-11-13 RX ADMIN — HYDRALAZINE HYDROCHLORIDE 10 MG: 20 INJECTION INTRAMUSCULAR; INTRAVENOUS at 10:11

## 2023-11-13 RX ADMIN — Medication 1908 UNITS: at 10:11

## 2023-11-14 PROBLEM — E46 MALNUTRITION: Status: ACTIVE | Noted: 2023-11-14

## 2023-11-14 PROBLEM — E44.0 MODERATE MALNUTRITION: Status: ACTIVE | Noted: 2023-11-14

## 2023-11-14 LAB
ALBUMIN SERPL-MCNC: 3.7 G/DL (ref 3.4–4.8)
ALBUMIN/GLOB SERPL: 1.2 RATIO (ref 1.1–2)
ALP SERPL-CCNC: 71 UNIT/L (ref 40–150)
ALT SERPL-CCNC: 24 UNIT/L (ref 0–55)
AST SERPL-CCNC: 21 UNIT/L (ref 5–34)
BASOPHILS # BLD AUTO: 0.05 X10(3)/MCL
BASOPHILS NFR BLD AUTO: 0.6 %
BILIRUB SERPL-MCNC: 0.4 MG/DL
BUN SERPL-MCNC: 22.5 MG/DL (ref 9.8–20.1)
CALCIUM SERPL-MCNC: 9.5 MG/DL (ref 8.4–10.2)
CHLORIDE SERPL-SCNC: 108 MMOL/L (ref 98–107)
CHOLEST SERPL-MCNC: 168 MG/DL
CHOLEST/HDLC SERPL: 5 {RATIO} (ref 0–5)
CO2 SERPL-SCNC: 20 MMOL/L (ref 23–31)
CREAT SERPL-MCNC: 0.76 MG/DL (ref 0.55–1.02)
EOSINOPHIL # BLD AUTO: 0.07 X10(3)/MCL (ref 0–0.9)
EOSINOPHIL NFR BLD AUTO: 0.8 %
ERYTHROCYTE [DISTWIDTH] IN BLOOD BY AUTOMATED COUNT: 14.2 % (ref 11.5–17)
EST. AVERAGE GLUCOSE BLD GHB EST-MCNC: 125.5 MG/DL
GFR SERPLBLD CREATININE-BSD FMLA CKD-EPI: >60 MLS/MIN/1.73/M2
GLOBULIN SER-MCNC: 3 GM/DL (ref 2.4–3.5)
GLUCOSE SERPL-MCNC: 94 MG/DL (ref 82–115)
HBA1C MFR BLD: 6 %
HCT VFR BLD AUTO: 43.5 % (ref 37–47)
HDLC SERPL-MCNC: 37 MG/DL (ref 35–60)
HGB BLD-MCNC: 13.7 G/DL (ref 12–16)
IMM GRANULOCYTES # BLD AUTO: 0.03 X10(3)/MCL (ref 0–0.04)
IMM GRANULOCYTES NFR BLD AUTO: 0.3 %
LACTATE SERPL-SCNC: 1.2 MMOL/L (ref 0.5–2.2)
LDLC SERPL CALC-MCNC: 109 MG/DL (ref 50–140)
LYMPHOCYTES # BLD AUTO: 1.89 X10(3)/MCL (ref 0.6–4.6)
LYMPHOCYTES NFR BLD AUTO: 21.9 %
MCH RBC QN AUTO: 28.2 PG (ref 27–31)
MCHC RBC AUTO-ENTMCNC: 31.5 G/DL (ref 33–36)
MCV RBC AUTO: 89.5 FL (ref 80–94)
MONOCYTES # BLD AUTO: 1.04 X10(3)/MCL (ref 0.1–1.3)
MONOCYTES NFR BLD AUTO: 12 %
NEUTROPHILS # BLD AUTO: 5.56 X10(3)/MCL (ref 2.1–9.2)
NEUTROPHILS NFR BLD AUTO: 64.4 %
NRBC BLD AUTO-RTO: 0 %
PLATELET # BLD AUTO: 328 X10(3)/MCL (ref 130–400)
PMV BLD AUTO: 10.2 FL (ref 7.4–10.4)
POTASSIUM SERPL-SCNC: 3.9 MMOL/L (ref 3.5–5.1)
PROT SERPL-MCNC: 6.7 GM/DL (ref 5.8–7.6)
RBC # BLD AUTO: 4.86 X10(6)/MCL (ref 4.2–5.4)
SODIUM SERPL-SCNC: 140 MMOL/L (ref 136–145)
TRIGL SERPL-MCNC: 109 MG/DL (ref 37–140)
TSH SERPL-ACNC: 3.65 UIU/ML (ref 0.35–4.94)
VLDLC SERPL CALC-MCNC: 22 MG/DL
WBC # SPEC AUTO: 8.64 X10(3)/MCL (ref 4.5–11.5)

## 2023-11-14 PROCEDURE — 80061 LIPID PANEL: CPT | Performed by: NURSE PRACTITIONER

## 2023-11-14 PROCEDURE — 97166 OT EVAL MOD COMPLEX 45 MIN: CPT

## 2023-11-14 PROCEDURE — 99223 PR INITIAL HOSPITAL CARE,LEVL III: ICD-10-PCS | Mod: ,,, | Performed by: PSYCHIATRY & NEUROLOGY

## 2023-11-14 PROCEDURE — 83605 ASSAY OF LACTIC ACID: CPT

## 2023-11-14 PROCEDURE — 85025 COMPLETE CBC W/AUTO DIFF WBC: CPT

## 2023-11-14 PROCEDURE — 25000003 PHARM REV CODE 250: Performed by: SURGERY

## 2023-11-14 PROCEDURE — 20800000 HC ICU TRAUMA

## 2023-11-14 PROCEDURE — 80053 COMPREHEN METABOLIC PANEL: CPT

## 2023-11-14 PROCEDURE — 92523 SPEECH SOUND LANG COMPREHEN: CPT

## 2023-11-14 PROCEDURE — 63600175 PHARM REV CODE 636 W HCPCS

## 2023-11-14 PROCEDURE — 99291 CRITICAL CARE FIRST HOUR: CPT | Mod: ,,, | Performed by: SURGERY

## 2023-11-14 PROCEDURE — 25000003 PHARM REV CODE 250

## 2023-11-14 PROCEDURE — 92610 EVALUATE SWALLOWING FUNCTION: CPT

## 2023-11-14 PROCEDURE — 99291 PR CRITICAL CARE, E/M 30-74 MINUTES: ICD-10-PCS | Mod: ,,, | Performed by: SURGERY

## 2023-11-14 PROCEDURE — 25500020 PHARM REV CODE 255: Performed by: SURGERY

## 2023-11-14 PROCEDURE — 84443 ASSAY THYROID STIM HORMONE: CPT | Performed by: NURSE PRACTITIONER

## 2023-11-14 PROCEDURE — 99223 1ST HOSP IP/OBS HIGH 75: CPT | Mod: ,,, | Performed by: PSYCHIATRY & NEUROLOGY

## 2023-11-14 PROCEDURE — 83036 HEMOGLOBIN GLYCOSYLATED A1C: CPT | Performed by: NURSE PRACTITIONER

## 2023-11-14 RX ORDER — NICARDIPINE HYDROCHLORIDE 0.2 MG/ML
0-15 INJECTION INTRAVENOUS CONTINUOUS
Status: DISCONTINUED | OUTPATIENT
Start: 2023-11-14 | End: 2023-11-15

## 2023-11-14 RX ORDER — ONDANSETRON 2 MG/ML
INJECTION INTRAMUSCULAR; INTRAVENOUS
Status: COMPLETED
Start: 2023-11-14 | End: 2023-11-14

## 2023-11-14 RX ORDER — FAMOTIDINE 20 MG/1
20 TABLET, FILM COATED ORAL DAILY
Status: DISCONTINUED | OUTPATIENT
Start: 2023-11-14 | End: 2023-11-17 | Stop reason: HOSPADM

## 2023-11-14 RX ORDER — ENALAPRILAT 1.25 MG/ML
1.25 INJECTION INTRAVENOUS ONCE
Status: COMPLETED | OUTPATIENT
Start: 2023-11-14 | End: 2023-11-14

## 2023-11-14 RX ORDER — HYDRALAZINE HYDROCHLORIDE 20 MG/ML
10 INJECTION INTRAMUSCULAR; INTRAVENOUS
Status: DISCONTINUED | OUTPATIENT
Start: 2023-11-14 | End: 2023-11-15

## 2023-11-14 RX ORDER — ENALAPRILAT 1.25 MG/ML
1.25 INJECTION INTRAVENOUS ONCE
Status: DISCONTINUED | OUTPATIENT
Start: 2023-11-14 | End: 2023-11-14

## 2023-11-14 RX ORDER — ONDANSETRON 2 MG/ML
4 INJECTION INTRAMUSCULAR; INTRAVENOUS EVERY 8 HOURS PRN
Status: DISCONTINUED | OUTPATIENT
Start: 2023-11-14 | End: 2023-11-17 | Stop reason: HOSPADM

## 2023-11-14 RX ADMIN — ATORVASTATIN CALCIUM 10 MG: 10 TABLET, FILM COATED ORAL at 08:11

## 2023-11-14 RX ADMIN — ONDANSETRON 4 MG: 2 INJECTION INTRAMUSCULAR; INTRAVENOUS at 01:11

## 2023-11-14 RX ADMIN — LEVOTHYROXINE SODIUM 25 MCG: 25 TABLET ORAL at 05:11

## 2023-11-14 RX ADMIN — AMIODARONE HYDROCHLORIDE 200 MG: 200 TABLET ORAL at 08:11

## 2023-11-14 RX ADMIN — LEVETIRACETAM 500 MG: 500 TABLET, FILM COATED ORAL at 08:11

## 2023-11-14 RX ADMIN — LISINOPRIL 20 MG: 20 TABLET ORAL at 08:11

## 2023-11-14 RX ADMIN — LEVETIRACETAM 500 MG: 500 TABLET, FILM COATED ORAL at 12:11

## 2023-11-14 RX ADMIN — ACETAMINOPHEN 650 MG: 325 TABLET, FILM COATED ORAL at 10:11

## 2023-11-14 RX ADMIN — ACETAMINOPHEN 650 MG: 325 TABLET, FILM COATED ORAL at 01:11

## 2023-11-14 RX ADMIN — LABETALOL HYDROCHLORIDE 10 MG: 5 INJECTION, SOLUTION INTRAVENOUS at 01:11

## 2023-11-14 RX ADMIN — HYDRALAZINE HYDROCHLORIDE 50 MG: 50 TABLET, FILM COATED ORAL at 08:11

## 2023-11-14 RX ADMIN — POLYETHYLENE GLYCOL 3350 17 G: 17 POWDER, FOR SOLUTION ORAL at 08:11

## 2023-11-14 RX ADMIN — IOPAMIDOL 50 ML: 755 INJECTION, SOLUTION INTRAVENOUS at 01:11

## 2023-11-14 RX ADMIN — HYDRALAZINE HYDROCHLORIDE 10 MG: 20 INJECTION INTRAMUSCULAR; INTRAVENOUS at 01:11

## 2023-11-14 RX ADMIN — FAMOTIDINE 20 MG: 20 TABLET, FILM COATED ORAL at 08:11

## 2023-11-14 RX ADMIN — DOCUSATE SODIUM 100 MG: 100 CAPSULE, LIQUID FILLED ORAL at 08:11

## 2023-11-14 RX ADMIN — METOPROLOL SUCCINATE 50 MG: 50 TABLET, EXTENDED RELEASE ORAL at 08:11

## 2023-11-14 RX ADMIN — HYDRALAZINE HYDROCHLORIDE 10 MG: 20 INJECTION INTRAMUSCULAR; INTRAVENOUS at 10:11

## 2023-11-14 RX ADMIN — SODIUM CHLORIDE: 9 INJECTION, SOLUTION INTRAVENOUS at 12:11

## 2023-11-14 RX ADMIN — ACETAMINOPHEN 650 MG: 325 TABLET, FILM COATED ORAL at 05:11

## 2023-11-14 RX ADMIN — SERTRALINE HYDROCHLORIDE 50 MG: 50 TABLET ORAL at 08:11

## 2023-11-14 RX ADMIN — SODIUM CHLORIDE: 9 INJECTION, SOLUTION INTRAVENOUS at 08:11

## 2023-11-14 RX ADMIN — HYDRALAZINE HYDROCHLORIDE 50 MG: 50 TABLET, FILM COATED ORAL at 04:11

## 2023-11-14 RX ADMIN — NICARDIPINE HYDROCHLORIDE 2.5 MG/HR: 0.2 INJECTION, SOLUTION INTRAVENOUS at 02:11

## 2023-11-14 RX ADMIN — ENALAPRILAT 1.25 MG: 2.5 INJECTION INTRAVENOUS at 01:11

## 2023-11-14 RX ADMIN — FAMOTIDINE 20 MG: 20 TABLET, FILM COATED ORAL at 12:11

## 2023-11-14 NOTE — CONSULTS
"   Trauma Surgery   Consult Note    Patient Name: Cierra Main  YOB: 1950  Date: 11/13/2023 11:36 PM  Date of Admission: 11/13/2023  HD#0  POD#* No surgery found *    PRESENTING HISTORY   Chief Complaint/Reason for Admission: Intraparenchymal hemorrhage   History of Present Illness:  Patient is a 73 year old female who presents as a transfer from [place] following a fall at home resulting in intraparenchymal hemorrhage. She was transferred here for neurosurgical evaluation. Patient had an ischemic stroke 10/3/23. Fell today striking her head on the ground after he legs gave out on her. IPH today is in similar area of stroke per imaging reports. She reports PMH including "heart problems", MI x 5, anxiety, and blindness from macular degeneration. She also confirms DNR status.     Review of Systems:  12 point ROS negative except as stated in HPI    PAST HISTORY:   Past medical history:  Past Medical History:   Diagnosis Date    Acute MI     Anxiety     Cardiac disease     COPD (chronic obstructive pulmonary disease)     Coronary artery disease     Hyperlipidemia     Hypertension     Hypothyroidism     Other specified noninfective gastroenteritis and colitis     Unspecified macular degeneration      Past Medical History:   Diagnosis Date    Acute MI     Anxiety     Cardiac disease     COPD (chronic obstructive pulmonary disease)     Coronary artery disease     Hyperlipidemia     Hypertension     Hypothyroidism     Other specified noninfective gastroenteritis and colitis     Unspecified macular degeneration        Past surgical history:  Past Surgical History:   Procedure Laterality Date    CORONARY ANGIOGRAPHY N/A 2/1/2021    Procedure: ANGIOGRAM, CORONARY ARTERY;  Surgeon: Devon Medeiros MD;  Location: [facility] CATH LAB;  Service: Cardiology;  Laterality: N/A;    CORONARY ANGIOPLASTY WITH STENT PLACEMENT      LEFT HEART CATHETERIZATION Left 2/1/2021    Procedure: CATHETERIZATION, HEART, LEFT;  " Surgeon: Devon Medeiros MD;  Location: [facility] CATH LAB;  Service: Cardiology;  Laterality: Left;     Past Surgical History:   Procedure Laterality Date    CORONARY ANGIOGRAPHY N/A 2/1/2021    Procedure: ANGIOGRAM, CORONARY ARTERY;  Surgeon: Devon Medeiros MD;  Location: [facility] CATH LAB;  Service: Cardiology;  Laterality: N/A;    CORONARY ANGIOPLASTY WITH STENT PLACEMENT      LEFT HEART CATHETERIZATION Left 2/1/2021    Procedure: CATHETERIZATION, HEART, LEFT;  Surgeon: Devon Medeiros MD;  Location: [facility] CATH LAB;  Service: Cardiology;  Laterality: Left;       Family history:  Family History   Problem Relation Age of Onset    Diabetes Mother     Heart disease Mother     Hypertension Father     Heart disease Father        Social history:  Social History     Socioeconomic History    Marital status:    Tobacco Use    Smoking status: Every Day     Current packs/day: 0.50     Average packs/day: 0.5 packs/day for 55.0 years (27.5 ttl pk-yrs)     Types: Cigarettes    Smokeless tobacco: Never   Substance and Sexual Activity    Alcohol use: Never    Drug use: Never    Sexual activity: Not Currently     Social Determinants of Health     Financial Resource Strain: Low Risk  (9/20/2023)    Overall Financial Resource Strain (CARDIA)     Difficulty of Paying Living Expenses: Not hard at all   Food Insecurity: No Food Insecurity (9/20/2023)    Hunger Vital Sign     Worried About Running Out of Food in the Last Year: Never true     Ran Out of Food in the Last Year: Never true   Transportation Needs: No Transportation Needs (9/20/2023)    PRAPARE - Transportation     Lack of Transportation (Medical): No     Lack of Transportation (Non-Medical): No   Physical Activity: Insufficiently Active (9/20/2023)    Exercise Vital Sign     Days of Exercise per Week: 4 days     Minutes of Exercise per Session: 20 min   Stress: No Stress Concern Present (9/20/2023)    [employer] - Occupational Stress  Questionnaire     Feeling of Stress : Not at all   Social Connections: Moderately Integrated (9/20/2023)    Social Connection and Isolation Panel [NHANES]     Frequency of Communication with Friends and Family: More than three times a week     Frequency of Social Gatherings with Friends and Family: More than three times a week     Attends L[employer]-day Services: 1 to 4 times per year     Active Member of Clubs or Organizations: No     Attends Club or Organization Meetings: 1 to 4 times per year     Marital Status:    Housing Stability: Low Risk  (9/20/2023)    Housing Stability Vital Sign     Unable to Pay for Housing in the Last Year: No     Number of Places Lived in the Last Year: 1     Unstable Housing in the Last Year: No     Social History     Tobacco Use   Smoking Status Every Day    Current packs/day: 0.50    Average packs/day: 0.5 packs/day for 55.0 years (27.5 ttl pk-yrs)    Types: Cigarettes   Smokeless Tobacco Never      Social History     Substance and Sexual Activity   Alcohol Use Never        MEDICATIONS & ALLERGIES:   Allergies:   Review of patient's allergies indicates:   Allergen Reactions    Amlodipine     Losartan      Home Meds:   Current Outpatient Medications   Medication Instructions    acetaminophen (TYLENOL) 1,000 mg, Oral, Every 4 hours PRN    amiodarone (PACERONE) 200 mg, Oral, 2 times daily    apixaban (ELIQUIS) 5 mg, Oral, 2 times daily    aspirin (ECOTRIN) 81 mg, Oral, Daily    atorvastatin (LIPITOR) 10 mg, Oral, Nightly    cimetidine (TAGAMET) 200 mg, Oral, Nightly    docusate sodium (COLACE) 100 mg, Oral, 2 times daily    hydrALAZINE (APRESOLINE) 50 mg, Oral, 3 times daily    levothyroxine (SYNTHROID) 25 mcg, Oral, Before breakfast    lisinopriL (PRINIVIL,ZESTRIL) 20 mg, Oral, 2 times daily    melatonin 3 mg Cap 1 tablet, Oral, Nightly    metoprolol succinate (TOPROL-XL) 50 mg, Oral, Every 12 hours    montelukast (SINGULAIR) 10 mg, Oral    nitroGLYCERIN 0.4 MG/DOSE TL SPRY  (NITROLINGUAL) 400 mcg/spray spray nitroglycerin 400 mcg/spray translingual aerosol   at onset of chest pain may take up to three sprays every 5min during a 15 min period    pravastatin (PRAVACHOL) 40 mg, Oral    predniSONE (DELTASONE) 10 MG tablet 1 tablet, Oral, Daily    sertraline (ZOLOFT) 50 mg, Oral, Daily      No current facility-administered medications on file prior to encounter.     Current Outpatient Medications on File Prior to Encounter   Medication Sig Dispense Refill    acetaminophen (TYLENOL) 500 MG tablet Take 1,000 mg by mouth every 4 (four) hours as needed for Pain or Temperature greater than.      amiodarone (PACERONE) 200 MG Tab Take 200 mg by mouth 2 (two) times daily.      apixaban (ELIQUIS) 5 mg Tab Take 5 mg by mouth 2 (two) times daily.      aspirin (ECOTRIN) 81 MG EC tablet Take 81 mg by mouth once daily.      atorvastatin (LIPITOR) 10 MG tablet Take 10 mg by mouth every evening.      cimetidine (TAGAMET) 200 MG tablet Take 200 mg by mouth nightly.      hydrALAZINE (APRESOLINE) 50 MG tablet Take 1 tablet (50 mg total) by mouth 3 (three) times daily. 90 tablet 11    levothyroxine (SYNTHROID) 25 MCG tablet Take 1 tablet (25 mcg total) by mouth before breakfast. 90 tablet 1    lisinopriL (PRINIVIL,ZESTRIL) 20 MG tablet Take 1 tablet (20 mg total) by mouth 2 (two) times daily. 90 tablet 1    melatonin 3 mg Cap Take 1 tablet by mouth every evening.      metoprolol succinate (TOPROL-XL) 50 MG 24 hr tablet Take 50 mg by mouth every 12 (twelve) hours.      sertraline (ZOLOFT) 50 MG tablet Take 1 tablet (50 mg total) by mouth once daily. 90 tablet 1    docusate sodium (COLACE) 100 MG capsule Take 100 mg by mouth 2 (two) times daily.      montelukast (SINGULAIR) 10 mg tablet Take 10 mg by mouth.      nitroGLYCERIN 0.4 MG/DOSE TL SPRY (NITROLINGUAL) 400 mcg/spray spray nitroglycerin 400 mcg/spray translingual aerosol   at onset of chest pain may take up to three sprays every 5min during a 15 min  period      pravastatin (PRAVACHOL) 40 MG tablet TAKE 1 TABLET BY MOUTH EVERY DAY 90 tablet 1    predniSONE (DELTASONE) 10 MG tablet Take 1 tablet by mouth once daily.        No current facility-administered medications on file prior to encounter.     Current Outpatient Medications on File Prior to Encounter   Medication Sig    acetaminophen (TYLENOL) 500 MG tablet Take 1,000 mg by mouth every 4 (four) hours as needed for Pain or Temperature greater than.    amiodarone (PACERONE) 200 MG Tab Take 200 mg by mouth 2 (two) times daily.    apixaban (ELIQUIS) 5 mg Tab Take 5 mg by mouth 2 (two) times daily.    aspirin (ECOTRIN) 81 MG EC tablet Take 81 mg by mouth once daily.    atorvastatin (LIPITOR) 10 MG tablet Take 10 mg by mouth every evening.    cimetidine (TAGAMET) 200 MG tablet Take 200 mg by mouth nightly.    hydrALAZINE (APRESOLINE) 50 MG tablet Take 1 tablet (50 mg total) by mouth 3 (three) times daily.    levothyroxine (SYNTHROID) 25 MCG tablet Take 1 tablet (25 mcg total) by mouth before breakfast.    lisinopriL (PRINIVIL,ZESTRIL) 20 MG tablet Take 1 tablet (20 mg total) by mouth 2 (two) times daily.    melatonin 3 mg Cap Take 1 tablet by mouth every evening.    metoprolol succinate (TOPROL-XL) 50 MG 24 hr tablet Take 50 mg by mouth every 12 (twelve) hours.    sertraline (ZOLOFT) 50 MG tablet Take 1 tablet (50 mg total) by mouth once daily.    docusate sodium (COLACE) 100 MG capsule Take 100 mg by mouth 2 (two) times daily.    montelukast (SINGULAIR) 10 mg tablet Take 10 mg by mouth.    nitroGLYCERIN 0.4 MG/DOSE TL SPRY (NITROLINGUAL) 400 mcg/spray spray nitroglycerin 400 mcg/spray translingual aerosol   at onset of chest pain may take up to three sprays every 5min during a 15 min period    pravastatin (PRAVACHOL) 40 MG tablet TAKE 1 TABLET BY MOUTH EVERY DAY    predniSONE (DELTASONE) 10 MG tablet Take 1 tablet by mouth once daily.     Scheduled Meds:   [START ON 11/14/2023] acetaminophen  650 mg Oral Q4H     "[START ON 11/14/2023] amiodarone  200 mg Oral BID    [START ON 11/14/2023] atorvastatin  10 mg Oral QHS    docusate sodium  100 mg Oral BID    famotidine  20 mg Oral BID    [START ON 11/14/2023] hydrALAZINE  50 mg Oral TID    levETIRAcetam  500 mg Oral BID    [START ON 11/14/2023] levothyroxine  25 mcg Oral Before breakfast    [START ON 11/14/2023] lisinopriL  20 mg Oral BID    [START ON 11/14/2023] metoprolol succinate  50 mg Oral Q12H    polyethylene glycol  17 g Oral BID    [START ON 11/14/2023] sertraline  50 mg Oral Daily     Continuous Infusions:   [START ON 11/14/2023] sodium chloride 0.9%       PRN Meds:[START ON 11/14/2023] bisacodyL, [START ON 11/14/2023] hydrALAZINE, [START ON 11/14/2023] labetaloL, [START ON 11/14/2023] melatonin, [START ON 11/14/2023] oxyCODONE    OBJECTIVE:   Vital Signs:  VITAL SIGNS: 24 HR MIN & MAX LAST   Temp  Min: 97.7 °F (36.5 °C)  Max: 97.7 °F (36.5 °C)  97.7 °F (36.5 °C)   BP  Min: 125/55  Max: 189/72  138/62    Pulse  Min: 56  Max: 66  61    Resp  Min: 20  Max: 20  20    SpO2  Min: 95 %  Max: 96 %  95 %      HT: 5' 3" (160 cm)  WT: 43.1 kg (95 lb)  BMI: 16.8     Lines/drains/airway:       Peripheral IV - Single Lumen 20 G Anterior;Right Forearm (Active)   Number of days:             Peripheral IV - Single Lumen 20 G Anterior;Left Forearm (Active)   Site Assessment Clean;Dry;Intact 11/13/23 2236   Dressing Status Clean;Dry;Intact 11/13/23 2236   Number of days:        Physical Exam:  General:  Well developed, well nourished, no acute distress  HEENT:  Normocephalic, atraumatic. PERRL.  CV:  RR  Resp: NWOB on RA  GI:  Abdomen soft, non-tender, non-distended  :  Deferred  MSK:  No muscle atrophy, cyanosis, peripheral edema, moving all extremities spontaneously  Skin/Wounds:  No rashes, ulcers, erythema  Neuro: CNII-XII grossly intact, alert and oriented to person, place, and time, strength and motor function grossly intact to all extremities, sensation intact to all extremities " "    Labs:  Troponin:  No results for input(s): "TROPONINI" in the last 72 hours.  CBC:  Recent Labs     11/13/23  2217   WBC 7.64   RBC 4.61   HGB 13.1   HCT 40.4      MCV 87.6   MCH 28.4   MCHC 32.4*       CMP:  Recent Labs     11/13/23  2217   CALCIUM 9.5   ALBUMIN 3.4      K 3.7   CO2 23   BUN 22.9*   CREATININE 0.76   ALKPHOS 69   ALT 15   AST 16   BILITOT 0.3       Lactic Acid:  No results for input(s): "LACTATE" in the last 72 hours.  ETOH:  No results for input(s): "ETHANOL" in the last 72 hours.   Urine Drug Screen:  No results for input(s): "COCAINE", "OPIATE", "BARBITURATE", "AMPHETAMINE", "FENTANYL", "CANNABINOIDS", "MDMA" in the last 72 hours.    Invalid input(s): "BENZODIAZEPINE", "PHENCYCLIDINE"   ABG:  No results for input(s): "PH", "PCO2", "PO2", "HCO3", "BE", "POCSATURATED" in the last 72 hours.    Diagnostic Results:  CT Cervical Spine Without Contrast         CT Previous   Final Result          ASSESSMENT & PLAN:    73F s/p fall. Denies any weakness, dizziness or HA prior to fall. State her legs gave out while she was using her walker and she fell back striking her head. Neurosurgery consulted by the ED physician who recommended ICU admission, Q1H neuro checks and repeat CT head in AM.     Consults:  Neurosurgery     Neuro/psych:  - GCS 15(E 4, V 5, M 6)   - Multimodal pain control   - C-Collar in place  - Q1H neuro checks  - CT head in AM     Pulmonary:  - IS, pulmonary toilet     Cardiovascular:  - Cardiac monitoring while in the ICU  - Keep SBP < 140 per Neurosurgery recommendations     Renal:  - Strict I&Os     FEN/GI:  - IVF: Normal Saline @ 100 cc/hr  - Diet:  NPO except for medications  - Daily CMP  - Replace electrolytes as needed based on daily labs     Heme/ID:  - Daily CBC  - Antibiotics not indicated at this time.    Endocrine:  - BG <180     Musculoskeletal:  - PT/OT when able to participate  - WB status:   RUE: WBAT  LUE: WBAT  RLE: WBAT  LLE: WBAT  - Tertiary when " appropriate      Wounds:  - Local wound care     Precautions:  Fall, Seizure, Pressure ulcer prevention, and Standard    Prophylaxis:  GI: H2B  Seizure: Keppra (day  1/7)  DVT: SCDs and Holding anticoagulation in light of intracranial hemorrhage     LDA:  Peripheral IV    Disposition:  Admit to trauma ICU for close neurological monitoring.      Taylor Tapia, SUSSYP-BC, FNP-BC

## 2023-11-14 NOTE — PT/OT/SLP EVAL
[facility]  Speech Language Pathology Department  Cognitive-Communication Evaluation    Patient Name:  Cierra Main   MRN:  [id number]    Recommendations:     General recommendations:  cognitive-linguistic therapy  Communication strategies:  go to room if call light pushed    Discharge therapy intensity: Low Intensity Therapy  Barriers to safe discharge: acuity of illness    History:     Cierra Main is a/n 73 y.o. female admitted after a ground level fall without loss of consciousness. CT indicated mild PIH. She has a past medical history including: HTN, HLD, CAD, AFib (on Eliquis), COPD, hypothyroidism, macular degeneration, and smoking.     Past Medical History:   Diagnosis Date    Acute MI     Anxiety     Cardiac disease     COPD (chronic obstructive pulmonary disease)     Coronary artery disease     Hyperlipidemia     Hypertension     Hypothyroidism     Other specified noninfective gastroenteritis and colitis     Unspecified macular degeneration      Past Surgical History:   Procedure Laterality Date    CORONARY ANGIOGRAPHY N/A 2/1/2021    Procedure: ANGIOGRAM, CORONARY ARTERY;  Surgeon: Devon Medeiros MD;  Location: [facility] CATH LAB;  Service: Cardiology;  Laterality: N/A;    CORONARY ANGIOPLASTY WITH STENT PLACEMENT      LEFT HEART CATHETERIZATION Left 2/1/2021    Procedure: CATHETERIZATION, HEART, LEFT;  Surgeon: Devon Medeiros MD;  Location: [facility] CATH LAB;  Service: Cardiology;  Laterality: Left;       Previous level of Function  Education:some college  Occupation: retired  Lives:  son who lives out of state  Handed: Right  Glasses: no  Hearing Aids: no  Home Responsibilities: financial management, medication/health management, laundry, shopping, meal preparation, and cleaning; she reports she is getting help intermittently    Subjective     Patient alert and cooperative.    Patient goals: to return home at [facility]     Spiritual/Cultural/Evangelical Beliefs/Practices that affect  care: no  Pain/Comfort: Pain Rating 1: 0/10  Respiratory Status: room air    Objective:     ORAL MUSCULATURE  Dentition:  adequate  Facial Movement: WFL  Buccal Strength & Mobility: WFL  Mandibular Strength & Mobility: WFL  Oral Labial Strength & Mobility: WFL  Lingual Strength & Mobility: WFL  Velar Elevation: WFL    SPEECH PRODUCTION  Phoneme Production: adequate  Voice Quality: adequate  Voice Production: adequate  Speech Rate: appropriate  Loudness: acceptable  Respiration: WFL for speech  Resonance: adequate  Prosody: adequate  Speech Intelligibility  Known Context: Greater that 90%  Unknown Context: Greater that 90%    AUDITORY COMPREHENSION  Identification:  Did not attempt secondary to visual deficits  Following Directions:  1-Step: 100%  2-Step: 100%  Yes/No Questions:  Biographical: 100%  Environmental: 100%  Simple: 100%  Complex: 100%    VERBAL EXPRESSION  Automatic Speech:  Days of the week: 100%  Months of the year: 100%  Confrontation Naming  Body Parts: 100%  Wh- Questions:  Object name: 100%  Object function: 100%    COGNITION  Orientation:  Person: yes  Place: yes  Time: month/year - yes  Situation: yes   Attention:  Focused: Within Functional Limits  Sustained: Within Functional Limits  Memory:  Immediate: Within Functional Limits  Delayed: Impaired (moderate deficits; 0/4 without assistance; 2/4 with semantic cue only; 4/4 with semantic cue and choices)  Long Term: Within Functional Limits  Problem Solving  Functional simple: Within Functional Limits  Organization:  Convergent thinking: Impaired (mild)  Divergent thinking: Impaired (mild)  Executive Function:  Information processing: Within Functional Limits  Reasoning: Within Functional Limits    Assessment:     Pt presents with mild-moderate cognitive-linguistic deficits impacting her overall communication competence and safe, independent functioning.     Goals:     Multidisciplinary Problems       SLP Goals          Problem: SLP    Goal  Priority Disciplines Outcome   SLP Goal     SLP Ongoing, Progressing   Description: LTG: Pt will demonstrate cognitive-linguistic skills at PLOF for improved safe, independent functioning and overall communication competence.     STG: Pt will participate in delayed memory exercises with 100% recall of 4 items with minimal assistance.   STG: Pt will participate in simple-complex convergent thinking exercises with 100% accuracy given minimal assistance.   STG: Pt will participate in simple-complex divergent thinking exercises with 100% accuracy given minimal assistance.                      Patient Education:     Patient provided with verbal education regarding results/recommendations.  Understanding was verbalized.    Plan:     SLP Follow-Up:  Yes   Patient to be seen:  3 x/week, 5 x/week   Plan of Care expires:  11/28/23  Plan of Care reviewed with:  patient      Time Tracking:     SLP Treatment Date:   11/14/23  Speech Start Time:  1400  Speech Stop Time:  1445     Speech Total Time (min):  45 min    Billable minutes:  Evaluation of Speech Sound Production with Comprehension and Expression, 30 minutes  Swallow and Oral Function Evaluation, 15 minutes     11/14/2023

## 2023-11-14 NOTE — HPI
"73-year-old female with past medical history of HTN, HLD, CAD, AFib (on Eliquis), COPD, hypothyroidism, macular degeneration, smoker, presented to [facility] on 11/13 as a transfer from [place] for [facility].  She initially presented there after her legs "gave out" and she fell at home.  She reported hitting the back of her head when she fell.  CTh showed stable mild parenchymal hemorrhage in the left occipital lobe and associated mild perimetric edema.  Neurosurgery is following, no surgical intervention planned.  Neurology was consulted for stroke workup.  "

## 2023-11-14 NOTE — SUBJECTIVE & OBJECTIVE
Past Medical History:   Diagnosis Date    Acute MI     Anxiety     Cardiac disease     COPD (chronic obstructive pulmonary disease)     Coronary artery disease     Hyperlipidemia     Hypertension     Hypothyroidism     Other specified noninfective gastroenteritis and colitis     Unspecified macular degeneration        Past Surgical History:   Procedure Laterality Date    CORONARY ANGIOGRAPHY N/A 2/1/2021    Procedure: ANGIOGRAM, CORONARY ARTERY;  Surgeon: Devon Medeiros MD;  Location: [facility] CATH LAB;  Service: Cardiology;  Laterality: N/A;    CORONARY ANGIOPLASTY WITH STENT PLACEMENT      LEFT HEART CATHETERIZATION Left 2/1/2021    Procedure: CATHETERIZATION, HEART, LEFT;  Surgeon: Devon Medeiros MD;  Location: [facility] CATH LAB;  Service: Cardiology;  Laterality: Left;       Review of patient's allergies indicates:   Allergen Reactions    Amlodipine      Leg weakness    Losartan          No current facility-administered medications on file prior to encounter.     Current Outpatient Medications on File Prior to Encounter   Medication Sig    acetaminophen (TYLENOL) 500 MG tablet Take 1,000 mg by mouth every 4 (four) hours as needed for Pain or Temperature greater than.    amiodarone (PACERONE) 200 MG Tab Take 200 mg by mouth 2 (two) times daily.    apixaban (ELIQUIS) 5 mg Tab Take 5 mg by mouth 2 (two) times daily.    aspirin (ECOTRIN) 81 MG EC tablet Take 81 mg by mouth once daily.    atorvastatin (LIPITOR) 10 MG tablet Take 10 mg by mouth every evening.    cimetidine (TAGAMET) 200 MG tablet Take 200 mg by mouth nightly.    hydrALAZINE (APRESOLINE) 50 MG tablet Take 1 tablet (50 mg total) by mouth 3 (three) times daily.    levothyroxine (SYNTHROID) 25 MCG tablet Take 1 tablet (25 mcg total) by mouth before breakfast.    lisinopriL (PRINIVIL,ZESTRIL) 20 MG tablet Take 1 tablet (20 mg total) by mouth 2 (two) times daily.    melatonin 3 mg Cap Take 1 tablet by mouth every evening.    metoprolol succinate (TOPROL-XL) 50 MG  24 hr tablet Take 50 mg by mouth every 12 (twelve) hours.    sertraline (ZOLOFT) 50 MG tablet Take 1 tablet (50 mg total) by mouth once daily.    docusate sodium (COLACE) 100 MG capsule Take 100 mg by mouth 2 (two) times daily.    montelukast (SINGULAIR) 10 mg tablet Take 10 mg by mouth.    nitroGLYCERIN 0.4 MG/DOSE TL SPRY (NITROLINGUAL) 400 mcg/spray spray nitroglycerin 400 mcg/spray translingual aerosol   at onset of chest pain may take up to three sprays every 5min during a 15 min period    pravastatin (PRAVACHOL) 40 MG tablet TAKE 1 TABLET BY MOUTH EVERY DAY    predniSONE (DELTASONE) 10 MG tablet Take 1 tablet by mouth once daily.     Family History       Problem Relation (Age of Onset)    Diabetes Mother    Heart disease Mother, Father    Hypertension Father          Tobacco Use    Smoking status: Every Day     Current packs/day: 0.50     Average packs/day: 0.5 packs/day for 55.0 years (27.5 ttl pk-yrs)     Types: Cigarettes    Smokeless tobacco: Never   Substance and Sexual Activity    Alcohol use: Never    Drug use: Never    Sexual activity: Not Currently     Review of Systems   Eyes:  Positive for visual disturbance (chronic blurred vision).   Neurological:  Negative for dizziness, tremors, seizures, syncope, facial asymmetry, speech difficulty, weakness, light-headedness, numbness and headaches.   All other systems reviewed and are negative.    Objective:     Vital Signs (Most Recent):  Temp: 98.8 °F (37.1 °C) (11/14/23 0800)  Pulse: 61 (11/14/23 1100)  Resp: (!) 22 (11/14/23 1100)  BP: (!) 144/59 (11/14/23 1100)  SpO2: 96 % (11/14/23 1100) Vital Signs (24h Range):  Temp:  [97.7 °F (36.5 °C)-98.8 °F (37.1 °C)] 98.8 °F (37.1 °C)  Pulse:  [56-69] 61  Resp:  [12-25] 22  SpO2:  [94 %-98 %] 96 %  BP: (102-210)/(45-85) 144/59     Weight: 40.2 kg (88 lb 10 oz)  Body mass index is 15.7 kg/m².     Physical Exam  Vitals reviewed.   Constitutional:       General: She is awake. She is not in acute distress.      Appearance: She is not ill-appearing.   Eyes:      Extraocular Movements: Extraocular movements intact.      Pupils: Pupils are equal, round, and reactive to light.      Comments: Reports blurred vision (chronic), able to accurately count fingers   Cardiovascular:      Rate and Rhythm: Normal rate and regular rhythm.   Pulmonary:      Effort: Pulmonary effort is normal.   Skin:     General: Skin is warm and dry.      Capillary Refill: Capillary refill takes less than 2 seconds.   Neurological:      Mental Status: She is alert and oriented to person, place, and time. Mental status is at baseline.      Cranial Nerves: No dysarthria or facial asymmetry.      Sensory: No sensory deficit.      Motor: No weakness, tremor or pronator drift.      Coordination: Coordination is intact.   Psychiatric:         Attention and Perception: Attention normal.         Mood and Affect: Mood and affect normal.         Speech: Speech normal.         Behavior: Behavior normal.        Significant Labs: BMP:   Recent Labs   Lab 11/13/23 2217 11/14/23  0110    140   K 3.7 3.9   CO2 23 20*   BUN 22.9* 22.5*   CREATININE 0.76 0.76   CALCIUM 9.5 9.5     CBC:   Recent Labs   Lab 11/13/23 2217 11/14/23  0110   WBC 7.64 8.64   HGB 13.1 13.7   HCT 40.4 43.5    328       Significant Imaging: I have reviewed all pertinent imaging results/findings within the past 24 hours.

## 2023-11-14 NOTE — CONSULTS
"[facility] - [facility]  Neurology  Consult Note    Patient Name: Cierra Main  MRN: [id number]  Admission Date: 11/13/2023  Hospital Length of Stay: 1 days  Code Status: DNR   Attending Provider: Isrrael Douglas Jr., *   Consulting Provider: ARELI Hernández  Primary Care Physician: Rafiq Eller MD  Principal Problem:Intraparenchymal hematoma of brain    Inpatient consult to Neurology  Consult performed by: Nory Rausch FNP  Consult ordered by: Nory Rausch FNP         Subjective:     Chief Complaint:    Chief Complaint   Patient presents with    Neurologic Problem     Tx from [place] for intraparenchymal hemorrhage           HPI:   73-year-old female with past medical history of HTN, HLD, CAD, AFib (on Eliquis), COPD, hypothyroidism, macular degeneration, smoker, presented to [facility] on 11/13 as a transfer from [place] for IPH.  She initially presented there after her legs "gave out" and she fell at home.  She reported hitting the back of her head when she fell.  CTh showed stable mild parenchymal hemorrhage in the left occipital lobe and associated mild perimetric edema.  Neurosurgery is following, no surgical intervention planned.  Neurology was consulted for stroke workup.     Past Medical History:   Diagnosis Date    Acute MI     Anxiety     Cardiac disease     COPD (chronic obstructive pulmonary disease)     Coronary artery disease     Hyperlipidemia     Hypertension     Hypothyroidism     Other specified noninfective gastroenteritis and colitis     Unspecified macular degeneration        Past Surgical History:   Procedure Laterality Date    CORONARY ANGIOGRAPHY N/A 2/1/2021    Procedure: ANGIOGRAM, CORONARY ARTERY;  Surgeon: Devon Medeiros MD;  Location: [facility] CATH LAB;  Service: Cardiology;  Laterality: N/A;    CORONARY ANGIOPLASTY WITH STENT PLACEMENT      LEFT HEART CATHETERIZATION Left 2/1/2021    Procedure: CATHETERIZATION, HEART, LEFT;  Surgeon: Devon" BRO Medeiros MD;  Location: [facility] CATH LAB;  Service: Cardiology;  Laterality: Left;       Review of patient's allergies indicates:   Allergen Reactions    Amlodipine      Leg weakness    Losartan          No current facility-administered medications on file prior to encounter.     Current Outpatient Medications on File Prior to Encounter   Medication Sig    acetaminophen (TYLENOL) 500 MG tablet Take 1,000 mg by mouth every 4 (four) hours as needed for Pain or Temperature greater than.    amiodarone (PACERONE) 200 MG Tab Take 200 mg by mouth 2 (two) times daily.    apixaban (ELIQUIS) 5 mg Tab Take 5 mg by mouth 2 (two) times daily.    aspirin (ECOTRIN) 81 MG EC tablet Take 81 mg by mouth once daily.    atorvastatin (LIPITOR) 10 MG tablet Take 10 mg by mouth every evening.    cimetidine (TAGAMET) 200 MG tablet Take 200 mg by mouth nightly.    hydrALAZINE (APRESOLINE) 50 MG tablet Take 1 tablet (50 mg total) by mouth 3 (three) times daily.    levothyroxine (SYNTHROID) 25 MCG tablet Take 1 tablet (25 mcg total) by mouth before breakfast.    lisinopriL (PRINIVIL,ZESTRIL) 20 MG tablet Take 1 tablet (20 mg total) by mouth 2 (two) times daily.    melatonin 3 mg Cap Take 1 tablet by mouth every evening.    metoprolol succinate (TOPROL-XL) 50 MG 24 hr tablet Take 50 mg by mouth every 12 (twelve) hours.    sertraline (ZOLOFT) 50 MG tablet Take 1 tablet (50 mg total) by mouth once daily.    docusate sodium (COLACE) 100 MG capsule Take 100 mg by mouth 2 (two) times daily.    montelukast (SINGULAIR) 10 mg tablet Take 10 mg by mouth.    nitroGLYCERIN 0.4 MG/DOSE TL SPRY (NITROLINGUAL) 400 mcg/spray spray nitroglycerin 400 mcg/spray translingual aerosol   at onset of chest pain may take up to three sprays every 5min during a 15 min period    pravastatin (PRAVACHOL) 40 MG tablet TAKE 1 TABLET BY MOUTH EVERY DAY    predniSONE (DELTASONE) 10 MG tablet Take 1 tablet by mouth once daily.     Family History       Problem Relation (Age of  Onset)    Diabetes Mother    Heart disease Mother, Father    Hypertension Father          Tobacco Use    Smoking status: Every Day     Current packs/day: 0.50     Average packs/day: 0.5 packs/day for 55.0 years (27.5 ttl pk-yrs)     Types: Cigarettes    Smokeless tobacco: Never   Substance and Sexual Activity    Alcohol use: Never    Drug use: Never    Sexual activity: Not Currently     Review of Systems   Eyes:  Positive for visual disturbance (chronic blurred vision).   Neurological:  Negative for dizziness, tremors, seizures, syncope, facial asymmetry, speech difficulty, weakness, light-headedness, numbness and headaches.   All other systems reviewed and are negative.    Objective:     Vital Signs (Most Recent):  Temp: 98.8 °F (37.1 °C) (11/14/23 0800)  Pulse: 61 (11/14/23 1100)  Resp: (!) 22 (11/14/23 1100)  BP: (!) 144/59 (11/14/23 1100)  SpO2: 96 % (11/14/23 1100) Vital Signs (24h Range):  Temp:  [97.7 °F (36.5 °C)-98.8 °F (37.1 °C)] 98.8 °F (37.1 °C)  Pulse:  [56-69] 61  Resp:  [12-25] 22  SpO2:  [94 %-98 %] 96 %  BP: (102-210)/(45-85) 144/59     Weight: 40.2 kg (88 lb 10 oz)  Body mass index is 15.7 kg/m².     Physical Exam  Vitals reviewed.   Constitutional:       General: She is awake. She is not in acute distress.     Appearance: She is not ill-appearing.   Eyes:      Extraocular Movements: Extraocular movements intact.      Pupils: Pupils are equal, round, and reactive to light.      Comments: Reports blurred vision (chronic), able to accurately count fingers   Cardiovascular:      Rate and Rhythm: Normal rate and regular rhythm.   Pulmonary:      Effort: Pulmonary effort is normal.   Skin:     General: Skin is warm and dry.      Capillary Refill: Capillary refill takes less than 2 seconds.   Neurological:      Mental Status: She is alert and oriented to person, place, and time. Mental status is at baseline.      Cranial Nerves: No dysarthria or facial asymmetry.      Sensory: No sensory deficit.       Motor: No weakness, tremor or pronator drift.      Coordination: Coordination is intact.   Psychiatric:         Attention and Perception: Attention normal.         Mood and Affect: Mood and affect normal.         Speech: Speech normal.         Behavior: Behavior normal.        Significant Labs: BMP:   Recent Labs   Lab 11/13/23 2217 11/14/23  0110    140   K 3.7 3.9   CO2 23 20*   BUN 22.9* 22.5*   CREATININE 0.76 0.76   CALCIUM 9.5 9.5     CBC:   Recent Labs   Lab 11/13/23 2217 11/14/23  0110   WBC 7.64 8.64   HGB 13.1 13.7   HCT 40.4 43.5    328       Significant Imaging: I have reviewed all pertinent imaging results/findings within the past 24 hours.    Assessment and Plan:     * Intraparenchymal hematoma of brain  IPH - left occipital    -MRI brain w/ w/o contrast ordered  -neuro checks every 2 hours ... notify neurology of any neuro change  -strict blood pressure control ... keep BP less than 140/90  -avoid antiplatelet or anticoagulation at this time  -continue Keppra 500mg BID  -seizure precautions ... notify neurology of any seizure-like activity         VTE Risk Mitigation (From admission, onward)           Ordered     Reason for No Pharmacological VTE Prophylaxis  Once        Question:  Reasons:  Answer:  Risk of Bleeding    11/13/23 2332     IP VTE HIGH RISK PATIENT  Once         11/13/23 2332     Place sequential compression device  Until discontinued         11/13/23 2332                    Thank you for your consult.  Further recommendations to follow by MD. Nory Rausch, ADITIP  Neurology  [facility] - [facility] [place] [facility]

## 2023-11-14 NOTE — PLAN OF CARE
Problem: SLP  Goal: SLP Goal  Description: LTG: Pt will demonstrate cognitive-linguistic skills at OF for improved safe, independent functioning and overall communication competence.     STG: Pt will participate in delayed memory exercises with 100% recall of 4 items with minimal assistance.   STG: Pt will participate in simple-complex convergent thinking exercises with 100% accuracy given minimal assistance.   STG: Pt will participate in simple-complex divergent thinking exercises with 100% accuracy given minimal assistance.   Outcome: Ongoing, Progressing

## 2023-11-14 NOTE — PLAN OF CARE
Pt has been on skilled at [facility]. She was going to be discharged today. [facility] will not be accepting pt after this hospitalization. Pt had used her skilled days 1-20 and was going into day 21 of skilled when medicare pays 80% and has no copay  for the remaining 20%. [facility] had tried to reach family prior to discuss this and family did not call them back. [facility] felt family was avoiding them after numerous attempts   11/14/23 1061   Discharge Assessment   Assessment Type Discharge Planning Assessment   Source of Information other (see comments)  ([facility])   Reason For Admission Intraparenchymal hematoma of the brain   Facility Arrived From: [facility]   Do you expect to return to your current living situation? No  (pt was scheduled for dc from [facility] 11/14. She had used her skilled days up to the point of having the copay. Family did not responde to [facility] about the copay.)   Discharge Plan A Other   Discharge Plan B Other       Will follow up on this

## 2023-11-14 NOTE — NURSING
Nurses Note -- 4 Eyes      11/14/2023   2:53 AM      Skin assessed during: Daily Assessment      [x] No Altered Skin Integrity Present    [x]Prevention Measures Documented      [] Yes- Altered Skin Integrity Present or Discovered   [] LDA Added if Not in Epic (Describe Wound)   [] New Altered Skin Integrity was Present on Admit and Documented in LDA   [] Wound Image Taken    Wound Care Consulted? No    Attending Nurse:  Hilary Cloud RN/Staff Member:   Zac CHEUGN

## 2023-11-14 NOTE — ED PROVIDER NOTES
Encounter Date: 11/13/2023       History     Chief Complaint   Patient presents with    Neurologic Problem     Tx from [place] for intraparenchymal hemorrhage      73-year-old female presents to the emergency department as transfer from [facility] Emergency Department for neurosurgical services after sustaining a ground level fall, striking the back of her head. C/o head and neck pain. CT w/ occipital intraparenchymal hemorrhage. Pt on anticoagulation with recent CVA.         In review of the records, they do describe cerebral infarction due to unspecified occlusion or stenosis of the left posterior cerebral artery.    The history is provided by the patient, medical records and the EMS personnel.   Neurologic Problem  The primary symptoms include headaches. Primary symptoms do not include speech change, nausea or vomiting.     Review of patient's allergies indicates:   Allergen Reactions    Amlodipine     Losartan      Past Medical History:   Diagnosis Date    Acute MI     Anxiety     Cardiac disease     COPD (chronic obstructive pulmonary disease)     Coronary artery disease     Hyperlipidemia     Hypertension     Hypothyroidism     Other specified noninfective gastroenteritis and colitis     Unspecified macular degeneration      Past Surgical History:   Procedure Laterality Date    CORONARY ANGIOGRAPHY N/A 2/1/2021    Procedure: ANGIOGRAM, CORONARY ARTERY;  Surgeon: Devon Medeiros MD;  Location: [facility] CATH LAB;  Service: Cardiology;  Laterality: N/A;    CORONARY ANGIOPLASTY WITH STENT PLACEMENT      LEFT HEART CATHETERIZATION Left 2/1/2021    Procedure: CATHETERIZATION, HEART, LEFT;  Surgeon: Devon Medeiros MD;  Location: [facility] CATH LAB;  Service: Cardiology;  Laterality: Left;     Family History   Problem Relation Age of Onset    Diabetes Mother     Heart disease Mother     Hypertension Father     Heart disease Father      Social History     Tobacco Use    Smoking status: Every Day     Current packs/day:  0.50     Average packs/day: 0.5 packs/day for 55.0 years (27.5 ttl pk-yrs)     Types: Cigarettes    Smokeless tobacco: Never   Substance Use Topics    Alcohol use: Never    Drug use: Never     Review of Systems   Respiratory:  Negative for shortness of breath.    Cardiovascular:  Negative for chest pain.   Gastrointestinal:  Negative for nausea and vomiting.   Musculoskeletal:  Positive for neck pain.   Skin:  Negative for wound.   Neurological:  Positive for headaches. Negative for speech change.       Physical Exam     Initial Vitals [11/13/23 2150]   BP Pulse Resp Temp SpO2   (!) 136/59 (!) 59 20 97.7 °F (36.5 °C) 96 %      MAP       --         Physical Exam    Nursing note and vitals reviewed.  Constitutional: She appears well-developed and well-nourished. No distress.   HENT:   Head: Normocephalic.   Posterior head tenderness, no laceration   Eyes: Pupils are equal, round, and reactive to light. No scleral icterus.   Neck: Neck supple.   Midline tenderness to palpation, no step-off deformity, cervical collar applied   Normal range of motion.  Cardiovascular:  Normal rate and regular rhythm.           Pulmonary/Chest: Breath sounds normal. No respiratory distress.   Abdominal: Abdomen is soft. She exhibits no distension. There is no abdominal tenderness.   Musculoskeletal:         General: Normal range of motion.      Cervical back: Normal range of motion and neck supple.     Neurological: She is alert and oriented to person, place, and time. She has normal strength.   Skin: Skin is warm and dry.         ED Course   Critical Care    Date/Time: 11/13/2023 9:59 PM    Performed by: Sandra Velazco MD  Authorized by: Sandra Velazco MD  Direct patient critical care time: 19 minutes  Additional history critical care time: 7 minutes  Ordering / reviewing critical care time: 4 minutes  Documentation critical care time: 4 minutes  Consulting other physicians critical care time: 8 minutes  Total critical care time  (exclusive of procedural time) : 42 minutes  Critical care time was exclusive of separately billable procedures and treating other patients.  Critical care was necessary to treat or prevent imminent or life-threatening deterioration of the following conditions: CNS failure or compromise and trauma.  Critical care was time spent personally by me on the following activities: development of treatment plan with patient or surrogate, discussions with consultants, evaluation of patient's response to treatment, interpretation of cardiac output measurements, examination of patient, obtaining history from patient or surrogate, ordering and review of laboratory studies, ordering and review of radiographic studies, ordering and performing treatments and interventions, pulse oximetry, re-evaluation of patient's condition and review of old charts.        Labs Reviewed   COMPREHENSIVE METABOLIC PANEL - Abnormal; Notable for the following components:       Result Value    Chloride 108 (*)     Blood Urea Nitrogen 22.9 (*)     All other components within normal limits   PROTIME-INR - Abnormal; Notable for the following components:    PT 14.8 (*)     All other components within normal limits   APTT - Abnormal; Notable for the following components:    PTT 34.2 (*)     All other components within normal limits   CBC WITH DIFFERENTIAL - Abnormal; Notable for the following components:    MCHC 32.4 (*)     All other components within normal limits   CBC W/ AUTO DIFFERENTIAL    Narrative:     The following orders were created for panel order CBC auto differential.  Procedure                               Abnormality         Status                     ---------                               -----------         ------                     CBC with Differential[[id number]]       Abnormal            Final result                 Please view results for these tests on the individual orders.   LACTIC ACID, PLASMA          Imaging Results               CT Cervical Spine Without Contrast (Preliminary result)  Result time 11/13/23 23:25:08      Preliminary result by Waldo Laguna MD (11/13/23 23:25:08)                   Narrative:    START OF REPORT:  Technique: CT of the cervical spine was performed without intravenous contrast with axial as well as sagittal and coronal images.    Comparison: None.    Dosage Information: Automated Exposure Control was utilized 392.67 mGy.cm.    Clinical history: Fall injury.    Findings:  Lung apices: Nonspecific scarringand moderate mixed emphysemaare seen, otherwise unremarkable.  Spine:  Spinal canal: The spinal canal appears unremarkable.  Spinal cord: The spinal cord appears unremarkable.  Mineralization: Within normal limits for age.  Rotation: No significant rotation is seen.  Scoliosis: Moderate dextroconvex cervical scoliosisis seen with a Thornton angle of 12.4. The apex of the scoliosis is centered on C4-C5.  Vertebral Fusion: No vertebral fusion is identified.  Listhesis: There is grade I degenerative posterolisthesis C5 on C6.  Lordosis: Significant degenerative straightening of the cervical lordosis is seen.  Intervertebral disc spaces: Multilevel loss of disc height is seen.  Osteophytes: Moderate to severe multilevel endplate osteophytes are seen.  Endplate Sclerosis: Mild multilevel endplate sclerosis is seen.  Uncovertebral degenerative changes: Moderate to severe multilevel uncovertebral joint arthrosis is seen.  Facet degenerative changes: Mild to moderate multilevel facet degenerative changes are seen.  Calcifications: None.  Fractures: No acute cervical spine fracture dislocation or subluxation is seen.    Miscellaneous:  Soft Tissues: Unremarkable.      Impression:  1. No acute cervical spine fracture dislocation or subluxation is seen.  2. Details and findings as noted above.                                         Medications   0.9%  NaCl infusion (has no administration in time range)   acetaminophen  tablet 650 mg (has no administration in time range)   oxyCODONE immediate release tablet 5 mg (has no administration in time range)   levETIRAcetam tablet 500 mg (has no administration in time range)   famotidine tablet 20 mg (has no administration in time range)   melatonin tablet 6 mg (has no administration in time range)   polyethylene glycol packet 17 g (has no administration in time range)   docusate sodium capsule 100 mg (has no administration in time range)   bisacodyL suppository 10 mg (has no administration in time range)   hydrALAZINE injection 10 mg (has no administration in time range)   labetaloL injection 10 mg (has no administration in time range)   amiodarone tablet 200 mg (has no administration in time range)   atorvastatin tablet 10 mg (has no administration in time range)   hydrALAZINE tablet 50 mg (has no administration in time range)   levothyroxine tablet 25 mcg (has no administration in time range)   lisinopriL tablet 20 mg (has no administration in time range)   metoprolol succinate (TOPROL-XL) 24 hr tablet 50 mg (has no administration in time range)   sertraline tablet 50 mg (has no administration in time range)   human prothrombin complex (PCC) (KCENTRA) 1,908 Units in sterile water for injection IVPB (0 Units Intravenous Stopped 11/13/23 2306)   hydrALAZINE injection 10 mg (10 mg Intravenous Given 11/13/23 2215)     Medical Decision Making  Problems Addressed:  Acute neck pain: acute illness or injury  Anticoagulated by anticoagulation treatment: chronic illness or injury with exacerbation, progression, or side effects of treatment  Aspirin long-term use: chronic illness or injury with exacerbation, progression, or side effects of treatment  Closed head injury, initial encounter: acute illness or injury  Fall, initial encounter: acute illness or injury  History of cerebrovascular accident (CVA) due to ischemia: chronic illness or injury  Intraparenchymal hemorrhage of brain: acute illness  or injury that poses a threat to life or bodily functions    Amount and/or Complexity of Data Reviewed  Labs: ordered.  Radiology: ordered.    Risk  Prescription drug management.  Decision regarding hospitalization.        ED assessment:    Ms. Main was transferred here for higher level of care, neurosurgical consultation after sustaining a minor head injury at home resulting in intraparenchymal hemorrhage.  Notably with recent ischemic CVA at this location.  Patient on anticoagulation treatment.  GCS 15.  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      Differential diagnosis (including but not limited to):   Closed head injury, intracranial hemorrhage, hemorrhagic conversion of ischemic CVA, cerebral contusion, anticoagulated, iatrogenic coagulopathy, cervical strain, cervical fracture    ED management:   Reviewed the workup completed at the previous facility.  Suspect hemorrhagic conversion of prior CVA; however, unclear if spontaneous or related to head injury.  Case discussed with neurosurgery who agrees to follow in consultation.  Case discussed with Trauma surgery who agrees to admission  She did have cervical spine tenderness and complain of neck pain as well.  No acute fracture subluxation noted on x-ray.    My independent radiology interpretation:   CT cervical spine:  No acute traumatic findings, no displaced fracture or acute subluxation      Amount and/or Complexity of Data Reviewed  Independent historian: none   Summary of history:   External data reviewed: notes from previous admissions and prior imaging  Summary of data reviewed:   Admitted at [facility] 10/03/2023.    MRI as below:    FINDINGS:     There is restricted diffusion within the medial occipital lobe and thalamus, both within the posterior cerebral artery distribution, with mild associated FLAIR hyperintensity and T1 hypointensity, more prominent in the thalamus than the occipital lobe. Scattered T2/FLAIR hyperintensity in  the periventricular white matter, corona radiata and centrum semiovale bilaterally to chronic microvascular angiopathic white matter disease. Gliosis is seen in the left frontal lobe, correlating with encephalomalacia on same-day CT. The ventricles and subarachnoid spaces are normal in size and configuration.  There is no midline shift or mass effect. The vascular flow-voids are unremarkable.     Risk and benefits of testing: discussed   Labs: ordered and reviewed  Radiology: ordered and independent interpretation performed (see above or ED course)    Discussion of management or test interpretation with external provider(s): discussed with Neurosurgery, Trauma surgery consultant   Summary of discussion:  As below    Risk  Decision regarding hospitalization  Shared decision making     Critical Care  30-74 minutes     Sandra ALLAN MD personally performed the history, PE, MDM, and procedures as documented above and agree with the scribe's documentation.                ED Course as of 11/15/23 1003   Mon Nov 13, 2023 2203 Discussed with Dr. Fontenot, Neurosurgery on-call, will follow in consultation. Recommends reversal of anticoagulation, admit ICU for q.1 hour neuro checks, BP control [KS]   2245 Discussed with Trauma NP, will evaluate at bedside  [KS]      ED Course User Index  [KS] Sandra Velazco MD                    Clinical Impression:   Final diagnoses:  [I61.9] Intraparenchymal hemorrhage of brain (Primary)  [S09.90XA] Closed head injury, initial encounter  [Z79.01] Anticoagulated by anticoagulation treatment  [Z79.82] Aspirin long-term use  [M54.2] Acute neck pain  [W19.XXXA] Fall, initial encounter  [Z86.73] History of cerebrovascular accident (CVA) due to ischemia        ED Disposition Condition    Admit Stable                Sandra Velazco MD  11/15/23 1008

## 2023-11-14 NOTE — ED NOTES
"Patient to ER transferred from [place] for intraparenchymal hemorrhage. Patient states her legs just "gave out on her" et she fell at home. Also c/o neck pain. Patient placed in c-collar, CM placed, et placed on purewick. HOB raised. Patient provided w/ warm blanket. RR nonlabored, NAD. Patient states she is blind d/t macular degeneration.  "

## 2023-11-14 NOTE — PROGRESS NOTES
Inpatient Nutrition Assessment    Admit Date: 11/13/2023   Total duration of encounter: 1 day   Patient Age: 73 y.o.    Nutrition Recommendation/Prescription     Advance diet when appropriate. Goal diet: regular with SLP recs for consistency.  If po intake >50% of meals, can consider adding cardiac diet restriction.  Add Boost (provides 240 kcal, 10 g protein per serving) vanilla TID.   If po intake of meals <50% of meals, will need to consider appetite stimulant per MD.     Communication of Recommendations: reviewed with nurse and reviewed with patient    Nutrition Assessment     Malnutrition Assessment/Nutrition-Focused Physical Exam    Malnutrition Context: acute illness or injury (11/14/23 0955)  Malnutrition Level: moderate (11/14/23 0955)  Energy Intake (Malnutrition):  (does not meet criteria) (11/14/23 0955)  Weight Loss (Malnutrition): greater than 7.5% in 3 months (11/14/23 0955)  Subcutaneous Fat (Malnutrition): mild depletion (11/14/23 0955)  Orbital Region (Subcutaneous Fat Loss): mild depletion  Upper Arm Region (Subcutaneous Fat Loss): mild depletion     Muscle Mass (Malnutrition): mild depletion (11/14/23 0955)  Jainism Region (Muscle Loss): mild depletion  Clavicle Bone Region (Muscle Loss): mild depletion                    Fluid Accumulation (Malnutrition):  (does not meet criteria) (11/14/23 0955)        A minimum of two characteristics is recommended for diagnosis of either severe or non-severe malnutrition.    Chart Review    Reason Seen: malnutrition screening tool (MST) and physician consult for wt loss    Malnutrition Screening Tool Results   Have you recently lost weight without trying?: Unsure  Have you been eating poorly because of a decreased appetite?: No   MST Score: 2   Diagnosis:  Intraparenchymal hemorrhage      Relevant Medical History:    Acute MI      Anxiety      Cardiac disease      COPD (chronic obstructive pulmonary disease)      Coronary artery disease      Hyperlipidemia    "   Hypertension      Hypothyroidism      Other specified noninfective gastroenteritis and colitis      Unspecified macular degeneration        Nutrition-Related Medications: Scheduled Meds:   acetaminophen  650 mg Oral Q4H    amiodarone  200 mg Oral BID    atorvastatin  10 mg Oral QHS    docusate sodium  100 mg Oral BID    famotidine  20 mg Oral Daily    hydrALAZINE  50 mg Oral TID    levETIRAcetam  500 mg Oral BID    levothyroxine  25 mcg Oral Before breakfast    lisinopriL  20 mg Oral BID    metoprolol succinate  50 mg Oral Q12H    polyethylene glycol  17 g Oral BID    sertraline  50 mg Oral Daily     Continuous Infusions:   sodium chloride 0.9% 100 mL/hr at 11/14/23 0714    nicardipine Stopped (11/14/23 0710)     PRN Meds:.bisacodyL, hydrALAZINE, labetaloL, melatonin, oxyCODONE   Calorie Containing IV Medications: no significant kcals from medications at this time    Nutrition-Related Labs:  Recent Labs   Lab 11/13/23  2217 11/14/23  0110    140   K 3.7 3.9   CALCIUM 9.5 9.5   CHLORIDE 108* 108*   CO2 23 20*   BUN 22.9* 22.5*   CREATININE 0.76 0.76   EGFRNORACEVR >60 >60   GLUCOSE 102 94   BILITOT 0.3 0.4   ALKPHOS 69 71   ALT 15 24   AST 16 21   ALBUMIN 3.4 3.7   WBC 7.64 8.64   HGB 13.1 13.7   HCT 40.4 43.5        Nutrition Orders:   No diet orders on file  Dietary nutrition supplements Boost Vanilla; TID    Appetite/Oral Intake: NPO/NPO    Factors Affecting Nutritional Intake: none identified    Food/Jehovah's witness/Cultural Preferences: none reported    Food Allergies: none reported    Wound(s):   none noted    Last Bowel Movement: 11/13/23    Comments    11/14/23: Noted unsure wt loss PTA. Pt confirmed wt loss, UBW from July 2023. Stated appetite good at home though. Willing to drink ONS, only wanting regular Boost (not plus.) SLP consulted, monitor for diet advancement. Discussed with RN giving regular diet with SLP recs for consistency unless po intake 100% of meals.     Anthropometrics    Height: 5' 3" " (160 cm) Height Method: Stated  Last Weight: 40.2 kg (88 lb 10 oz) (23 09) Weight Method: Bed Scale  BMI (Calculated): 15.7  BMI Classification: underweight (BMI less than 18.5)     Ideal Body Weight (IBW), Female: 115 lb     % Ideal Body Weight, Female (lb): 77.07 %                    Usual Body Weight (UBW), k.9 kg  % Usual Body Weight: 89.72  % Weight Change From Usual Weight: -10.47 %  Usual Weight Provided By: patient    Wt Readings from Last 5 Encounters:   23 40.2 kg (88 lb 10 oz)   10/01/23 40.8 kg (90 lb)   23 40.9 kg (90 lb 3.2 oz)   23 42.3 kg (93 lb 4.8 oz)   23 44.9 kg (98 lb 15.8 oz)     Weight Change(s) Since Admission: none  Wt Readings from Last 1 Encounters:   2353 40.2 kg (88 lb 10 oz)   23 0022 40.2 kg (88 lb 10 oz)   23 43.1 kg (95 lb)   Admit Weight: 43.1 kg (95 lb) (23), Weight Method: Bed Scale    Estimated Needs    Weight Used For Calorie Calculations: 40.2 kg (88 lb 10 oz)  Energy Calorie Requirements (kcal): 1545kcal (1.2 stress factor + 500kcal for wt gain)  Energy Need Method: Bullock-St Jeor  Weight Used For Protein Calculations: 40.2 kg (88 lb 10 oz)  Protein Requirements: 48-56gm (1.2-1.4g/kg)  Fluid Requirements (mL): 1206ml (30ml/kg)  Temp (24hrs), Av.2 °F (36.8 °C), Min:97.7 °F (36.5 °C), Max:98.8 °F (37.1 °C)       Enteral Nutrition    Patient not receiving enteral nutrition at this time.    Parenteral Nutrition    Patient not receiving parenteral nutrition support at this time.    Evaluation of Received Nutrient Intake    Calories: not meeting estimated needs  Protein: not meeting estimated needs    Patient Education    Not applicable.    Nutrition Diagnosis     PES: Malnutrition related to acute illness as evidenced by muscle wasting, fat loss, wt loss. (new)    Interventions/Goals     Intervention(s): general/healthful diet, commercial beverage, prescription medication, and collaboration with other  providers    Goal: Meet greater than 75% of nutritional needs by follow-up. (new)    Monitoring & Evaluation     Dietitian will monitor energy intake.    Nutrition Risk/Follow-Up: high (follow-up in 1-4 days)   Please consult if re-assessment needed sooner.

## 2023-11-14 NOTE — PT/OT/SLP EVAL
[facility]  Speech Language Pathology Department  Clinical Swallow Evaluation    Patient Name:  Cierra Main   MRN:  [id number]    Recommendations:     General recommendations:  SLP follow up for diet tolerance and cognitive-linguistic therapy  Diet texture/consistency recommendations:  Regular solids (IDDSI 7) and thin liquids (IDDSI 0)  Medications: per patient preference  Swallow strategies/precautions: upright for PO intake, assist with feeding as needed, and feed only when fully alert  Precautions: Standard, fall (visual impairments)    History:     Cierra Main is a/n 73 y.o. female admitted for a fall.    Past Medical History:   Diagnosis Date    Acute MI     Anxiety     Cardiac disease     COPD (chronic obstructive pulmonary disease)     Coronary artery disease     Hyperlipidemia     Hypertension     Hypothyroidism     Other specified noninfective gastroenteritis and colitis     Unspecified macular degeneration      Past Surgical History:   Procedure Laterality Date    CORONARY ANGIOGRAPHY N/A 2/1/2021    Procedure: ANGIOGRAM, CORONARY ARTERY;  Surgeon: Devon Medeiros MD;  Location: [facility] CATH LAB;  Service: Cardiology;  Laterality: N/A;    CORONARY ANGIOPLASTY WITH STENT PLACEMENT      LEFT HEART CATHETERIZATION Left 2/1/2021    Procedure: CATHETERIZATION, HEART, LEFT;  Surgeon: Devon Medeiros MD;  Location: [facility] CATH LAB;  Service: Cardiology;  Laterality: Left;       Home diet texture/consistency: Regular and thin liquids  Current method of nutrition: NPO    Patient complaint: Pt denies any difficulties with swallowing.     Subjective     Patient alert and cooperative.    Patient goals: to return home at [facility]     Spiritual/Cultural/Pentecostalism Beliefs/Practices that affect care: no  Pain/Comfort: Pain Rating 1: 0/10    Respiratory status: Room air  Restraints/positioning devices: none    Objective:     ORAL MUSCULATURE  Dentition:  adequate  Secretion Management:  adequate  Mucosal Quality: good  Facial Movement: WFL  Buccal Strength & Mobility: WFL  Mandibular Strength & Mobility: WFL  Oral Labial Strength & Mobility: WFL  Lingual Strength & Mobility: WFL  Velar Elevation: WFL  Vocal Quality: adequate  Volitional Cough:  present  Volitional Swallow: present    Consistency Fed By Oral Symptoms Pharyngeal Symptoms   Thin liquid by straw SLP None None   Puree SLP None None   Chewable solid SLP None None     Assessment:     Pt presents with WFL oropharyngeal swallow function with no clinical, overt signs/symptoms of aspiration during this evaluation.     Goals:     Multidisciplinary Problems       SLP Goals          Problem: SLP    Goal Priority Disciplines Outcome   SLP Goal     SLP Ongoing, Progressing   Description: LTG: Pt will demonstrate cognitive-linguistic skills at OF for improved safe, independent functioning and overall communication competence.     STG: Pt will participate in delayed memory exercises with 100% recall of 4 items with minimal assistance.   STG: Pt will participate in simple-complex convergent thinking exercises with 100% accuracy given minimal assistance.   STG: Pt will participate in simple-complex divergent thinking exercises with 100% accuracy given minimal assistance.                      Patient Education:     Patient provided with verbal education regarding results/recommendations.  Understanding was verbalized.    Plan:     SLP Follow-Up:  Yes   Patient to be seen:  3 x/week, 5 x/week   Plan of Care expires:  11/28/23  Plan of Care reviewed with:  patient      Time Tracking:     SLP Treatment Date:   11/14/23  Speech Start Time:  1400  Speech Stop Time:  1445     Speech Total Time (min):  45 min    Billable minutes:  Evaluation of Speech Sound Production with Comprehension and Expression, 30 minutes  Swallow and Oral Function Evaluation, 15 minutes     11/14/2023

## 2023-11-14 NOTE — PROGRESS NOTES
"   TRAUMA ICU PROGRESS NOTE    HD# 1  Admission Summary:   Patient is a 73 year old female who presents as a transfer from [place] following a fall at home resulting in intraparenchymal hemorrhage. She was transferred here for neurosurgical evaluation. Patient had an ischemic stroke 10/3/23. Fell today striking her head on the ground after he legs gave out on her. IPH today is in similar area of stroke per imaging reports. She reports PMH including "heart problems", MI x 5, anxiety, and blindness from macular degeneration. She also confirms DNR status.     Interval history:    NAEON. Repeat CT head Stable    Consults:   Neurosurgery Injuries:  Intraparenchymal hemorrhage of brain     [x]Problems list reviewed Operations/Procedures:  none     Past medical history:  Acute MI  Anxiety  COPD  CAD  HLD  HTN  Hyperthyroidism  Macular degeneration     Medications: [x] Medications reviewed/updated   Home Meds:    Current Outpatient Medications   Medication Instructions    acetaminophen (TYLENOL) 1,000 mg, Oral, Every 4 hours PRN    amiodarone (PACERONE) 200 mg, Oral, 2 times daily    apixaban (ELIQUIS) 5 mg, Oral, 2 times daily    aspirin (ECOTRIN) 81 mg, Oral, Daily    atorvastatin (LIPITOR) 10 mg, Oral, Nightly    cimetidine (TAGAMET) 200 mg, Oral, Nightly    docusate sodium (COLACE) 100 mg, Oral, 2 times daily    hydrALAZINE (APRESOLINE) 50 mg, Oral, 3 times daily    levothyroxine (SYNTHROID) 25 mcg, Oral, Before breakfast    lisinopriL (PRINIVIL,ZESTRIL) 20 mg, Oral, 2 times daily    melatonin 3 mg Cap 1 tablet, Oral, Nightly    metoprolol succinate (TOPROL-XL) 50 mg, Oral, Every 12 hours    montelukast (SINGULAIR) 10 mg, Oral    nitroGLYCERIN 0.4 MG/DOSE TL SPRY (NITROLINGUAL) 400 mcg/spray spray nitroglycerin 400 mcg/spray translingual aerosol   at onset of chest pain may take up to three sprays every 5min during a 15 min period    pravastatin (PRAVACHOL) 40 mg, Oral    predniSONE (DELTASONE) 10 MG tablet 1 tablet, " "Oral, Daily    sertraline (ZOLOFT) 50 mg, Oral, Daily      Scheduled Meds:    acetaminophen  650 mg Oral Q4H    amiodarone  200 mg Oral BID    atorvastatin  10 mg Oral QHS    docusate sodium  100 mg Oral BID    famotidine  20 mg Oral Daily    hydrALAZINE  50 mg Oral TID    levETIRAcetam  500 mg Oral BID    levothyroxine  25 mcg Oral Before breakfast    lisinopriL  20 mg Oral BID    metoprolol succinate  50 mg Oral Q12H    polyethylene glycol  17 g Oral BID    sertraline  50 mg Oral Daily     Continuous Infusions:    sodium chloride 0.9% 100 mL/hr at 23    nicardipine Stopped (23)     PRN Meds: bisacodyL, hydrALAZINE, labetaloL, melatonin, oxyCODONE     Vitals:  VITAL SIGNS: 24 HR MIN & MAX LAST   Temp  Min: 97.7 °F (36.5 °C)  Max: 98.8 °F (37.1 °C)  98.8 °F (37.1 °C)   BP  Min: 102/47  Max: 210/85  (!) 143/60    Pulse  Min: 56  Max: 69  61    Resp  Min: 12  Max: 25  16    SpO2  Min: 94 %  Max: 98 %  96 %      HT: 5' 3" (160 cm)  WT: 40.2 kg (88 lb 10 oz)  BMI: 15.7               General  Exam: NAD; appear mal nourished     Neuro/Psych  GCS: 15  Exam: Bump on back of head. A/O x4  ICP monitor: No  ICP treatment: ICP Treatment: N/A  C-Collar: No    Plan:   TBI- stable for transfer to floor will follow up with final recs for NSX     HEENT  Exam: NCAT    Plan:   monitor     Pulmonary  Vitals: Resp  Av.8  Min: 12  Max: 25  SpO2  Av.8 %  Min: 94 %  Max: 98 %    Ventilator/Oxygen Settings:            PaO2/FiO2 ratio (if ventilated): N/A  RSBI RR/TV (if ventilated): N/A     ABG:   No results for input(s): "PH", "PO2", "PCO2", "HCO3", "BE" in the last 168 hours.     CXR:    No results found in the last 24 hours.      Rib fractures: none  Chest Tube: None     Exam: CTA-B    Plan:     Monitor  IS  Incentive Spirometry/RT Treatments: IS     Cardiovascular  Vitals: Pulse  Av  Min: 56  Max: 69  BP  Min: 102/47  Max: 210/85  No results for input(s): "TROPONINI", "CKTOTAL", "CKMB", "BNP" in the " "last 168 hours.  Vasoactive Agents:  Cardene: 2.5 mcg/kg/min  Exam: RRR SBP high     Plan:   Restart all home meds for BP control wean off Cardene      Renal  Recent Labs     23  011   BUN 22.9* 22.5*   CREATININE 0.76 0.76       Recent Labs     23  0104   LACTIC 1.2       Intake/Output - Last 3 Shifts          07 0659  07 0659  0700  11/15 0659    I.V. (mL/kg)  705.6 (17.6) 27.3 (0.7)    IV Piggyback  75.3     Total Intake(mL/kg)  780.9 (19.4) 27.3 (0.7)    Net  +780.9 +27.3                    Intake/Output Summary (Last 24 hours) at 2023 1047  Last data filed at 2023 0714  Gross per 24 hour   Intake 808.18 ml   Output --   Net 808.18 ml         [place]man: No     Plan:   monitor     FEN/GI  Recent Labs     23  011    140   K 3.7 3.9   CO2 23 20*   CALCIUM 9.5 9.5   ALBUMIN 3.4 3.7   BILITOT 0.3 0.4   AST 16 21   ALKPHOS 69 71   ALT 15 24       Diet: Regular diet    Last BM: unknown    Abdominal Exam: S/NT/ND +BS     Plan:   Diet  Nutrition consult for malnourishment      Heme/Onc  Recent Labs     23  011   HGB 13.1 13.7   HCT 40.4 43.5    328   PTT 34.2*  --    INR 1.2  --        Transfusions (over past 24h): None    Plan:   monitor     ID  Temp  Av.2 °F (36.8 °C)  Min: 97.7 °F (36.5 °C)  Max: 98.8 °F (37.1 °C)      Recent Labs     23  011   WBC 7.64 8.64       Cultures: Antibiotics:    none 1. none     Plan:   monitor     Endocrine  Recent Labs     23  011   GLUCOSE 102 94      No results for input(s): "POCTGLUCOSE" in the last 72 hours.     Plan:   monitor  Insulin treatment: none     Musculoskeletal/Wounds  Weight bearing status:   RUE: WBAT  LUE: WBAT  RLE: WBAT  LLE: WBAT    [] Tertiary exam performed    Extremity/wound exam: bump on back of head no lesions  Plan:   Monitor for skin break down on back side     Precautions  Fall, Seizure, " Pressure ulcer prevention, and Standard     Prophylaxis  Seizure: Keppra (day 1/7)  DVT: Holding lovenox    GI: H2B     Lines/drains/airway [x] LDA reviewed/updated   Lines/Drains/Airways       Drain  Duration             Female External Urinary Catheter 11/14/23 0045 <1 day              Peripheral Intravenous Line  Duration                  Peripheral IV - Single Lumen 20 G Anterior;Left Forearm -- days         Peripheral IV - Single Lumen 20 G Anterior;Right Forearm -- days                    Plan:  Cont all lines      Restraints  Face to face evaluation of need for restraints on rounds today:   Currently restrained? No.        Disposition  Stable to transfer to floor.  TBI- follow up final recs from NSX; if OK will transfer to floor with medicine  HTN-restart all home meds; wean off cardene  Malnourishment- nutrition consult      Isrrael Douglas Jr, MD MS   Trauma Critical Care Surgery     30 minutes of critical care was spent on this patient personally by me on the following activities: development of treatment plan with patient and bedside nurse, discussions with consultants, evaluation of patient's response to treatment, examining the patient, ordering and preforming treatments and interventions, ordering and reviewing laboratory studies, ordering and reviewing radiologic studies, and re-evaluation of patient's condition.

## 2023-11-14 NOTE — H&P
"   Trauma ICU   History and Physical Note    Patient Name: Cierra Main  YOB: 1950  Date: 11/13/2023 11:36 PM  Date of Admission: 11/13/2023  HD#0  POD#* No surgery found *    PRESENTING HISTORY   Chief Complaint/Reason for Admission: Intraparenchymal hemorrhage   History of Present Illness:  Patient is a 73 year old female who presents as a transfer from [place] following a fall at home resulting in intraparenchymal hemorrhage. She was transferred here for neurosurgical evaluation. Patient had an ischemic stroke 10/3/23. Fell today striking her head on the ground after he legs gave out on her. IPH today is in similar area of stroke per imaging reports. She reports PMH including "heart problems", MI x 5, anxiety, and blindness from macular degeneration. She also confirms DNR status.    Review of Systems:  12 point ROS negative except as stated in HPI    PAST HISTORY:   Past medical history:  Past Medical History:   Diagnosis Date    Acute MI     Anxiety     Cardiac disease     COPD (chronic obstructive pulmonary disease)     Coronary artery disease     Hyperlipidemia     Hypertension     Hypothyroidism     Other specified noninfective gastroenteritis and colitis     Unspecified macular degeneration      Past Medical History:   Diagnosis Date    Acute MI     Anxiety     Cardiac disease     COPD (chronic obstructive pulmonary disease)     Coronary artery disease     Hyperlipidemia     Hypertension     Hypothyroidism     Other specified noninfective gastroenteritis and colitis     Unspecified macular degeneration        Past surgical history:  Past Surgical History:   Procedure Laterality Date    CORONARY ANGIOGRAPHY N/A 2/1/2021    Procedure: ANGIOGRAM, CORONARY ARTERY;  Surgeon: Devon Medeiros MD;  Location: [facility] CATH LAB;  Service: Cardiology;  Laterality: N/A;    CORONARY ANGIOPLASTY WITH STENT PLACEMENT      LEFT HEART CATHETERIZATION Left 2/1/2021    Procedure: CATHETERIZATION, HEART, LEFT;  " Surgeon: Devon Medeiros MD;  Location: [facility] CATH LAB;  Service: Cardiology;  Laterality: Left;     Past Surgical History:   Procedure Laterality Date    CORONARY ANGIOGRAPHY N/A 2/1/2021    Procedure: ANGIOGRAM, CORONARY ARTERY;  Surgeon: Devon Medeiros MD;  Location: [facility] CATH LAB;  Service: Cardiology;  Laterality: N/A;    CORONARY ANGIOPLASTY WITH STENT PLACEMENT      LEFT HEART CATHETERIZATION Left 2/1/2021    Procedure: CATHETERIZATION, HEART, LEFT;  Surgeon: Devon Medeiros MD;  Location: [facility] CATH LAB;  Service: Cardiology;  Laterality: Left;       Family history:  Family History   Problem Relation Age of Onset    Diabetes Mother     Heart disease Mother     Hypertension Father     Heart disease Father        Social history:  Social History     Socioeconomic History    Marital status:    Tobacco Use    Smoking status: Every Day     Current packs/day: 0.50     Average packs/day: 0.5 packs/day for 55.0 years (27.5 ttl pk-yrs)     Types: Cigarettes    Smokeless tobacco: Never   Substance and Sexual Activity    Alcohol use: Never    Drug use: Never    Sexual activity: Not Currently     Social Determinants of Health     Financial Resource Strain: Low Risk  (9/20/2023)    Overall Financial Resource Strain (CARDIA)     Difficulty of Paying Living Expenses: Not hard at all   Food Insecurity: No Food Insecurity (9/20/2023)    Hunger Vital Sign     Worried About Running Out of Food in the Last Year: Never true     Ran Out of Food in the Last Year: Never true   Transportation Needs: No Transportation Needs (9/20/2023)    PRAPARE - Transportation     Lack of Transportation (Medical): No     Lack of Transportation (Non-Medical): No   Physical Activity: Insufficiently Active (9/20/2023)    Exercise Vital Sign     Days of Exercise per Week: 4 days     Minutes of Exercise per Session: 20 min   Stress: No Stress Concern Present (9/20/2023)    [employer] - Occupational Stress  Questionnaire     Feeling of Stress : Not at all   Social Connections: Moderately Integrated (9/20/2023)    Social Connection and Isolation Panel [NHANES]     Frequency of Communication with Friends and Family: More than three times a week     Frequency of Social Gatherings with Friends and Family: More than three times a week     Attends Sabianist Services: 1 to 4 times per year     Active Member of Clubs or Organizations: No     Attends Club or Organization Meetings: 1 to 4 times per year     Marital Status:    Housing Stability: Low Risk  (9/20/2023)    Housing Stability Vital Sign     Unable to Pay for Housing in the Last Year: No     Number of Places Lived in the Last Year: 1     Unstable Housing in the Last Year: No     Social History     Tobacco Use   Smoking Status Every Day    Current packs/day: 0.50    Average packs/day: 0.5 packs/day for 55.0 years (27.5 ttl pk-yrs)    Types: Cigarettes   Smokeless Tobacco Never      Social History     Substance and Sexual Activity   Alcohol Use Never        MEDICATIONS & ALLERGIES:   Allergies:   Review of patient's allergies indicates:   Allergen Reactions    Amlodipine     Losartan      Home Meds:   Current Outpatient Medications   Medication Instructions    acetaminophen (TYLENOL) 1,000 mg, Oral, Every 4 hours PRN    amiodarone (PACERONE) 200 mg, Oral, 2 times daily    apixaban (ELIQUIS) 5 mg, Oral, 2 times daily    aspirin (ECOTRIN) 81 mg, Oral, Daily    atorvastatin (LIPITOR) 10 mg, Oral, Nightly    cimetidine (TAGAMET) 200 mg, Oral, Nightly    docusate sodium (COLACE) 100 mg, Oral, 2 times daily    hydrALAZINE (APRESOLINE) 50 mg, Oral, 3 times daily    levothyroxine (SYNTHROID) 25 mcg, Oral, Before breakfast    lisinopriL (PRINIVIL,ZESTRIL) 20 mg, Oral, 2 times daily    melatonin 3 mg Cap 1 tablet, Oral, Nightly    metoprolol succinate (TOPROL-XL) 50 mg, Oral, Every 12 hours    montelukast (SINGULAIR) 10 mg, Oral    nitroGLYCERIN 0.4 MG/DOSE TL SPRY  (NITROLINGUAL) 400 mcg/spray spray nitroglycerin 400 mcg/spray translingual aerosol   at onset of chest pain may take up to three sprays every 5min during a 15 min period    pravastatin (PRAVACHOL) 40 mg, Oral    predniSONE (DELTASONE) 10 MG tablet 1 tablet, Oral, Daily    sertraline (ZOLOFT) 50 mg, Oral, Daily      No current facility-administered medications on file prior to encounter.     Current Outpatient Medications on File Prior to Encounter   Medication Sig Dispense Refill    acetaminophen (TYLENOL) 500 MG tablet Take 1,000 mg by mouth every 4 (four) hours as needed for Pain or Temperature greater than.      amiodarone (PACERONE) 200 MG Tab Take 200 mg by mouth 2 (two) times daily.      apixaban (ELIQUIS) 5 mg Tab Take 5 mg by mouth 2 (two) times daily.      aspirin (ECOTRIN) 81 MG EC tablet Take 81 mg by mouth once daily.      atorvastatin (LIPITOR) 10 MG tablet Take 10 mg by mouth every evening.      cimetidine (TAGAMET) 200 MG tablet Take 200 mg by mouth nightly.      hydrALAZINE (APRESOLINE) 50 MG tablet Take 1 tablet (50 mg total) by mouth 3 (three) times daily. 90 tablet 11    levothyroxine (SYNTHROID) 25 MCG tablet Take 1 tablet (25 mcg total) by mouth before breakfast. 90 tablet 1    lisinopriL (PRINIVIL,ZESTRIL) 20 MG tablet Take 1 tablet (20 mg total) by mouth 2 (two) times daily. 90 tablet 1    melatonin 3 mg Cap Take 1 tablet by mouth every evening.      metoprolol succinate (TOPROL-XL) 50 MG 24 hr tablet Take 50 mg by mouth every 12 (twelve) hours.      sertraline (ZOLOFT) 50 MG tablet Take 1 tablet (50 mg total) by mouth once daily. 90 tablet 1    docusate sodium (COLACE) 100 MG capsule Take 100 mg by mouth 2 (two) times daily.      montelukast (SINGULAIR) 10 mg tablet Take 10 mg by mouth.      nitroGLYCERIN 0.4 MG/DOSE TL SPRY (NITROLINGUAL) 400 mcg/spray spray nitroglycerin 400 mcg/spray translingual aerosol   at onset of chest pain may take up to three sprays every 5min during a 15 min  period      pravastatin (PRAVACHOL) 40 MG tablet TAKE 1 TABLET BY MOUTH EVERY DAY 90 tablet 1    predniSONE (DELTASONE) 10 MG tablet Take 1 tablet by mouth once daily.        No current facility-administered medications on file prior to encounter.     Current Outpatient Medications on File Prior to Encounter   Medication Sig    acetaminophen (TYLENOL) 500 MG tablet Take 1,000 mg by mouth every 4 (four) hours as needed for Pain or Temperature greater than.    amiodarone (PACERONE) 200 MG Tab Take 200 mg by mouth 2 (two) times daily.    apixaban (ELIQUIS) 5 mg Tab Take 5 mg by mouth 2 (two) times daily.    aspirin (ECOTRIN) 81 MG EC tablet Take 81 mg by mouth once daily.    atorvastatin (LIPITOR) 10 MG tablet Take 10 mg by mouth every evening.    cimetidine (TAGAMET) 200 MG tablet Take 200 mg by mouth nightly.    hydrALAZINE (APRESOLINE) 50 MG tablet Take 1 tablet (50 mg total) by mouth 3 (three) times daily.    levothyroxine (SYNTHROID) 25 MCG tablet Take 1 tablet (25 mcg total) by mouth before breakfast.    lisinopriL (PRINIVIL,ZESTRIL) 20 MG tablet Take 1 tablet (20 mg total) by mouth 2 (two) times daily.    melatonin 3 mg Cap Take 1 tablet by mouth every evening.    metoprolol succinate (TOPROL-XL) 50 MG 24 hr tablet Take 50 mg by mouth every 12 (twelve) hours.    sertraline (ZOLOFT) 50 MG tablet Take 1 tablet (50 mg total) by mouth once daily.    docusate sodium (COLACE) 100 MG capsule Take 100 mg by mouth 2 (two) times daily.    montelukast (SINGULAIR) 10 mg tablet Take 10 mg by mouth.    nitroGLYCERIN 0.4 MG/DOSE TL SPRY (NITROLINGUAL) 400 mcg/spray spray nitroglycerin 400 mcg/spray translingual aerosol   at onset of chest pain may take up to three sprays every 5min during a 15 min period    pravastatin (PRAVACHOL) 40 MG tablet TAKE 1 TABLET BY MOUTH EVERY DAY    predniSONE (DELTASONE) 10 MG tablet Take 1 tablet by mouth once daily.     Scheduled Meds:   [START ON 11/14/2023] acetaminophen  650 mg Oral Q4H     "docusate sodium  100 mg Oral BID    famotidine  20 mg Oral BID    levETIRAcetam  500 mg Oral BID    polyethylene glycol  17 g Oral BID     Continuous Infusions:   [START ON 11/14/2023] sodium chloride 0.9%       PRN Meds:[START ON 11/14/2023] bisacodyL, [START ON 11/14/2023] hydrALAZINE, [START ON 11/14/2023] labetaloL, [START ON 11/14/2023] melatonin, [START ON 11/14/2023] oxyCODONE    OBJECTIVE:   Vital Signs:  VITAL SIGNS: 24 HR MIN & MAX LAST   Temp  Min: 97.7 °F (36.5 °C)  Max: 97.7 °F (36.5 °C)  97.7 °F (36.5 °C)   BP  Min: 136/59  Max: 189/72  (!) 154/62    Pulse  Min: 56  Max: 65  63    Resp  Min: 20  Max: 20  20    SpO2  Min: 95 %  Max: 96 %  95 %      HT: 5' 3" (160 cm)  WT: 43.1 kg (95 lb)  BMI: 16.8     Lines/drains/airway:       Peripheral IV - Single Lumen 20 G Anterior;Right Forearm (Active)   Number of days:             Peripheral IV - Single Lumen 20 G Anterior;Left Forearm (Active)   Site Assessment Clean;Dry;Intact 11/13/23 2236   Dressing Status Clean;Dry;Intact 11/13/23 2236   Number of days:        Physical Exam:  General:  Well developed, well nourished, no acute distress  HEENT:  Normocephalic, atraumatic. PERRL.  CV:  RR  Resp: NWOB on RA  GI:  Abdomen soft, non-tender, non-distended  :  Deferred  MSK:  No muscle atrophy, cyanosis, peripheral edema, moving all extremities spontaneously  Skin/Wounds:  No rashes, ulcers, erythema  Neuro: CNII-XII grossly intact, alert and oriented to person, place, and time, strength and motor function grossly intact to all extremities, sensation intact to all extremities     Labs:  Troponin:  No results for input(s): "TROPONINI" in the last 72 hours.  CBC:  Recent Labs     11/13/23 2217   WBC 7.64   RBC 4.61   HGB 13.1   HCT 40.4      MCV 87.6   MCH 28.4   MCHC 32.4*     CMP:  Recent Labs     11/13/23  2217   CALCIUM 9.5   ALBUMIN 3.4      K 3.7   CO2 23   BUN 22.9*   CREATININE 0.76   ALKPHOS 69   ALT 15   AST 16   BILITOT 0.3     Lactic " "Acid:  No results for input(s): "LACTATE" in the last 72 hours.  ETOH:  No results for input(s): "ETHANOL" in the last 72 hours.   Urine Drug Screen:  No results for input(s): "COCAINE", "OPIATE", "BARBITURATE", "AMPHETAMINE", "FENTANYL", "CANNABINOIDS", "MDMA" in the last 72 hours.    Invalid input(s): "BENZODIAZEPINE", "PHENCYCLIDINE"   ABG:  No results for input(s): "PH", "PCO2", "PO2", "HCO3", "BE", "POCSATURATED" in the last 72 hours.    Diagnostic Results:  CT Cervical Spine Without Contrast         CT Previous   Final Result          ASSESSMENT & PLAN:    73F s/p fall. Denies any weakness, dizziness or HA prior to fall. State her legs gave out while she was using her walker and she fell back striking her head. Neurosurgery consulted by the ED physician who recommended ICU admission, Q1H neuro checks and repeat CT head in AM.     Consults:  Neurosurgery     Neuro/psych:  - GCS 15(E 4, V 5, M 6)   - Multimodal pain control   - C-Collar in place  - Q1H neuro checks  - CT head in AM     Pulmonary:  - IS, pulmonary toilet     Cardiovascular:  - Cardiac monitoring while in the ICU  - Keep SBP < 140 per Neurosurgery recommendations     Renal:  - Strict I&Os     FEN/GI:  - IVF: Normal Saline @ 100 cc/hr  - Diet:  NPO except for medications  - Daily CMP  - Replace electrolytes as needed based on daily labs     Heme/ID:  - Daily CBC  - Antibiotics not indicated at this time.    Endocrine:  - BG <180     Musculoskeletal:  - PT/OT when able to participate  - WB status:   RUE: WBAT  LUE: WBAT  RLE: WBAT  LLE: WBAT  - Tertiary when appropriate      Wounds:  - Local wound care     Precautions:  Fall, Seizure, Pressure ulcer prevention, and Standard    Prophylaxis:  GI: H2B  Seizure: Keppra (day  1/7)  DVT: SCDs and Holding anticoagulation in light of intracranial hemorrhage     LDA:  Peripheral IV    Disposition:  Admit to trauma ICU for close neurological monitoring.      Taylor Tapia, AGACNP-BC, FNP-BC  "

## 2023-11-14 NOTE — PLAN OF CARE
Problem: Occupational Therapy  Goal: Occupational Therapy Goal  Description: LTG: Pt will perform basic ADLs and ADL transfers with Modified independence using LRAD by discharge.    STG: to be met by 12/14/23    Pt will complete grooming standing at sink with LRAD with SBA.  Pt will complete UB dressing with SBA.  Pt will complete LB dressing with SBA using LRAD.  Pt will complete toileting with SBA using LRAD.  Pt will complete functional mobility to/from toilet and toilet transfer with SBA using LRAD.   Outcome: Ongoing, Progressing

## 2023-11-14 NOTE — PT/OT/SLP EVAL
Occupational Therapy  Evaluation    Name: Cierra Main  MRN: [id number]  Admitting Diagnosis: t/f from [place], fall  Recent Surgery: * No surgery found *      Recommendations:     Discharge therapy intensity: Low Intensity Therapy (pending progress)   Discharge Equipment Recommendations:  other (see comments) (TBD)  Barriers to discharge:  Other (Comment) (impaired self-care and functional mobility)    Assessment:     Cierra Main is a 73 y.o. female with a medical diagnosis of IPH - left occipital following fall. Pt has a PMH including macular degeneration and previous CVA on 10/3. She presents with low vision resulting from macular degeneration. She presents anxious and requires encouragement to participate in tasks. She presents with the following performance deficits affecting function: weakness, impaired functional mobility, visual deficits, impaired self care skills, impaired balance, impaired endurance. She required min A-CGA using rolling walker for functional mobility within room.     Rehab Prognosis: Good; patient would benefit from acute skilled OT services to address these deficits and reach maximum level of function.       Plan:     Patient to be seen 3 x/week to address the above listed problems via self-care/home management, therapeutic activities, therapeutic exercises  Plan of Care Expires: 12/12/23  Plan of Care Reviewed with: patient    Subjective     Chief Complaint: c/o of feeling cold throughout session  Patient/Family Comments/goals: To return home.     Occupational Profile:  Living Environment: Pt reports her son intermittently lives with her in a single level home with a small threshold to enter. Pt has a tub/shower combo with no shower chair.   Previous level of function: Pt reports being independent with ADLs prior.   Roles and Routines: Retired book keeper, mother  Equipment Used at Home: wheelchair  Assistance upon Discharge: Pt reports her son can assist sometimes upon d/c.      Pain/Comfort:  Pain Rating 1: 0/10 (pt c/o feeling cold)    Patients cultural, spiritual, Christianity conflicts given the current situation: no    Objective:     OT communicated with RN prior to session.      Patient was found HOB elevated with blood pressure cuff, PureWick, telemetry, SCD, pulse ox (continuous), peripheral IV upon OT entry to room.    General Precautions: Standard, fall (BP <140/90)  Orthopedic Precautions: N/A  Braces: N/A    Vital Signs: Blood Pressure: 123/56  HR: 65  Respiratory Status: on room air    Bed Mobility:    Patient completed Supine to Sit with minimum assistance    Functional Mobility/Transfers:  Patient completed Sit <> Stand Transfer with minimum assistance  with  rolling walker   Patient completed Chair Transfer using Step Transfer technique with minimum assistance with rolling walker  Patient completed bedside commode Transfer Step Transfer technique with minimum assistance with  rolling walker  Functional Mobility: Pt ambulated within room with CGA using rolling walker to complete ADL tasks.     Activities of Daily Living:  Grooming: contact guard assistance to brush teeth while standing at the sink using rolling walker.   Toileting: minimum assistance to complete bedside commode transfer using rolling walker. Pt able to complete toileting hygiene with CGA while seated.     AMPAC 6 Click ADL:  AMPAC Total Score: 20    Functional Cognition:  Orientation: oriented to Person, Place, Time, and Situation  Initiation: Requires verbal cues.     Visual Perceptual Skills:  Low vision due to macular degeneration.     Upper Extremity Function:  Right Upper Extremity:   Range of Motion: WFL  Strength: WFL  Coordination: WFL    Left Upper Extremity:  Range of Motion: WFL  Strength: WFL  Coordination: WFL    Balance:   Static standing balance:Impaired.    Dynamic standing balance:Impaired.      Therapeutic Positioning  Risk for acquired pressure injuries is increased due to relative  decrease in mobility d/t hospitalization .    OT interventions performed during the course of today's session:   Therapeutic positioning was provided at the conclusion of session to offload all bony prominences for the prevention and/or reduction of pressure injuries    Skin assessment: all bony prominences were assessed    Findings: no redness or breakdown noted    OT recommendations for therapeutic positioning throughout hospitalization:   Follow [facility] Pressure Injury Prevention Protocol    Patient Education:  Patient provided with verbal education education regarding OT role/goals/POC, fall prevention, safety awareness, and Discharge/DME recommendations.  Understanding was verbalized, however additional teaching warranted.     Patient left up in chair with all lines intact, call button in reach, and RN notified    GOALS:   Multidisciplinary Problems       Occupational Therapy Goals          Problem: Occupational Therapy    Goal Priority Disciplines Outcome Interventions   Occupational Therapy Goal     OT, PT/OT Ongoing, Progressing    Description: LTG: Pt will perform basic ADLs and ADL transfers with Modified independence using LRAD by discharge.    STG: to be met by 12/14/23    Pt will complete grooming standing at sink with LRAD with SBA.  Pt will complete UB dressing with SBA.  Pt will complete LB dressing with SBA using LRAD.  Pt will complete toileting with SBA using LRAD.  Pt will complete functional mobility to/from toilet and toilet transfer with SBA using LRAD.                        History:     Past Medical History:   Diagnosis Date    Acute MI     Anxiety     Cardiac disease     COPD (chronic obstructive pulmonary disease)     Coronary artery disease     Hyperlipidemia     Hypertension     Hypothyroidism     Other specified noninfective gastroenteritis and colitis     Unspecified macular degeneration          Past Surgical History:   Procedure Laterality Date    CORONARY ANGIOGRAPHY N/A 2/1/2021     Procedure: ANGIOGRAM, CORONARY ARTERY;  Surgeon: Devon Medeiros MD;  Location: [facility] CATH LAB;  Service: Cardiology;  Laterality: N/A;    CORONARY ANGIOPLASTY WITH STENT PLACEMENT      LEFT HEART CATHETERIZATION Left 2/1/2021    Procedure: CATHETERIZATION, HEART, LEFT;  Surgeon: Devon Medeiros MD;  Location: [facility] CATH LAB;  Service: Cardiology;  Laterality: Left;       Time Tracking:     OT Date of Treatment: 11/14/23  OT Start Time: 1028  OT Stop Time: 1057  OT Total Time (min): 29 min    Billable Minutes:Evaluation Moderate Complexity.     11/14/2023

## 2023-11-14 NOTE — CONSULTS
[facility] - [facility]  Neurosurgery  Consult Note    Inpatient consult to Neurosurgery  Consult performed by: Carley Montoya PA  Consult ordered by: Sandra Velazco MD        Subjective:     Chief Complaint/Reason for Admission: HA    History of Present Illness: Patient is a 73 year old female with PMHx significant for MI x 5, A-fib, CAD, HLD, HTN, hypothyroidism, anxiety, and blindness from macular degeneration, who presented as a transfer from [place] on 11/13 following a fall at home resulting in intraparenchymal hemorrhage. She was transferred here for neurosurgical evaluation. Patient had an ischemic stroke 10/3/23, imaging done at [facility]. Fell 11/13, striking her head on the ground after he legs gave out on her. [facility] today is in similar area of stroke per imaging reports. She confirms DNR status.      Repeat CT head shows stable mild parenchymal hemorrhage in the left occipital lobe and associated mild perimetric edema. Dr. Fontenot has been consulted for evaluation and treatment recommendations.    On PE this am she is sitting up in bed, NAD. She is sleepy but easily arouses. She c/o pain to the back of her head. She denies N/V. Her vision is very altered d/t macular degeneration, no new vision changes since fall. She follows commands in all ext. She has not yet gotten OOB.    PTA Medications   Medication Sig    acetaminophen (TYLENOL) 500 MG tablet Take 1,000 mg by mouth every 4 (four) hours as needed for Pain or Temperature greater than.    amiodarone (PACERONE) 200 MG Tab Take 200 mg by mouth 2 (two) times daily.    apixaban (ELIQUIS) 5 mg Tab Take 5 mg by mouth 2 (two) times daily.    aspirin (ECOTRIN) 81 MG EC tablet Take 81 mg by mouth once daily.    atorvastatin (LIPITOR) 10 MG tablet Take 10 mg by mouth every evening.    cimetidine (TAGAMET) 200 MG tablet Take 200 mg by mouth nightly.    hydrALAZINE (APRESOLINE) 50 MG tablet Take 1 tablet (50 mg total) by mouth 3 (three)  times daily.    levothyroxine (SYNTHROID) 25 MCG tablet Take 1 tablet (25 mcg total) by mouth before breakfast.    lisinopriL (PRINIVIL,ZESTRIL) 20 MG tablet Take 1 tablet (20 mg total) by mouth 2 (two) times daily.    melatonin 3 mg Cap Take 1 tablet by mouth every evening.    metoprolol succinate (TOPROL-XL) 50 MG 24 hr tablet Take 50 mg by mouth every 12 (twelve) hours.    sertraline (ZOLOFT) 50 MG tablet Take 1 tablet (50 mg total) by mouth once daily.    docusate sodium (COLACE) 100 MG capsule Take 100 mg by mouth 2 (two) times daily.    montelukast (SINGULAIR) 10 mg tablet Take 10 mg by mouth.    nitroGLYCERIN 0.4 MG/DOSE TL SPRY (NITROLINGUAL) 400 mcg/spray spray nitroglycerin 400 mcg/spray translingual aerosol   at onset of chest pain may take up to three sprays every 5min during a 15 min period    pravastatin (PRAVACHOL) 40 MG tablet TAKE 1 TABLET BY MOUTH EVERY DAY    predniSONE (DELTASONE) 10 MG tablet Take 1 tablet by mouth once daily.       Review of patient's allergies indicates:   Allergen Reactions    Amlodipine      Leg weakness    Losartan        Past Medical History:   Diagnosis Date    Acute MI     Anxiety     Cardiac disease     COPD (chronic obstructive pulmonary disease)     Coronary artery disease     Hyperlipidemia     Hypertension     Hypothyroidism     Other specified noninfective gastroenteritis and colitis     Unspecified macular degeneration      Past Surgical History:   Procedure Laterality Date    CORONARY ANGIOGRAPHY N/A 2/1/2021    Procedure: ANGIOGRAM, CORONARY ARTERY;  Surgeon: Devon Medeiros MD;  Location: [facility] CATH LAB;  Service: Cardiology;  Laterality: N/A;    CORONARY ANGIOPLASTY WITH STENT PLACEMENT      LEFT HEART CATHETERIZATION Left 2/1/2021    Procedure: CATHETERIZATION, HEART, LEFT;  Surgeon: Devon Medeiros MD;  Location: [facility] CATH LAB;  Service: Cardiology;  Laterality: Left;     Family History       Problem Relation (Age of Onset)    Diabetes Mother    Heart disease  Mother, Father    Hypertension Father          Tobacco Use    Smoking status: Every Day     Current packs/day: 0.50     Average packs/day: 0.5 packs/day for 55.0 years (27.5 ttl pk-yrs)     Types: Cigarettes    Smokeless tobacco: Never   Substance and Sexual Activity    Alcohol use: Never    Drug use: Never    Sexual activity: Not Currently     Review of Systems  Objective:   12 pt ROS WNL, except for HPI    Weight: 40.2 kg (88 lb 10 oz)  Body mass index is 15.7 kg/m².  Vital Signs (Most Recent):  Temp: 98.8 °F (37.1 °C) (11/14/23 0800)  Pulse: 61 (11/14/23 1000)  Resp: 16 (11/14/23 1000)  BP: (!) 143/60 (11/14/23 1000)  SpO2: 96 % (11/14/23 1000) Vital Signs (24h Range):  Temp:  [97.7 °F (36.5 °C)-98.8 °F (37.1 °C)] 98.8 °F (37.1 °C)  Pulse:  [56-69] 61  Resp:  [12-25] 16  SpO2:  [94 %-98 %] 96 %  BP: (102-210)/(45-85) 143/60     Date 11/14/23 0700 - 11/15/23 0659   Shift 6180-3338 1713-2916 6565-6182 24 Hour Total   INTAKE   I.V.(mL/kg) 27.3(0.7)   27.3(0.7)   Shift Total(mL/kg) 27.3(0.7)   27.3(0.7)   OUTPUT   Shift Total(mL/kg)       Weight (kg) 40.2 40.2 40.2 40.2                       Female External Urinary Catheter 11/14/23 0045 (Active)   Skin no redness;no breakdown 11/14/23 0800   Tolerance no signs/symptoms of discomfort 11/14/23 0800   Suction Continuous suction at 70 mmHg 11/14/23 0800   Date of last wick change 11/14/23 11/14/23 0800   Time of last wick change 0045 11/14/23 0800       Physical Exam:  Nursing note and vitals reviewed.    Constitutional: She appears well-developed. No distress.     Eyes: Pupils are equal, round, and reactive to light.     Cardiovascular: Intact distal pulses.     Abdominal: Soft.     Psych/Behavior: She is alert. She is oriented to person, place, and time. She has a normal mood and affect.     Musculoskeletal:        Neck: Range of motion is full.        Back: Range of motion is full.        Right Upper Extremities: Range of motion is full.        Left Upper  Extremities: Range of motion is full.       Right Lower Extremities: Range of motion is full.        Left Lower Extremities: Range of motion is full.     Neurological:        Sensory: There is no sensory deficit in the trunk. There is no sensory deficit in the extremities.        Cranial nerves: Cranial nerve(s) II, III, IV, V, VI, VII, VIII, IX, X, XI and XII are intact.     Significant Labs:  Recent Labs   Lab 11/13/23 2217 11/14/23 0110    140   K 3.7 3.9   CO2 23 20*   BUN 22.9* 22.5*   CREATININE 0.76 0.76   CALCIUM 9.5 9.5     Recent Labs   Lab 11/13/23 2217 11/14/23 0110   WBC 7.64 8.64   HGB 13.1 13.7   HCT 40.4 43.5    328     Recent Labs   Lab 11/13/23 2217   INR 1.2     Microbiology Results (last 7 days)       ** No results found for the last 168 hours. **            Significant Diagnostics:  CT Head Without Contrast [[id number]] Resulted: 11/14/23 0534   Order Status: Completed Updated: 11/14/23 0535   Narrative:     START OF REPORT:  Technique: CT of the head was performed without intravenous contrast with axial as well as coronal and sagittal images.    Comparison: None.    Dosage Information: Automated Exposure Control was utilized 993.74 mGy.cm.    Clinical history: Intracranial hemorrhage.    Findings:  Hemorrhage: There is stable mild parenchymal hemorrhage in the left occipital lobe and associated mild perimetric edema currently measuring 1.9 x 1.5 x 1.1 cm. The left occipital encephalomalacia changes appear stable as well.  CSF spaces: The ventricles sulci and basal cisterns are within normal limits.  Brain parenchyma: There is preservation of the grey white junction throughout. There is encephalomalacic change seen in the left frontal lobe, unchanged from prior.  Cerebellum: Unremarkable.  Vascular: Atheromatous calcification of the intracranial arteries is seen.  Sella and skull base: The sella appears to be within normal limits for age.  Cerebellopontine angles: Within normal  limits.  Intracranial calcifications: Incidental note is made of bilateral choroid plexus calcification. Incidental note is made of some pineal region calcification. Incidental note is made of some calcification of the falx.  Calvarium: No acute linear or depressed skull fracture is seen.    Maxillofacial Structures:  Paranasal sinuses: There is some opacity of the left maxillary sinuses and left ethmoid air cells and left frontal sinuses and left sphenoid sinuses. This is consistent with acute sinusitis. The rest of the paranasal sinsuses appear clear.  Orbits: The orbits appear unremarkable.  Zygomatic arches: The zygomatic arches are intact and unremarkable.  Temporal bones and mastoids: The temporal bones and mastoids appear unremarkable.  TMJ: The mandibular condyles appear normally placed with respect to the mandibular fossa.  Nasal Bones: The nasal septum is midline.    Visualized upper cervical spine: The visualized cervical spine appears unremarkable.      Impression:  1. There is stable mild parenchymal hemorrhage in the left occipital lobe and associated mild perimetric edema currently measuring 1.9 x 1.5 x 1.1 cm. The left occipital encephalomalacia changes appear stable as well. Follow-up as clinically indicated.       Assessment/Plan:     Active Diagnoses:    Diagnosis Date Noted POA    PRINCIPAL PROBLEM:  Intraparenchymal hematoma of brain [S06.33AA] 11/13/2023 Unknown    Moderate malnutrition [E44.0] 11/14/2023 Yes      Problems Resolved During this Admission:     She is currently GCS 15 with minimal c/o HA.  Repeat CT head is stable.  Chart shows patient had recent imaging for stroke; no notes available for review  We will repeat MRI brain  CTA head and neck ordered  Neurology has been consulted  OK for Q2 hour neuro exams  BP parameters below 140/90  Keppra BID  OK for PT/OT  OK for diet  SCDs for DVT prophylaxis; hold anticoagulation for now  Call with any neuro decline    Thank you for your  consult. I will follow-up with patient. Please contact us if you have any additional questions.    ROSALES Reyna  Neurosurgery  [facility] - [facility]

## 2023-11-14 NOTE — PLAN OF CARE
Problem: Adult Inpatient Plan of Care  Goal: Plan of Care Review  Outcome: Ongoing, Progressing  Goal: Patient-Specific Goal (Individualized)  Outcome: Ongoing, Progressing  Goal: Absence of Hospital-Acquired Illness or Injury  Outcome: Ongoing, Progressing  Goal: Optimal Comfort and Wellbeing  Outcome: Ongoing, Progressing  Goal: Readiness for Transition of Care  Outcome: Ongoing, Progressing     Problem: Adjustment to Illness (Stroke, Hemorrhagic)  Goal: Optimal Coping  Outcome: Ongoing, Progressing     Problem: Bowel Elimination Impaired (Stroke, Hemorrhagic)  Goal: Effective Bowel Elimination  Outcome: Ongoing, Progressing     Problem: Cerebral Tissue Perfusion (Stroke, Hemorrhagic)  Goal: Optimal Cerebral Tissue Perfusion  Outcome: Ongoing, Progressing     Problem: Cognitive Impairment (Stroke, Hemorrhagic)  Goal: Optimal Cognitive Function  Outcome: Ongoing, Progressing     Problem: Functional Ability Impaired (Stroke, Hemorrhagic)  Goal: Optimal Functional Ability  Outcome: Ongoing, Progressing     Problem: Pain (Stroke, Hemorrhagic)  Goal: Acceptable Pain Control  Outcome: Ongoing, Progressing     Problem: Respiratory Compromise (Stroke, Hemorrhagic)  Goal: Effective Oxygenation and Ventilation  Outcome: Ongoing, Progressing     Problem: Sensorimotor Impairment (Stroke, Hemorrhagic)  Goal: Improved Sensorimotor Function  Outcome: Ongoing, Progressing     Problem: Swallowing Impairment (Stroke, Hemorrhagic)  Goal: Optimal Eating and Swallowing Without Aspiration  Outcome: Ongoing, Progressing     Problem: Urinary Elimination Impaired (Stroke, Hemorrhagic)  Goal: Effective Urinary Elimination  Outcome: Ongoing, Progressing

## 2023-11-14 NOTE — ASSESSMENT & PLAN NOTE
IPH - left occipital    -MRI brain w/ w/o contrast ordered  -neuro checks every 2 hours ... notify neurology of any neuro change  -strict blood pressure control ... keep BP less than 140/90  -avoid antiplatelet or anticoagulation at this time  -continue Keppra 500mg BID  -seizure precautions ... notify neurology of any seizure-like activity

## 2023-11-14 NOTE — ED NOTES
Patient resting on stretcher w/ blankets. NAD, RR nonlabored. Call light at patient's hand. No needs voiced.

## 2023-11-15 ENCOUNTER — PATIENT MESSAGE (OUTPATIENT)
Dept: ADMINISTRATIVE | Facility: HOSPITAL | Age: 73
End: 2023-11-15
Payer: MEDICARE

## 2023-11-15 PROBLEM — I65.29 CAROTID STENOSIS: Status: ACTIVE | Noted: 2023-11-15

## 2023-11-15 PROBLEM — Z92.29 HX OF LONG TERM USE OF BLOOD THINNERS: Status: ACTIVE | Noted: 2023-11-15

## 2023-11-15 LAB
ALBUMIN SERPL-MCNC: 3.1 G/DL (ref 3.4–4.8)
ALBUMIN/GLOB SERPL: 1.3 RATIO (ref 1.1–2)
ALP SERPL-CCNC: 50 UNIT/L (ref 40–150)
ALT SERPL-CCNC: 26 UNIT/L (ref 0–55)
AST SERPL-CCNC: 21 UNIT/L (ref 5–34)
BASOPHILS # BLD AUTO: 0.05 X10(3)/MCL
BASOPHILS NFR BLD AUTO: 0.8 %
BILIRUB SERPL-MCNC: 0.4 MG/DL
BUN SERPL-MCNC: 16.3 MG/DL (ref 9.8–20.1)
CALCIUM SERPL-MCNC: 8.8 MG/DL (ref 8.4–10.2)
CHLORIDE SERPL-SCNC: 114 MMOL/L (ref 98–107)
CO2 SERPL-SCNC: 17 MMOL/L (ref 23–31)
CREAT SERPL-MCNC: 0.64 MG/DL (ref 0.55–1.02)
EOSINOPHIL # BLD AUTO: 0.12 X10(3)/MCL (ref 0–0.9)
EOSINOPHIL NFR BLD AUTO: 1.9 %
ERYTHROCYTE [DISTWIDTH] IN BLOOD BY AUTOMATED COUNT: 14.5 % (ref 11.5–17)
GFR SERPLBLD CREATININE-BSD FMLA CKD-EPI: >60 MLS/MIN/1.73/M2
GLOBULIN SER-MCNC: 2.4 GM/DL (ref 2.4–3.5)
GLUCOSE SERPL-MCNC: 83 MG/DL (ref 82–115)
HCT VFR BLD AUTO: 38.8 % (ref 37–47)
HGB BLD-MCNC: 12.2 G/DL (ref 12–16)
IMM GRANULOCYTES # BLD AUTO: 0.02 X10(3)/MCL (ref 0–0.04)
IMM GRANULOCYTES NFR BLD AUTO: 0.3 %
LYMPHOCYTES # BLD AUTO: 1.4 X10(3)/MCL (ref 0.6–4.6)
LYMPHOCYTES NFR BLD AUTO: 21.8 %
MCH RBC QN AUTO: 28.5 PG (ref 27–31)
MCHC RBC AUTO-ENTMCNC: 31.4 G/DL (ref 33–36)
MCV RBC AUTO: 90.7 FL (ref 80–94)
MONOCYTES # BLD AUTO: 0.71 X10(3)/MCL (ref 0.1–1.3)
MONOCYTES NFR BLD AUTO: 11.1 %
NEUTROPHILS # BLD AUTO: 4.12 X10(3)/MCL (ref 2.1–9.2)
NEUTROPHILS NFR BLD AUTO: 64.1 %
NRBC BLD AUTO-RTO: 0 %
PLATELET # BLD AUTO: 275 X10(3)/MCL (ref 130–400)
PMV BLD AUTO: 10.6 FL (ref 7.4–10.4)
POTASSIUM SERPL-SCNC: 4.2 MMOL/L (ref 3.5–5.1)
PROT SERPL-MCNC: 5.5 GM/DL (ref 5.8–7.6)
RBC # BLD AUTO: 4.28 X10(6)/MCL (ref 4.2–5.4)
SODIUM SERPL-SCNC: 140 MMOL/L (ref 136–145)
WBC # SPEC AUTO: 6.42 X10(3)/MCL (ref 4.5–11.5)

## 2023-11-15 PROCEDURE — 99291 CRITICAL CARE FIRST HOUR: CPT | Mod: ,,, | Performed by: SURGERY

## 2023-11-15 PROCEDURE — A9577 INJ MULTIHANCE: HCPCS | Performed by: SURGERY

## 2023-11-15 PROCEDURE — 25000003 PHARM REV CODE 250

## 2023-11-15 PROCEDURE — 85025 COMPLETE CBC W/AUTO DIFF WBC: CPT

## 2023-11-15 PROCEDURE — 80053 COMPREHEN METABOLIC PANEL: CPT

## 2023-11-15 PROCEDURE — 25500020 PHARM REV CODE 255: Performed by: SURGERY

## 2023-11-15 PROCEDURE — 21400001 HC TELEMETRY ROOM

## 2023-11-15 PROCEDURE — 25000003 PHARM REV CODE 250: Performed by: SURGERY

## 2023-11-15 PROCEDURE — 63600175 PHARM REV CODE 636 W HCPCS

## 2023-11-15 PROCEDURE — 97162 PT EVAL MOD COMPLEX 30 MIN: CPT

## 2023-11-15 PROCEDURE — 99291 PR CRITICAL CARE, E/M 30-74 MINUTES: ICD-10-PCS | Mod: ,,, | Performed by: SURGERY

## 2023-11-15 PROCEDURE — 25000003 PHARM REV CODE 250: Performed by: STUDENT IN AN ORGANIZED HEALTH CARE EDUCATION/TRAINING PROGRAM

## 2023-11-15 RX ORDER — LABETALOL HYDROCHLORIDE 5 MG/ML
20 INJECTION, SOLUTION INTRAVENOUS EVERY 4 HOURS PRN
Status: DISCONTINUED | OUTPATIENT
Start: 2023-11-15 | End: 2023-11-15

## 2023-11-15 RX ORDER — HYDRALAZINE HYDROCHLORIDE 20 MG/ML
10 INJECTION INTRAMUSCULAR; INTRAVENOUS
Status: DISCONTINUED | OUTPATIENT
Start: 2023-11-15 | End: 2023-11-15

## 2023-11-15 RX ORDER — LABETALOL HYDROCHLORIDE 5 MG/ML
20 INJECTION, SOLUTION INTRAVENOUS EVERY 4 HOURS PRN
Status: DISCONTINUED | OUTPATIENT
Start: 2023-11-15 | End: 2023-11-17 | Stop reason: HOSPADM

## 2023-11-15 RX ORDER — HYDRALAZINE HYDROCHLORIDE 20 MG/ML
10 INJECTION INTRAMUSCULAR; INTRAVENOUS
Status: DISCONTINUED | OUTPATIENT
Start: 2023-11-15 | End: 2023-11-17 | Stop reason: HOSPADM

## 2023-11-15 RX ORDER — MUPIROCIN 20 MG/G
OINTMENT TOPICAL 2 TIMES DAILY
Status: DISCONTINUED | OUTPATIENT
Start: 2023-11-15 | End: 2023-11-17 | Stop reason: HOSPADM

## 2023-11-15 RX ADMIN — LEVOTHYROXINE SODIUM 25 MCG: 25 TABLET ORAL at 06:11

## 2023-11-15 RX ADMIN — MUPIROCIN: 20 OINTMENT TOPICAL at 08:11

## 2023-11-15 RX ADMIN — ACETAMINOPHEN 650 MG: 325 TABLET, FILM COATED ORAL at 02:11

## 2023-11-15 RX ADMIN — SERTRALINE HYDROCHLORIDE 50 MG: 50 TABLET ORAL at 08:11

## 2023-11-15 RX ADMIN — LISINOPRIL 20 MG: 20 TABLET ORAL at 08:11

## 2023-11-15 RX ADMIN — LABETALOL HYDROCHLORIDE 10 MG: 5 INJECTION, SOLUTION INTRAVENOUS at 12:11

## 2023-11-15 RX ADMIN — LABETALOL HYDROCHLORIDE 10 MG: 5 INJECTION, SOLUTION INTRAVENOUS at 06:11

## 2023-11-15 RX ADMIN — DOCUSATE SODIUM 100 MG: 100 CAPSULE, LIQUID FILLED ORAL at 08:11

## 2023-11-15 RX ADMIN — FAMOTIDINE 20 MG: 20 TABLET, FILM COATED ORAL at 08:11

## 2023-11-15 RX ADMIN — HYDRALAZINE HYDROCHLORIDE 50 MG: 50 TABLET, FILM COATED ORAL at 08:11

## 2023-11-15 RX ADMIN — LEVETIRACETAM 500 MG: 500 TABLET, FILM COATED ORAL at 08:11

## 2023-11-15 RX ADMIN — ATORVASTATIN CALCIUM 10 MG: 10 TABLET, FILM COATED ORAL at 08:11

## 2023-11-15 RX ADMIN — POLYETHYLENE GLYCOL 3350 17 G: 17 POWDER, FOR SOLUTION ORAL at 08:11

## 2023-11-15 RX ADMIN — AMIODARONE HYDROCHLORIDE 200 MG: 200 TABLET ORAL at 08:11

## 2023-11-15 RX ADMIN — METOPROLOL SUCCINATE 50 MG: 50 TABLET, EXTENDED RELEASE ORAL at 08:11

## 2023-11-15 RX ADMIN — HYDRALAZINE HYDROCHLORIDE 10 MG: 20 INJECTION INTRAMUSCULAR; INTRAVENOUS at 02:11

## 2023-11-15 RX ADMIN — GADOBENATE DIMEGLUMINE 8 ML: 529 INJECTION, SOLUTION INTRAVENOUS at 02:11

## 2023-11-15 RX ADMIN — HYDRALAZINE HYDROCHLORIDE 50 MG: 50 TABLET, FILM COATED ORAL at 04:11

## 2023-11-15 NOTE — PT/OT/SLP PROGRESS
Follow up with RN regarding pt's diet. Pt tolerating PO diet with no signs/symptoms of aspiration reported. Will continue with current POC.

## 2023-11-15 NOTE — PLAN OF CARE
Problem: Physical Therapy  Goal: Physical Therapy Goal  Description: Goals to be met by: 12/15/23     Patient will increase functional independence with mobility by performin. Sit to stand transfer with Modified [place]  2. Bed to chair transfer with Modified [place] using Rolling Walker  3. Gait  x 300 feet with Modified [place] using Rolling Walker.   4. Ascend/descend 4 stair with no Handrails Modified [place] using hand held assist.     Outcome: Ongoing, Progressing

## 2023-11-15 NOTE — PLAN OF CARE
Sent referral to [facility] via [facility] for resubmission of auth from [facility].

## 2023-11-15 NOTE — PROGRESS NOTES
[facility] - [facility]  Stroke Neurology  Progress Note    Subjective:     Interval History:  No new issues.  Patient is sitting up in the chair.  Vital signs stable.  No headache.  No visual issues reported.  She is worked with physical therapy.  MRI was performed of the brain to rule out mass.  No masses appreciated.  Patient will follow up with Neurosurgery.  She continues on Keppra.      MRI brain with and without contrast was completed today 11/15/2023:  Impression:       1.  Left posterior occipital lobe combination of subacute and old hemorrhages surrounded by brain gliotic changes and/or some edema.  No associated significant enhancement or diffusion weighted restriction.  Findings may represent subacute to old hemorrhagic infarct.  Follow-up MRI in 6 weeks is recommended.    2.  Old infarcts, chronic microangiopathic ischemia and atrophy.         History of Present Illness: 73 F who presented after a ground level fall without loss of consciousness was noted to have left occipital hemorrhage. She reported that her legs gave out and she fell backwards hitting the back of her head. She was reportedly on eliquis for A.Fib. She also reports unintentional weight loss and night sweats. She has chronic macular degeneration with poor vision in both eyes.     Post-Op Info:  * No surgery found *          Medications:  Continuous Infusions:   sodium chloride 0.9% 100 mL/hr at 11/15/23 0649    nicardipine Stopped (11/14/23 0854)     Scheduled Meds:   acetaminophen  650 mg Oral Q4H    amiodarone  200 mg Oral BID    atorvastatin  10 mg Oral QHS    docusate sodium  100 mg Oral BID    famotidine  20 mg Oral Daily    hydrALAZINE  50 mg Oral TID    levETIRAcetam  500 mg Oral BID    levothyroxine  25 mcg Oral Before breakfast    lisinopriL  20 mg Oral BID    metoprolol succinate  50 mg Oral Q12H    polyethylene glycol  17 g Oral BID    sertraline  50 mg Oral Daily     PRN Meds:bisacodyL, hydrALAZINE,  labetaloL, melatonin, ondansetron, oxyCODONE     Review of Systems  Objective:     Weight: 40.2 kg (88 lb 10 oz)  Body mass index is 15.7 kg/m².  Vital Signs (Most Recent):  Temp: 98.5 °F (36.9 °C) (11/15/23 0400)  Pulse: (!) 54 (11/15/23 1500)  Resp: 16 (11/15/23 1500)  BP: (!) 146/56 (11/15/23 1500)  SpO2: 97 % (11/15/23 1500) Vital Signs (24h Range):  Temp:  [98.2 °F (36.8 °C)-98.5 °F (36.9 °C)] 98.5 °F (36.9 °C)  Pulse:  [54-64] 54  Resp:  [13-30] 16  SpO2:  [94 %-98 %] 97 %  BP: ()/(41-85) 146/56                              Neurosurgery Physical Exam    Awake and oriented to all spheres.    PERRLA bilateral brisk.    Motor strength 5/5 with no sensory deficits.  No focal neurological deficits.    Significant Labs:  Recent Labs   Lab 11/13/23  2217 11/14/23  0110 11/15/23  0111    140 140   K 3.7 3.9 4.2   CO2 23 20* 17*   BUN 22.9* 22.5* 16.3   CREATININE 0.76 0.76 0.64   CALCIUM 9.5 9.5 8.8     Recent Labs   Lab 11/13/23 2217 11/14/23 0110 11/15/23  0111   WBC 7.64 8.64 6.42   HGB 13.1 13.7 12.2   HCT 40.4 43.5 38.8    328 275     Recent Labs   Lab 11/13/23 2217   INR 1.2     Microbiology Results (last 7 days)       ** No results found for the last 168 hours. **              Assessment/Plan:    MRI with no masses.  ICH likely traumatic.  Continue Keppra, seizure precautions   Recommend systolic blood pressure less than 140.    No antiplatelets or anticoagulation at this time.    Neurosurgery following and planning MRI in 6 weeks.    PTOT ST  SCDs   Fall precautions          Active Diagnoses:    Diagnosis Date Noted POA    PRINCIPAL PROBLEM:  Intraparenchymal hematoma of brain [S06.33AA] 11/13/2023 Yes    Carotid stenosis [I65.29] 11/15/2023 Yes    Hx of long term use of blood thinners [Z92.29] 11/15/2023 Not Applicable    Moderate malnutrition [E44.0] 11/14/2023 Yes    Malnutrition [E46] 11/14/2023 Unknown      Problems Resolved During this Admission:       Patricia Espino,  AGACNP-BC  Stroke Neurology  [facility] - [facility]

## 2023-11-15 NOTE — PLAN OF CARE
Pts sister Krystin will be pts caregiver when pt is discharged and would like  to call her with update tomorrow  [phone].   Pt is out of skilled days so we will not be doing skilled like we often do. Pt will be here then home with Krystin when medically ready. She has walker and would like [facility] heatl  pts son Aly will be home on the weekends to assist.   Will follow

## 2023-11-15 NOTE — CLINICAL REVIEW
Criteria Review   73-year-old female admitted on 11/13/2023 with intraparenchymal hemorrhage after she had fallen.  Recent ischemic stroke on 10/03/2023.  Initially, started on q.1h neuro checks.  Neurology, neurosurgery consulted.  Neurosurgery recommended repeat imaging.  Neurology recommended seizure precautions, neuro checks as noted above, and epileptic regimen.  The patient does take Eliquis for anticoagulation.  On hospital day 3., the patient did have a repeat CT scan which demonstrated mild parenchymal hemorrhage in the left occipital lobe and associated mild periventricular edema.  Neurosurgery recommended to utilize q.2 hours neuro checks, seizure precautions, seizure regimen, blood pressure with a threshold below 140/90.  The patient does have variable hypertensive episodes greater than that threshold necessitating frequent monitoring and antihypertensive readjustments.  She does have hyperchloremic metabolic acidosis related to normal saline infusion.  It should also be noted that the patient required a nicardipine drip on 11/14.  She has furthermore required as previously mentioned readjustment of her antihypertensive and p.r.n. antihypertensives.  In the setting of uncontrolled hypertension, persistent need for q.2 hours neuro checks on hospital day 3. Exceeding the capabilities of observation setting, need for telemetry related to arrhythmias of concern, metabolic abnormalities  necessitating further fluid readjustments, seizure precautions, advanced age increasing her risk for adverse outcomes, would favor inpatient level of care given the severity of illness and intensity of services           Joo Matute MD  Utilization Management  Physician Advisor  [facility]  11/15/2023

## 2023-11-15 NOTE — PROGRESS NOTES
"   TRAUMA ICU PROGRESS NOTE    HD# 2  Admission Summary:   Patient is a 73 year old female who presents as a transfer from [place] following a fall at home resulting in intraparenchymal hemorrhage. She was transferred here for neurosurgical evaluation. Patient had an ischemic stroke 10/3/23. Fell today striking her head on the ground after he legs gave out on her. IPH today is in similar area of stroke per imaging reports. She reports PMH including "heart problems", MI x 5, anxiety, and blindness from macular degeneration. She also confirms DNR status.      Interval history:    NAEON. Patient is currently being worked up for a brain mass    Consults:   Neurosurgery Injuries:  Intraparenchymal hemorrhage of brain      [x]Problems list reviewed Operations/Procedures:  none      Past medical history:  Acute MI  Anxiety  COPD  CAD  HLD  HTN  Hyperthyroidism  Macular degeneration   Medications: [x] Medications reviewed/updated   Home Meds:    Current Outpatient Medications   Medication Instructions    acetaminophen (TYLENOL) 1,000 mg, Oral, Every 4 hours PRN    amiodarone (PACERONE) 200 mg, Oral, 2 times daily    apixaban (ELIQUIS) 5 mg, Oral, 2 times daily    aspirin (ECOTRIN) 81 mg, Oral, Daily    atorvastatin (LIPITOR) 10 mg, Oral, Nightly    cimetidine (TAGAMET) 200 mg, Oral, Nightly    docusate sodium (COLACE) 100 mg, Oral, 2 times daily    hydrALAZINE (APRESOLINE) 50 mg, Oral, 3 times daily    levothyroxine (SYNTHROID) 25 mcg, Oral, Before breakfast    lisinopriL (PRINIVIL,ZESTRIL) 20 mg, Oral, 2 times daily    melatonin 3 mg Cap 1 tablet, Oral, Nightly    metoprolol succinate (TOPROL-XL) 50 mg, Oral, Every 12 hours    montelukast (SINGULAIR) 10 mg, Oral    nitroGLYCERIN 0.4 MG/DOSE TL SPRY (NITROLINGUAL) 400 mcg/spray spray nitroglycerin 400 mcg/spray translingual aerosol   at onset of chest pain may take up to three sprays every 5min during a 15 min period    pravastatin (PRAVACHOL) 40 mg, Oral    predniSONE " "(DELTASONE) 10 MG tablet 1 tablet, Oral, Daily    sertraline (ZOLOFT) 50 mg, Oral, Daily      Scheduled Meds:    acetaminophen  650 mg Oral Q4H    amiodarone  200 mg Oral BID    atorvastatin  10 mg Oral QHS    docusate sodium  100 mg Oral BID    famotidine  20 mg Oral Daily    hydrALAZINE  50 mg Oral TID    levETIRAcetam  500 mg Oral BID    levothyroxine  25 mcg Oral Before breakfast    lisinopriL  20 mg Oral BID    metoprolol succinate  50 mg Oral Q12H    polyethylene glycol  17 g Oral BID    sertraline  50 mg Oral Daily     Continuous Infusions:    sodium chloride 0.9% 100 mL/hr at 11/15/23 0649    nicardipine Stopped (23 0854)     PRN Meds: bisacodyL, hydrALAZINE, labetaloL, melatonin, ondansetron, oxyCODONE     Vitals:  VITAL SIGNS: 24 HR MIN & MAX LAST   Temp  Min: 98.1 °F (36.7 °C)  Max: 98.5 °F (36.9 °C)  98.5 °F (36.9 °C)   BP  Min: 96/41  Max: 171/66  (!) 113/50    Pulse  Min: 54  Max: 64  (!) 54    Resp  Min: 13  Max: 30  19    SpO2  Min: 94 %  Max: 98 %  96 %      HT: 5' 3" (160 cm)  WT: 40.2 kg (88 lb 10 oz)  BMI: 15.7               General  Exam: NAD; appear mal nourished      Neuro/Psych  GCS: 15  Exam: Bump on back of head. A/O x4  ICP monitor: No  ICP treatment: ICP Treatment: N/A  C-Collar: No    Plan:   TBI- stable for transfer to floor will follow up with final recs for NSX      HEENT  Exam: NCAT    Plan:   monitor       Pulmonary  Vitals: Resp  Av.4  Min: 13  Max: 30  SpO2  Av.6 %  Min: 94 %  Max: 98 %    Ventilator/Oxygen Settings:              ABG:   No results for input(s): "PH", "PO2", "PCO2", "HCO3", "BE" in the last 168 hours.     CXR:    No results found in the last 24 hours.      Rib fractures: none  Chest Tube: None      Exam: CTA-B    Plan:     Monitor  IS  Incentive Spirometry/RT Treatments: IS     Cardiovascular  Vitals: Pulse  Av.9  Min: 54  Max: 64  BP  Min: 96/41  Max: 171/66  No results for input(s): "TROPONINI", "CKTOTAL", "CKMB", "BNP" in the last 168 " "hours.  Vasoactive Agents: None  Exam: RRR SBP high      Plan:   Restart all home meds for BP control wean off Cardene      Renal  Recent Labs     11/13/23  2217 11/14/23  0110 11/15/23  0111   BUN 22.9* 22.5* 16.3   CREATININE 0.76 0.76 0.64       Recent Labs     23  0104   LACTIC 1.2       Intake/Output - Last 3 Shifts          07 06 0700  11/15 0659 11/15 07 06    I.V. (mL/kg) 705.6 (17.6) 2084.1 (51.8)     IV Piggyback 75.3      Total Intake(mL/kg) 780.9 (19.4) 2084.1 (51.8)     Urine (mL/kg/hr)  300 (0.3)     Stool  0     Total Output  300     Net +780.9 +1784.1            Urine Occurrence  2 x     Stool Occurrence  1 x              Intake/Output Summary (Last 24 hours) at 11/15/2023 1355  Last data filed at 11/15/2023 0649  Gross per 24 hour   Intake 2056.75 ml   Output 300 ml   Net 1756.75 ml         Osman: No     Plan:   monitor     FEN/GI  Recent Labs     11/13/23  2217 11/14/23  0110 11/15/23  0111    140 140   K 3.7 3.9 4.2   CO2 23 20* 17*   CALCIUM 9.5 9.5 8.8   ALBUMIN 3.4 3.7 3.1*   BILITOT 0.3 0.4 0.4   AST 16 21 21   ALKPHOS 69 71 50   ALT 15 24 26     Diet: Regular diet     Last BM: unknown     Abdominal Exam: S/NT/ND +BS      Plan:   monitor     Heme/Onc  Recent Labs     11/13/23  2217 11/14/23  0110 11/15/23  0111   HGB 13.1 13.7 12.2   HCT 40.4 43.5 38.8    328 275   PTT 34.2*  --   --    INR 1.2  --   --        Transfusions (over past 24h): None    Plan:   monitor     ID  Temp  Av.3 °F (36.8 °C)  Min: 98.1 °F (36.7 °C)  Max: 98.5 °F (36.9 °C)      Recent Labs     237 23  0110 11/15/23  011   WBC 7.64 8.64 6.42     Cultures: Antibiotics:    none 1. none      Plan:   monitor       Endocrine  Recent Labs     11/13/23  2217 11/14/23  0110 11/15/23  0111   GLUCOSE 102 94 83      No results for input(s): "POCTGLUCOSE" in the last 72 hours.     Plan:   monitor  Insulin treatment: none     Musculoskeletal/Wounds  Weight bearing " status:   RUE: WBAT  LUE: WBAT  RLE: WBAT  LLE: WBAT     [x] Tertiary exam performed     Extremity/wound exam: bump on back of head no lesions  Plan:   Monitor for skin break down on back side      Precautions  Fall, Seizure, Pressure ulcer prevention, and Standard      Prophylaxis  Seizure: Keppra (day 2/7)  DVT: Holding lovenox    GI: H2B       Lines/drains/airway [x] LDA reviewed/updated   Lines/Drains/Airways       Peripheral Intravenous Line  Duration                  Peripheral IV - Single Lumen 20 G Anterior;Left Forearm -- days         Peripheral IV - Single Lumen 20 G Anterior;Right Forearm -- days                    Plan:  Cont all lines     Restraints  Face to face evaluation of need for restraints on rounds today:   Currently restrained? No.   I     Disposition  Stable to transfer to floor. With hospital medicine  TBI- Now being worked up for brain mass with Neurosurgery  HTN-home meds started  Malnourishment- nutrition consult      Isrrael Douglas Jr, MD MS  Trauma critical Care Surgery

## 2023-11-15 NOTE — PROGRESS NOTES
[facility] - [address] [place] [address] ICU  Neurosurgery  Progress Note    Subjective:     Interval History: She is sitting up in bed, NAD. She denies HA and N/V. She feels weak d/t not getting OOB much. Tolerating PO well.    History of Present Illness: Patient is a 73 year old female with PMHx significant for MI x 5, A-fib, CAD, HLD, HTN, hypothyroidism, anxiety, and blindness from macular degeneration, who presented as a transfer from [place] on 11/13 following a fall at home resulting in intraparenchymal hemorrhage. She was transferred here for neurosurgical evaluation. Patient had an ischemic stroke 10/3/23, imaging done at [facility]. Fell 11/13, striking her head on the ground after he legs gave out on her. [facility] today is in similar area of stroke per imaging reports. She confirms DNR status.       Repeat CT head shows stable mild parenchymal hemorrhage in the left occipital lobe and associated mild perimetric edema. Dr. Fontenot has been consulted for evaluation and treatment recommendations.    Post-Op Info:  * No surgery found *          Medications:  Continuous Infusions:   sodium chloride 0.9% 100 mL/hr at 11/15/23 0649    nicardipine Stopped (11/14/23 0854)     Scheduled Meds:   acetaminophen  650 mg Oral Q4H    amiodarone  200 mg Oral BID    atorvastatin  10 mg Oral QHS    docusate sodium  100 mg Oral BID    famotidine  20 mg Oral Daily    hydrALAZINE  50 mg Oral TID    levETIRAcetam  500 mg Oral BID    levothyroxine  25 mcg Oral Before breakfast    lisinopriL  20 mg Oral BID    metoprolol succinate  50 mg Oral Q12H    polyethylene glycol  17 g Oral BID    sertraline  50 mg Oral Daily     PRN Meds:bisacodyL, hydrALAZINE, labetaloL, melatonin, ondansetron, oxyCODONE     Review of Systems  Objective:     Weight: 40.2 kg (88 lb 10 oz)  Body mass index is 15.7 kg/m².  Vital Signs (Most Recent):  Temp: 98.5 °F (36.9 °C) (11/15/23 0400)  Pulse: (!) 54 (11/15/23 1100)  Resp: 19 (11/15/23 1100)  BP: (!) 113/50  (11/15/23 1100)  SpO2: 96 % (11/15/23 1100) Vital Signs (24h Range):  Temp:  [97.9 °F (36.6 °C)-98.5 °F (36.9 °C)] 98.5 °F (36.9 °C)  Pulse:  [54-67] 54  Resp:  [13-30] 19  SpO2:  [94 %-98 %] 96 %  BP: ()/(41-85) 113/50                              Neurosurgery Physical Exam  Nursing note and vitals reviewed.     Constitutional: She appears well-developed. No distress.      Eyes: Pupils are equal, round, and reactive to light.      Cardiovascular: Intact distal pulses.      Abdominal: Soft.      Psych/Behavior: She is alert. She is oriented to person, place, and time. She has a normal mood and affect.      Musculoskeletal:        Neck: Range of motion is full.        Back: Range of motion is full.        Right Upper Extremities: Range of motion is full.        Left Upper Extremities: Range of motion is full.       Right Lower Extremities: Range of motion is full.        Left Lower Extremities: Range of motion is full.      Neurological:        Sensory: There is no sensory deficit in the trunk. There is no sensory deficit in the extremities.        Cranial nerves: Cranial nerve(s) II, III, IV, V, VI, VII, VIII, IX, X, XI and XII are intact.     Significant Labs:  Recent Labs   Lab 11/13/23  2217 11/14/23  0110 11/15/23  0111    140 140   K 3.7 3.9 4.2   CO2 23 20* 17*   BUN 22.9* 22.5* 16.3   CREATININE 0.76 0.76 0.64   CALCIUM 9.5 9.5 8.8     Recent Labs   Lab 11/13/23  2217 11/14/23  0110 11/15/23  0111   WBC 7.64 8.64 6.42   HGB 13.1 13.7 12.2   HCT 40.4 43.5 38.8    328 275     Recent Labs   Lab 11/13/23 2217   INR 1.2     Microbiology Results (last 7 days)       ** No results found for the last 168 hours. **            Significant Diagnostics:  MRI Brain W WO Contrast [[id number]] Resulted: 11/15/23 0917   Order Status: Completed Updated: 11/15/23 0919   Narrative:     EXAMINATION:  MRI BRAIN W WO CONTRAST    CLINICAL HISTORY:  Neuro deficit, acute, stroke  suspected;    TECHNIQUE:  Multiplanar MRI sequences were performed of the brain without and following administration gadolinium based contrast.    COMPARISON:  CT brain without contrast November 14, 2023.    FINDINGS:  The left posterior occipital lobe is remarkable for small subacute hemorrhage with pre contrast T1 and T2 weighted hyperintensity on images 18 and 19 series 7.  This is surrounded by irregularly defined hemosiderin/chronic hemorrhagic byproducts with dephasing artifact on the gradient echo sequence.  There is only subtle enhancement and this location without dominant mass lesion.  This is surrounded by brain gliotic changes and/or mild edema.  No associated diffusion restriction to indicate an acute infarct.  The findings may represent a subacute to old hemorrhagic infarct.  There is local effacement of the sulci without significant mass effect or midline shift.  There is left frontal lobe small encephalomalacia related to old infarct.  There are old pontine lacunar infarcts.  Chronic microvascular ischemia with periventricular and deep white matter T2 FLAIR hyperintense signals. There is generalized cerebral and cerebellar cortical volume loss.  Partially empty sella.    The cerebellar tonsils are normally positioned.  No hydrocephalus.  No acute extra axial fluid collections identified. The mastoid air cells are clear.  There is loss of pneumatization of the left maxillary sinus occupied by mucoperiosteal thickening.  There is also prominent mucoperiosteal thickening involving the left frontal sinus.   Impression:       1.  Left posterior occipital lobe combination of subacute and old hemorrhages surrounded by brain gliotic changes and/or some edema.  No associated significant enhancement or diffusion weighted restriction.  Findings may represent subacute to old hemorrhagic infarct.  Follow-up MRI in 6 weeks is recommended.    2.  Old infarcts, chronic microangiopathic ischemia and atrophy.        Assessment/Plan:     Active Diagnoses:    Diagnosis Date Noted POA    PRINCIPAL PROBLEM:  Intraparenchymal hematoma of brain [S06.33AA] 11/13/2023 Yes    Carotid stenosis [I65.29] 11/15/2023 Yes    Hx of long term use of blood thinners [Z92.29] 11/15/2023 Not Applicable    Moderate malnutrition [E44.0] 11/14/2023 Yes    Malnutrition [E46] 11/14/2023 Unknown      Problems Resolved During this Admission:     She is GCS 15 with no c/o HA.  MRI shows combination of subacute and old hemorrhages. No mass is appreciated.  She is ok to downgrade to the floor with Q2 hour neuro exams  Continue BP parameters below 140/90  Keppra BID  PT/OT  SCDs for DVT prophylaxis  She will need a f/u MRI in 6 weeks  Call with any neuro decline    ROSALES Reyna  Neurosurgery  [facility] - [address] [place] [address]

## 2023-11-15 NOTE — NURSING
Nurses Note -- 4 Eyes      11/14/2023   11:06 PM      Skin assessed during: Daily Assessment      [x] No Altered Skin Integrity Present    [x]Prevention Measures Documented      [] Yes- Altered Skin Integrity Present or Discovered   [] LDA Added if Not in Epic (Describe Wound)   [] New Altered Skin Integrity was Present on Admit and Documented in LDA   [] Wound Image Taken    Wound Care Consulted? No    Attending Nurse:  Hilary Cloud RN/Staff Member:   Zac CHEUNG

## 2023-11-15 NOTE — PLAN OF CARE
Pt is ready for the floor, nursing updates me that they passed on the message to Dr Marroquin of sister Krystin wanting called.

## 2023-11-15 NOTE — H&P
[facility]  Hospital Medicine History & Physical Examination       Patient Name: Cierra Main  MRN: [id number]  Patient Class: IP- Inpatient   Admission Date: 11/13/2023   Admitting Physician: Isrrael Douglas Jr., MD  Length of Stay: 2  Attending Physician: Tigre Condon MD   Primary Care Provider: Rafiq Eller MD  Face-to-Face encounter date: 11/15/2023  Code Status: DNR  Chief Complaint: Neurologic Problem (Tx from [facility] for intraparenchymal hemorrhage )        Patient information was obtained from patient, patient's family, past medical records and ER records.     HISTORY OF PRESENT ILLNESS:   Cierra Main is a 73 y.o. female with a past medical history of essential hypertension, hyperlipidemia, CAD, MI, COPD, CVA, hypothyroidism, macular degeneration, and anxiety presented to [facility] ED on 11/13/2023 transferred from [facility] for IPH.  Patient presented to outside facility for fall at rehab. Patient reported her legs gave out on her and she struck her head on the ground.  Patient was in rehab for ischemic CVA on 10/03/2023. CT head showed IPH in similar area of previous CVA.  Neurosurgery and Neurology were consulted. Patient was admitted to trauma ICU for close monitoring.  Patient was started on Keppra 500 mg BID with recommendations of blood pressure less than 140/90.  Repeat CT head revealed stable mild intraparenchymal hemorrhage in the left occipital lobe and associated mild perinephric edema.  CTA head and neck on 11/14 revealed no evidence of vascular occlusion, 60% stenosis in the left proximal internal carotid artery secondary to calcified plaque and less than 50% stenosis in the right proximal internal carotid artery secondary to calcified plaque, and rim enhancing lesion seen in the occipital lobe on the left side with some surrounding hemorrhage.  MRI brain with and without contrast revealed left posterior occipital lobe combination of subacute and old  hemorrhages surrounded by brain gliotic changes and/or some edema.  Neurosurgery recommended follow-up MRI in 6 weeks.  Patient was cleared for downgrade out of ICU on 11/15/2023.  Hospital medicine was consulted for transition of care and further medical management.    PAST MEDICAL HISTORY:   Essential hypertension  Hyperlipidemia  CAD  MI  COPD  CVA  Hypothyroidism   Macular degeneration   Anxiety    PAST SURGICAL HISTORY:   Hysterectomy  Appendectomy   Tonsillectomy  Coronary angiogram   Heart catheterization     ALLERGIES:   Amlodipine and Losartan    FAMILY HISTORY:   Reviewed and negative    SOCIAL HISTORY:   Former tobacco use: Quit 2 months ago   Denies illicit drug and alcohol use    HOME MEDICATIONS:     Prior to Admission medications    Medication Sig Start Date End Date Taking? Authorizing Provider   acetaminophen (TYLENOL) 500 MG tablet Take 1,000 mg by mouth every 4 (four) hours as needed for Pain or Temperature greater than.   Yes Provider, Historical   amiodarone (PACERONE) 200 MG Tab Take 200 mg by mouth 2 (two) times daily.   Yes Provider, Historical   apixaban (ELIQUIS) 5 mg Tab Take 5 mg by mouth 2 (two) times daily.   Yes Provider, Historical   aspirin (ECOTRIN) 81 MG EC tablet Take 81 mg by mouth once daily.   Yes Provider, Historical   atorvastatin (LIPITOR) 10 MG tablet Take 10 mg by mouth every evening.   Yes Provider, Historical   cimetidine (TAGAMET) 200 MG tablet Take 200 mg by mouth nightly.   Yes Provider, Historical   hydrALAZINE (APRESOLINE) 50 MG tablet Take 1 tablet (50 mg total) by mouth 3 (three) times daily. 7/25/23 7/24/24 Yes Jill Roth,    levothyroxine (SYNTHROID) 25 MCG tablet Take 1 tablet (25 mcg total) by mouth before breakfast. 9/20/23 3/18/24 Yes Rafiq Eller MD   lisinopriL (PRINIVIL,ZESTRIL) 20 MG tablet Take 1 tablet (20 mg total) by mouth 2 (two) times daily. 2/27/23  Yes Rafiq Eller MD   melatonin 3 mg Cap Take 1 tablet by mouth every evening.    Yes Provider, Historical   metoprolol succinate (TOPROL-XL) 50 MG 24 hr tablet Take 50 mg by mouth every 12 (twelve) hours. 8/14/23  Yes Provider, Historical   sertraline (ZOLOFT) 50 MG tablet Take 1 tablet (50 mg total) by mouth once daily. 9/20/23 3/18/24 Yes Rafiq Eller MD   docusate sodium (COLACE) 100 MG capsule Take 100 mg by mouth 2 (two) times daily.    Provider, Historical   montelukast (SINGULAIR) 10 mg tablet Take 10 mg by mouth. 5/15/23   Provider, Historical   nitroGLYCERIN 0.4 MG/DOSE TL SPRY (NITROLINGUAL) 400 mcg/spray spray nitroglycerin 400 mcg/spray translingual aerosol   at onset of chest pain may take up to three sprays every 5min during a 15 min period    Provider, Historical   pravastatin (PRAVACHOL) 40 MG tablet TAKE 1 TABLET BY MOUTH EVERY DAY 10/27/23   Rafiq Eller MD   predniSONE (DELTASONE) 10 MG tablet Take 1 tablet by mouth once daily. 7/31/23   Provider, Historical       REVIEW OF SYSTEMS:   Except as documented, all other systems reviewed and negative     PHYSICAL EXAM:     VITAL SIGNS: 24 HRS MIN & MAX LAST   Temp  Min: 98.1 °F (36.7 °C)  Max: 98.5 °F (36.9 °C) 98.5 °F (36.9 °C)   BP  Min: 96/41  Max: 171/66 (!) 113/50   Pulse  Min: 54  Max: 64  (!) 54   Resp  Min: 13  Max: 30 19   SpO2  Min: 94 %  Max: 98 % 96 %       General appearance: Female in no apparent distress.  HEENNT: Atraumatic head.   Lungs: Clear to auscultation bilaterally.   Heart: Regular rate and rhythm.    Abdomen: Soft, non-distended, non-tender. Bowel sounds are normal.   Extremities: No cyanosis, clubbing, or edema. No deformities.   Skin: No Rash. Warm and dry.   Neuro: Awake, alert, and oriented. Motor and sensory exams grossly intact.  Strength equal in bilateral upper and lower extremities.  No slurred speech.  No facial droop.  Psych/mental status: Appropriate mood and affect.     LABS AND IMAGING:     Recent Labs   Lab 11/13/23  2217 11/14/23  0110 11/15/23  0111   WBC 7.64 8.64 6.42   RBC  4.61 4.86 4.28   HGB 13.1 13.7 12.2   HCT 40.4 43.5 38.8   MCV 87.6 89.5 90.7   MCH 28.4 28.2 28.5   MCHC 32.4* 31.5* 31.4*   RDW 14.2 14.2 14.5    328 275   MPV 10.0 10.2 10.6*       Recent Labs   Lab 11/13/23  2217 11/14/23  0110 11/15/23  0111    140 140   K 3.7 3.9 4.2   CO2 23 20* 17*   BUN 22.9* 22.5* 16.3   CREATININE 0.76 0.76 0.64   CALCIUM 9.5 9.5 8.8   ALBUMIN 3.4 3.7 3.1*   ALKPHOS 69 71 50   ALT 15 24 26   AST 16 21 21   BILITOT 0.3 0.4 0.4       Microbiology Results (last 7 days)       ** No results found for the last 168 hours. **             MRI Brain W WO Contrast  Narrative: EXAMINATION:  MRI BRAIN W WO CONTRAST    CLINICAL HISTORY:  Neuro deficit, acute, stroke suspected;    TECHNIQUE:  Multiplanar MRI sequences were performed of the brain without and following administration gadolinium based contrast.    COMPARISON:  CT brain without contrast November 14, 2023.    FINDINGS:  The left posterior occipital lobe is remarkable for small subacute hemorrhage with pre contrast T1 and T2 weighted hyperintensity on images 18 and 19 series 7.  This is surrounded by irregularly defined hemosiderin/chronic hemorrhagic byproducts with dephasing artifact on the gradient echo sequence.  There is only subtle enhancement and this location without dominant mass lesion.  This is surrounded by brain gliotic changes and/or mild edema.  No associated diffusion restriction to indicate an acute infarct.  The findings may represent a subacute to old hemorrhagic infarct.  There is local effacement of the sulci without significant mass effect or midline shift.  There is left frontal lobe small encephalomalacia related to old infarct.  There are old pontine lacunar infarcts.  Chronic microvascular ischemia with periventricular and deep white matter T2 FLAIR hyperintense signals. There is generalized cerebral and cerebellar cortical volume loss.  Partially empty sella.    The cerebellar tonsils are normally  positioned.  No hydrocephalus.  No acute extra axial fluid collections identified. The mastoid air cells are clear.  There is loss of pneumatization of the left maxillary sinus occupied by mucoperiosteal thickening.  There is also prominent mucoperiosteal thickening involving the left frontal sinus.  Impression: 1.  Left posterior occipital lobe combination of subacute and old hemorrhages surrounded by brain gliotic changes and/or some edema.  No associated significant enhancement or diffusion weighted restriction.  Findings may represent subacute to old hemorrhagic infarct.  Follow-up MRI in 6 weeks is recommended.    2.  Old infarcts, chronic microangiopathic ischemia and atrophy.    Electronically signed by: Yusuf Fleming  Date:    11/15/2023  Time:    09:17        ASSESSMENT & PLAN:   Assessment:   Left posterior occipital lobe combination of subacute and old hemorrhages   History of essential hypertension, hyperlipidemia, CAD, MI, COPD, CVA, hypothyroidism, macular degeneration, and anxiety       Plan:  Q2 neurochecks   BP less than 140/90 per neurosurgery   Keppra 500 mg b.i.d.  Neurosurgery following, appreciate recommendations   PT/OT/SLP  Continue appropriate home medications once med rec updated   Labs in a.m.    VTE Prophylaxis:  SCDs    __________________________________________________________________________  INPATIENT LIST OF MEDICATIONS     Scheduled Meds:   acetaminophen  650 mg Oral Q4H    amiodarone  200 mg Oral BID    atorvastatin  10 mg Oral QHS    docusate sodium  100 mg Oral BID    famotidine  20 mg Oral Daily    hydrALAZINE  50 mg Oral TID    levETIRAcetam  500 mg Oral BID    levothyroxine  25 mcg Oral Before breakfast    lisinopriL  20 mg Oral BID    metoprolol succinate  50 mg Oral Q12H    polyethylene glycol  17 g Oral BID    sertraline  50 mg Oral Daily     Continuous Infusions:   sodium chloride 0.9% 100 mL/hr at 11/15/23 0649    nicardipine Stopped (11/14/23 0854)     PRN  Meds:.bisacodyL, hydrALAZINE, labetaloL, melatonin, ondansetron, oxyCODONE      IJose PA-C, have reviewed and discussed the case with Dr. Tigre Condon MD   Please see the following addendum for further assessment and plan from there attending MD.    11/15/2023    ________________________________________________________________________________    MD Addendum:  I,  assumed care of this patient today at ---am/pm  For the patient encounter, I performed the substantive portion of the visit, I reviewed the PA documentation, treatment plan, and medical decision making.  I had face to face time with this patient     Seen and examined the patient. Agree with above history, physical exam and management.    Patient was downgraded  to the floor after stabilization of the ICH, no Masses in the MRI and neurology recommends 6 weeks follow up with MRI.   She is ox3 with no neurodeficits.   Keep the BP<140/90 .   - continue monitor   - pt/ot   Consider discharge tomorrow am if stable and neurology ok for dc.     Tigre Condon M.D.  [facility]/Hospital Medicine Department  [facility]          Discharge Planning and Disposition: No mobility needs. Ambulating well. Good social support system.   Anticipated discharge    All diagnosis and differential diagnosis have been reviewed; assessment and plan has been documented; I have personally reviewed the labs and test results that are presently available; I have reviewed the patients medication list; I have reviewed the consulting providers response and recommendations. I have reviewed or attempted to review medical records based upon their availability.    All of the patient and family questions have been addressed and answered. Patient's is agreeable to the above stated plan. I will continue to monitor closely and make adjustments to medical management as needed.      11/15/2023

## 2023-11-15 NOTE — NURSING
Nurses Note -- 4 Eyes      11/15/2023   3:49 PM      Skin assessed during: Daily Assessment      [x] No Altered Skin Integrity Present    [x]Prevention Measures Documented      [] Yes- Altered Skin Integrity Present or Discovered   [] LDA Added if Not in Epic (Describe Wound)   [] New Altered Skin Integrity was Present on Admit and Documented in LDA   [] Wound Image Taken    Wound Care Consulted? No    Attending Nurse:  Saira Cloud RN/Staff Member:   GINGER Leal

## 2023-11-15 NOTE — PT/OT/SLP EVAL
Physical Therapy Evaluation    Patient Name:  Cierra Main   MRN:  [id number]    Recommendations:     Discharge therapy intensity: Moderate Intensity Therapy   Discharge Equipment Recommendations:     Barriers to discharge: Decreased caregiver support, Impaired mobility, and Ongoing medical needs    Assessment:     Cierra Main is a 73 y.o. female admitted with a medical diagnosis of Intraparenchymal hematoma of brain. Pt with previous ischemic CVA 10/2023 in which she attended inpatient rehab [facility]. Pt returned following fall in which she struck back of head. Pt with mild IPH in occipital lobe with mild perimetric edema. MRI shows combination of subacute and old hemorrhages, no mass appreciated. Pt was previously planning to discharge to sister's home, however admits sister is busy caring for  with ongoing cancer treatments. At this time pt requires overall min A for mobility with poor stability as well as visual impairment resulting in high fall risk She presents with the following impairments/functional limitations: weakness, impaired endurance, impaired functional mobility, gait instability, impaired self care skills, impaired balance, visual deficits, decreased safety awareness, impaired cardiopulmonary response to activity.    Rehab Prognosis: Fair; patient would benefit from acute skilled PT services to address these deficits and reach maximum level of function.    Recent Surgery: * No surgery found *      Plan:     During this hospitalization, patient to be seen 5 x/week to address the identified rehab impairments via gait training, therapeutic activities, therapeutic exercises, neuromuscular re-education and progress toward the following goals:    Plan of Care Expires:  12/15/23    Subjective     Chief Complaint: fatigue  Patient/Family Comments/goals: return home  Pain/Comfort:  Pain Rating 1: 0/10    Patients cultural, spiritual, Restoration conflicts given the current  situation:      Living Environment:  Pt was living at home but son gone frequently for work, plan was to discharge to sister's home.  Prior to admission, patients level of function was assist required for mobility.  Equipment used at home:  (at facilite pt used WC).  DME owned (not currently used): rolling walker and wheelchair.  Upon discharge, patient will have assistance from TBD.    Objective:     Communicated with nurse prior to session.  Patient found up in chair with blood pressure cuff, telemetry, SCD, pulse ox (continuous), peripheral IV  upon PT entry to room.    General Precautions: Standard, fall, vision impaired (macular degeneration) BP<140/90  Orthopedic Precautions:N/A   Braces: N/A  Respiratory Status: Room air  Blood Pressure:   -112/48- seated   -101/46- standing & symptomatic   -152/69 after activity (nurse notified)  Exams:  Cognitive Exam:  Patient is oriented to Person, Place, and Time  RLE ROM: WFL  RLE Strength:grossly 4/5  LLE ROM: WFL  LLE Strength: grossly 4/5  Skin integrity: Visible skin intact      Functional Mobility:  Transfers:     Sit to Stand:  minimum assistance with rolling walker  Toilet Transfer: minimum assistance with  rolling walker  using  Step Transfer  Gait: 150ft with RW min A with slow tami, narrow BRAD, increased lateral sway, frequent standing rest breaks. Constant cues for direction and navigation of obstacles. No knee buckling observed.  Balance: Poor      AM-PAC 6 CLICK MOBILITY  Total Score:        Treatment & Education:  Pt required transfer to commode prior to mobility. Extended gait training in order to improve pt mobility, however due to instability and visual deficits, continues with high fall risk and not safe for discharge home.    Patient provided with verbal education and demonstrations education regarding PT POC.  Understanding was verbalized, however additional teaching warranted.     Patient left up in chair with all lines intact, call button in  reach, and nurse notified.    GOALS:   Multidisciplinary Problems       Physical Therapy Goals          Problem: Physical Therapy    Goal Priority Disciplines Outcome Goal Variances Interventions   Physical Therapy Goal     PT, PT/OT Ongoing, Progressing     Description: Goals to be met by: 12/15/23     Patient will increase functional independence with mobility by performin. Sit to stand transfer with Modified Vanderwagen  2. Bed to chair transfer with Modified Vanderwagen using Rolling Walker  3. Gait  x 300 feet with Modified Vanderwagen using Rolling Walker.   4. Ascend/descend 4 stair with no Handrails Modified Vanderwagen using hand held assist.                          History:     Past Medical History:   Diagnosis Date    Acute MI     Anxiety     Cardiac disease     COPD (chronic obstructive pulmonary disease)     Coronary artery disease     Hyperlipidemia     Hypertension     Hypothyroidism     Other specified noninfective gastroenteritis and colitis     Unspecified macular degeneration        Past Surgical History:   Procedure Laterality Date    CORONARY ANGIOGRAPHY N/A 2021    Procedure: ANGIOGRAM, CORONARY ARTERY;  Surgeon: Devon Medeiros MD;  Location: [facility] CATH LAB;  Service: Cardiology;  Laterality: N/A;    CORONARY ANGIOPLASTY WITH STENT PLACEMENT      LEFT HEART CATHETERIZATION Left 2021    Procedure: CATHETERIZATION, HEART, LEFT;  Surgeon: Devon Medeiros MD;  Location: [facility] CATH LAB;  Service: Cardiology;  Laterality: Left;       Time Tracking:     PT Received On: 11/15/23  PT Start Time: 1052     PT Stop Time: 1130  PT Total Time (min): 38 min     Billable Minutes: Evaluation moderate      11/15/2023

## 2023-11-15 NOTE — PLAN OF CARE
Stefany from [facility] called that pts sister Krystin called this am and has talked with their business office to make the arrangement financially for pt to return skilled and then stay on ICF.   Stefany would like info sent to her so they can resubmit to insurer. She understand that while pt is in ICU she is floor status, we just havent had the bed to move her. I anticipates as soon as they get insurer approval  will be ready to dc.

## 2023-11-16 LAB
ANION GAP SERPL CALC-SCNC: 7 MEQ/L
BUN SERPL-MCNC: 18.2 MG/DL (ref 9.8–20.1)
CALCIUM SERPL-MCNC: 8.6 MG/DL (ref 8.4–10.2)
CHLORIDE SERPL-SCNC: 113 MMOL/L (ref 98–107)
CO2 SERPL-SCNC: 19 MMOL/L (ref 23–31)
CREAT SERPL-MCNC: 0.65 MG/DL (ref 0.55–1.02)
CREAT/UREA NIT SERPL: 28
GFR SERPLBLD CREATININE-BSD FMLA CKD-EPI: >60 MLS/MIN/1.73/M2
GLUCOSE SERPL-MCNC: 102 MG/DL (ref 82–115)
POTASSIUM SERPL-SCNC: 3.6 MMOL/L (ref 3.5–5.1)
SODIUM SERPL-SCNC: 139 MMOL/L (ref 136–145)

## 2023-11-16 PROCEDURE — 93010 ELECTROCARDIOGRAM REPORT: CPT | Mod: ,,, | Performed by: INTERNAL MEDICINE

## 2023-11-16 PROCEDURE — 93010 EKG 12-LEAD: ICD-10-PCS | Mod: ,,, | Performed by: INTERNAL MEDICINE

## 2023-11-16 PROCEDURE — 93005 ELECTROCARDIOGRAM TRACING: CPT

## 2023-11-16 PROCEDURE — 63600175 PHARM REV CODE 636 W HCPCS: Performed by: STUDENT IN AN ORGANIZED HEALTH CARE EDUCATION/TRAINING PROGRAM

## 2023-11-16 PROCEDURE — 25000003 PHARM REV CODE 250: Performed by: SURGERY

## 2023-11-16 PROCEDURE — 21400001 HC TELEMETRY ROOM

## 2023-11-16 PROCEDURE — 80048 BASIC METABOLIC PNL TOTAL CA: CPT | Performed by: STUDENT IN AN ORGANIZED HEALTH CARE EDUCATION/TRAINING PROGRAM

## 2023-11-16 PROCEDURE — 25000003 PHARM REV CODE 250: Performed by: STUDENT IN AN ORGANIZED HEALTH CARE EDUCATION/TRAINING PROGRAM

## 2023-11-16 PROCEDURE — 97530 THERAPEUTIC ACTIVITIES: CPT | Mod: CO

## 2023-11-16 PROCEDURE — 25000003 PHARM REV CODE 250

## 2023-11-16 RX ADMIN — METOPROLOL SUCCINATE 50 MG: 50 TABLET, EXTENDED RELEASE ORAL at 08:11

## 2023-11-16 RX ADMIN — HYDRALAZINE HYDROCHLORIDE 50 MG: 50 TABLET, FILM COATED ORAL at 02:11

## 2023-11-16 RX ADMIN — LEVETIRACETAM 500 MG: 500 TABLET, FILM COATED ORAL at 08:11

## 2023-11-16 RX ADMIN — AMIODARONE HYDROCHLORIDE 200 MG: 200 TABLET ORAL at 10:11

## 2023-11-16 RX ADMIN — SERTRALINE HYDROCHLORIDE 50 MG: 50 TABLET ORAL at 08:11

## 2023-11-16 RX ADMIN — HYDRALAZINE HYDROCHLORIDE 50 MG: 50 TABLET, FILM COATED ORAL at 08:11

## 2023-11-16 RX ADMIN — AMIODARONE HYDROCHLORIDE 200 MG: 200 TABLET ORAL at 08:11

## 2023-11-16 RX ADMIN — DOCUSATE SODIUM 100 MG: 100 CAPSULE, LIQUID FILLED ORAL at 08:11

## 2023-11-16 RX ADMIN — LEVETIRACETAM 500 MG: 500 TABLET, FILM COATED ORAL at 10:11

## 2023-11-16 RX ADMIN — MUPIROCIN: 20 OINTMENT TOPICAL at 10:11

## 2023-11-16 RX ADMIN — HYDRALAZINE HYDROCHLORIDE 10 MG: 20 INJECTION INTRAMUSCULAR; INTRAVENOUS at 12:11

## 2023-11-16 RX ADMIN — LISINOPRIL 20 MG: 20 TABLET ORAL at 08:11

## 2023-11-16 RX ADMIN — HYDRALAZINE HYDROCHLORIDE 10 MG: 20 INJECTION INTRAMUSCULAR; INTRAVENOUS at 05:11

## 2023-11-16 RX ADMIN — HYDRALAZINE HYDROCHLORIDE 50 MG: 50 TABLET, FILM COATED ORAL at 10:11

## 2023-11-16 RX ADMIN — LEVOTHYROXINE SODIUM 25 MCG: 25 TABLET ORAL at 05:11

## 2023-11-16 RX ADMIN — ATORVASTATIN CALCIUM 10 MG: 10 TABLET, FILM COATED ORAL at 10:11

## 2023-11-16 RX ADMIN — LISINOPRIL 20 MG: 20 TABLET ORAL at 10:11

## 2023-11-16 RX ADMIN — ACETAMINOPHEN 650 MG: 325 TABLET, FILM COATED ORAL at 10:11

## 2023-11-16 RX ADMIN — FAMOTIDINE 20 MG: 20 TABLET, FILM COATED ORAL at 08:11

## 2023-11-16 RX ADMIN — ACETAMINOPHEN 650 MG: 325 TABLET, FILM COATED ORAL at 01:11

## 2023-11-16 RX ADMIN — ACETAMINOPHEN 325 MG: 325 TABLET, FILM COATED ORAL at 10:11

## 2023-11-16 RX ADMIN — ACETAMINOPHEN 650 MG: 325 TABLET, FILM COATED ORAL at 05:11

## 2023-11-16 RX ADMIN — ACETAMINOPHEN 650 MG: 325 TABLET, FILM COATED ORAL at 02:11

## 2023-11-16 RX ADMIN — METOPROLOL SUCCINATE 50 MG: 50 TABLET, EXTENDED RELEASE ORAL at 10:11

## 2023-11-16 NOTE — PROGRESS NOTES
Inpatient Nutrition Assessment    Admit Date: 11/13/2023   Total duration of encounter: 3 days   Patient Age: 73 y.o.    Nutrition Recommendation/Prescription     Oral diet as tolerated.  Boost (provides 240 kcal, 10 g protein per serving) TID.    Communication of Recommendations: reviewed with patient    Nutrition Assessment     Malnutrition Assessment/Nutrition-Focused Physical Exam    Malnutrition Context: acute illness or injury (11/14/23 0955)  Malnutrition Level: moderate (11/14/23 0955)  Energy Intake (Malnutrition):  (does not meet criteria) (11/14/23 0955)  Weight Loss (Malnutrition): greater than 7.5% in 3 months (11/14/23 0955)  Subcutaneous Fat (Malnutrition): mild depletion (11/14/23 0955)  Orbital Region (Subcutaneous Fat Loss): mild depletion  Upper Arm Region (Subcutaneous Fat Loss): mild depletion     Muscle Mass (Malnutrition): mild depletion (11/14/23 0955)  Buddhism Region (Muscle Loss): mild depletion  Clavicle Bone Region (Muscle Loss): mild depletion                    Fluid Accumulation (Malnutrition):  (does not meet criteria) (11/14/23 0955)        A minimum of two characteristics is recommended for diagnosis of either severe or non-severe malnutrition.    Chart Review    Reason Seen: follow-up    Malnutrition Screening Tool Results   Have you recently lost weight without trying?: Unsure  Have you been eating poorly because of a decreased appetite?: No   MST Score: 2   Diagnosis:  Intraparenchymal hemorrhage      Relevant Medical History:    Acute MI      Anxiety      Cardiac disease      COPD (chronic obstructive pulmonary disease)      Coronary artery disease      Hyperlipidemia      Hypertension      Hypothyroidism      Other specified noninfective gastroenteritis and colitis      Unspecified macular degeneration      Scheduled Medications:  acetaminophen, 650 mg, Q4H  amiodarone, 200 mg, BID  atorvastatin, 10 mg, QHS  docusate sodium, 100 mg, BID  famotidine, 20 mg, Daily  hydrALAZINE, 50  "mg, TID  levETIRAcetam, 500 mg, BID  levothyroxine, 25 mcg, Before breakfast  lisinopriL, 20 mg, BID  metoprolol succinate, 50 mg, Q12H  mupirocin, , BID  polyethylene glycol, 17 g, BID  sertraline, 50 mg, Daily    Continuous Infusions: none at this time     PRN Medications: bisacodyL, hydrALAZINE, labetaloL, melatonin, ondansetron, oxyCODONE    Recent Labs   Lab 11/13/23  2217 11/14/23  0110 11/14/23  1717 11/15/23  0111 11/16/23  0335    140  --  140 139   K 3.7 3.9  --  4.2 3.6   CALCIUM 9.5 9.5  --  8.8 8.6   CHLORIDE 108* 108*  --  114* 113*   CO2 23 20*  --  17* 19*   BUN 22.9* 22.5*  --  16.3 18.2   CREATININE 0.76 0.76  --  0.64 0.65   EGFRNORACEVR >60 >60  --  >60 >60   GLUCOSE 102 94  --  83 102   BILITOT 0.3 0.4  --  0.4  --    ALKPHOS 69 71  --  50  --    ALT 15 24  --  26  --    AST 16 21  --  21  --    ALBUMIN 3.4 3.7  --  3.1*  --    TRIG  --  109  --   --   --    HGBA1C  --   --  6.0  --   --    WBC 7.64 8.64  --  6.42  --    HGB 13.1 13.7  --  12.2  --    HCT 40.4 43.5  --  38.8  --      Nutrition Orders:   Diet Adult Regular  Dietary nutrition supplements Boost Vanilla; TID    Appetite/Oral Intake: good/% of meals  Factors Affecting Nutritional Intake: none identified  Food/Christianity/Cultural Preferences: none reported  Food Allergies: none reported  Wound(s):  none noted  Last Bowel Movement: 11/15/23    Comments    11/14/23: Noted unsure wt loss PTA. Pt confirmed wt loss, UBW from July 2023. Stated appetite good at home though. Willing to drink ONS, only wanting regular Boost (not plus.) SLP consulted, monitor for diet advancement. Discussed with RN giving regular diet with SLP recs for consistency unless po intake 100% of meals.     11/16/23 Patient reports good appetite and good intake; observed good intake of meal tray at bedside during rounds.     Anthropometrics    Height: 5' 3" (160 cm) Height Method: Stated  Last Weight: 43.8 kg (96 lb 9 oz) (11/16/23 1419) Weight Method: Bed " Scale  BMI (Calculated): 17.1  BMI Classification: underweight (BMI less than 18.5)     Ideal Body Weight (IBW), Female: 115 lb     % Ideal Body Weight, Female (lb): 77.07 %                    Usual Body Weight (UBW), k.9 kg  % Usual Body Weight: 89.72  % Weight Change From Usual Weight: -10.47 %  Usual Weight Provided By: patient    Wt Readings from Last 5 Encounters:   23 43.8 kg (96 lb 9 oz)   10/01/23 40.8 kg (90 lb)   23 40.9 kg (90 lb 3.2 oz)   23 42.3 kg (93 lb 4.8 oz)   23 44.9 kg (98 lb 15.8 oz)     Weight Change(s) Since Admission: fluctuations noted, monitor  Wt Readings from Last 1 Encounters:   23 1419 43.8 kg (96 lb 9 oz)   23 0953 40.2 kg (88 lb 10 oz)   23 0022 40.2 kg (88 lb 10 oz)   23 43.1 kg (95 lb)   Admit Weight: 43.1 kg (95 lb) (23), Weight Method: Bed Scale    Estimated Needs    Weight Used For Calorie Calculations: 40.2 kg (88 lb 10 oz)  Energy Calorie Requirements (kcal): 1545kcal (1.2 stress factor + 500kcal for wt gain)  Energy Need Method: Lyman-St Jeor  Weight Used For Protein Calculations: 40.2 kg (88 lb 10 oz)  Protein Requirements: 48-56gm (1.2-1.4g/kg)  Fluid Requirements (mL): 1206ml (30ml/kg)    Enteral Nutrition Patient not receiving enteral nutrition at this time.    Parenteral Nutrition Patient not receiving parenteral nutrition support at this time.    Evaluation of Received Nutrient Intake    Calories: meeting estimated needs  Protein: meeting estimated needs    Patient Education Not applicable.    Nutrition Diagnosis     PES: Malnutrition related to acute illness as evidenced by muscle wasting, fat loss, wt loss. (active)    Interventions/Goals     Intervention(s): general/healthful diet, commercial beverage, prescription medication, and collaboration with other providers    Goal: Meet greater than 75% of nutritional needs by follow-up. (goal met)    Monitoring & Evaluation     Dietitian will monitor food  and beverage intake, energy intake, weight, electrolyte/renal panel, glucose/endocrine profile, and gastrointestinal profile.    Nutrition Risk/Follow-Up: high (follow-up in 1-4 days)   Please consult if re-assessment needed sooner.

## 2023-11-16 NOTE — PLAN OF CARE
Problem: Adult Inpatient Plan of Care  Goal: Plan of Care Review  Outcome: Ongoing, Progressing  Goal: Patient-Specific Goal (Individualized)  Outcome: Ongoing, Progressing  Goal: Absence of Hospital-Acquired Illness or Injury  Outcome: Ongoing, Progressing  Goal: Optimal Comfort and Wellbeing  Outcome: Ongoing, Progressing  Goal: Readiness for Transition of Care  Outcome: Ongoing, Progressing     Problem: Skin Injury Risk Increased  Goal: Skin Health and Integrity  Outcome: Ongoing, Progressing     Problem: Adjustment to Injury (Traumatic Brain Injury)  Goal: Optimal Coping with Brain Injury  Outcome: Ongoing, Progressing     Problem: Cerebral Tissue Perfusion Risk (Traumatic Brain Injury)  Goal: Effective Cerebral Tissue Perfusion  Outcome: Ongoing, Progressing     Problem: Cognitive Impairment (Traumatic Brain Injury)  Goal: Optimal Cognition Function  Outcome: Ongoing, Progressing     Problem: Communication Impairment (Traumatic Brain Injury)  Goal: Effective Communication  Outcome: Ongoing, Progressing     Problem: Pain (Traumatic Brain Injury)  Goal: Acceptable Pain Control  Outcome: Ongoing, Progressing     Problem: Nutrition Impaired (Traumatic Brain Injury)  Goal: Optimal Nutrition Intake  Outcome: Ongoing, Progressing     Problem: Functional Ability Impaired (Traumatic Brain Injury)  Goal: Optimal Functional Ability  Outcome: Ongoing, Progressing     Problem: Respiratory Compromise (Traumatic Brain Injury)  Goal: Effective Oxygenation and Ventilation  Outcome: Ongoing, Progressing     Problem: Respiratory Compromise (Stroke, Hemorrhagic)  Goal: Effective Oxygenation and Ventilation  Outcome: Ongoing, Progressing     Problem: Sensorimotor Impairment (Stroke, Hemorrhagic)  Goal: Improved Sensorimotor Function  Outcome: Ongoing, Progressing

## 2023-11-16 NOTE — PT/OT/SLP PROGRESS
Physical Therapy      Patient Name:  Cierra Main   MRN:  [id number]    Patient not seen today secondary to Other (Comment) (pt. politely declined stating 'Today I rest'). Will follow-up as schedule allows.

## 2023-11-16 NOTE — PT/OT/SLP PROGRESS
Occupational Therapy   Treatment    Name: Cierra Main  MRN: [id number]  Admitting Diagnosis:  Intraparenchymal hematoma of brain       Recommendations:     Recommended therapy intensity at discharge: Moderate Intensity Therapy   Discharge Equipment Recommendations:  other (see comments) (TBD)  Barriers to discharge:       Assessment:     Cierra Main is a 73 y.o. female with a medical diagnosis of Intraparenchymal hematoma of brain. Performance deficits affecting function are weakness, impaired endurance, impaired self care skills, impaired functional mobility, visual deficits, decreased safety awareness, impaired balance. Pt required max encouragement for participation during session today. C/o fatigue and weakness. Educated on importance of therapy to improve strength and endurance. Able to sit EOB for ~3 minutes however returned self to supine.     Rehab Prognosis:  Fair; patient would benefit from acute skilled OT services to address these deficits and reach maximum level of function.       Plan:     Patient to be seen 3 x/week to address the above listed problems via self-care/home management, therapeutic activities, therapeutic exercises  Plan of Care Expires: 12/12/23  Plan of Care Reviewed with: patient    Subjective     Pain/Comfort:  Pain Rating 1: 0/10    Objective:     Communicated with: RN prior to session.  Patient found supine with peripheral IV, telemetry, pulse ox (continuous) upon OT entry to room.    General Precautions: Standard, fall, vision impaired    Orthopedic Precautions:N/A  Braces: N/A  Respiratory Status: Room air  Vital Signs: Blood Pressure: 136/56     Occupational Performance:     Bed Mobility:    Patient completed Supine to Sit with minimum assistance  Patient completed Sit to Supine with contact guard assistance     Therapeutic Activities:  Pt sat EOB x3 minutes with SBA. Encouragement provided for further mobility or completion of ADLs however pt declining. Returned self to  supine.     Therapeutic Positioning    OT interventions performed during the course of today's session in an effort to prevent and/or reduce acquired pressure injuries:   Education was provided on benefits of and recommendations for therapeutic positioning    Skin assessment: all bony prominences were assessed    Findings: no redness or breakdown noted    [facility] 6 Click ADL:      Patient Education:  Patient provided with verbal education education regarding OT role/goals/POC.  Understanding was verbalized, however additional teaching warranted.      Patient left supine with all lines intact and call button in reach    GOALS:   Multidisciplinary Problems       Occupational Therapy Goals          Problem: Occupational Therapy    Goal Priority Disciplines Outcome Interventions   Occupational Therapy Goal     OT, PT/OT Ongoing, Progressing    Description: LTG: Pt will perform basic ADLs and ADL transfers with Modified independence using LRAD by discharge.    STG: to be met by 12/14/23    Pt will complete grooming standing at sink with LRAD with SBA.  Pt will complete UB dressing with SBA.  Pt will complete LB dressing with SBA using LRAD.  Pt will complete toileting with SBA using LRAD.  Pt will complete functional mobility to/from toilet and toilet transfer with SBA using LRAD.                        Time Tracking:     OT Date of Treatment: 11/16/23  OT Start Time: 1316  OT Stop Time: 1324  OT Total Time (min): 8 min    Billable Minutes:Therapeutic Activity 8    OT/MARY: MARY     Number of MARY visits since last OT visit: 1 11/16/2023

## 2023-11-16 NOTE — PT/OT/SLP PROGRESS
Attempted to see pt this afternoon; however, pt working with other therapy services and unavailable. Will re-attempt a later time schedule permitting.

## 2023-11-16 NOTE — NURSING
Nurses Note -- 4 Eyes      11/16/2023   2:52 AM      Skin assessed during: Transfer      [x] No Altered Skin Integrity Present    []Prevention Measures Documented      [] Yes- Altered Skin Integrity Present or Discovered   [] LDA Added if Not in Epic (Describe Wound)   [] New Altered Skin Integrity was Present on Admit and Documented in LDA   [] Wound Image Taken    Wound Care Consulted? No    Attending Nurse:  Leo Cloud RN/Staff Member:   Mami Godinez

## 2023-11-16 NOTE — PLAN OF CARE
Sent PT Note in [facility] per request of [facility]. Spoke with Stefany @ [facility] who states they will submit for auth approval.

## 2023-11-16 NOTE — NURSING
Nurses Note -- 4 Eyes      11/15/2023   10:42 PM      Skin assessed during: Daily Assessment      [x] No Altered Skin Integrity Present    [x]Prevention Measures Documented      [] Yes- Altered Skin Integrity Present or Discovered   [] LDA Added if Not in Epic (Describe Wound)   [] New Altered Skin Integrity was Present on Admit and Documented in LDA   [] Wound Image Taken    Wound Care Consulted? No    Attending Nurse:  Hilary Cloud RN/Staff Member:   Jessica CHEUNG

## 2023-11-16 NOTE — PROGRESS NOTES
[facility]  Hospital Medicine Progress Note        Chief Complaint: Inpatient Follow-up for     Subjective:  Cierra Main is a 73 y.o. female with a past medical history of essential hypertension, hyperlipidemia, CAD, MI, COPD, CVA, hypothyroidism, macular degeneration, and anxiety presented to [facility] ED on 11/13/2023 transferred from [facility] for IPH.  Patient presented to outside facility for fall at rehab. Patient reported her legs gave out on her and she struck her head on the ground.  Patient was in rehab for ischemic CVA on 10/03/2023. CT head showed IPH in similar area of previous CVA.  Neurosurgery and Neurology were consulted. Patient was admitted to trauma ICU for close monitoring.  Patient was started on Keppra 500 mg BID with recommendations of blood pressure less than 140/90.  Repeat CT head revealed stable mild intraparenchymal hemorrhage in the left occipital lobe and associated mild perinephric edema.  CTA head and neck on 11/14 revealed no evidence of vascular occlusion, 60% stenosis in the left proximal internal carotid artery secondary to calcified plaque and less than 50% stenosis in the right proximal internal carotid artery secondary to calcified plaque, and rim enhancing lesion seen in the occipital lobe on the left side with some surrounding hemorrhage.  MRI brain with and without contrast revealed left posterior occipital lobe combination of subacute and old hemorrhages surrounded by brain gliotic changes and/or some edema.  Neurosurgery recommended follow-up MRI in 6 weeks.  Patient was cleared for downgrade out of ICU on 11/15/2023.  Hospital medicine was consulted for transition of care and further medical management.     Examined the patient.  Patient states she is more tired today than yesterday.  Denies having any other symptoms like fever chills urinary symptoms.  Able to follow commands moves all 4 extremities with no issues.    Objective/physical  exam:  General appearance: Female in no apparent distress.  HEENNT: Atraumatic head.   Lungs: Clear to auscultation bilaterally.   Heart: Regular rate and rhythm.    Abdomen: Soft, non-distended, non-tender. Bowel sounds are normal.   Extremities: No cyanosis, clubbing, or edema. No deformities.   Skin: No Rash. Warm and dry.   Neuro: Awake, alert, and oriented. Motor and sensory exams grossly intact.  Strength equal in bilateral upper and lower extremities.  No slurred speech.  No facial droop.        VITAL SIGNS: 24 HRS MIN & MAX LAST   Temp  Min: 98.3 °F (36.8 °C)  Max: 98.6 °F (37 °C) 98.4 °F (36.9 °C)   BP  Min: 120/52  Max: 176/64 (!) 150/69   Pulse  Min: 54  Max: 69  (!) 54   Resp  Min: 16  Max: 23 18   SpO2  Min: 94 %  Max: 97 % 96 %       Labs, Microbiology and Imaging were Reviewed.      Microbiology Results (last 7 days)       ** No results found for the last 168 hours. **               Medications   acetaminophen  650 mg Oral Q4H    amiodarone  200 mg Oral BID    atorvastatin  10 mg Oral QHS    docusate sodium  100 mg Oral BID    famotidine  20 mg Oral Daily    hydrALAZINE  50 mg Oral TID    levETIRAcetam  500 mg Oral BID    levothyroxine  25 mcg Oral Before breakfast    lisinopriL  20 mg Oral BID    metoprolol succinate  50 mg Oral Q12H    mupirocin   Nasal BID    polyethylene glycol  17 g Oral BID    sertraline  50 mg Oral Daily        bisacodyL, hydrALAZINE, labetaloL, melatonin, ondansetron, oxyCODONE     Radiology:  MRI Brain W WO Contrast  Narrative: EXAMINATION:  MRI BRAIN W WO CONTRAST    CLINICAL HISTORY:  Neuro deficit, acute, stroke suspected;    TECHNIQUE:  Multiplanar MRI sequences were performed of the brain without and following administration gadolinium based contrast.    COMPARISON:  CT brain without contrast November 14, 2023.    FINDINGS:  The left posterior occipital lobe is remarkable for small subacute hemorrhage with pre contrast T1 and T2 weighted hyperintensity on images 18 and  19 series 7.  This is surrounded by irregularly defined hemosiderin/chronic hemorrhagic byproducts with dephasing artifact on the gradient echo sequence.  There is only subtle enhancement and this location without dominant mass lesion.  This is surrounded by brain gliotic changes and/or mild edema.  No associated diffusion restriction to indicate an acute infarct.  The findings may represent a subacute to old hemorrhagic infarct.  There is local effacement of the sulci without significant mass effect or midline shift.  There is left frontal lobe small encephalomalacia related to old infarct.  There are old pontine lacunar infarcts.  Chronic microvascular ischemia with periventricular and deep white matter T2 FLAIR hyperintense signals. There is generalized cerebral and cerebellar cortical volume loss.  Partially empty sella.    The cerebellar tonsils are normally positioned.  No hydrocephalus.  No acute extra axial fluid collections identified. The mastoid air cells are clear.  There is loss of pneumatization of the left maxillary sinus occupied by mucoperiosteal thickening.  There is also prominent mucoperiosteal thickening involving the left frontal sinus.  Impression: 1.  Left posterior occipital lobe combination of subacute and old hemorrhages surrounded by brain gliotic changes and/or some edema.  No associated significant enhancement or diffusion weighted restriction.  Findings may represent subacute to old hemorrhagic infarct.  Follow-up MRI in 6 weeks is recommended.    2.  Old infarcts, chronic microangiopathic ischemia and atrophy.    Electronically signed by: Yusuf Fleming  Date:    11/15/2023  Time:    09:17      Assessment/Plan:  Left posterior occipital lobe combination of subacute and old hemorrhages   History of essential hypertension, hyperlipidemia, CAD, MI, COPD, CVA, hypothyroidism, macular degeneration, and anxiety     Patient was downgraded  to the floor after stabilization of the ICH, no Masses  in the MRI and neurology recommends 6 weeks follow up with MRI.   She is ox3 with no neurodeficits.   - EKG, and we will order carotid duplex.    - echocardiogram revealed EF 60 65%, there is 1 to 2+ mitral and tricuspid regurgitation.  10/2/2023  - had left heart catheterization, 10/06/2023 with no stent placement.  Previously was stent placed however family does not remember the timing.  -she at least needs to be on antiplatelet, discretion of Neurosurgery went to start  Keep the BP<140/90 .   - Continue monitor   - pt/ot     All diagnosis and differential diagnosis have been reviewed; assessment and plan has been documented; I have personally reviewed the labs and test results that are presently available; I have reviewed the patients medication list; I have reviewed the consulting providers response and recommendations. I have reviewed or attempted to review medical records based upon their availability.       Tigre Condon MD   11/16/2023

## 2023-11-17 VITALS
WEIGHT: 96.56 LBS | OXYGEN SATURATION: 96 % | HEIGHT: 63 IN | BODY MASS INDEX: 17.11 KG/M2 | SYSTOLIC BLOOD PRESSURE: 128 MMHG | RESPIRATION RATE: 18 BRPM | DIASTOLIC BLOOD PRESSURE: 55 MMHG | TEMPERATURE: 98 F | HEART RATE: 51 BPM

## 2023-11-17 LAB
ALBUMIN SERPL-MCNC: 3 G/DL (ref 3.4–4.8)
ALBUMIN/GLOB SERPL: 1 RATIO (ref 1.1–2)
ALP SERPL-CCNC: 57 UNIT/L (ref 40–150)
ALT SERPL-CCNC: 19 UNIT/L (ref 0–55)
AST SERPL-CCNC: 18 UNIT/L (ref 5–34)
BASOPHILS # BLD AUTO: 0.06 X10(3)/MCL
BASOPHILS NFR BLD AUTO: 0.9 %
BILIRUB SERPL-MCNC: 0.3 MG/DL
BUN SERPL-MCNC: 14 MG/DL (ref 9.8–20.1)
CALCIUM SERPL-MCNC: 8.7 MG/DL (ref 8.4–10.2)
CHLORIDE SERPL-SCNC: 111 MMOL/L (ref 98–107)
CO2 SERPL-SCNC: 23 MMOL/L (ref 23–31)
CREAT SERPL-MCNC: 0.62 MG/DL (ref 0.55–1.02)
EOSINOPHIL # BLD AUTO: 0.14 X10(3)/MCL (ref 0–0.9)
EOSINOPHIL NFR BLD AUTO: 2.2 %
ERYTHROCYTE [DISTWIDTH] IN BLOOD BY AUTOMATED COUNT: 14.1 % (ref 11.5–17)
GFR SERPLBLD CREATININE-BSD FMLA CKD-EPI: >60 MLS/MIN/1.73/M2
GLOBULIN SER-MCNC: 3 GM/DL (ref 2.4–3.5)
GLUCOSE SERPL-MCNC: 91 MG/DL (ref 82–115)
HCT VFR BLD AUTO: 39.8 % (ref 37–47)
HGB BLD-MCNC: 12.8 G/DL (ref 12–16)
IMM GRANULOCYTES # BLD AUTO: 0.02 X10(3)/MCL (ref 0–0.04)
IMM GRANULOCYTES NFR BLD AUTO: 0.3 %
LYMPHOCYTES # BLD AUTO: 1.65 X10(3)/MCL (ref 0.6–4.6)
LYMPHOCYTES NFR BLD AUTO: 25.9 %
MCH RBC QN AUTO: 28.8 PG (ref 27–31)
MCHC RBC AUTO-ENTMCNC: 32.2 G/DL (ref 33–36)
MCV RBC AUTO: 89.6 FL (ref 80–94)
MONOCYTES # BLD AUTO: 0.77 X10(3)/MCL (ref 0.1–1.3)
MONOCYTES NFR BLD AUTO: 12.1 %
NEUTROPHILS # BLD AUTO: 3.72 X10(3)/MCL (ref 2.1–9.2)
NEUTROPHILS NFR BLD AUTO: 58.6 %
NRBC BLD AUTO-RTO: 0 %
PLATELET # BLD AUTO: 250 X10(3)/MCL (ref 130–400)
PMV BLD AUTO: 10.8 FL (ref 7.4–10.4)
POTASSIUM SERPL-SCNC: 3.4 MMOL/L (ref 3.5–5.1)
PROT SERPL-MCNC: 6 GM/DL (ref 5.8–7.6)
RBC # BLD AUTO: 4.44 X10(6)/MCL (ref 4.2–5.4)
SODIUM SERPL-SCNC: 140 MMOL/L (ref 136–145)
WBC # SPEC AUTO: 6.36 X10(3)/MCL (ref 4.5–11.5)

## 2023-11-17 PROCEDURE — 85025 COMPLETE CBC W/AUTO DIFF WBC: CPT | Performed by: STUDENT IN AN ORGANIZED HEALTH CARE EDUCATION/TRAINING PROGRAM

## 2023-11-17 PROCEDURE — 25000003 PHARM REV CODE 250: Performed by: STUDENT IN AN ORGANIZED HEALTH CARE EDUCATION/TRAINING PROGRAM

## 2023-11-17 PROCEDURE — 63600175 PHARM REV CODE 636 W HCPCS: Performed by: STUDENT IN AN ORGANIZED HEALTH CARE EDUCATION/TRAINING PROGRAM

## 2023-11-17 PROCEDURE — 97530 THERAPEUTIC ACTIVITIES: CPT | Mod: CQ

## 2023-11-17 PROCEDURE — 80053 COMPREHEN METABOLIC PANEL: CPT | Performed by: STUDENT IN AN ORGANIZED HEALTH CARE EDUCATION/TRAINING PROGRAM

## 2023-11-17 PROCEDURE — 25000003 PHARM REV CODE 250: Performed by: SURGERY

## 2023-11-17 PROCEDURE — 25000003 PHARM REV CODE 250

## 2023-11-17 PROCEDURE — 97116 GAIT TRAINING THERAPY: CPT | Mod: CQ

## 2023-11-17 RX ORDER — HYDRALAZINE HYDROCHLORIDE 50 MG/1
100 TABLET, FILM COATED ORAL 3 TIMES DAILY
Status: DISCONTINUED | OUTPATIENT
Start: 2023-11-17 | End: 2023-11-17 | Stop reason: HOSPADM

## 2023-11-17 RX ORDER — POLYETHYLENE GLYCOL 3350 17 G/17G
17 POWDER, FOR SOLUTION ORAL 2 TIMES DAILY
Refills: 0 | Status: ON HOLD
Start: 2023-11-17 | End: 2024-01-16

## 2023-11-17 RX ORDER — ACETAMINOPHEN 500 MG
1000 TABLET ORAL EVERY 8 HOURS PRN
Refills: 0
Start: 2023-11-17 | End: 2024-01-09

## 2023-11-17 RX ORDER — HYDRALAZINE HYDROCHLORIDE 50 MG/1
50 TABLET, FILM COATED ORAL ONCE
Status: COMPLETED | OUTPATIENT
Start: 2023-11-17 | End: 2023-11-17

## 2023-11-17 RX ORDER — HYDRALAZINE HYDROCHLORIDE 100 MG/1
100 TABLET, FILM COATED ORAL 3 TIMES DAILY
Qty: 90 TABLET | Refills: 11 | Status: SHIPPED | OUTPATIENT
Start: 2023-11-17 | End: 2024-11-16

## 2023-11-17 RX ORDER — LEVETIRACETAM 500 MG/1
500 TABLET ORAL 2 TIMES DAILY
Qty: 60 TABLET | Refills: 11 | Status: ON HOLD | OUTPATIENT
Start: 2023-11-17 | End: 2024-01-10

## 2023-11-17 RX ADMIN — LEVETIRACETAM 500 MG: 500 TABLET, FILM COATED ORAL at 08:11

## 2023-11-17 RX ADMIN — HYDRALAZINE HYDROCHLORIDE 50 MG: 50 TABLET, FILM COATED ORAL at 08:11

## 2023-11-17 RX ADMIN — POLYETHYLENE GLYCOL 3350 17 G: 17 POWDER, FOR SOLUTION ORAL at 08:11

## 2023-11-17 RX ADMIN — HYDRALAZINE HYDROCHLORIDE 10 MG: 20 INJECTION INTRAMUSCULAR; INTRAVENOUS at 05:11

## 2023-11-17 RX ADMIN — DOCUSATE SODIUM 100 MG: 100 CAPSULE, LIQUID FILLED ORAL at 08:11

## 2023-11-17 RX ADMIN — METOPROLOL SUCCINATE 50 MG: 50 TABLET, EXTENDED RELEASE ORAL at 08:11

## 2023-11-17 RX ADMIN — FAMOTIDINE 20 MG: 20 TABLET, FILM COATED ORAL at 08:11

## 2023-11-17 RX ADMIN — LISINOPRIL 20 MG: 20 TABLET ORAL at 08:11

## 2023-11-17 RX ADMIN — MUPIROCIN: 20 OINTMENT TOPICAL at 08:11

## 2023-11-17 RX ADMIN — SERTRALINE HYDROCHLORIDE 50 MG: 50 TABLET ORAL at 08:11

## 2023-11-17 RX ADMIN — ACETAMINOPHEN 650 MG: 325 TABLET, FILM COATED ORAL at 05:11

## 2023-11-17 RX ADMIN — AMIODARONE HYDROCHLORIDE 200 MG: 200 TABLET ORAL at 08:11

## 2023-11-17 RX ADMIN — ACETAMINOPHEN 650 MG: 325 TABLET, FILM COATED ORAL at 10:11

## 2023-11-17 RX ADMIN — HYDRALAZINE HYDROCHLORIDE 50 MG: 50 TABLET, FILM COATED ORAL at 01:11

## 2023-11-17 RX ADMIN — LEVOTHYROXINE SODIUM 25 MCG: 25 TABLET ORAL at 05:11

## 2023-11-17 NOTE — PLAN OF CARE
11/17/23 1503   Final Note   Assessment Type Final Discharge Note   Anticipated Discharge Disposition SNF   Post-Acute Status   Post-Acute Authorization Placement   Post-Acute Placement Status Set-up Complete/Auth obtained   Discharge Delays None known at this time     Patient will return to [facility] via facility van. Nurse can call report to [facility].

## 2023-11-17 NOTE — DISCHARGE SUMMARY
[facility] Hospital Medicine Discharge Summary    Admit Date: 11/13/2023  Discharge Date and Time: 11/17/2023  Discharging Physician: Tigre Condon MD.  Consults:  Neurosurgery, neurology.    Discharge Diagnoses:  Left posterior occipital lobe combination of subacute and old hemorrhages   History of essential hypertension, hyperlipidemia, CAD, MI, COPD, CVA, hypothyroidism, macular degeneration, and anxiety    Hospital Course:   Cierra Main is a 73 y.o. female with a past medical history of essential hypertension, hyperlipidemia, CAD, MI, COPD, CVA, hypothyroidism, macular degeneration, and anxiety presented to [facility] ED on 11/13/2023 transferred from [place] for IPH.  Patient presented to outside facility for fall at rehab. Patient reported her legs gave out on her and she struck her head on the ground.  Patient was in rehab for ischemic CVA on 10/03/2023. CT head showed IPH in similar area of previous CVA.  Neurosurgery and Neurology were consulted. Patient was admitted to trauma ICU for close monitoring.  Patient was started on Keppra 500 mg BID with recommendations of blood pressure less than 140/90.  Repeat CT head revealed stable mild intraparenchymal hemorrhage in the left occipital lobe and associated mild perinephric edema.  CTA head and neck on 11/14 revealed no evidence of vascular occlusion, 60% stenosis in the left proximal internal carotid artery secondary to calcified plaque and less than 50% stenosis in the right proximal internal carotid artery secondary to calcified plaque, and rim enhancing lesion seen in the occipital lobe on the left side with some surrounding hemorrhage.  MRI brain with and without contrast revealed left posterior occipital lobe combination of subacute and old hemorrhages surrounded by brain gliotic changes and/or some edema.  Neurosurgery recommended follow-up MRI in 6 weeks.  Patient was cleared for downgrade out of ICU on 11/15/2023.   Hospital medicine was consulted for transition of care and further medical management.     Examined the patient.  Patient states she is more tired today than yesterday.  Denies having any other symptoms like fever chills urinary symptoms.  Able to follow commands moves all 4 extremities with no issues.    - Patient was downgraded  to the floor after stabilization of the ICH, no Masses in the MRI and neurology recommends 6 weeks follow up with MRI.   - She is ox3 with no neurodeficits.   - EKG, and we will order carotid duplex.    - Echocardiogram revealed EF 60 65%, there is 1 to 2+ mitral and tricuspid regurgitation.  10/2/2023  - Had left heart catheterization, 10/06/2023 with no stent placement.  Previously was stent placed however family does not remember the timing.  -She at least needs to be on antiplatelet, discretion of Neurosurgery went to start  Keep the BP<140/90 .     Objective/physical exam:  General appearance: Female in no apparent distress.  HEENNT: Atraumatic head.   Lungs: Clear to auscultation bilaterally.   Heart: Regular rate and rhythm.    Abdomen: Soft, non-distended, non-tender. Bowel sounds are normal.   Extremities: No cyanosis, clubbing, or edema. No deformities.   Skin: No Rash. Warm and dry.   Neuro: Awake, alert, and oriented. Motor and sensory exams grossly intact.  Strength equal in bilateral upper and lower extremities.  No slurred speech.  No facial droop.    Vitals:  VITAL SIGNS: 24 HRS MIN & MAX LAST   Temp  Min: 97.8 °F (36.6 °C)  Max: 100 °F (37.8 °C) 100 °F (37.8 °C)   BP  Min: 136/58  Max: 206/74 (!) 136/58   Pulse  Min: 52  Max: 63  60   Resp  Min: 18  Max: 20 18   SpO2  Min: 95 %  Max: 97 % 96 %         Procedures Performed: No admission procedures for hospital encounter.     Significant Diagnostic Studies: See Full reports for all details    Recent Labs   Lab 11/14/23  0110 11/15/23  0111 11/17/23  0425   WBC 8.64 6.42 6.36   RBC 4.86 4.28 4.44   HGB 13.7 12.2 12.8   HCT  43.5 38.8 39.8   MCV 89.5 90.7 89.6   MCH 28.2 28.5 28.8   MCHC 31.5* 31.4* 32.2*   RDW 14.2 14.5 14.1    275 250   MPV 10.2 10.6* 10.8*       Recent Labs   Lab 11/14/23  0110 11/15/23  0111 11/16/23  0335 11/17/23  0425    140 139 140   K 3.9 4.2 3.6 3.4*   CO2 20* 17* 19* 23   BUN 22.5* 16.3 18.2 14.0   CREATININE 0.76 0.64 0.65 0.62   CALCIUM 9.5 8.8 8.6 8.7   ALBUMIN 3.7 3.1*  --  3.0*   ALKPHOS 71 50  --  57   ALT 24 26  --  19   AST 21 21  --  18   BILITOT 0.4 0.4  --  0.3        Microbiology Results (last 7 days)       ** No results found for the last 168 hours. **             MRI Brain W WO Contrast  Narrative: EXAMINATION:  MRI BRAIN W WO CONTRAST    CLINICAL HISTORY:  Neuro deficit, acute, stroke suspected;    TECHNIQUE:  Multiplanar MRI sequences were performed of the brain without and following administration gadolinium based contrast.    COMPARISON:  CT brain without contrast November 14, 2023.    FINDINGS:  The left posterior occipital lobe is remarkable for small subacute hemorrhage with pre contrast T1 and T2 weighted hyperintensity on images 18 and 19 series 7.  This is surrounded by irregularly defined hemosiderin/chronic hemorrhagic byproducts with dephasing artifact on the gradient echo sequence.  There is only subtle enhancement and this location without dominant mass lesion.  This is surrounded by brain gliotic changes and/or mild edema.  No associated diffusion restriction to indicate an acute infarct.  The findings may represent a subacute to old hemorrhagic infarct.  There is local effacement of the sulci without significant mass effect or midline shift.  There is left frontal lobe small encephalomalacia related to old infarct.  There are old pontine lacunar infarcts.  Chronic microvascular ischemia with periventricular and deep white matter T2 FLAIR hyperintense signals. There is generalized cerebral and cerebellar cortical volume loss.  Partially empty sella.    The cerebellar  tonsils are normally positioned.  No hydrocephalus.  No acute extra axial fluid collections identified. The mastoid air cells are clear.  There is loss of pneumatization of the left maxillary sinus occupied by mucoperiosteal thickening.  There is also prominent mucoperiosteal thickening involving the left frontal sinus.  Impression: 1.  Left posterior occipital lobe combination of subacute and old hemorrhages surrounded by brain gliotic changes and/or some edema.  No associated significant enhancement or diffusion weighted restriction.  Findings may represent subacute to old hemorrhagic infarct.  Follow-up MRI in 6 weeks is recommended.    2.  Old infarcts, chronic microangiopathic ischemia and atrophy.    Electronically signed by: Yusuf Fleming  Date:    11/15/2023  Time:    09:17         Medication List        START taking these medications      levETIRAcetam 500 MG Tab  Commonly known as: KEPPRA  Take 1 tablet (500 mg total) by mouth 2 (two) times daily.     polyethylene glycol 17 gram Pwpk  Commonly known as: GLYCOLAX  Take 17 g by mouth 2 (two) times a day.            CHANGE how you take these medications      acetaminophen 500 MG tablet  Commonly known as: TYLENOL  Take 2 tablets (1,000 mg total) by mouth every 8 (eight) hours as needed for Pain or Temperature greater than.  What changed: when to take this     hydrALAZINE 100 MG tablet  Commonly known as: APRESOLINE  Take 1 tablet (100 mg total) by mouth 3 (three) times daily.  What changed:   medication strength  how much to take            CONTINUE taking these medications      amiodarone 200 MG Tab  Commonly known as: PACERONE     atorvastatin 10 MG tablet  Commonly known as: LIPITOR     cimetidine 200 MG tablet  Commonly known as: TAGAMET     docusate sodium 100 MG capsule  Commonly known as: COLACE     levothyroxine 25 MCG tablet  Commonly known as: SYNTHROID  Take 1 tablet (25 mcg total) by mouth before breakfast.     lisinopriL 20 MG tablet  Commonly  known as: PRINIVIL,ZESTRIL  Take 1 tablet (20 mg total) by mouth 2 (two) times daily.     melatonin 3 mg Cap     metoprolol succinate 50 MG 24 hr tablet  Commonly known as: TOPROL-XL     montelukast 10 mg tablet  Commonly known as: SINGULAIR     nitroGLYCERIN 0.4 MG/DOSE TL SPRY 400 mcg/spray spray  Commonly known as: NITROLINGUAL     sertraline 50 MG tablet  Commonly known as: ZOLOFT  Take 1 tablet (50 mg total) by mouth once daily.            STOP taking these medications      aspirin 81 MG EC tablet  Commonly known as: ECOTRIN     ELIQUIS 5 mg Tab  Generic drug: apixaban     pravastatin 40 MG tablet  Commonly known as: PRAVACHOL     predniSONE 10 MG tablet  Commonly known as: DELTASONE               Where to Get Your Medications        These medications were sent to [facility]/pharmacy #[id number] - [place], [place] - [address]  [address], [place] [address]      Phone: [phone]   hydrALAZINE 100 MG tablet  levETIRAcetam 500 MG Tab       Information about where to get these medications is not yet available    Ask your nurse or doctor about these medications  acetaminophen 500 MG tablet  polyethylene glycol 17 gram Pwpk          Explained in detail to the patient about the discharge plan, medications, and follow-up visits. Pt understands and agrees with the treatment plan  Discharge Disposition: Short Term Hospital   Discharged Condition: stable  Diet-   Dietary Orders (From admission, onward)       Start     Ordered    11/14/23 1417  Diet Adult Regular  Diet effective now         11/14/23 1416    11/14/23 0944  Dietary nutrition supplements Boost Vanilla; TID  Continuous        Question Answer Comment   Select PO Supplement: Boost Vanilla    Frequency: TID        11/14/23 0945                   Medications Per DC med rec  Activities as tolerated   Follow-up Information       Rafiq Eller MD Follow up in 1 week(s).    Specialty: Family Medicine  Contact information:  [address] [place]  [place] [address]  [phone]               Bryce Fontenot MD Follow up in 1 week(s).    Specialty: Neurosurgery  Contact information:  [address] [place] [address] [phone]               Fer Hernandez MD Follow up in 2 week(s).    Specialties: Neurology, Interventional Radiology  Contact information:  [address] [place] [address] [address] [address]  [phone]                           For further questions contact hospitalist office    Discharge time 33 minutes    For worsening symptoms, chest pain, shortness of breath, increased abdominal pain, high grade fever, stroke or stroke like symptoms, immediately go to the nearest Emergency Room or call 911 as soon as possible.      Tigre Alegria M.D, on 11/17/2023. at 2:52 PM.

## 2023-11-17 NOTE — PT/OT/SLP PROGRESS
Physical Therapy Treatment    Patient Name:  Cierra Main   MRN:  [id number]    Recommendations:     Discharge therapy intensity: Moderate Intensity Therapy   Discharge Equipment Recommendations:    Barriers to discharge:     Assessment:     Cierra Main is a 73 y.o. female admitted with a medical diagnosis of Intraparenchymal hematoma of brain.  She presents with the following impairments/functional limitations: weakness, impaired endurance, impaired functional mobility, gait instability, impaired self care skills, impaired balance, visual deficits, decreased safety awareness, impaired cardiopulmonary response to activity.    Rehab Prognosis: Good; patient would benefit from acute skilled PT services to address these deficits and reach maximum level of function.    Recent Surgery: * No surgery found *      Plan:     During this hospitalization, patient to be seen 5 x/week to address the identified rehab impairments via gait training, therapeutic activities, therapeutic exercises, neuromuscular re-education and progress toward the following goals:    Plan of Care Expires:  12/15/23    Subjective     Chief Complaint: 'i want to go home'  Patient/Family Comments/goals:   Pain/Comfort:         Objective:     Communicated with nursing prior to session.  Patient found supine with pulse ox (continuous), telemetry upon PT entry to room.     General Precautions: Standard, fall, vision impaired  Orthopedic Precautions: N/A  Braces: N/A  Respiratory Status: Room air  Blood Pressure: 136/58 HR 55  Skin Integrity:     Functional Mobility:  Bed Mobility:     Supine to Sit: minimum assistance  Transfers:     Sit to Stand:  minimum assistance with rolling walker  Bed to Chair: minimum assistance with  rolling walker  using  Step Transfer  Gait: ~180' w/RW, Dany  Noted slow tami    Therapeutic Activities/Exercises:  Pt. T/f to BS, then amb into villa with Dany over all for navigation 2/2 vision  deficits    Education:  Patient provided with verbal education education regarding importance of functional mobility.  Understanding was verbalized.     Patient left up in chair with all lines intact, call button in reach, and nurse notified.    GOALS:   Multidisciplinary Problems       Physical Therapy Goals          Problem: Physical Therapy    Goal Priority Disciplines Outcome Goal Variances Interventions   Physical Therapy Goal     PT, PT/OT Ongoing, Progressing     Description: Goals to be met by: 12/15/23     Patient will increase functional independence with mobility by performin. Sit to stand transfer with Modified [place]  2. Bed to chair transfer with Modified [place] using Rolling Walker  3. Gait  x 300 feet with Modified [place] using Rolling Walker.   4. Ascend/descend 4 stair with no Handrails Modified [place] using hand held assist.                          Time Tracking:     PT Received On: 23  PT Start Time: 1408     PT Stop Time: 1434  PT Total Time (min): 26 min     Billable Minutes: Gait Training 16 and Therapeutic Activity 10    Treatment Type: Treatment  PT/PTA: PTA     Number of PTA visits since last PT visit: 1     2023

## 2023-11-18 LAB
LEFT CCA DIST DIAS: 4 CM/S
LEFT CCA DIST SYS: 56 CM/S
LEFT CCA PROX DIAS: 0 CM/S
LEFT CCA PROX SYS: 74 CM/S
LEFT ECA DIAS: 0 CM/S
LEFT ECA SYS: 148 CM/S
LEFT ICA DIST DIAS: 7 CM/S
LEFT ICA DIST SYS: 110 CM/S
LEFT ICA MID DIAS: 21 CM/S
LEFT ICA MID SYS: 121 CM/S
LEFT ICA PROX DIAS: 5 CM/S
LEFT ICA PROX SYS: 74 CM/S
LEFT VERTEBRAL DIAS: 3 CM/S
LEFT VERTEBRAL SYS: 64 CM/S
OHS CV CAROTID RIGHT ICA EDV HIGHEST: 14
OHS CV CAROTID ULTRASOUND LEFT ICA/CCA RATIO: 2.16
OHS CV CAROTID ULTRASOUND RIGHT ICA/CCA RATIO: 1.87
OHS CV PV CAROTID LEFT HIGHEST CCA: 74
OHS CV PV CAROTID LEFT HIGHEST ICA: 121
OHS CV PV CAROTID RIGHT HIGHEST CCA: 89
OHS CV PV CAROTID RIGHT HIGHEST ICA: 133
OHS CV US CAROTID LEFT HIGHEST EDV: 21
RIGHT CCA DIST DIAS: 6 CM/S
RIGHT CCA DIST SYS: 71 CM/S
RIGHT CCA PROX DIAS: 0 CM/S
RIGHT CCA PROX SYS: 89 CM/S
RIGHT ECA DIAS: 0 CM/S
RIGHT ECA SYS: 118 CM/S
RIGHT ICA DIST DIAS: 11 CM/S
RIGHT ICA DIST SYS: 108 CM/S
RIGHT ICA MID DIAS: 14 CM/S
RIGHT ICA MID SYS: 121 CM/S
RIGHT ICA PROX DIAS: 14 CM/S
RIGHT ICA PROX SYS: 133 CM/S
RIGHT VERTEBRAL DIAS: 0 CM/S
RIGHT VERTEBRAL SYS: 105 CM/S

## 2023-12-05 ENCOUNTER — OFFICE VISIT (OUTPATIENT)
Dept: FAMILY MEDICINE | Facility: CLINIC | Age: 73
End: 2023-12-05
Payer: MEDICARE

## 2023-12-05 VITALS
TEMPERATURE: 98 F | RESPIRATION RATE: 18 BRPM | DIASTOLIC BLOOD PRESSURE: 54 MMHG | BODY MASS INDEX: 16.3 KG/M2 | SYSTOLIC BLOOD PRESSURE: 136 MMHG | HEIGHT: 63 IN | HEART RATE: 51 BPM | WEIGHT: 92 LBS | OXYGEN SATURATION: 96 %

## 2023-12-05 DIAGNOSIS — I61.9 INTRAPARENCHYMAL HEMORRHAGE OF BRAIN: Primary | ICD-10-CM

## 2023-12-05 PROCEDURE — 4010F PR ACE/ARB THEARPY RXD/TAKEN: ICD-10-PCS | Mod: CPTII,,, | Performed by: FAMILY MEDICINE

## 2023-12-05 PROCEDURE — 1160F RVW MEDS BY RX/DR IN RCRD: CPT | Mod: CPTII,,, | Performed by: FAMILY MEDICINE

## 2023-12-05 PROCEDURE — 1100F PTFALLS ASSESS-DOCD GE2>/YR: CPT | Mod: CPTII,,, | Performed by: FAMILY MEDICINE

## 2023-12-05 PROCEDURE — 3288F FALL RISK ASSESSMENT DOCD: CPT | Mod: CPTII,,, | Performed by: FAMILY MEDICINE

## 2023-12-05 PROCEDURE — 3044F HG A1C LEVEL LT 7.0%: CPT | Mod: CPTII,,, | Performed by: FAMILY MEDICINE

## 2023-12-05 PROCEDURE — 1159F PR MEDICATION LIST DOCUMENTED IN MEDICAL RECORD: ICD-10-PCS | Mod: CPTII,,, | Performed by: FAMILY MEDICINE

## 2023-12-05 PROCEDURE — 1111F PR DISCHARGE MEDS RECONCILED W/ CURRENT OUTPATIENT MED LIST: ICD-10-PCS | Mod: CPTII,,, | Performed by: FAMILY MEDICINE

## 2023-12-05 PROCEDURE — 1111F DSCHRG MED/CURRENT MED MERGE: CPT | Mod: CPTII,,, | Performed by: FAMILY MEDICINE

## 2023-12-05 PROCEDURE — 1157F PR ADVANCE CARE PLAN OR EQUIV PRESENT IN MEDICAL RECORD: ICD-10-PCS | Mod: CPTII,,, | Performed by: FAMILY MEDICINE

## 2023-12-05 PROCEDURE — 3075F PR MOST RECENT SYSTOLIC BLOOD PRESS GE 130-139MM HG: ICD-10-PCS | Mod: CPTII,,, | Performed by: FAMILY MEDICINE

## 2023-12-05 PROCEDURE — 3078F PR MOST RECENT DIASTOLIC BLOOD PRESSURE < 80 MM HG: ICD-10-PCS | Mod: CPTII,,, | Performed by: FAMILY MEDICINE

## 2023-12-05 PROCEDURE — 1100F PR PT FALLS ASSESS DOC 2+ FALLS/FALL W/INJURY/YR: ICD-10-PCS | Mod: CPTII,,, | Performed by: FAMILY MEDICINE

## 2023-12-05 PROCEDURE — 3075F SYST BP GE 130 - 139MM HG: CPT | Mod: CPTII,,, | Performed by: FAMILY MEDICINE

## 2023-12-05 PROCEDURE — 3288F PR FALLS RISK ASSESSMENT DOCUMENTED: ICD-10-PCS | Mod: CPTII,,, | Performed by: FAMILY MEDICINE

## 2023-12-05 PROCEDURE — 4010F ACE/ARB THERAPY RXD/TAKEN: CPT | Mod: CPTII,,, | Performed by: FAMILY MEDICINE

## 2023-12-05 PROCEDURE — 1160F PR REVIEW ALL MEDS BY PRESCRIBER/CLIN PHARMACIST DOCUMENTED: ICD-10-PCS | Mod: CPTII,,, | Performed by: FAMILY MEDICINE

## 2023-12-05 PROCEDURE — 99214 PR OFFICE/OUTPT VISIT, EST, LEVL IV, 30-39 MIN: ICD-10-PCS | Mod: ,,, | Performed by: FAMILY MEDICINE

## 2023-12-05 PROCEDURE — 3044F PR MOST RECENT HEMOGLOBIN A1C LEVEL <7.0%: ICD-10-PCS | Mod: CPTII,,, | Performed by: FAMILY MEDICINE

## 2023-12-05 PROCEDURE — 99214 OFFICE O/P EST MOD 30 MIN: CPT | Mod: ,,, | Performed by: FAMILY MEDICINE

## 2023-12-05 PROCEDURE — 3078F DIAST BP <80 MM HG: CPT | Mod: CPTII,,, | Performed by: FAMILY MEDICINE

## 2023-12-05 PROCEDURE — 1159F MED LIST DOCD IN RCRD: CPT | Mod: CPTII,,, | Performed by: FAMILY MEDICINE

## 2023-12-05 PROCEDURE — 1157F ADVNC CARE PLAN IN RCRD: CPT | Mod: CPTII,,, | Performed by: FAMILY MEDICINE

## 2023-12-05 RX ORDER — ATORVASTATIN CALCIUM 10 MG/1
10 TABLET, FILM COATED ORAL NIGHTLY
Qty: 90 TABLET | Refills: 1 | Status: SHIPPED | OUTPATIENT
Start: 2023-12-05 | End: 2024-06-02

## 2023-12-05 NOTE — PROGRESS NOTES
Subjective:       Patient ID: Cierra Main is a 73 y.o. female.    Chief Complaint: Rehab discharge, decrease in VA, S/P Fall      HPI    Discharged from [facility] rehab on 11/24/2023    Discharge Diagnoses:  Left posterior occipital lobe combination of subacute and old hemorrhages   History of essential hypertension, hyperlipidemia, CAD, MI, COPD, CVA, hypothyroidism, macular degeneration, and anxiety     Hospital Course:   Cierra Main is a 73 y.o. female with a past medical history of essential hypertension, hyperlipidemia, CAD, MI, COPD, CVA, hypothyroidism, macular degeneration, and anxiety presented to [facility] ED on 11/13/2023 transferred from [place] for IPH.  Patient presented to outside facility for fall at rehab. Patient reported her legs gave out on her and she struck her head on the ground.  Patient was in rehab for ischemic CVA on 10/03/2023. CT head showed IPH in similar area of previous CVA.  Neurosurgery and Neurology were consulted. Patient was admitted to trauma ICU for close monitoring.  Patient was started on Keppra 500 mg BID with recommendations of blood pressure less than 140/90.  Repeat CT head revealed stable mild intraparenchymal hemorrhage in the left occipital lobe and associated mild perinephric edema.  CTA head and neck on 11/14 revealed no evidence of vascular occlusion, 60% stenosis in the left proximal internal carotid artery secondary to calcified plaque and less than 50% stenosis in the right proximal internal carotid artery secondary to calcified plaque, and rim enhancing lesion seen in the occipital lobe on the left side with some surrounding hemorrhage.  MRI brain with and without contrast revealed left posterior occipital lobe combination of subacute and old hemorrhages surrounded by brain gliotic changes and/or some edema.  Neurosurgery recommended follow-up MRI in 6 weeks.  Patient was cleared for downgrade out of ICU on 11/15/2023.  Hospital medicine was consulted for  "transition of care and further medical management.     Review of Systems   Constitutional: Negative.    Respiratory: Negative.     Cardiovascular: Negative.    Neurological:  Positive for weakness (currently followed by PT with [facility], trouble rising from sitting position).        Scheduled for follow-up with neurology for repeat MRI           Objective:      BP (!) 136/54 (BP Location: Right arm, Patient Position: Sitting, BP Method: Large (Manual))   Pulse (!) 51   Temp 97.6 °F (36.4 °C)   Resp 18   Ht 5' 3" (1.6 m)   Wt 41.7 kg (92 lb)   SpO2 96%   BMI 16.30 kg/m²    Physical Exam  Constitutional:       Appearance: Normal appearance.   Cardiovascular:      Rate and Rhythm: Normal rate and regular rhythm.      Heart sounds: Normal heart sounds.   Pulmonary:      Effort: Pulmonary effort is normal.      Breath sounds: Normal breath sounds.   Musculoskeletal:      Comments: Using walker to aid with ambulation   Neurological:      General: No focal deficit present.      Mental Status: She is alert.   Psychiatric:         Mood and Affect: Mood normal.         Behavior: Behavior normal.         Thought Content: Thought content normal.         Judgment: Judgment normal.               Assessment:       Problem List Items Addressed This Visit    None  Visit Diagnoses       Intraparenchymal hemorrhage of brain    -  Primary    Relevant Orders    Ambulatory referral/consult to Neurology               Plan:   1. Intraparenchymal hemorrhage of brain  -     Ambulatory referral/consult to Neurology; Future; Expected date: 12/12/2023  Continue home health PT   Refer patient to Dr. Robertson  ER precautions  Medication list reviewed with patient; continue current medication   Return to clinic with any concerns    Other orders  -     atorvastatin (LIPITOR) 10 MG tablet; Take 1 tablet (10 mg total) by mouth every evening.  Dispense: 90 tablet; Refill: 1               "

## 2023-12-11 DIAGNOSIS — F41.9 ANXIETY DISORDER, UNSPECIFIED TYPE: Chronic | ICD-10-CM

## 2023-12-11 RX ORDER — SERTRALINE HYDROCHLORIDE 50 MG/1
50 TABLET, FILM COATED ORAL DAILY
Qty: 90 TABLET | Refills: 1 | Status: SHIPPED | OUTPATIENT
Start: 2023-12-11 | End: 2024-01-22 | Stop reason: SDUPTHER

## 2023-12-29 ENCOUNTER — OFFICE VISIT (OUTPATIENT)
Dept: NEUROLOGY | Facility: CLINIC | Age: 73
End: 2023-12-29
Payer: MEDICARE

## 2023-12-29 VITALS
WEIGHT: 92 LBS | SYSTOLIC BLOOD PRESSURE: 106 MMHG | HEIGHT: 63 IN | HEART RATE: 81 BPM | BODY MASS INDEX: 16.3 KG/M2 | DIASTOLIC BLOOD PRESSURE: 54 MMHG

## 2023-12-29 DIAGNOSIS — I67.1 CEREBRAL ANEURYSM, NONRUPTURED: Primary | ICD-10-CM

## 2023-12-29 DIAGNOSIS — F41.9 ANXIETY DISORDER, UNSPECIFIED TYPE: Chronic | ICD-10-CM

## 2023-12-29 DIAGNOSIS — S06.320A INTRAPARENCHYMAL HEMATOMA OF BRAIN, LEFT, WITHOUT LOSS OF CONSCIOUSNESS, INITIAL ENCOUNTER: Primary | ICD-10-CM

## 2023-12-29 PROCEDURE — 3078F DIAST BP <80 MM HG: CPT | Mod: CPTII,S$GLB,, | Performed by: PSYCHIATRY & NEUROLOGY

## 2023-12-29 PROCEDURE — 3074F PR MOST RECENT SYSTOLIC BLOOD PRESSURE < 130 MM HG: ICD-10-PCS | Mod: CPTII,S$GLB,, | Performed by: PSYCHIATRY & NEUROLOGY

## 2023-12-29 PROCEDURE — 1157F PR ADVANCE CARE PLAN OR EQUIV PRESENT IN MEDICAL RECORD: ICD-10-PCS | Mod: CPTII,S$GLB,, | Performed by: PSYCHIATRY & NEUROLOGY

## 2023-12-29 PROCEDURE — 3288F PR FALLS RISK ASSESSMENT DOCUMENTED: ICD-10-PCS | Mod: CPTII,S$GLB,, | Performed by: PSYCHIATRY & NEUROLOGY

## 2023-12-29 PROCEDURE — 1159F MED LIST DOCD IN RCRD: CPT | Mod: CPTII,S$GLB,, | Performed by: PSYCHIATRY & NEUROLOGY

## 2023-12-29 PROCEDURE — 3044F HG A1C LEVEL LT 7.0%: CPT | Mod: CPTII,S$GLB,, | Performed by: PSYCHIATRY & NEUROLOGY

## 2023-12-29 PROCEDURE — 99214 OFFICE O/P EST MOD 30 MIN: CPT | Mod: S$GLB,,, | Performed by: PSYCHIATRY & NEUROLOGY

## 2023-12-29 PROCEDURE — 4010F ACE/ARB THERAPY RXD/TAKEN: CPT | Mod: CPTII,S$GLB,, | Performed by: PSYCHIATRY & NEUROLOGY

## 2023-12-29 PROCEDURE — 3044F PR MOST RECENT HEMOGLOBIN A1C LEVEL <7.0%: ICD-10-PCS | Mod: CPTII,S$GLB,, | Performed by: PSYCHIATRY & NEUROLOGY

## 2023-12-29 PROCEDURE — 99999 PR PBB SHADOW E&M-EST. PATIENT-LVL III: ICD-10-PCS | Mod: PBBFAC,,, | Performed by: PSYCHIATRY & NEUROLOGY

## 2023-12-29 PROCEDURE — 3288F FALL RISK ASSESSMENT DOCD: CPT | Mod: CPTII,S$GLB,, | Performed by: PSYCHIATRY & NEUROLOGY

## 2023-12-29 PROCEDURE — 99214 PR OFFICE/OUTPT VISIT, EST, LEVL IV, 30-39 MIN: ICD-10-PCS | Mod: S$GLB,,, | Performed by: PSYCHIATRY & NEUROLOGY

## 2023-12-29 PROCEDURE — 4010F PR ACE/ARB THEARPY RXD/TAKEN: ICD-10-PCS | Mod: CPTII,S$GLB,, | Performed by: PSYCHIATRY & NEUROLOGY

## 2023-12-29 PROCEDURE — 3008F BODY MASS INDEX DOCD: CPT | Mod: CPTII,S$GLB,, | Performed by: PSYCHIATRY & NEUROLOGY

## 2023-12-29 PROCEDURE — 1157F ADVNC CARE PLAN IN RCRD: CPT | Mod: CPTII,S$GLB,, | Performed by: PSYCHIATRY & NEUROLOGY

## 2023-12-29 PROCEDURE — 3078F PR MOST RECENT DIASTOLIC BLOOD PRESSURE < 80 MM HG: ICD-10-PCS | Mod: CPTII,S$GLB,, | Performed by: PSYCHIATRY & NEUROLOGY

## 2023-12-29 PROCEDURE — 1100F PR PT FALLS ASSESS DOC 2+ FALLS/FALL W/INJURY/YR: ICD-10-PCS | Mod: CPTII,S$GLB,, | Performed by: PSYCHIATRY & NEUROLOGY

## 2023-12-29 PROCEDURE — 1100F PTFALLS ASSESS-DOCD GE2>/YR: CPT | Mod: CPTII,S$GLB,, | Performed by: PSYCHIATRY & NEUROLOGY

## 2023-12-29 PROCEDURE — 1159F PR MEDICATION LIST DOCUMENTED IN MEDICAL RECORD: ICD-10-PCS | Mod: CPTII,S$GLB,, | Performed by: PSYCHIATRY & NEUROLOGY

## 2023-12-29 PROCEDURE — 3074F SYST BP LT 130 MM HG: CPT | Mod: CPTII,S$GLB,, | Performed by: PSYCHIATRY & NEUROLOGY

## 2023-12-29 PROCEDURE — 99999 PR PBB SHADOW E&M-EST. PATIENT-LVL III: CPT | Mod: PBBFAC,,, | Performed by: PSYCHIATRY & NEUROLOGY

## 2023-12-29 PROCEDURE — 3008F PR BODY MASS INDEX (BMI) DOCUMENTED: ICD-10-PCS | Mod: CPTII,S$GLB,, | Performed by: PSYCHIATRY & NEUROLOGY

## 2023-12-29 RX ORDER — TRAZODONE HYDROCHLORIDE 50 MG/1
50 TABLET ORAL NIGHTLY
Qty: 30 TABLET | Refills: 11 | Status: SHIPPED | OUTPATIENT
Start: 2023-12-29 | End: 2024-01-22 | Stop reason: CLARIF

## 2023-12-29 NOTE — PROGRESS NOTES
Neurology Office Visit  Neurology    Cierra Main is a 73 y.o. female for IPH, traumatic.  Reports decreased energy and motivation, as well as excessive daytime sleepiness since IPH.  Has not had repeat CT yet.    ROS:  Review of Systems   All other systems reviewed and are negative.     Past Medical History:   Diagnosis Date    Acute MI     Anxiety     Cardiac disease     COPD (chronic obstructive pulmonary disease)     Coronary artery disease     Hyperlipidemia     Hypertension     Hypothyroidism     Other specified noninfective gastroenteritis and colitis     Unspecified macular degeneration      Past Surgical History:   Procedure Laterality Date    CORONARY ANGIOGRAPHY N/A 2/1/2021    Procedure: ANGIOGRAM, CORONARY ARTERY;  Surgeon: Devon Medeiros MD;  Location: [facility] CATH LAB;  Service: Cardiology;  Laterality: N/A;    CORONARY ANGIOPLASTY WITH STENT PLACEMENT      LEFT HEART CATHETERIZATION Left 2/1/2021    Procedure: CATHETERIZATION, HEART, LEFT;  Surgeon: Devon Medeiros MD;  Location: [facility] CATH LAB;  Service: Cardiology;  Laterality: Left;     Family History   Problem Relation Age of Onset    Diabetes Mother     Heart disease Mother     Hypertension Father     Heart disease Father      Review of patient's allergies indicates:   Allergen Reactions    Amlodipine      Leg weakness    Losartan        Current Outpatient Medications:     acetaminophen (TYLENOL) 500 MG tablet, Take 2 tablets (1,000 mg total) by mouth every 8 (eight) hours as needed for Pain or Temperature greater than., Disp: , Rfl: 0    amiodarone (PACERONE) 200 MG Tab, Take 200 mg by mouth 2 (two) times daily., Disp: , Rfl:     atorvastatin (LIPITOR) 10 MG tablet, Take 1 tablet (10 mg total) by mouth every evening., Disp: 90 tablet, Rfl: 1    cimetidine (TAGAMET) 200 MG tablet, Take 200 mg by mouth nightly., Disp: , Rfl:     docusate sodium (COLACE) 100 MG capsule, Take 100 mg by mouth 2 (two) times daily., Disp: , Rfl:     hydrALAZINE  (APRESOLINE) 100 MG tablet, Take 1 tablet (100 mg total) by mouth 3 (three) times daily., Disp: 90 tablet, Rfl: 11    levETIRAcetam (KEPPRA) 500 MG Tab, Take 1 tablet (500 mg total) by mouth 2 (two) times daily., Disp: 60 tablet, Rfl: 11    levothyroxine (SYNTHROID) 25 MCG tablet, Take 1 tablet (25 mcg total) by mouth before breakfast., Disp: 90 tablet, Rfl: 1    lisinopriL (PRINIVIL,ZESTRIL) 20 MG tablet, Take 1 tablet (20 mg total) by mouth 2 (two) times daily., Disp: 90 tablet, Rfl: 1    melatonin 3 mg Cap, Take 1 tablet by mouth every evening., Disp: , Rfl:     metoprolol succinate (TOPROL-XL) 50 MG 24 hr tablet, Take 50 mg by mouth every 12 (twelve) hours., Disp: , Rfl:     montelukast (SINGULAIR) 10 mg tablet, Take 10 mg by mouth., Disp: , Rfl:     nitroGLYCERIN 0.4 MG/DOSE TL SPRY (NITROLINGUAL) 400 mcg/spray spray, nitroglycerin 400 mcg/spray translingual aerosol  at onset of chest pain may take up to three sprays every 5min during a 15 min period, Disp: , Rfl:     polyethylene glycol (GLYCOLAX) 17 gram PwPk, Take 17 g by mouth 2 (two) times a day., Disp: , Rfl: 0    sertraline (ZOLOFT) 50 MG tablet, Take 1 tablet (50 mg total) by mouth once daily., Disp: 90 tablet, Rfl: 1  Outpatient Medications Marked as Taking for the 12/29/23 encounter (Office Visit) with Berny Robertson MD   Medication Sig Dispense Refill    acetaminophen (TYLENOL) 500 MG tablet Take 2 tablets (1,000 mg total) by mouth every 8 (eight) hours as needed for Pain or Temperature greater than.  0    amiodarone (PACERONE) 200 MG Tab Take 200 mg by mouth 2 (two) times daily.      atorvastatin (LIPITOR) 10 MG tablet Take 1 tablet (10 mg total) by mouth every evening. 90 tablet 1    cimetidine (TAGAMET) 200 MG tablet Take 200 mg by mouth nightly.      docusate sodium (COLACE) 100 MG capsule Take 100 mg by mouth 2 (two) times daily.      hydrALAZINE (APRESOLINE) 100 MG tablet Take 1 tablet (100 mg total) by mouth 3 (three) times daily. 90  tablet 11    levETIRAcetam (KEPPRA) 500 MG Tab Take 1 tablet (500 mg total) by mouth 2 (two) times daily. 60 tablet 11    levothyroxine (SYNTHROID) 25 MCG tablet Take 1 tablet (25 mcg total) by mouth before breakfast. 90 tablet 1    lisinopriL (PRINIVIL,ZESTRIL) 20 MG tablet Take 1 tablet (20 mg total) by mouth 2 (two) times daily. 90 tablet 1    melatonin 3 mg Cap Take 1 tablet by mouth every evening.      metoprolol succinate (TOPROL-XL) 50 MG 24 hr tablet Take 50 mg by mouth every 12 (twelve) hours.      montelukast (SINGULAIR) 10 mg tablet Take 10 mg by mouth.      nitroGLYCERIN 0.4 MG/DOSE TL SPRY (NITROLINGUAL) 400 mcg/spray spray nitroglycerin 400 mcg/spray translingual aerosol   at onset of chest pain may take up to three sprays every 5min during a 15 min period      polyethylene glycol (GLYCOLAX) 17 gram PwPk Take 17 g by mouth 2 (two) times a day.  0    sertraline (ZOLOFT) 50 MG tablet Take 1 tablet (50 mg total) by mouth once daily. 90 tablet 1     Social History     Tobacco Use    Smoking status: Every Day     Current packs/day: 0.50     Average packs/day: 0.5 packs/day for 55.0 years (27.5 ttl pk-yrs)     Types: Cigarettes    Smokeless tobacco: Never   Substance Use Topics    Alcohol use: Never    Drug use: Never         Vitals:    12/29/23 0945   BP: (!) 106/54   Pulse: 81     Gen NAD  HEENT NC/AT  CV RRR, no carotid bruits  Resp clear  GI soft  Ext no C/C/E  Neuro  AAOx4  Speech fluent, appropriate  Low vision OU  Normal facial strength, sensation  Tongue and palate midline  Motor 5/5  Sensation intact  DTRs symmetric  Coord intact  Gait with walker    Assessment: [facility]  Sleep Disturbance    Plan: CT head w/o  Trazodone 50mg QHS  Wean Keppra to off (instructions provided)

## 2024-01-03 ENCOUNTER — TELEPHONE (OUTPATIENT)
Dept: FAMILY MEDICINE | Facility: CLINIC | Age: 74
End: 2024-01-03
Payer: MEDICARE

## 2024-01-09 ENCOUNTER — HOSPITAL ENCOUNTER (INPATIENT)
Facility: HOSPITAL | Age: 74
LOS: 7 days | Discharge: HOME HEALTH | DRG: 948 | End: 2024-01-16
Attending: FAMILY MEDICINE | Admitting: FAMILY MEDICINE
Payer: MEDICARE

## 2024-01-09 ENCOUNTER — EXTERNAL HOME HEALTH (OUTPATIENT)
Dept: HOME HEALTH SERVICES | Facility: HOSPITAL | Age: 74
End: 2024-01-09
Payer: MEDICARE

## 2024-01-09 DIAGNOSIS — R53.1 GENERALIZED WEAKNESS: ICD-10-CM

## 2024-01-09 DIAGNOSIS — R09.02 HYPOXIA: Primary | ICD-10-CM

## 2024-01-09 DIAGNOSIS — R53.1 WEAKNESS: ICD-10-CM

## 2024-01-09 LAB
ALBUMIN SERPL-MCNC: 2.6 G/DL (ref 3.4–4.8)
ALBUMIN/GLOB SERPL: 0.7 RATIO (ref 1.1–2)
ALP SERPL-CCNC: 90 UNIT/L (ref 40–150)
ALT SERPL-CCNC: 74 UNIT/L (ref 0–55)
AST SERPL-CCNC: 74 UNIT/L (ref 5–34)
BASOPHILS # BLD AUTO: 0.05 X10(3)/MCL
BASOPHILS NFR BLD AUTO: 0.6 %
BILIRUB SERPL-MCNC: 0.5 MG/DL
BUN SERPL-MCNC: 13 MG/DL (ref 9.8–20.1)
CALCIUM SERPL-MCNC: 9 MG/DL (ref 8.4–10.2)
CHLORIDE SERPL-SCNC: 105 MMOL/L (ref 98–107)
CO2 SERPL-SCNC: 26 MMOL/L (ref 23–31)
CREAT SERPL-MCNC: 0.7 MG/DL (ref 0.55–1.02)
EOSINOPHIL # BLD AUTO: 0.05 X10(3)/MCL (ref 0–0.9)
EOSINOPHIL NFR BLD AUTO: 0.6 %
ERYTHROCYTE [DISTWIDTH] IN BLOOD BY AUTOMATED COUNT: 14.1 % (ref 11.5–17)
GFR SERPLBLD CREATININE-BSD FMLA CKD-EPI: >60 MLS/MIN/1.73/M2
GLOBULIN SER-MCNC: 3.5 GM/DL (ref 2.4–3.5)
GLUCOSE SERPL-MCNC: 109 MG/DL (ref 82–115)
HCT VFR BLD AUTO: 38.8 % (ref 37–47)
HGB BLD-MCNC: 12.7 G/DL (ref 12–16)
IMM GRANULOCYTES # BLD AUTO: 0.05 X10(3)/MCL (ref 0–0.04)
IMM GRANULOCYTES NFR BLD AUTO: 0.6 %
INFLUENZA A (OHS): NEGATIVE
INFLUENZA B (OHS): NEGATIVE
LYMPHOCYTES # BLD AUTO: 0.88 X10(3)/MCL (ref 0.6–4.6)
LYMPHOCYTES NFR BLD AUTO: 11.2 %
MCH RBC QN AUTO: 28.9 PG (ref 27–31)
MCHC RBC AUTO-ENTMCNC: 32.7 G/DL (ref 33–36)
MCV RBC AUTO: 88.2 FL (ref 80–94)
MONOCYTES # BLD AUTO: 0.86 X10(3)/MCL (ref 0.1–1.3)
MONOCYTES NFR BLD AUTO: 10.9 %
NEUTROPHILS # BLD AUTO: 5.99 X10(3)/MCL (ref 2.1–9.2)
NEUTROPHILS NFR BLD AUTO: 76.1 %
NRBC BLD AUTO-RTO: 0 %
PLATELET # BLD AUTO: 414 X10(3)/MCL (ref 130–400)
PMV BLD AUTO: 10 FL (ref 7.4–10.4)
POTASSIUM SERPL-SCNC: 3.6 MMOL/L (ref 3.5–5.1)
PROT SERPL-MCNC: 6.1 GM/DL (ref 5.8–7.6)
RBC # BLD AUTO: 4.4 X10(6)/MCL (ref 4.2–5.4)
SARS-COV-2 RDRP RESP QL NAA+PROBE: NEGATIVE
SODIUM SERPL-SCNC: 141 MMOL/L (ref 136–145)
WBC # SPEC AUTO: 7.88 X10(3)/MCL (ref 4.5–11.5)

## 2024-01-09 PROCEDURE — 25000242 PHARM REV CODE 250 ALT 637 W/ HCPCS: Performed by: FAMILY MEDICINE

## 2024-01-09 PROCEDURE — 96361 HYDRATE IV INFUSION ADD-ON: CPT

## 2024-01-09 PROCEDURE — 11000001 HC ACUTE MED/SURG PRIVATE ROOM

## 2024-01-09 PROCEDURE — 96374 THER/PROPH/DIAG INJ IV PUSH: CPT

## 2024-01-09 PROCEDURE — 94761 N-INVAS EAR/PLS OXIMETRY MLT: CPT

## 2024-01-09 PROCEDURE — 87502 INFLUENZA DNA AMP PROBE: CPT | Performed by: FAMILY MEDICINE

## 2024-01-09 PROCEDURE — 94640 AIRWAY INHALATION TREATMENT: CPT

## 2024-01-09 PROCEDURE — 25000003 PHARM REV CODE 250: Performed by: FAMILY MEDICINE

## 2024-01-09 PROCEDURE — 63600175 PHARM REV CODE 636 W HCPCS: Performed by: FAMILY MEDICINE

## 2024-01-09 PROCEDURE — 87635 SARS-COV-2 COVID-19 AMP PRB: CPT | Performed by: FAMILY MEDICINE

## 2024-01-09 PROCEDURE — 99900031 HC PATIENT EDUCATION (STAT)

## 2024-01-09 PROCEDURE — 99285 EMERGENCY DEPT VISIT HI MDM: CPT | Mod: 25

## 2024-01-09 PROCEDURE — 25500020 PHARM REV CODE 255: Performed by: FAMILY MEDICINE

## 2024-01-09 PROCEDURE — 27000221 HC OXYGEN, UP TO 24 HOURS

## 2024-01-09 PROCEDURE — 80053 COMPREHEN METABOLIC PANEL: CPT | Performed by: FAMILY MEDICINE

## 2024-01-09 PROCEDURE — 85025 COMPLETE CBC W/AUTO DIFF WBC: CPT | Performed by: FAMILY MEDICINE

## 2024-01-09 RX ORDER — TALC
6 POWDER (GRAM) TOPICAL NIGHTLY PRN
Status: DISCONTINUED | OUTPATIENT
Start: 2024-01-09 | End: 2024-01-16 | Stop reason: HOSPADM

## 2024-01-09 RX ORDER — HYDRALAZINE HYDROCHLORIDE 25 MG/1
100 TABLET, FILM COATED ORAL ONCE
Status: COMPLETED | OUTPATIENT
Start: 2024-01-09 | End: 2024-01-09

## 2024-01-09 RX ORDER — ATORVASTATIN CALCIUM 10 MG/1
10 TABLET, FILM COATED ORAL ONCE
Status: COMPLETED | OUTPATIENT
Start: 2024-01-09 | End: 2024-01-09

## 2024-01-09 RX ORDER — SODIUM CHLORIDE 0.9 % (FLUSH) 0.9 %
10 SYRINGE (ML) INJECTION
Status: DISCONTINUED | OUTPATIENT
Start: 2024-01-09 | End: 2024-01-16 | Stop reason: HOSPADM

## 2024-01-09 RX ORDER — ONDANSETRON HYDROCHLORIDE 2 MG/ML
4 INJECTION, SOLUTION INTRAVENOUS
Status: COMPLETED | OUTPATIENT
Start: 2024-01-09 | End: 2024-01-09

## 2024-01-09 RX ORDER — LISINOPRIL 20 MG/1
20 TABLET ORAL ONCE
Status: COMPLETED | OUTPATIENT
Start: 2024-01-09 | End: 2024-01-09

## 2024-01-09 RX ORDER — AMIODARONE HYDROCHLORIDE 100 MG/1
200 TABLET ORAL ONCE
Status: COMPLETED | OUTPATIENT
Start: 2024-01-09 | End: 2024-01-09

## 2024-01-09 RX ORDER — IPRATROPIUM BROMIDE AND ALBUTEROL SULFATE 2.5; .5 MG/3ML; MG/3ML
3 SOLUTION RESPIRATORY (INHALATION) EVERY 6 HOURS
Status: DISCONTINUED | OUTPATIENT
Start: 2024-01-09 | End: 2024-01-16 | Stop reason: HOSPADM

## 2024-01-09 RX ORDER — TRAZODONE HYDROCHLORIDE 50 MG/1
50 TABLET ORAL ONCE
Status: COMPLETED | OUTPATIENT
Start: 2024-01-09 | End: 2024-01-09

## 2024-01-09 RX ORDER — TRAMADOL HYDROCHLORIDE 50 MG/1
50 TABLET ORAL
Status: DISCONTINUED | OUTPATIENT
Start: 2024-01-09 | End: 2024-01-09

## 2024-01-09 RX ADMIN — HYDRALAZINE HYDROCHLORIDE 100 MG: 25 TABLET ORAL at 08:01

## 2024-01-09 RX ADMIN — SODIUM CHLORIDE 1000 ML: 9 INJECTION, SOLUTION INTRAVENOUS at 12:01

## 2024-01-09 RX ADMIN — IPRATROPIUM BROMIDE AND ALBUTEROL SULFATE 3 ML: 2.5; .5 SOLUTION RESPIRATORY (INHALATION) at 06:01

## 2024-01-09 RX ADMIN — ONDANSETRON 4 MG: 2 INJECTION INTRAMUSCULAR; INTRAVENOUS at 12:01

## 2024-01-09 RX ADMIN — TRAZODONE HYDROCHLORIDE 50 MG: 50 TABLET ORAL at 08:01

## 2024-01-09 RX ADMIN — AMIODARONE HYDROCHLORIDE 200 MG: 100 TABLET ORAL at 08:01

## 2024-01-09 RX ADMIN — ATORVASTATIN CALCIUM 10 MG: 10 TABLET, FILM COATED ORAL at 08:01

## 2024-01-09 RX ADMIN — LISINOPRIL 20 MG: 20 TABLET ORAL at 08:01

## 2024-01-09 RX ADMIN — IOPAMIDOL 100 ML: 755 INJECTION, SOLUTION INTRAVENOUS at 03:01

## 2024-01-09 NOTE — ED NOTES
Attempted to clean dried blood from pt wound and hair, pt unable to tolerate. D/t pain & sensitivity

## 2024-01-09 NOTE — ED PROVIDER NOTES
Encounter Date: 1/9/2024       History     Chief Complaint   Patient presents with    Fall     Arrived via [facility], fall last night, fell out of wheelchair, forehead first impact to bed frame.  LOC unknown, on floor for 1 hour.      This patient is a 73-year-old female who arrived by ambulance after a fall last night, patient states that she fell out of her wheelchair falling onto her forehead, unknown LOC and states that she was on the floor for about an hour.  Patient states that her sister and her son take care for and that she has home health but she is home most of the day alone.  Patient has a moderate hematoma on her forehead.  Patient states she has been weak for the past week and has not really gotten out of bed much but she can get up and walk with help.  She is normally in her wheelchair    The history is provided by the patient.     Review of patient's allergies indicates:   Allergen Reactions    Amlodipine      Leg weakness    Losartan      Past Medical History:   Diagnosis Date    Acute MI     Anxiety     Cardiac disease     COPD (chronic obstructive pulmonary disease)     Coronary artery disease     Hyperlipidemia     Hypertension     Hypothyroidism     Other specified noninfective gastroenteritis and colitis     Unspecified macular degeneration      Past Surgical History:   Procedure Laterality Date    CORONARY ANGIOGRAPHY N/A 2/1/2021    Procedure: ANGIOGRAM, CORONARY ARTERY;  Surgeon: Devon Medeiros MD;  Location: [facility] CATH LAB;  Service: Cardiology;  Laterality: N/A;    CORONARY ANGIOPLASTY WITH STENT PLACEMENT      LEFT HEART CATHETERIZATION Left 2/1/2021    Procedure: CATHETERIZATION, HEART, LEFT;  Surgeon: Devon Medeiros MD;  Location: [facility] CATH LAB;  Service: Cardiology;  Laterality: Left;     Family History   Problem Relation Age of Onset    Diabetes Mother     Heart disease Mother     Hypertension Father     Heart disease Father      Social History     Tobacco Use    Smoking status: Every Day      Current packs/day: 0.50     Average packs/day: 0.5 packs/day for 55.0 years (27.5 ttl pk-yrs)     Types: Cigarettes    Smokeless tobacco: Never   Substance Use Topics    Alcohol use: Never    Drug use: Never     Review of Systems   Constitutional: Negative.    HENT: Negative.     Respiratory: Negative.     Cardiovascular: Negative.    Gastrointestinal: Negative.    Endocrine: Negative.    Genitourinary: Negative.    Musculoskeletal: Negative.    Skin: Negative.    Neurological:  Positive for headaches.   Hematological: Negative.    Psychiatric/Behavioral: Negative.     All other systems reviewed and are negative.      Physical Exam     Initial Vitals   BP Pulse Resp Temp SpO2   01/09/24 1200 01/09/24 1200 01/09/24 1200 01/09/24 1203 01/09/24 1200   128/74 62 20 98 °F (36.7 °C) (!) 94 %      MAP       --                Physical Exam    Nursing note and vitals reviewed.  Constitutional: She appears well-developed.   HENT:   Head: Normocephalic.       Small abrasion with a flat hematoma on the left side of her forehead.  There is mild swelling surrounding and patient states it is very painful.   Eyes: Pupils are equal, round, and reactive to light.   Neck:   Normal range of motion.  Cardiovascular:  Normal rate, regular rhythm and normal heart sounds.           Pulmonary/Chest: Breath sounds normal.   Abdominal: Abdomen is soft.   Musculoskeletal:         General: Normal range of motion.      Cervical back: Normal range of motion.     Neurological: She is alert and oriented to person, place, and time. She has normal strength. She displays a negative Romberg sign.   Patient is awake alert and oriented   Skin: Skin is warm and dry.   Psychiatric: She has a normal mood and affect.         ED Course   Critical Care    Date/Time: 1/9/2024 4:57 PM    Performed by: Lavelle Georges MD  Authorized by: Lavelle Georges MD  Direct patient critical care time: 20 minutes  Additional history critical care  time: 10 minutes  Ordering / reviewing critical care time: 10 minutes  Documentation critical care time: 10 minutes  Consulting other physicians critical care time: 10 minutes  Total critical care time (exclusive of procedural time) : 60 minutes  Critical care was necessary to treat or prevent imminent or life-threatening deterioration of the following conditions: respiratory failure.  Critical care was time spent personally by me on the following activities: evaluation of patient's response to treatment, examination of patient, obtaining history from patient or surrogate, ordering and performing treatments and interventions, ordering and review of laboratory studies, ordering and review of radiographic studies, pulse oximetry and re-evaluation of patient's condition.        Labs Reviewed   COMPREHENSIVE METABOLIC PANEL - Abnormal; Notable for the following components:       Result Value    Albumin Level 2.6 (*)     Albumin/Globulin Ratio 0.7 (*)     Alanine Aminotransferase 74 (*)     Aspartate Aminotransferase 74 (*)     All other components within normal limits   CBC WITH DIFFERENTIAL - Abnormal; Notable for the following components:    MCHC 32.7 (*)     Platelet 414 (*)     IG# 0.05 (*)     All other components within normal limits   RAPID INFLUENZA A/B - Normal   SARS-COV-2 RNA AMPLIFICATION, QUAL - Normal    Narrative:     The IDNOW COVID-19 assay is a rapid molecular in vitro diagnostic test utilizing an isothermal nucleic acid amplification technology intended for the qualitative detection of nucleic acid from the SARS-CoV-2 viral RNA in direct nasal, nasopharyngeal or throat swabs from individuals who are suspected of COVID-19 by their healthcare provider.   CBC W/ AUTO DIFFERENTIAL    Narrative:     The following orders were created for panel order CBC auto differential.  Procedure                               Abnormality         Status                     ---------                                -----------         ------                     CBC with Differential[[id number]]       Abnormal            Final result                 Please view results for these tests on the individual orders.          Imaging Results              CTA Chest Non-Coronary (PE Studies) (Final result)  Result time 01/09/24 15:52:01      Final result by Anthony Ryan MD (01/09/24 15:52:01)                   Impression:      No pulmonary embolism seen    Chronic interstitial lung changes seen bilaterally with overlying mild ground-glass infiltrates    Stable left lower lobe lung nodule      Electronically signed by: Deniz Ryan  Date:    01/09/2024  Time:    15:52               Narrative:    EXAMINATION:  CTA CHEST NON CORONARY (PE STUDIES)    CLINICAL HISTORY:  Pulmonary embolism (PE) suspected, high prob;    TECHNIQUE:  Low dose axial images, sagittal and coronal reformations were obtained from the thoracic inlet to the lung bases following the IV administration of contrast..  Contrast timing was optimized to evaluate the pulmonary arteries.  MIP images were performed.  Automated exposure control was utilized    COMPARISON:  11/15/2022 some mild ground-glass infiltrates are seen in the lungs bilaterally and some areas of interstitial fibrosis seen in the upper lobes.    No pleural effusion is seen.    FINDINGS:  There is some changes consistent with emphysema and COPD in the lungs bilaterally.  There is a lung nodule in the left lower lobe on image 70 series 12.  It measures 7 mm.  It was seen on the 11/15/2022 examination as well.  No pleural effusion is seen.    No pulmonary embolism is seen.    No pleural thickening is seen.  Thoracic aorta appears normal.  Heart appears grossly unremarkable.    Upper abdomen shows no acute abnormality.                                       CT Cervical Spine Without Contrast (Final result)  Result time 01/09/24 13:11:57      Final result by Latrice Zhou MD (01/09/24  13:11:57)                   Impression:      No acute osseous abnormality appreciable by CT evaluation      Electronically signed by: Latrice Zhou  Date:    01/09/2024  Time:    13:11               Narrative:    EXAMINATION:  CT CERVICAL SPINE WITHOUT CONTRAST    CLINICAL HISTORY:  fall;    TECHNIQUE:  Low dose helical acquired images with axial, sagittal and coronal reformations though the cervical spine.  Contrast was not administered.    All CT scans at this location are performed using dose optimization techniques as appropriate to a performed exam including the following automated exposure control, adjustment of the mA and/or kV according to patient size and/or use of iterative reconstruction technique    DLP: 986 mGycm    COMPARISON:  CT cervical spine 11/13/2023    FINDINGS:  BONES: No fracture.  Trace retrolisthesis of C5 on C6, unchanged from previous.  The dens is intact, the lateral masses of C1 are normally aligned, and the atlantodental interval is normal.    DISCS AND FACETS: Severe disc space narrowing at C5-C6 with anterior and posterior disc osteophytes.    SPINAL CANAL AND NEURAL FORAMINA: No osseous narrowing of the spinal canal.    SOFT TISSUES: There are atherosclerotic calcifications.    LUNG APICES: Biapical emphysema.                                       CT Head Without Contrast (Final result)  Result time 01/09/24 13:04:19      Final result by Yusuf Fleming MD (01/09/24 13:04:19)                   Impression:      No acute intracranial traumatic findings identified.      Electronically signed by: Yusuf Fleming  Date:    01/09/2024  Time:    13:04               Narrative:    EXAMINATION:  CT HEAD WITHOUT CONTRAST    CLINICAL HISTORY:  Head trauma, minor (Age >= 65y);    TECHNIQUE:  Sequential axial images were performed of the brain without contrast.    Dose product length of 986 mGycm. Automated exposure control was utilized to minimize radiation dose.    COMPARISON:  CT brain  November 14, 2023.  MRI brain November 15, 2023.    FINDINGS:  There is left frontal scalp inflammation.  No acute depressed skull fracture.  There is left occipital lobe old infarct and associated hemorrhage which is less dense and smaller in size compared to the prior CT brain on image 26 series 2.  There is also left frontal lobe encephalomalacia combined with gliotic changes.  Pontine and left thalamic old lacunar infarcts.  There are chronic microvascular ischemic changes and cerebral and cerebellar cortical volume loss.  There is no acute large vessel territory infarct.  There is no midline shift or hydrocephalus.  There is no acute extra axial fluid collection.    There is unchanged pronounced mucoperiosteal thickening of the left maxillary, ethmoidal, frontal and the sphenoid sinus.  Mastoid air cells aeration is optimal.                                       Medications   ondansetron injection 4 mg (4 mg Intravenous Given 1/9/24 1232)   sodium chloride 0.9% bolus 1,000 mL 1,000 mL (1,000 mLs Intravenous New Bag 1/9/24 1233)   iopamidoL (ISOVUE-370) injection 100 mL (100 mLs Intravenous Given 1/9/24 1541)     Medical Decision Making  This patient is a 73-year-old female who comes in with a fall from last night.  Patient states that she fell out of her wheelchair and hit her head on the side of the bed.  She has a an area small hematoma on the left side of her forehead.  During her stay in the ER patient had several episodes of hypoxia to wear her oxygen went to lower to mid 80s.  She was put on oxygen and we decided  To send the patient for CTA   differential diagnosis:  Pulmonary embolus, hemorrhage in the brain, hematoma, head contusion    Amount and/or Complexity of Data Reviewed  Labs: ordered.     Details: Patient's labs show a an ALT of 74 and AST of 74 the rest of her CMP is normal, patient is negative for the flu and COVID, CBC is normal.  Radiology: ordered.     Details: Patient's CT of the head  which shows no acute intracranial findings, CT of the cervical spine without contrast was also shows no acute osseous abnormality.  Patient's CT of the chest shows no pulmonary embolus.  Chronic interstitial lung changes seen bilaterally with overlying mild ground-glass infiltrates.  Stable left lower lobe lung nodule.                                      Clinical Impression:  Final diagnoses:  [R09.02] Hypoxia (Primary)  [R53.1] Generalized weakness          ED Disposition Condition    Admit Stable                Lavelle Georges MD  01/09/24 2246

## 2024-01-09 NOTE — ED NOTES
2nd attempt to call Dr. Eller to make contact for Dr. Cobian about admission regarding pt. Will return call.

## 2024-01-10 PROBLEM — R53.1 WEAKNESS: Status: ACTIVE | Noted: 2024-01-10

## 2024-01-10 PROBLEM — F33.1 MODERATE EPISODE OF RECURRENT MAJOR DEPRESSIVE DISORDER: Status: ACTIVE | Noted: 2024-01-10

## 2024-01-10 PROCEDURE — 25000242 PHARM REV CODE 250 ALT 637 W/ HCPCS: Performed by: FAMILY MEDICINE

## 2024-01-10 PROCEDURE — 94640 AIRWAY INHALATION TREATMENT: CPT

## 2024-01-10 PROCEDURE — 97166 OT EVAL MOD COMPLEX 45 MIN: CPT

## 2024-01-10 PROCEDURE — 97535 SELF CARE MNGMENT TRAINING: CPT

## 2024-01-10 PROCEDURE — 97162 PT EVAL MOD COMPLEX 30 MIN: CPT

## 2024-01-10 PROCEDURE — 27000221 HC OXYGEN, UP TO 24 HOURS

## 2024-01-10 PROCEDURE — 94761 N-INVAS EAR/PLS OXIMETRY MLT: CPT

## 2024-01-10 PROCEDURE — 11000001 HC ACUTE MED/SURG PRIVATE ROOM

## 2024-01-10 PROCEDURE — 99222 1ST HOSP IP/OBS MODERATE 55: CPT | Mod: AI,,, | Performed by: FAMILY MEDICINE

## 2024-01-10 PROCEDURE — 99900035 HC TECH TIME PER 15 MIN (STAT)

## 2024-01-10 PROCEDURE — 25000003 PHARM REV CODE 250: Performed by: FAMILY MEDICINE

## 2024-01-10 RX ORDER — MONTELUKAST SODIUM 10 MG/1
10 TABLET ORAL NIGHTLY
Status: DISCONTINUED | OUTPATIENT
Start: 2024-01-10 | End: 2024-01-16 | Stop reason: HOSPADM

## 2024-01-10 RX ORDER — LISINOPRIL 20 MG/1
20 TABLET ORAL 2 TIMES DAILY
Status: DISCONTINUED | OUTPATIENT
Start: 2024-01-10 | End: 2024-01-16 | Stop reason: HOSPADM

## 2024-01-10 RX ORDER — AMIODARONE HYDROCHLORIDE 100 MG/1
200 TABLET ORAL 2 TIMES DAILY
Status: DISCONTINUED | OUTPATIENT
Start: 2024-01-10 | End: 2024-01-16 | Stop reason: HOSPADM

## 2024-01-10 RX ORDER — SERTRALINE HYDROCHLORIDE 50 MG/1
100 TABLET, FILM COATED ORAL DAILY
Status: DISCONTINUED | OUTPATIENT
Start: 2024-01-11 | End: 2024-01-16 | Stop reason: HOSPADM

## 2024-01-10 RX ORDER — METOPROLOL SUCCINATE 25 MG/1
25 TABLET, EXTENDED RELEASE ORAL DAILY
Status: DISCONTINUED | OUTPATIENT
Start: 2024-01-10 | End: 2024-01-16 | Stop reason: HOSPADM

## 2024-01-10 RX ORDER — ATORVASTATIN CALCIUM 10 MG/1
10 TABLET, FILM COATED ORAL NIGHTLY
Status: DISCONTINUED | OUTPATIENT
Start: 2024-01-10 | End: 2024-01-16 | Stop reason: HOSPADM

## 2024-01-10 RX ORDER — SERTRALINE HYDROCHLORIDE 50 MG/1
50 TABLET, FILM COATED ORAL DAILY
Status: DISCONTINUED | OUTPATIENT
Start: 2024-01-10 | End: 2024-01-10

## 2024-01-10 RX ORDER — DOCUSATE SODIUM 100 MG/1
100 CAPSULE, LIQUID FILLED ORAL 2 TIMES DAILY PRN
Status: DISCONTINUED | OUTPATIENT
Start: 2024-01-10 | End: 2024-01-16 | Stop reason: HOSPADM

## 2024-01-10 RX ORDER — HYDRALAZINE HYDROCHLORIDE 25 MG/1
100 TABLET, FILM COATED ORAL 3 TIMES DAILY
Status: DISCONTINUED | OUTPATIENT
Start: 2024-01-10 | End: 2024-01-16 | Stop reason: HOSPADM

## 2024-01-10 RX ORDER — TRAZODONE HYDROCHLORIDE 50 MG/1
50 TABLET ORAL NIGHTLY
Status: DISCONTINUED | OUTPATIENT
Start: 2024-01-10 | End: 2024-01-10

## 2024-01-10 RX ORDER — LEVOTHYROXINE SODIUM 25 UG/1
25 TABLET ORAL
Status: DISCONTINUED | OUTPATIENT
Start: 2024-01-11 | End: 2024-01-16 | Stop reason: HOSPADM

## 2024-01-10 RX ORDER — POLYETHYLENE GLYCOL 3350 17 G/17G
17 POWDER, FOR SOLUTION ORAL 2 TIMES DAILY
Status: DISCONTINUED | OUTPATIENT
Start: 2024-01-10 | End: 2024-01-16 | Stop reason: HOSPADM

## 2024-01-10 RX ADMIN — MONTELUKAST 10 MG: 10 TABLET, FILM COATED ORAL at 08:01

## 2024-01-10 RX ADMIN — SERTRALINE HYDROCHLORIDE 50 MG: 50 TABLET ORAL at 12:01

## 2024-01-10 RX ADMIN — ATORVASTATIN CALCIUM 10 MG: 10 TABLET, FILM COATED ORAL at 08:01

## 2024-01-10 RX ADMIN — POLYETHYLENE GLYCOL 3350 17 G: 17 POWDER, FOR SOLUTION ORAL at 12:01

## 2024-01-10 RX ADMIN — AMIODARONE HYDROCHLORIDE 200 MG: 100 TABLET ORAL at 08:01

## 2024-01-10 RX ADMIN — POLYETHYLENE GLYCOL 3350 17 G: 17 POWDER, FOR SOLUTION ORAL at 08:01

## 2024-01-10 RX ADMIN — IPRATROPIUM BROMIDE AND ALBUTEROL SULFATE 3 ML: 2.5; .5 SOLUTION RESPIRATORY (INHALATION) at 06:01

## 2024-01-10 RX ADMIN — Medication 6 MG: at 08:01

## 2024-01-10 RX ADMIN — IPRATROPIUM BROMIDE AND ALBUTEROL SULFATE 3 ML: 2.5; .5 SOLUTION RESPIRATORY (INHALATION) at 11:01

## 2024-01-10 RX ADMIN — LISINOPRIL 20 MG: 20 TABLET ORAL at 08:01

## 2024-01-10 RX ADMIN — HYDRALAZINE HYDROCHLORIDE 100 MG: 25 TABLET ORAL at 02:01

## 2024-01-10 RX ADMIN — HYDRALAZINE HYDROCHLORIDE 100 MG: 25 TABLET ORAL at 08:01

## 2024-01-10 RX ADMIN — METOPROLOL SUCCINATE 25 MG: 25 TABLET, EXTENDED RELEASE ORAL at 12:01

## 2024-01-10 NOTE — ASSESSMENT & PLAN NOTE
PT/OT following along with patient   Order CT L-spine   Discussed long-term placement with patient/sister at [facility] when patient is ready; both are in agreement,  initiating paperwork

## 2024-01-10 NOTE — PLAN OF CARE
Problem: Adult Inpatient Plan of Care  Goal: Plan of Care Review  Outcome: Ongoing, Progressing  Goal: Patient-Specific Goal (Individualized)  Outcome: Ongoing, Progressing  Goal: Absence of Hospital-Acquired Illness or Injury  Outcome: Ongoing, Progressing  Goal: Optimal Comfort and Wellbeing  Outcome: Ongoing, Progressing  Goal: Readiness for Transition of Care  Outcome: Ongoing, Progressing     Problem: Impaired Wound Healing  Goal: Optimal Wound Healing  Outcome: Ongoing, Progressing     Problem: Fall Injury Risk  Goal: Absence of Fall and Fall-Related Injury  Outcome: Ongoing, Progressing  Intervention: Identify and Manage Contributors  Flowsheets (Taken 1/10/2024 0838)  Self-Care Promotion:   BADL personal objects within reach   meal set-up provided   safe use of adaptive equipment encouraged   BADL personal routines maintained  Medication Review/Management:   medications reviewed   high-risk medications identified     Problem: Behavioral Health Comorbidity  Goal: Maintenance of Behavioral Health Symptom Control  Outcome: Ongoing, Progressing     Problem: COPD (Chronic Obstructive Pulmonary Disease) Comorbidity  Goal: Maintenance of COPD Symptom Control  Outcome: Ongoing, Progressing  Intervention: Maintain COPD-Symptom Control  Flowsheets (Taken 1/10/2024 0838)  Supportive Measures:   relaxation techniques promoted   verbalization of feelings encouraged   active listening utilized   goal-setting facilitated   positive reinforcement provided   problem-solving facilitated   self-care encouraged   self-responsibility promoted  Medication Review/Management:   medications reviewed   high-risk medications identified     Problem: Hypertension Comorbidity  Goal: Blood Pressure in Desired Range  Outcome: Ongoing, Progressing     Problem: Pain Acute  Goal: Acceptable Pain Control and Functional Ability  Outcome: Ongoing, Progressing     Problem: Gas Exchange Impaired  Goal: Optimal Gas Exchange  Outcome: Ongoing,  Progressing     Problem: Mobility Impairment  Goal: Optimal Mobility  Outcome: Ongoing, Progressing     Problem: Anxiety  Goal: Anxiety Reduction or Resolution  Outcome: Ongoing, Progressing

## 2024-01-10 NOTE — PLAN OF CARE
Problem: Adult Inpatient Plan of Care  Goal: Plan of Care Review  Outcome: Ongoing, Progressing  Goal: Patient-Specific Goal (Individualized)  Outcome: Ongoing, Progressing  Goal: Absence of Hospital-Acquired Illness or Injury  Outcome: Ongoing, Progressing  Goal: Optimal Comfort and Wellbeing  Outcome: Ongoing, Progressing  Goal: Readiness for Transition of Care  Outcome: Ongoing, Progressing     Problem: Impaired Wound Healing  Goal: Optimal Wound Healing  Outcome: Ongoing, Progressing     Problem: Fall Injury Risk  Goal: Absence of Fall and Fall-Related Injury  Outcome: Ongoing, Progressing     Problem: Behavioral Health Comorbidity  Goal: Maintenance of Behavioral Health Symptom Control  Outcome: Ongoing, Progressing     Problem: COPD (Chronic Obstructive Pulmonary Disease) Comorbidity  Goal: Maintenance of COPD Symptom Control  Outcome: Ongoing, Progressing     Problem: Hypertension Comorbidity  Goal: Blood Pressure in Desired Range  Outcome: Ongoing, Progressing     Problem: Pain Acute  Goal: Acceptable Pain Control and Functional Ability  Outcome: Ongoing, Progressing     Problem: Gas Exchange Impaired  Goal: Optimal Gas Exchange  Outcome: Ongoing, Progressing     Problem: Mobility Impairment  Goal: Optimal Mobility  Outcome: Ongoing, Progressing

## 2024-01-10 NOTE — PT/OT/SLP EVAL
"Occupational Therapy   Acute Care Evaluation    Name: Cierra Main  MRN: [id number]  Admitting Diagnosis:  <principal problem not specified>      History:      Fall       Arrived via [facility], fall last night, fell out of wheelchair, forehead first impact to bed frame.  LOC unknown, on floor for 1 hour.       This patient is a 73-year-old female who arrived by ambulance after a fall last night, patient states that she fell out of her wheelchair falling onto her forehead, unknown LOC and states that she was on the floor for about an hour.  Patient states that her sister and her son take care for and that she has home health but she is home most of the day alone.  Patient has a moderate hematoma on her forehead.  Patient states she has been weak for the past week and has not really gotten out of bed much but she can get up and walk with help.  She is normally in her wheelchair    Past Medical History:   Diagnosis Date    Acute MI     Anxiety     Cardiac disease     COPD (chronic obstructive pulmonary disease)     Coronary artery disease     Hyperlipidemia     Hypertension     Hypothyroidism     Other specified noninfective gastroenteritis and colitis     Unspecified macular degeneration          Past Surgical History:   Procedure Laterality Date    CORONARY ANGIOGRAPHY N/A 2/1/2021    Procedure: ANGIOGRAM, CORONARY ARTERY;  Surgeon: Devon Medeiros MD;  Location: [facility] CATH LAB;  Service: Cardiology;  Laterality: N/A;    CORONARY ANGIOPLASTY WITH STENT PLACEMENT      LEFT HEART CATHETERIZATION Left 2/1/2021    Procedure: CATHETERIZATION, HEART, LEFT;  Surgeon: Devon Medeiros MD;  Location: [facility] CATH LAB;  Service: Cardiology;  Laterality: Left;         Subjective     Chief Complaint: "I'm so cold."  Patient/Family Comments/goals: Sister reported goal is for pt to transition to [place] care in [place]    Occupational Profile:  Lives with: Son  Home Environment: Pt lives with son in [facility].  Pt reported her son works during the " day and her sister, who is caring for her ill , would assist when able.  Previous level of function: Pt reported that up until 2 weeks ago she was walking with her walker and performing all basic ADLs except bathing at mod I level.  Pt reported having several falls and became fearful of walking and began using wc as primary mode of mobility and started requiring more assist with ADLs.  HH was seeing pt and a CNA would assist with bathing 1x/week, son would assist 1x and her sister would assist 1x.   Equipment Used at Home:  walker, rolling, wheelchair, raised toilet      Objective:     Communicated with: RN prior to session.  Patient found supine with bed alarm, oxygen, peripheral IV upon OT entry to room.    General Precautions: Standard, fall, vision impaired   Orthopedic Precautions:Full weight bearing   Braces: N/A  Respiratory Status: Nasal cannula, flow 1 L/min    Occupational Performance:    Mobility  Assist level Comments    Bed mobility Min Transfer training supine to sit Min assist with upper trunk with HOB elevated.   Balance training     Transfer Min Transfer training bed to recliner min assist and verbal cuing to sequence and encouragement secondary to decreased vision and gravitation insecurity, Stand pivot transfer   Functional mobility     Sit to stand transitions     Other:         Activities of Daily Living Assist level Comments    Feeding     Grooming/hygiene Min ADL grooming assessment performed sitting in recliner.  Pt able to wash face with setup and verbal cuing.  Secondary to visual deficits, pt able to perform oral care once setup and handed tooth brush.  Pt unable to perform hair grooming secondary to scalp pain from injury during fall.   Bathing Max Initially pt refusing to allow nursing clean dried blood on forehead and scalp from injury during fall.  Pt would allow her sister to perform.  Sister setup with basin of warm water, soap, and gloves and was able to remove dried blood.    Upper body dressing     Lower body dressing     Toileting     Toilet transfer     Adaptive equipment training     Other:       Upper Extremity Strength:  Right Upper Extremity: WFL except strength 4+/5 grossly throughout  Left Upper Extremity: WFL except 4+/5 grossly throughout    Cognitive/Visual Perceptual:  Cognitive/Psychosocial Skills:     -       Oriented to: Person, Place, and Time   -       Follows Commands/attention:Follows one-step commands  -       Communication: clear/fluent  -       Memory: Impaired STM  -       Safety awareness/insight to disability: impaired   -       Mood/Affect/Coping skills/emotional control: Flat affect and very fearful with gravitation insecurity.    Legally blind, can see shapes and movement.    Treatment & Education:  Transfer training and ADL assessment.  Discussed scope and goals of OT and POC. Educated on call bell function and call before you fall.      Assessment:     Cierra Main is a 73 y.o. female with a medical diagnosis of <principal problem not specified>.  She presents with decreased functional transfers and ADLs  secondary to recent fall with injury. Performance deficits affecting function: weakness, impaired functional mobility, impaired cognition, decreased safety awareness, impaired endurance, pain, impaired balance, impaired self care skills.      Rehab Prognosis: Fair; patient would benefit from acute skilled OT services to address these deficits and reach maximum level of function.     Pain/Comfort:  Pain Rating 1: 7/10  Location 1: forehead  Pain Addressed 1: Reposition, Distraction    Blood Pressure:        Plan:     Patient to be seen 5 x/week to address the above listed problems via self-care/home management, therapeutic activities, therapeutic exercises  Plan of Care Reviewed with: patient      GOALS:   Multidisciplinary Problems       Occupational Therapy Goals          Problem: Occupational Therapy    Goal Priority Disciplines Outcome  Interventions   Occupational Therapy Goal     OT, PT/OT     Description:   Patient will increase functional independence with ADLs by performing:    Feeding with Stand-by Assistance.  Grooming while seated at sink with Stand-by Assistance.  Toileting from bedside commode with Contact Guard Assistance for hygiene and clothing management.   Bathing from  edge of bed with Minimal Assistance.  Toilet transfer to bedside commode with Contact Guard Assistance.                             Patient left up in chair with all lines intact, call button in reach, chair alarm on, and sister present      Recommendations:     Discharge Recommendations:    Discharge Equipment Recommendations:  to be determined by next level of care (TBD)  Barriers to discharge:  Decreased caregiver support      Time Tracking:     OT Date of Treatment: 01/10/24  OT Start Time: 0930  OT Stop Time: 1015  OT Total Time (min): 45 min    Billable Minutes:Evaluation 30  Self Care/Home Management 15      1/10/2024

## 2024-01-10 NOTE — CONSULTS
Inpatient Nutrition Assessment    Admit Date: 1/9/2024   Total duration of encounter: 1 day   Patient Age: 73 y.o.    Nutrition Recommendation/Prescription     Continue 2 gm Na+ diet as tolerated.   Will provide Boost (vanilla) TID. (Provides 240 kcal and 10 gm per serving)  Weigh daily  Monitor appetite and intake    Communication of Recommendations: reviewed with patient    Nutrition Assessment     Malnutrition Assessment/Nutrition-Focused Physical Exam    Malnutrition Context: chronic illness (01/10/24 1526)  Malnutrition Level: moderate (01/10/24 1526)  Energy Intake (Malnutrition): less than 75% for greater than 7 days (01/10/24 1526)     Subcutaneous Fat (Malnutrition): moderate depletion (01/10/24 1526)  Orbital Region (Subcutaneous Fat Loss): moderate depletion  Upper Arm Region (Subcutaneous Fat Loss): moderate depletion     Muscle Mass (Malnutrition): moderate depletion (01/10/24 1526)  Gobler Region (Muscle Loss): moderate depletion  Clavicle Bone Region (Muscle Loss): moderate depletion  Clavicle and Acromion Bone Region (Muscle Loss): moderate depletion                          A minimum of two characteristics is recommended for diagnosis of either severe or non-severe malnutrition.    Chart Review    Reason Seen: physician consult for wound healing    Malnutrition Screening Tool Results   Have you recently lost weight without trying?: No  Have you been eating poorly because of a decreased appetite?: No   MST Score: 0   Diagnosis:  Weakness, moderate episode of recurrent major depressive d/o, hypothyroidism, HTN    Relevant Medical History: acute MI, anxiety, cardiac dis, COPD, CAD, HLD, HTN, hypothyroidism, non-infective gastroenteritis & colitis, macular degeneration      Scheduled Medications:  albuterol-ipratropium, 3 mL, Q6H  amiodarone, 200 mg, BID  atorvastatin, 10 mg, QHS  hydrALAZINE, 100 mg, TID  [START ON 1/11/2024] levothyroxine, 25 mcg, Before breakfast  lisinopriL, 20 mg, BID  metoprolol  "succinate, 25 mg, Daily  montelukast, 10 mg, QHS  polyethylene glycol, 17 g, BID  [START ON 1/11/2024] sertraline, 100 mg, Daily    Continuous Infusions:   PRN Medications: docusate sodium, melatonin, sodium chloride 0.9%    Calorie Containing IV Medications: no significant kcals from medications at this time    Recent Labs   Lab 01/09/24  1227 01/09/24  1255   NA  --  141   K  --  3.6   CALCIUM  --  9.0   CHLORIDE  --  105   CO2  --  26   BUN  --  13.0   CREATININE  --  0.70   EGFRNORACEVR  --  >60   GLUCOSE  --  109   BILITOT  --  0.5   ALKPHOS  --  90   ALT  --  74*   AST  --  74*   ALBUMIN  --  2.6*   WBC 7.88  --    HGB 12.7  --    HCT 38.8  --      Nutrition Orders:  Diet Low Sodium, 2gm      Appetite/Oral Intake: fair/50-75% of meals  Factors Affecting Nutritional Intake: decreased appetite and prefers small meals  Food/Mormon/Cultural Preferences: none reported  Food Allergies: none reported  Last Bowel Movement: 01/07/24  Wound(s):  Intact    Comments    (1/10) Pt reported poor intake over past few days, however, her intake and appetite has improved somewhat. Pt did consume 75% of lunch today. She has never really been a big eater. Prefers small meals. Pt agreeable to try Boost due to underweight status. Denied N/V/D/C. No difficulties chewing or swallowing. Able to feed self with set-up assist. Stated UBW to be around 90#. Reported no recent weight change. RDN consulted for wound healing, however, skin is intact at this time.     Anthropometrics    Height: 5' 3" (160 cm), Height Method: Stated  Last Weight: 42.4 kg (93 lb 7.6 oz) (01/09/24 1747), Weight Method: Bed Scale  BMI (Calculated): 16.6  BMI Classification: underweight (BMI less than 18.5)     Ideal Body Weight (IBW), Female: 115 lb     % Ideal Body Weight, Female (lb): 81.29 %                             Usual Weight Provided By: patient    Wt Readings from Last 5 Encounters:   01/09/24 42.4 kg (93 lb 7.6 oz)   12/29/23 41.7 kg (92 lb) "   12/05/23 41.7 kg (92 lb)   11/16/23 43.8 kg (96 lb 9 oz)   10/01/23 40.8 kg (90 lb)     Weight Change(s) Since Admission: new admit  Wt Readings from Last 1 Encounters:   01/09/24 1747 42.4 kg (93 lb 7.6 oz)   01/09/24 1200 40.8 kg (90 lb)   Admit Weight: 40.8 kg (90 lb) (01/09/24 1200), Weight Method: Stated    Estimated Needs    Weight Used For Calorie Calculations: 42.4 kg (93 lb 7.6 oz)  Energy Calorie Requirements (kcal): 1484 kcal (35 kcal/kg)  Energy Need Method: Kcal/kg  Weight Used For Protein Calculations: 42.4 kg (93 lb 7.6 oz)  Protein Requirements: 85 gm (2 gm/kg)       Enteral Nutrition     Patient not receiving enteral nutrition at this time.    Parenteral Nutrition     Patient not receiving parenteral nutrition support at this time.    Evaluation of Received Nutrient Intake    Calories: not meeting estimated needs  Protein: not meeting estimated needs    Patient Education     Not applicable.    Nutrition Diagnosis     PES: Inadequate energy intake related to chronic illness as evidenced by poor intake PTA and underweight status BMI 16.6. (new)     PES: Moderate chronic disease or condition related malnutrition related to inability to consume sufficient nutrients as evidenced by less than 75% needs met for greater than 7 days, moderate fat depletion, and moderate muscle depletion. (new)    Nutrition Interventions     Intervention(s): general/healthful diet, commercial beverage, and collaboration with other providers    Goal: Meet greater than 80% of nutritional needs by follow-up. (new)  Goal: Maintain weight throughout hospitalization. (new)    Nutrition Goals & Monitoring     Dietitian will monitor: energy intake, weight, electrolyte/renal panel, glucose/endocrine profile, and gastrointestinal profile    Nutrition Risk/Follow-Up: high (follow-up in 1-4 days)   Please consult if re-assessment needed sooner.

## 2024-01-10 NOTE — PT/OT/SLP EVAL
"Physical Therapy Acute Care Evaluation    Patient Name:  Cierra Main   MRN:  [id number]    History:     Per MD:    Chief Complaint   Patient presents with    Fall       Arrived via [facility], fall last night, fell out of wheelchair, forehead first impact to bed frame.  LOC unknown, on floor for 1 hour.       This patient is a 73-year-old female who arrived by ambulance after a fall last night, patient states that she fell out of her wheelchair falling onto her forehead, unknown LOC and states that she was on the floor for about an hour.  Patient states that her sister and her son take care for and that she has home health but she is home most of the day alone.  Patient has a moderate hematoma on her forehead.  Patient states she has been weak for the past week and has not really gotten out of bed much but she can get up and walk with help.  She is normally in her wheelchair    Past Medical History:   Diagnosis Date    Acute MI     Anxiety     Cardiac disease     COPD (chronic obstructive pulmonary disease)     Coronary artery disease     Hyperlipidemia     Hypertension     Hypothyroidism     Other specified noninfective gastroenteritis and colitis     Unspecified macular degeneration        Past Surgical History:   Procedure Laterality Date    CORONARY ANGIOGRAPHY N/A 2/1/2021    Procedure: ANGIOGRAM, CORONARY ARTERY;  Surgeon: Devon Medeiros MD;  Location: [facility];  Service: Cardiology;  Laterality: N/A;    CORONARY ANGIOPLASTY WITH STENT PLACEMENT      LEFT HEART CATHETERIZATION Left 2/1/2021    Procedure: CATHETERIZATION, HEART, LEFT;  Surgeon: Devon Medeiros MD;  Location: [facility];  Service: Cardiology;  Laterality: Left;       Recent Surgery: * No surgery found *      Subjective     Chief Complaint: "my back hurts and I'm so cold"  Patient/Family Comments/goals: "to get better"  Pain/Comfort:  Location - Orientation 1: lower  Location 1: back      Living Environment:  Pt currently resides at home " "alone in a single story home with a threshold step to enter. Pt's son and sister come over daily to check on pt and assist as needed. Pt receives assistance with bathing and some aspects of dressing. Pt reports that she is legally blind.  Family also assists with meal preparation.  Pt has experienced multiple falls as of late.  Pt was previously using a RW for ambulation, but had "stopped using the walker because she was scared of it".  Pt was mostly dependent on her WC for mobility over the last 2 weeks.    Equipment used at home: walker, rolling, raised toilet, grab bar, wheelchair.        Objective:     Patient found up in chair with oxygen (chair alarm)  upon PT entry to room.    General Precautions: Standard, fall   Orthopedic Precautions:N/A   Braces: N/A  Respiratory Status: Nasal cannula, flow 1 L/min     Vitals Value    Room air  93-97%   1 L/min Oxygen (L) 98%    Blood pressure     Heart rate             Functional Mobility:     Assist Level Assistive Device Comments    Bed Mobility      Sit to stand/Stand to sit CGA-Lupe RW Sit to stand/stand to sit performed from recliner level and from WC level.  Reminders provided for hand placement during both ascent and descent.  Pt very fearful when attempting to reach back for the chair to sit.    Transfers CGA-Lupe RW Stand step t/f performed from the recliner to the WC.  Some slight assistance provided with maneuvering the RW within small spaces.    Gait CGA RW Pt able to ambulate 50 ft with a step through gait pattern and slow gait speed. Pt paused briefly due to c/o "dizziness". Pt reported that this resolved and was able to ambulate back to her room.  PT provided CGA for safety and WC was kept in tow.     Balance Training      Wheelchair Mobility      Stair Climbing      Car Transfer      Other:           Assessment:     Cierra Main is a 73 y.o. female admitted with a medical diagnosis of <principal problem not specified>.  She presents with the following " "impairments/functional limitations:  weakness, impaired endurance, visual deficits, impaired cognition, impaired self care skills, impaired functional mobility, gait instability, impaired balance, pain, decreased safety awareness.    Rehab Prognosis: Fair; patient would benefit from acute skilled PT services to address these deficits and reach maximum level of function.      Additional information: Pt tolerated evaluation fairly well but her performance was limited by increased fear of falling and anxiety.  Pt was provided with reassurance and encouragement throughout evaluation and remained participatory. Pt's sister was also present throughout assessment.  Following mobility, PT discussed D/C planning with pt and sister.  At this time, PT feels that pt requires 24 hour care.  Due to her current level of deconditioning, recent immobility, the frequency of her falls, and the extent of her visual deficits, PT feels that pt is unsafe to remain in the home environment independently. Pt's sister reported that they were making efforts to arrange care at home, but stated that pt would "have to be comfortable staying alone at night".  PT clarified that pt will require full time day and night care.  PT discussed the options of skilled nursing care followed by the possibility of transitioning to either residential care or home with 24 hour care.  Pt expressed agreement with this plan an acknowledged that she cannot adequately care for herself without assistance.  Pt will speak with family regarding decision-making.  CM notified.     Pt left on room air as she was maintaining O2 sats between 93-97%.  Nsg aware.     Patient left up in chair with all lines intact, call button in reach, and chair alarm on.      Plan:     During this hospitalization, patient to be seen 6 x/week to address the identified rehab impairments via gait training, therapeutic activities, therapeutic exercises and progress toward the following " goals:    GOALS:   Multidisciplinary Problems       Physical Therapy Goals          Problem: Physical Therapy    Goal Priority Disciplines Outcome Goal Variances Interventions   Physical Therapy Goal     PT, PT/OT      Description: Patient will increase functional independence with mobility by performin. Supine to sit with Modified Wetzel  2. Sit to supine with Modified Wetzel  3. Sit to stand transfer with Supervision  4. Gait  x 200 feet with Supervision using Rolling Walker.                          Recommendations:     Discharge Recommendations:  SNF vs home with 24 hour care and   Discharge Equipment Recommendations: none     Time Tracking:       PT Start Time: 1035     PT Stop Time: 1113  PT Total Time (min): 38 min     Billable Minutes: Evaluation 38      01/10/2024

## 2024-01-10 NOTE — H&P
[facility] Medical [facility] Medicine  History & Physical    Patient Name: Cierra Main  MRN: [id number]  Patient Class: IP- Inpatient  Admission Date: 1/9/2024  Attending Physician: Rafiq Eller MD   Primary Care Provider: Rafiq Eller MD         Patient information was obtained from patient, relative(s), and ER records.     Subjective:     Principal Problem:Weakness    Chief Complaint:   Chief Complaint   Patient presents with    Fall     Arrived via [facility], fall last night, fell out of wheelchair, forehead first impact to bed frame.  LOC unknown, on floor for 1 hour.         HPI:   Chief Complaint   Patient presents with    Fall       Arrived via [facility], fall last night, fell out of wheelchair, forehead first impact to bed frame.  LOC unknown, on floor for 1 hour.       This patient is a 73-year-old female who arrived by ambulance after a fall last night, patient states that she fell out of her wheelchair falling onto her forehead, unknown LOC and states that she was on the floor for about an hour.  Patient states that her sister and her son take care for and that she has home health but she is home most of the day alone.  Patient has a moderate hematoma on her forehead.  Patient states she has been weak for the past week and has not really gotten out of bed much but she can get up and walk with help.  She is normally in her wheelchair.  Patient recently admitted to [place] for extended rehab.  Upon being home, patient reports having falls.  She does have family that checks on her periodically during the daytime but does stay home alone.  Due to recent multiple falls, patient will be admitted for further treatment of her lower extremity weakness.    Past Medical History:   Diagnosis Date    Acute MI     Anxiety     Cardiac disease     COPD (chronic obstructive pulmonary disease)     Coronary artery disease     Hyperlipidemia     Hypertension     Hypothyroidism     Other  specified noninfective gastroenteritis and colitis     Unspecified macular degeneration        Past Surgical History:   Procedure Laterality Date    CORONARY ANGIOGRAPHY N/A 2/1/2021    Procedure: ANGIOGRAM, CORONARY ARTERY;  Surgeon: Devon Medeiros MD;  Location: [facility] CATH LAB;  Service: Cardiology;  Laterality: N/A;    CORONARY ANGIOPLASTY WITH STENT PLACEMENT      LEFT HEART CATHETERIZATION Left 2/1/2021    Procedure: CATHETERIZATION, HEART, LEFT;  Surgeon: Devon Medeiros MD;  Location: [facility] CATH LAB;  Service: Cardiology;  Laterality: Left;       Review of patient's allergies indicates:   Allergen Reactions    Amlodipine      Leg weakness    Losartan        No current facility-administered medications on file prior to encounter.     Current Outpatient Medications on File Prior to Encounter   Medication Sig    amiodarone (PACERONE) 200 MG Tab Take 200 mg by mouth 2 (two) times daily.    atorvastatin (LIPITOR) 10 MG tablet Take 1 tablet (10 mg total) by mouth every evening.    hydrALAZINE (APRESOLINE) 100 MG tablet Take 1 tablet (100 mg total) by mouth 3 (three) times daily.    levothyroxine (SYNTHROID) 25 MCG tablet Take 1 tablet (25 mcg total) by mouth before breakfast.    lisinopriL (PRINIVIL,ZESTRIL) 20 MG tablet Take 1 tablet (20 mg total) by mouth 2 (two) times daily.    metoprolol succinate (TOPROL-XL) 50 MG 24 hr tablet Take 25 mg by mouth once daily.    montelukast (SINGULAIR) 10 mg tablet Take 10 mg by mouth.    sertraline (ZOLOFT) 50 MG tablet Take 1 tablet (50 mg total) by mouth once daily.    traZODone (DESYREL) 50 MG tablet Take 1 tablet (50 mg total) by mouth every evening.    docusate sodium (COLACE) 100 MG capsule Take 100 mg by mouth 2 (two) times daily as needed.    melatonin 3 mg Cap Take 1 tablet by mouth every evening.    nitroGLYCERIN 0.4 MG/DOSE TL SPRY (NITROLINGUAL) 400 mcg/spray spray nitroglycerin 400 mcg/spray translingual aerosol   at onset of chest pain may take up to three sprays  every 5min during a 15 min period    polyethylene glycol (GLYCOLAX) 17 gram PwPk Take 17 g by mouth 2 (two) times a day. (Patient taking differently: Take 17 g by mouth 2 (two) times daily as needed for Constipation.)    [DISCONTINUED] cimetidine (TAGAMET) 200 MG tablet Take 200 mg by mouth nightly.    [DISCONTINUED] levETIRAcetam (KEPPRA) 500 MG Tab Take 1 tablet (500 mg total) by mouth 2 (two) times daily.     Family History       Problem Relation (Age of Onset)    Diabetes Mother    Heart disease Mother, Father    Hypertension Father          Tobacco Use    Smoking status: Every Day     Current packs/day: 0.50     Average packs/day: 0.5 packs/day for 55.0 years (27.5 ttl pk-yrs)     Types: Cigarettes    Smokeless tobacco: Never   Substance and Sexual Activity    Alcohol use: Never    Drug use: Never    Sexual activity: Not Currently     Review of Systems   Constitutional: Negative.    Respiratory: Negative.     Cardiovascular: Negative.    Musculoskeletal:  Positive for back pain (Lower).        Falls   Neurological:  Positive for weakness and headaches.   Psychiatric/Behavioral:          Depression     Objective:     Vital Signs (Most Recent):  Temp: 98.2 °F (36.8 °C) (01/10/24 1210)  Pulse: 66 (01/10/24 1258)  Resp: 20 (01/10/24 1210)  BP: (!) 108/47 (01/10/24 1258)  SpO2: 96 % (01/10/24 1211) Vital Signs (24h Range):  Temp:  [97.8 °F (36.6 °C)-98.8 °F (37.1 °C)] 98.2 °F (36.8 °C)  Pulse:  [54-88] 66  Resp:  [18-20] 20  SpO2:  [93 %-100 %] 96 %  BP: (102-200)/(47-80) 108/47     Weight: 42.4 kg (93 lb 7.6 oz)  Body mass index is 16.56 kg/m².     Physical Exam  Constitutional:       Appearance: She is underweight.   Cardiovascular:      Rate and Rhythm: Normal rate and regular rhythm.      Pulses: Normal pulses.      Heart sounds: Normal heart sounds.   Abdominal:      General: Abdomen is flat. Bowel sounds are normal.      Palpations: Abdomen is soft.   Musculoskeletal:         General: Normal range of motion.       Right lower leg: No edema.      Left lower leg: No edema.   Skin:            Comments: Large contusion noted on left forehead   Neurological:      General: No focal deficit present.      Mental Status: She is alert.      Motor: Weakness present.   Psychiatric:         Mood and Affect: Mood normal.         Behavior: Behavior normal.         Thought Content: Thought content normal.         Judgment: Judgment normal.                Significant Labs: All pertinent labs within the past 24 hours have been reviewed.  CBC:   Recent Labs   Lab 01/09/24  1227   WBC 7.88   HGB 12.7   HCT 38.8   *     CMP:   Recent Labs   Lab 01/09/24  1255      K 3.6   CO2 26   BUN 13.0   CREATININE 0.70   CALCIUM 9.0   ALBUMIN 2.6*   BILITOT 0.5   ALKPHOS 90   AST 74*   ALT 74*     Significant Imaging: I have reviewed all pertinent imaging results/findings within the past 24 hours.  Assessment/Plan:     * Weakness  PT/OT following along with patient   Order CT L-spine   Discussed long-term placement with patient/sister at [facility] when patient is ready; both are in agreement,  initiating paperwork      Moderate episode of recurrent major depressive disorder  Increase Zoloft 100 mg q.day      Hypothyroidism  Continue home medication      Essential hypertension  Chronic, controlled. Latest blood pressure and vitals reviewed-     Temp:  [97.8 °F (36.6 °C)-98.8 °F (37.1 °C)]   Pulse:  [54-88]   Resp:  [18-20]   BP: (102-200)/(47-80)   SpO2:  [93 %-100 %] .   Home meds for hypertension were reviewed and noted below.   Hypertension Medications               hydrALAZINE (APRESOLINE) 100 MG tablet Take 1 tablet (100 mg total) by mouth 3 (three) times daily.    lisinopriL (PRINIVIL,ZESTRIL) 20 MG tablet Take 1 tablet (20 mg total) by mouth 2 (two) times daily.    metoprolol succinate (TOPROL-XL) 50 MG 24 hr tablet Take 25 mg by mouth once daily.    nitroGLYCERIN 0.4 MG/DOSE TL SPRY (NITROLINGUAL) 400 mcg/spray spray  nitroglycerin 400 mcg/spray translingual aerosol   at onset of chest pain may take up to three sprays every 5min during a 15 min period            While in the hospital, will manage blood pressure as follows; Continue home antihypertensive regimen    Will utilize p.r.n. blood pressure medication only if patient's blood pressure greater than 180/110 and she develops symptoms such as worsening chest pain or shortness of breath.      VTE Risk Mitigation (From admission, onward)           Ordered     IP VTE LOW RISK PATIENT  Once         01/09/24 1743                                  01/10/24 1242   Discharge Assessment   Assessment Type Discharge Planning Assessment   Confirmed/corrected address, phone number and insurance Yes   Confirmed Demographics Correct on Facesheet   Source of Information patient;legal guardian   People in Home alone   Do you expect to return to your current living situation? No   Do you have help at home or someone to help you manage your care at home? No   Prior to hospitilization cognitive status: Unable to Assess   Current cognitive status: Unable to Assess   Walking or Climbing Stairs Difficulty yes   Walking or Climbing Stairs ambulation difficulty, dependent   Mobility Management Wheelchair   Dressing/Bathing Difficulty yes   Dressing/Bathing bathing difficulty, dependent   Equipment Currently Used at Home walker, rolling;wheelchair;raised toilet   Readmission within 30 days? No   Patient currently being followed by outpatient case management? No   Do you currently have service(s) that help you manage your care at home? Yes   Name and Contact number of agency [facility]   Is the pt/caregiver preference to resume services with current agency Yes   Do you take prescription medications? Yes   Do you have prescription coverage? Yes   Do you have any problems affording any of your prescribed medications? No   Is the patient taking medications as prescribed? yes   Who is going to help you  get home at discharge? Sister   Are you on dialysis? No   Do you take coumadin? No   Discharge Plan A Skilled Nursing Facility   Discharge Plan B Home with family;Home Health   DME Needed Upon Discharge  none   Discharge Plan discussed with: Patient;POA   Physical Activity   On average, how many days per week do you engage in moderate to strenuous exercise (like a brisk walk)? 0 days   On average, how many minutes do you engage in exercise at this level? 0 min   Financial Resource Strain   How hard is it for you to pay for the very basics like food, housing, medical care, and heating? Not hard   Housing Stability   In the last 12 months, was there a time when you were not able to pay the mortgage or rent on time? N   In the last 12 months, how many places have you lived? 1   In the last 12 months, was there a time when you did not have a steady place to sleep or slept in a shelter (including now)? N   Transportation Needs   In the past 12 months, has lack of transportation kept you from medical appointments or from getting medications? no   In the past 12 months, has lack of transportation kept you from meetings, work, or from getting things needed for daily living? No   Food Insecurity   Within the past 12 months, you worried that your food would run out before you got the money to buy more. Never true   Within the past 12 months, the food you bought just didn't last and you didn't have money to get more. Never true   Stress   Do you feel stress - tense, restless, nervous, or anxious, or unable to sleep at night because your mind is troubled all the time - these days? Not at all   Social Connections   In a typical week, how many times do you talk on the phone with family, friends, or neighbors? More than 3   How often do you get together with friends or relatives? More than 3   Do you belong to any clubs or organizations such as Rastafarian groups, unions, fraternal or athletic groups, or school groups? No   How often  do you attend meetings of the clubs or organizations you belong to? Never   Are you , , , , never , or living with a partner?    Alcohol Use   Q1: How often do you have a drink containing alcohol? Never   Q2: How many drinks containing alcohol do you have on a typical day when you are drinking? None   Q3: How often do you have six or more drinks on one occasion? Never     Currently resides in a single level home alone. DME in home includes rolling walker, raised toilet seat, and wheelchair. Currently active with [facility]. Discussed discharge options in detail with patient and family- SNF vs. Home with family. Decision unmade as to plan. Will speak more in depth with both patient and sister once MD rounds. Will coordinate discharge plan as ordered per attending MD.     Rafiq Eller MD  Department of Hospital Medicine  [facility] Surgical Unit

## 2024-01-10 NOTE — NURSING
Nurses Note -- 4 Eyes      1/9/2024   6:24 PM      Skin assessed during: Admit      [] No Altered Skin Integrity Present    []Prevention Measures Documented      [x] Yes- Altered Skin Integrity Present or Discovered   [x] LDA Added if Not in Epic (Describe Wound)   [x] New Altered Skin Integrity was Present on Admit and Documented in LDA   [x] Wound Image Taken    Wound Care Consulted? No    Attending Nurse:  Katalina Cloud RN/Staff Member:   David

## 2024-01-10 NOTE — PROGRESS NOTES
01/10/24 1242   Discharge Assessment   Assessment Type Discharge Planning Assessment   Confirmed/corrected address, phone number and insurance Yes   Confirmed Demographics Correct on Facesheet   Source of Information patient;legal guardian   People in Home alone   Do you expect to return to your current living situation? No   Do you have help at home or someone to help you manage your care at home? No   Prior to hospitilization cognitive status: Unable to Assess   Current cognitive status: Unable to Assess   Walking or Climbing Stairs Difficulty yes   Walking or Climbing Stairs ambulation difficulty, dependent   Mobility Management Wheelchair   Dressing/Bathing Difficulty yes   Dressing/Bathing bathing difficulty, dependent   Equipment Currently Used at Home walker, rolling;wheelchair;raised toilet   Readmission within 30 days? No   Patient currently being followed by outpatient case management? No   Do you currently have service(s) that help you manage your care at home? Yes   Name and Contact number of agency [facility]   Is the pt/caregiver preference to resume services with current agency Yes   Do you take prescription medications? Yes   Do you have prescription coverage? Yes   Do you have any problems affording any of your prescribed medications? No   Is the patient taking medications as prescribed? yes   Who is going to help you get home at discharge? Sister   Are you on dialysis? No   Do you take coumadin? No   Discharge Plan A Skilled Nursing Facility   Discharge Plan B Home with family;Home Health   DME Needed Upon Discharge  none   Discharge Plan discussed with: Patient;POA   Physical Activity   On average, how many days per week do you engage in moderate to strenuous exercise (like a brisk walk)? 0 days   On average, how many minutes do you engage in exercise at this level? 0 min   Financial Resource Strain   How hard is it for you to pay for the very basics like food, housing, medical care, and  heating? Not hard   Housing Stability   In the last 12 months, was there a time when you were not able to pay the mortgage or rent on time? N   In the last 12 months, how many places have you lived? 1   In the last 12 months, was there a time when you did not have a steady place to sleep or slept in a shelter (including now)? N   Transportation Needs   In the past 12 months, has lack of transportation kept you from medical appointments or from getting medications? no   In the past 12 months, has lack of transportation kept you from meetings, work, or from getting things needed for daily living? No   Food Insecurity   Within the past 12 months, you worried that your food would run out before you got the money to buy more. Never true   Within the past 12 months, the food you bought just didn't last and you didn't have money to get more. Never true   Stress   Do you feel stress - tense, restless, nervous, or anxious, or unable to sleep at night because your mind is troubled all the time - these days? Not at all   Social Connections   In a typical week, how many times do you talk on the phone with family, friends, or neighbors? More than 3   How often do you get together with friends or relatives? More than 3   Do you belong to any clubs or organizations such as Buddhist groups, unions, fraternal or athletic groups, or school groups? No   How often do you attend meetings of the clubs or organizations you belong to? Never   Are you , , , , never , or living with a partner?    Alcohol Use   Q1: How often do you have a drink containing alcohol? Never   Q2: How many drinks containing alcohol do you have on a typical day when you are drinking? None   Q3: How often do you have six or more drinks on one occasion? Never     Currently resides in a single level home alone. DME in home includes rolling walker, raised toilet seat, and wheelchair. Currently active with [facility].  Discussed discharge options in detail with patient and family- SNF vs. Home with family. Decision unmade as to plan. Will speak more in depth with both patient and sister once MD rounds. Will coordinate discharge plan as ordered per attending MD.

## 2024-01-10 NOTE — SUBJECTIVE & OBJECTIVE
Past Medical History:   Diagnosis Date    Acute MI     Anxiety     Cardiac disease     COPD (chronic obstructive pulmonary disease)     Coronary artery disease     Hyperlipidemia     Hypertension     Hypothyroidism     Other specified noninfective gastroenteritis and colitis     Unspecified macular degeneration        Past Surgical History:   Procedure Laterality Date    CORONARY ANGIOGRAPHY N/A 2/1/2021    Procedure: ANGIOGRAM, CORONARY ARTERY;  Surgeon: Devon Medeiros MD;  Location: [facility] CATH LAB;  Service: Cardiology;  Laterality: N/A;    CORONARY ANGIOPLASTY WITH STENT PLACEMENT      LEFT HEART CATHETERIZATION Left 2/1/2021    Procedure: CATHETERIZATION, HEART, LEFT;  Surgeon: Devon Medeiros MD;  Location: [facility] CATH LAB;  Service: Cardiology;  Laterality: Left;       Review of patient's allergies indicates:   Allergen Reactions    Amlodipine      Leg weakness    Losartan        No current facility-administered medications on file prior to encounter.     Current Outpatient Medications on File Prior to Encounter   Medication Sig    amiodarone (PACERONE) 200 MG Tab Take 200 mg by mouth 2 (two) times daily.    atorvastatin (LIPITOR) 10 MG tablet Take 1 tablet (10 mg total) by mouth every evening.    hydrALAZINE (APRESOLINE) 100 MG tablet Take 1 tablet (100 mg total) by mouth 3 (three) times daily.    levothyroxine (SYNTHROID) 25 MCG tablet Take 1 tablet (25 mcg total) by mouth before breakfast.    lisinopriL (PRINIVIL,ZESTRIL) 20 MG tablet Take 1 tablet (20 mg total) by mouth 2 (two) times daily.    metoprolol succinate (TOPROL-XL) 50 MG 24 hr tablet Take 25 mg by mouth once daily.    montelukast (SINGULAIR) 10 mg tablet Take 10 mg by mouth.    sertraline (ZOLOFT) 50 MG tablet Take 1 tablet (50 mg total) by mouth once daily.    traZODone (DESYREL) 50 MG tablet Take 1 tablet (50 mg total) by mouth every evening.    docusate sodium (COLACE) 100 MG capsule Take 100 mg by mouth 2 (two) times daily as needed.     melatonin 3 mg Cap Take 1 tablet by mouth every evening.    nitroGLYCERIN 0.4 MG/DOSE TL SPRY (NITROLINGUAL) 400 mcg/spray spray nitroglycerin 400 mcg/spray translingual aerosol   at onset of chest pain may take up to three sprays every 5min during a 15 min period    polyethylene glycol (GLYCOLAX) 17 gram PwPk Take 17 g by mouth 2 (two) times a day. (Patient taking differently: Take 17 g by mouth 2 (two) times daily as needed for Constipation.)    [DISCONTINUED] cimetidine (TAGAMET) 200 MG tablet Take 200 mg by mouth nightly.    [DISCONTINUED] levETIRAcetam (KEPPRA) 500 MG Tab Take 1 tablet (500 mg total) by mouth 2 (two) times daily.     Family History       Problem Relation (Age of Onset)    Diabetes Mother    Heart disease Mother, Father    Hypertension Father          Tobacco Use    Smoking status: Every Day     Current packs/day: 0.50     Average packs/day: 0.5 packs/day for 55.0 years (27.5 ttl pk-yrs)     Types: Cigarettes    Smokeless tobacco: Never   Substance and Sexual Activity    Alcohol use: Never    Drug use: Never    Sexual activity: Not Currently     Review of Systems   Constitutional: Negative.    Respiratory: Negative.     Cardiovascular: Negative.    Musculoskeletal:  Positive for back pain (Lower).        Falls   Neurological:  Positive for weakness and headaches.   Psychiatric/Behavioral:          Depression     Objective:     Vital Signs (Most Recent):  Temp: 98.2 °F (36.8 °C) (01/10/24 1210)  Pulse: 66 (01/10/24 1258)  Resp: 20 (01/10/24 1210)  BP: (!) 108/47 (01/10/24 1258)  SpO2: 96 % (01/10/24 1211) Vital Signs (24h Range):  Temp:  [97.8 °F (36.6 °C)-98.8 °F (37.1 °C)] 98.2 °F (36.8 °C)  Pulse:  [54-88] 66  Resp:  [18-20] 20  SpO2:  [93 %-100 %] 96 %  BP: (102-200)/(47-80) 108/47     Weight: 42.4 kg (93 lb 7.6 oz)  Body mass index is 16.56 kg/m².     Physical Exam  Constitutional:       Appearance: She is underweight.   Cardiovascular:      Rate and Rhythm: Normal rate and regular rhythm.       Pulses: Normal pulses.      Heart sounds: Normal heart sounds.   Abdominal:      General: Abdomen is flat. Bowel sounds are normal.      Palpations: Abdomen is soft.   Musculoskeletal:         General: Normal range of motion.      Right lower leg: No edema.      Left lower leg: No edema.   Skin:            Comments: Large contusion noted on left forehead   Neurological:      General: No focal deficit present.      Mental Status: She is alert.      Motor: Weakness present.   Psychiatric:         Mood and Affect: Mood normal.         Behavior: Behavior normal.         Thought Content: Thought content normal.         Judgment: Judgment normal.                Significant Labs: All pertinent labs within the past 24 hours have been reviewed.  CBC:   Recent Labs   Lab 01/09/24  1227   WBC 7.88   HGB 12.7   HCT 38.8   *     CMP:   Recent Labs   Lab 01/09/24  1255      K 3.6   CO2 26   BUN 13.0   CREATININE 0.70   CALCIUM 9.0   ALBUMIN 2.6*   BILITOT 0.5   ALKPHOS 90   AST 74*   ALT 74*     Significant Imaging: I have reviewed all pertinent imaging results/findings within the past 24 hours.

## 2024-01-10 NOTE — HPI
Chief Complaint   Patient presents with    Fall       Arrived via [facility], fall last night, fell out of wheelchair, forehead first impact to bed frame.  LOC unknown, on floor for 1 hour.       This patient is a 73-year-old female who arrived by ambulance after a fall last night, patient states that she fell out of her wheelchair falling onto her forehead, unknown LOC and states that she was on the floor for about an hour.  Patient states that her sister and her son take care for and that she has home health but she is home most of the day alone.  Patient has a moderate hematoma on her forehead.  Patient states she has been weak for the past week and has not really gotten out of bed much but she can get up and walk with help.  She is normally in her wheelchair.  Patient recently admitted to [facility] for extended rehab.  Upon being home, patient reports having falls.  She does have family that checks on her periodically during the daytime but does stay home alone.  Due to recent multiple falls, patient will be admitted for further treatment of her lower extremity weakness.

## 2024-01-10 NOTE — PLAN OF CARE
Problem: Fall Injury Risk  Goal: Absence of Fall and Fall-Related Injury  Intervention: Identify and Manage Contributors  Flowsheets (Taken 1/10/2024 0838)  Self-Care Promotion:   BADL personal objects within reach   meal set-up provided   safe use of adaptive equipment encouraged   BADL personal routines maintained  Medication Review/Management:   medications reviewed   high-risk medications identified     Problem: COPD (Chronic Obstructive Pulmonary Disease) Comorbidity  Goal: Maintenance of COPD Symptom Control  Intervention: Maintain COPD-Symptom Control  Flowsheets (Taken 1/10/2024 0838)  Supportive Measures:   relaxation techniques promoted   verbalization of feelings encouraged   active listening utilized   goal-setting facilitated   positive reinforcement provided   problem-solving facilitated   self-care encouraged   self-responsibility promoted  Medication Review/Management:   medications reviewed   high-risk medications identified

## 2024-01-10 NOTE — ASSESSMENT & PLAN NOTE
Chronic, controlled. Latest blood pressure and vitals reviewed-     Temp:  [97.8 °F (36.6 °C)-98.8 °F (37.1 °C)]   Pulse:  [54-88]   Resp:  [18-20]   BP: (102-200)/(47-80)   SpO2:  [93 %-100 %] .   Home meds for hypertension were reviewed and noted below.   Hypertension Medications               hydrALAZINE (APRESOLINE) 100 MG tablet Take 1 tablet (100 mg total) by mouth 3 (three) times daily.    lisinopriL (PRINIVIL,ZESTRIL) 20 MG tablet Take 1 tablet (20 mg total) by mouth 2 (two) times daily.    metoprolol succinate (TOPROL-XL) 50 MG 24 hr tablet Take 25 mg by mouth once daily.    nitroGLYCERIN 0.4 MG/DOSE TL SPRY (NITROLINGUAL) 400 mcg/spray spray nitroglycerin 400 mcg/spray translingual aerosol   at onset of chest pain may take up to three sprays every 5min during a 15 min period            While in the hospital, will manage blood pressure as follows; Continue home antihypertensive regimen    Will utilize p.r.n. blood pressure medication only if patient's blood pressure greater than 180/110 and she develops symptoms such as worsening chest pain or shortness of breath.

## 2024-01-10 NOTE — NURSING
Locet completed. PASSAR completed and faxed into [facility]. Facility of choice is [facility].

## 2024-01-11 ENCOUNTER — PATIENT OUTREACH (OUTPATIENT)
Dept: ADMINISTRATIVE | Facility: HOSPITAL | Age: 74
End: 2024-01-11
Payer: MEDICARE

## 2024-01-11 PROCEDURE — 99232 SBSQ HOSP IP/OBS MODERATE 35: CPT | Mod: ,,, | Performed by: FAMILY MEDICINE

## 2024-01-11 PROCEDURE — 11000001 HC ACUTE MED/SURG PRIVATE ROOM

## 2024-01-11 PROCEDURE — 92523 SPEECH SOUND LANG COMPREHEN: CPT

## 2024-01-11 PROCEDURE — 97535 SELF CARE MNGMENT TRAINING: CPT

## 2024-01-11 PROCEDURE — 25000003 PHARM REV CODE 250: Performed by: FAMILY MEDICINE

## 2024-01-11 PROCEDURE — 97530 THERAPEUTIC ACTIVITIES: CPT

## 2024-01-11 PROCEDURE — 94640 AIRWAY INHALATION TREATMENT: CPT

## 2024-01-11 PROCEDURE — 25000242 PHARM REV CODE 250 ALT 637 W/ HCPCS: Performed by: FAMILY MEDICINE

## 2024-01-11 PROCEDURE — 94761 N-INVAS EAR/PLS OXIMETRY MLT: CPT

## 2024-01-11 RX ORDER — ONDANSETRON 4 MG/1
4 TABLET, FILM COATED ORAL EVERY 4 HOURS PRN
Status: DISCONTINUED | OUTPATIENT
Start: 2024-01-11 | End: 2024-01-16 | Stop reason: HOSPADM

## 2024-01-11 RX ORDER — ACETAMINOPHEN 325 MG/1
650 TABLET ORAL EVERY 6 HOURS PRN
Status: DISCONTINUED | OUTPATIENT
Start: 2024-01-11 | End: 2024-01-16 | Stop reason: HOSPADM

## 2024-01-11 RX ADMIN — SERTRALINE HYDROCHLORIDE 100 MG: 50 TABLET ORAL at 09:01

## 2024-01-11 RX ADMIN — AMIODARONE HYDROCHLORIDE 200 MG: 100 TABLET ORAL at 09:01

## 2024-01-11 RX ADMIN — ACETAMINOPHEN 650 MG: 325 TABLET, FILM COATED ORAL at 11:01

## 2024-01-11 RX ADMIN — LEVOTHYROXINE SODIUM 25 MCG: 0.03 TABLET ORAL at 06:01

## 2024-01-11 RX ADMIN — IPRATROPIUM BROMIDE AND ALBUTEROL SULFATE 3 ML: 2.5; .5 SOLUTION RESPIRATORY (INHALATION) at 12:01

## 2024-01-11 RX ADMIN — METOPROLOL SUCCINATE 25 MG: 25 TABLET, EXTENDED RELEASE ORAL at 09:01

## 2024-01-11 RX ADMIN — HYDRALAZINE HYDROCHLORIDE 100 MG: 25 TABLET ORAL at 09:01

## 2024-01-11 RX ADMIN — LISINOPRIL 20 MG: 20 TABLET ORAL at 09:01

## 2024-01-11 RX ADMIN — HYDRALAZINE HYDROCHLORIDE 100 MG: 25 TABLET ORAL at 08:01

## 2024-01-11 RX ADMIN — MONTELUKAST 10 MG: 10 TABLET, FILM COATED ORAL at 08:01

## 2024-01-11 RX ADMIN — IPRATROPIUM BROMIDE AND ALBUTEROL SULFATE 3 ML: 2.5; .5 SOLUTION RESPIRATORY (INHALATION) at 06:01

## 2024-01-11 RX ADMIN — ATORVASTATIN CALCIUM 10 MG: 10 TABLET, FILM COATED ORAL at 08:01

## 2024-01-11 RX ADMIN — AMIODARONE HYDROCHLORIDE 200 MG: 100 TABLET ORAL at 08:01

## 2024-01-11 RX ADMIN — HYDRALAZINE HYDROCHLORIDE 100 MG: 25 TABLET ORAL at 04:01

## 2024-01-11 RX ADMIN — POLYETHYLENE GLYCOL 3350 17 G: 17 POWDER, FOR SOLUTION ORAL at 09:01

## 2024-01-11 RX ADMIN — ONDANSETRON 4 MG: 4 TABLET ORAL at 11:01

## 2024-01-11 RX ADMIN — POLYETHYLENE GLYCOL 3350 17 G: 17 POWDER, FOR SOLUTION ORAL at 08:01

## 2024-01-11 RX ADMIN — LISINOPRIL 20 MG: 20 TABLET ORAL at 08:01

## 2024-01-11 RX ADMIN — ACETAMINOPHEN 650 MG: 325 TABLET, FILM COATED ORAL at 10:01

## 2024-01-11 NOTE — PT/OT/SLP PROGRESS
Occupational Therapy  Treatment    Name: Cierra Main    : 1950 (73 y.o.)  MRN: [id number]          TREATMENT SUMMARY AND RECOMMENDATIONS:      Subjective Assessment:   No complaints  Lethargic    Awake, alert, cooperative  Uncooperative    Agitated  Flat affect    Appropriate  c/o fatigue   x Confused x Treated at bedside     Emotionally liable  Treated in gym/dept.    Impulsive x Other: pt reported sleeping well last night and in good spirits       Pain/Comfort:  Pain Rating 1: 4/10  Location 1: forehead  Pain Addressed 1: Distraction, Nurse notified      Therapy Precautions:   x Cognitive deficits  Spinal precautions    Collar - hard  Sternal precautions    Collar - soft   TLSO   x Fall risk  LSO    Hip precautions - posterior  Knee immobilizer    Hip precautions - anterior  WBAT    Impaired communication  Partial weightbearing    Oxygen  TTWB    PEG tube  NWB   x Visual deficits  Other:    Hearing deficits           Vitals Value   x Room air      Oxygen (L)     Blood pressure     Heart rate         Treatment Objectives:     Mobility Training:    Mobility task Assist level Comments    Bed mobility Min Transfer training supine to sit min assist with upper trunk with HOB elevated.   Balance training Min Dynamic standing bal/regina activity.  After standing from bed worked on weight shifting l/r and forward/back 10 reps ea min assist secondary to gravitational insecurity.   Transfer Min Transfer training bed to recliner min assist and verbal cuing stand pivot.   Functional mobility     Sit to stand transitions Min    Other:       ADL Training:    ADL Assist level Comments    Feeding     Grooming/hygiene Min ADL grooming activity performed sitting in recliner.  After setup pt able to perform oral care and face washing with verbal cuing.  Min assist with hair grooming secondary to crusted blood in hair. (Will shower tomorrow to remove)   Bathing     Upper body dressing     Lower body dressing     Toileting      Toilet transfer     Adaptive equipment training     Other:           Additional Treatment:  Reinforced call bell function and call before you fall.  Pt able to demonstrate call function using touch.    Assessment: Patient tolerated session fair.  Pt in better spirits today and compliant with all activities.   Pt continues to demonstrate fear and gravitational insecurity with transfers but better than yesterday.  Continue to recommend 24 hour care.    OT Plan: ADL bathing training with shower activity.      GOALS:   Multidisciplinary Problems       Occupational Therapy Goals          Problem: Occupational Therapy    Goal Priority Disciplines Outcome Interventions   Occupational Therapy Goal     OT, PT/OT Ongoing, Progressing    Description:   Patient will increase functional independence with ADLs by performing:    Feeding with Stand-by Assistance.  Grooming while seated at sink with Stand-by Assistance.  Toileting from bedside commode with Contact Guard Assistance for hygiene and clothing management.   Bathing from  edge of bed with Minimal Assistance.  Toilet transfer to bedside commode with Contact Guard Assistance.                         Communication with Treatment Team:     Discharge Recommendations:    Discharge Equipment Recommendations:  to be determined by next level of care (TBD)  Barriers to discharge:  Decreased caregiver support      At end of treatment, patient remained:  x Up in chair     In bed   x With alarm activated    Bed rails up   x Call bell in reach     Family/friends present    Restraints secured properly    In bathroom with CNA/RN notified    In gym with PT/PTA/tech   x Nurse aware    Other:         OT Date of Treatment: 01/11/24  OT Start Time: 0820  OT Stop Time: 0850  OT Total Time (min): 30 min    Billable Minutes:Self Care/Home Management 15  Therapeutic Activity 15      1/11/2024

## 2024-01-11 NOTE — ASSESSMENT & PLAN NOTE
PT/OT following along with patient   CT L-spine shows no acute fractures  Due to headache/nausea, we will repeat CT head

## 2024-01-11 NOTE — PROGRESS NOTES
[facility] - Medical Surgical Unit  [facility] Medicine  Progress Note    Patient Name: Cierra Main  MRN: [id number]  Patient Class: IP- Inpatient   Admission Date: 1/9/2024  Length of Stay: 2 days  Attending Physician: Rafiq Eller MD  Primary Care Provider: Rafiq Eller MD        Subjective:     Principal Problem:Weakness        HPI:  Chief Complaint   Patient presents with    Fall       Arrived via [facility], fall last night, fell out of wheelchair, forehead first impact to bed frame.  LOC unknown, on floor for 1 hour.       This patient is a 73-year-old female who arrived by ambulance after a fall last night, patient states that she fell out of her wheelchair falling onto her forehead, unknown LOC and states that she was on the floor for about an hour.  Patient states that her sister and her son take care for and that she has home health but she is home most of the day alone.  Patient has a moderate hematoma on her forehead.  Patient states she has been weak for the past week and has not really gotten out of bed much but she can get up and walk with help.  She is normally in her wheelchair.  Patient recently admitted to [facility] for extended rehab.  Upon being home, patient reports having falls.  She does have family that checks on her periodically during the daytime but does stay home alone.  Due to recent multiple falls, patient will be admitted for further treatment of her lower extremity weakness.    Overview/Hospital Course:  01/11/2024:  Notified by nursing staff this a.m. she did participate with OT earlier this a.m. but was unable to perform PT due to headache/nausea.  Patient reports BM earlier.  On rounds, she continues to report headache with nausea    Interval History:     Review of Systems   Constitutional: Negative.    Respiratory: Negative.     Cardiovascular: Negative.    Gastrointestinal:  Positive for nausea.   Neurological:  Positive for headaches.   Psychiatric/Behavioral:  Negative.       Objective:     Vital Signs (Most Recent):  Temp: 99.2 °F (37.3 °C) (01/11/24 1155)  Pulse: 61 (01/11/24 1155)  Resp: 18 (01/11/24 1155)  BP: (!) 145/66 (01/11/24 1155)  SpO2: (!) 91 % (01/11/24 1155) Vital Signs (24h Range):  Temp:  [98.1 °F (36.7 °C)-99.2 °F (37.3 °C)] 99.2 °F (37.3 °C)  Pulse:  [61-84] 61  Resp:  [16-20] 18  SpO2:  [90 %-98 %] 91 %  BP: (106-162)/(47-68) 145/66     Weight: 42.4 kg (93 lb 7.6 oz)  Body mass index is 16.56 kg/m².    Intake/Output Summary (Last 24 hours) at 1/11/2024 1242  Last data filed at 1/11/2024 0700  Gross per 24 hour   Intake 600 ml   Output --   Net 600 ml         Physical Exam  Constitutional:       Appearance: Normal appearance.   Cardiovascular:      Rate and Rhythm: Normal rate and regular rhythm.      Heart sounds: Normal heart sounds.   Pulmonary:      Effort: Pulmonary effort is normal.      Breath sounds: Normal breath sounds.   Abdominal:      General: Abdomen is flat. Bowel sounds are normal.      Palpations: Abdomen is soft.   Skin:     General: Skin is warm. Contusion on forehead  Neurological:      General: No focal deficit present.      Mental Status: She is alert and oriented to person, place, and time.      Cranial Nerves: No cranial nerve deficit.   Psychiatric:         Mood and Affect: Mood normal.         Behavior: Behavior normal.         Thought Content: Thought content normal.         Judgment: Judgment normal.             Significant Labs: All pertinent labs within the past 24 hours have been reviewed.    Significant Imaging: I have reviewed all pertinent imaging results/findings within the past 24 hours.    Assessment/Plan:      * Weakness  PT/OT following along with patient   CT L-spine shows no acute fractures  Due to headache/nausea, we will repeat CT head      Moderate episode of recurrent major depressive disorder  Continue Zoloft 100 mg q.day  Consult psychiatry to evaluate while inpatient      Hypothyroidism  Continue home  medication  TSH 11/14/2023 within normal limits      Essential hypertension  Chronic, controlled. Latest blood pressure and vitals reviewed-     Temp:  [98.1 °F (36.7 °C)-99.2 °F (37.3 °C)]   Pulse:  [61-84]   Resp:  [16-20]   BP: (106-162)/(47-68)   SpO2:  [90 %-98 %] .   Home meds for hypertension were reviewed and noted below.   Hypertension Medications               hydrALAZINE (APRESOLINE) 100 MG tablet Take 1 tablet (100 mg total) by mouth 3 (three) times daily.    lisinopriL (PRINIVIL,ZESTRIL) 20 MG tablet Take 1 tablet (20 mg total) by mouth 2 (two) times daily.    metoprolol succinate (TOPROL-XL) 50 MG 24 hr tablet Take 25 mg by mouth once daily.    nitroGLYCERIN 0.4 MG/DOSE TL SPRY (NITROLINGUAL) 400 mcg/spray spray nitroglycerin 400 mcg/spray translingual aerosol   at onset of chest pain may take up to three sprays every 5min during a 15 min period            While in the hospital, will manage blood pressure as follows; Continue home antihypertensive regimen    Will utilize p.r.n. blood pressure medication only if patient's blood pressure greater than 180/110 and she develops symptoms such as worsening chest pain or shortness of breath.      VTE Risk Mitigation (From admission, onward)           Ordered     IP VTE LOW RISK PATIENT  Once         01/09/24 9352                    Discharge Planning   SUMMER:      Code Status: Full Code   Is the patient medically ready for discharge?:     Reason for patient still in hospital (select all that apply): Patient trending condition  Discharge Plan A: (P) Skilled Nursing Facility                  Rafiq Eller MD  Department of Hospital Medicine   [facility] - Medical Surgical Unit

## 2024-01-11 NOTE — PT/OT/SLP EVAL
"[facility]  Speech Language Pathology   Cognitive Communication Evaluation    Patient Name:  Cierra Main   MRN:  [id number]  Admitting Diagnosis: Weakness    Past Medical History:   Diagnosis Date    Acute MI     Anxiety     Cardiac disease     COPD (chronic obstructive pulmonary disease)     Coronary artery disease     Hyperlipidemia     Hypertension     Hypothyroidism     Other specified noninfective gastroenteritis and colitis     Unspecified macular degeneration        Past Surgical History:   Procedure Laterality Date    CORONARY ANGIOGRAPHY N/A 2/1/2021    Procedure: ANGIOGRAM, CORONARY ARTERY;  Surgeon: Devon Medeiros MD;  Location: [facility] CATH LAB;  Service: Cardiology;  Laterality: N/A;    CORONARY ANGIOPLASTY WITH STENT PLACEMENT      LEFT HEART CATHETERIZATION Left 2/1/2021    Procedure: CATHETERIZATION, HEART, LEFT;  Surgeon: Devon Medeiros MD;  Location: [facility] CATH LAB;  Service: Cardiology;  Laterality: Left;       Social History: Patient lives alone. Pt reports her son and sister check on her daily and assist with ADLs and IADLs.    Occupation/hobbies/homemaking: Pt reports, "I eat, I try to clean, I fall, I try to sleep". Pt unable to perform any hobbies 2/2 legally blind    Respiratory Status: room air     Recommendations:                 General Recommendations:  Cognitive-linguistic therapy  Diet recommendations:  Regular Diet - IDDSI Level 7, Thin liquids - IDDSI Level 0   Aspiration Precautions:  n/a    General Precautions: Standard, fall  Communication strategies:  repeat as needed    Subjective     Patient reported: Pt c/o dizziness upon SLP and PT entry. PT check BP and Pt began to vomit. Nursing notified and same to care for Pt. Pt agreed to con't with cognitive evaluation. Pt reported she is weak and "I fall a lot." Pt reported her son and sister might report she has had cognitive changes, but the Pt does not recognize any.   Patient goals: "get stronger"  Behavioral " "observations:  Pt is very pleasant and demonstrated good effort throughout.    Pain/Comfort:   No pain reported. Pt reported nausea and dizziness at times, Nsg was notified and responded      Objective:     Orientation: O x all information except year ("34 or 24")  General Attention: WFL    LANGUAGE:   Receptive Language:  Simple y/n Questions: 100%  Complex y/n Questions: 100%  Paragraph Comprehension: 100% complex     Expressive Language:   Automatic Speech: 100%  Repetition: 100%  Naming items: unable to complete 2/2 reduced vision  Item Function: 100%  WH questions: 100%    COGNITION:  Memory Impairment Screen (MIS): 5/8 (<=4: possible cognitive deficit likely)  Recall/ Repetition: 100%  Working Memory/backwards span:  100%  Convergent namin%    Verbal Problem Solvin% min cues for specificity  Insight and Awareness: good insight and awareness  Pragmatics: WNL  Further assessment warranted      Assessment:   Cierra Main is a 73 y.o. female with an SLP diagnosis of possible Cognitive-Linguistic Impairment.  Staff and therapists reported confusion in function.    Goals:   Multidisciplinary Problems       SLP Goals          Problem: SLP    Goal Priority Disciplines Outcome   SLP Goal     SLP Ongoing, Progressing   Description: LTGs:  1. Pt will demonstrate recall and problems solving for adequate safety.     STGs:  1. Pt will participate in further assessment of cognitive skills.                        Plan:     Patient to be seen:   (3-5x/week)   Plan of Care expires:  24  Plan of Care reviewed with:  patient   SLP Follow-Up:  Yes       Discharge recommendations:    Nsg Home or 24 hour supervision at home  Barriers to Discharge:  Level of Skilled Assistance Needed      Time Tracking:   SLP Treatment Date:   24  Speech Start Time:  1015  Speech Stop Time:  1046     Speech Total Time (min):  31 min    Billable Minutes: Maliaal 31min / 1 unit       Mabel Guillory MA, CCC-SLP  2024    "

## 2024-01-11 NOTE — PT/OT/SLP PROGRESS
Name: Cierra Main    : 1950 (73 y.o.)  MRN: [id number]           Patient Not Seen      Attempted to see pt for PT treatment this AM.  Pt c/o dizziness at rest and while BP was being assessed, pt began to vomit into the trash can.  Nsg was notified and CNA arrived to assist. /88.  Mobility held due to vomiting.

## 2024-01-11 NOTE — PROGRESS NOTES
LPN Care Coordination Encounter Details:    Outreach Performed: [facility] will reach out in 1 week if no TCC outreach done.       Recent ED and/or IP Discharges:    Last PCP Visit Date: 12/5/2023          [x]  Reviewed recent ED & Inpatient Discharges to ensure appropriate           follow up appointments are scheduled         Additional Notes:    01/09/2024 ED discharge         Provider Team Continuity:        [x]  Reviewed Primary Care Provider Visits, Annual Wellness Visit, and Future          Appointments to ensure appointments have been scheduled and/or           completed        Additional Notes:    No future appts in Appt desk noted         [facility] Portal Status:         [x]  Reviewed [facility] Portal Status offered / enrolled if applicable        Additional Notes:     [employer] Outcomes: Pt is enrolled & active  and last log in 01/10/2024           Health Maintenance Screening(s) Due:      Additional Pop Health Notes:                  Updates Requested / Reviewed:        Updated Care Coordination Note, Care Everywhere, Care Team Updated, and Other    updated         Health Maintenance Topics Overdue:      VBHM Score: 2     Colon Cancer Screening  Osteoporosis Screening                Health Maintenance Topic(s) Outreach Outcomes & Actions Taken:                                              Chronic Disease Management:     Diabetes Measures        Lab Results   Component Value Date    HGBA1C 6.0 11/14/2023           [x]  Reviewed chart for active Diabetes diagnosis     []  Scheduled necessary follow up appointments if needed         Additional Notes:             Hypertension Measures        BP Readings from Last 1 Encounters:   01/11/24 (!) 160/66           [x]  Reviewed chart for active Hypertension diagnosis     []  Reviewed & documented Home BP Cuff     []  Documented a Remote BP if needed & applicable     []  Scheduled necessary follow up appointments with Primary Care  if needed         Additional Notes:             Social Determinants of Health         [x]  Reviewed, completed, and/or updated the following sections:                  Food Insecurity, Transportation Needs, Financial Resource Strain,                 Tobacco Use          Care Management, Digital Medicine, and/or Education Referrals    OPCM Risk Score: 86         Next Steps - Referral Actions: no referrals placed        Additional Notes:

## 2024-01-11 NOTE — PLAN OF CARE
Problem: Adult Inpatient Plan of Care  Goal: Plan of Care Review  Outcome: Ongoing, Progressing  Goal: Patient-Specific Goal (Individualized)  Outcome: Ongoing, Progressing  Goal: Absence of Hospital-Acquired Illness or Injury  Outcome: Ongoing, Progressing  Goal: Optimal Comfort and Wellbeing  Outcome: Ongoing, Progressing  Goal: Readiness for Transition of Care  Outcome: Ongoing, Progressing     Problem: Impaired Wound Healing  Goal: Optimal Wound Healing  Outcome: Ongoing, Progressing     Problem: Fall Injury Risk  Goal: Absence of Fall and Fall-Related Injury  Outcome: Ongoing, Progressing     Problem: Behavioral Health Comorbidity  Goal: Maintenance of Behavioral Health Symptom Control  Outcome: Ongoing, Progressing     Problem: COPD (Chronic Obstructive Pulmonary Disease) Comorbidity  Goal: Maintenance of COPD Symptom Control  Outcome: Ongoing, Progressing     Problem: Hypertension Comorbidity  Goal: Blood Pressure in Desired Range  Outcome: Ongoing, Progressing     Problem: Pain Acute  Goal: Acceptable Pain Control and Functional Ability  Outcome: Ongoing, Progressing     Problem: Gas Exchange Impaired  Goal: Optimal Gas Exchange  Outcome: Ongoing, Progressing     Problem: Mobility Impairment  Goal: Optimal Mobility  Outcome: Ongoing, Progressing     Problem: Anxiety  Goal: Anxiety Reduction or Resolution  Outcome: Ongoing, Progressing     Problem: Skin Injury Risk Increased  Goal: Skin Health and Integrity  Outcome: Ongoing, Progressing

## 2024-01-11 NOTE — NURSING
Nurses Note -- 4 Eyes      1/11/2024   12:48 PM      Skin assessed during: Admit      [] No Altered Skin Integrity Present    []Prevention Measures Documented      [x] Yes- Altered Skin Integrity Present or Discovered   [x] LDA Added if Not in Epic (Describe Wound)   [x] New Altered Skin Integrity was Present on Admit and Documented in LDA   [x] Wound Image Taken    Wound Care Consulted? No    Attending Nurse:  SKYE Darden    Second RN/Staff Member:  SKYE Hernandez

## 2024-01-11 NOTE — ASSESSMENT & PLAN NOTE
Chronic, controlled. Latest blood pressure and vitals reviewed-     Temp:  [98.1 °F (36.7 °C)-99.2 °F (37.3 °C)]   Pulse:  [61-84]   Resp:  [16-20]   BP: (106-162)/(47-68)   SpO2:  [90 %-98 %] .   Home meds for hypertension were reviewed and noted below.   Hypertension Medications               hydrALAZINE (APRESOLINE) 100 MG tablet Take 1 tablet (100 mg total) by mouth 3 (three) times daily.    lisinopriL (PRINIVIL,ZESTRIL) 20 MG tablet Take 1 tablet (20 mg total) by mouth 2 (two) times daily.    metoprolol succinate (TOPROL-XL) 50 MG 24 hr tablet Take 25 mg by mouth once daily.    nitroGLYCERIN 0.4 MG/DOSE TL SPRY (NITROLINGUAL) 400 mcg/spray spray nitroglycerin 400 mcg/spray translingual aerosol   at onset of chest pain may take up to three sprays every 5min during a 15 min period            While in the hospital, will manage blood pressure as follows; Continue home antihypertensive regimen    Will utilize p.r.n. blood pressure medication only if patient's blood pressure greater than 180/110 and she develops symptoms such as worsening chest pain or shortness of breath.

## 2024-01-11 NOTE — PLAN OF CARE
POC established.     Problem: SLP  Goal: SLP Goal  Description: LTGs:  1. Pt will demonstrate recall and problems solving for adequate safety.     STGs:  1. Pt will participate in further assessment of cognitive skills.   Outcome: Ongoing, Progressing

## 2024-01-11 NOTE — SUBJECTIVE & OBJECTIVE
Patient History               Medical as of 1/11/2024       Past Medical History       Diagnosis Date Comments Source    Acute MI -- -- Provider    Anxiety -- -- Provider    Cardiac disease -- -- Provider    COPD (chronic obstructive pulmonary disease) -- -- Provider    Coronary artery disease -- -- Provider    Depression -- -- Provider    Hyperlipidemia -- -- Provider    Hypertension -- -- Provider    Hypothyroidism -- -- Provider    Other specified noninfective gastroenteritis and colitis -- -- Provider    Unspecified macular degeneration -- -- Provider                          Surgical as of 1/11/2024       Past Surgical History       Procedure Laterality Date Comments Source    CORONARY ANGIOPLASTY WITH STENT PLACEMENT -- -- -- Provider    LEFT HEART CATHETERIZATION Left 2/1/2021 Procedure: CATHETERIZATION, HEART, LEFT;  Surgeon: Devon Medeiros MD;  Location: [facility] CATH LAB;  Service: Cardiology;  Laterality: Left; Provider    CORONARY ANGIOGRAPHY N/A 2/1/2021 Procedure: ANGIOGRAM, CORONARY ARTERY;  Surgeon: Devon Medeiros MD;  Location: [facility] CATH LAB;  Service: Cardiology;  Laterality: N/A; Provider                          Family as of 1/11/2024       Problem Relation Name Age of Onset Comments Source    Diabetes Mother -- -- -- Provider    Heart disease Mother -- -- -- Provider    Hypertension Father -- -- -- Provider    Heart disease Father -- -- -- Provider                  Tobacco Use as of 1/11/2024       Smoking Status Smoking Start Date Last Attempt to Quit Current Packs/Day Average Packs/Day    Every Day -- -- 0.5 0.5 packs/day for 55.0 years (27.5 ttl pk-yrs)   Pack Year History   Packs/Day From To Years    0.5 -- -- 55.0      Smokeless Status Smokeless Type Smokeless Quit Date    Never -- --      Source    Provider                  Alcohol Use as of 1/11/2024       Alcohol Use Drinks/Week Alcohol/Week Comments Source    Never   -- -- Provider                  Drug Use as of 1/11/2024       Drug  Use Types Frequency Comments Source    Never -- -- -- Provider                  Sexual Activity as of 1/11/2024       Sexually Active Birth Control Partners Comments Source    Not Currently -- -- -- Provider                  Activities of Daily Living as of 1/11/2024    None               Social Documentation as of 1/11/2024    None               Occupational as of 1/11/2024    None               Socioeconomic as of 1/11/2024       Marital Status Spouse Name Number of Children Years Education Education Level Preferred Language Ethnicity Race Source     -- -- -- -- English Not  or /a White --                  Pertinent History       Question Response Comments    Lives with -- --    Place in Birth Order -- --    Lives in -- --    Number of Siblings -- --    Raised by -- --    Legal Involvement -- --    Childhood Trauma -- --    Criminal History of -- --    Financial Status -- --    Highest Level of Education -- --    Does patient have access to a firearm? -- --     Service -- --    Primary Leisure Activity -- --    Spirituality -- --          Past Medical History:   Diagnosis Date    Acute MI     Anxiety     Cardiac disease     COPD (chronic obstructive pulmonary disease)     Coronary artery disease     Depression     Hyperlipidemia     Hypertension     Hypothyroidism     Other specified noninfective gastroenteritis and colitis     Unspecified macular degeneration      Past Surgical History:   Procedure Laterality Date    CORONARY ANGIOGRAPHY N/A 2/1/2021    Procedure: ANGIOGRAM, CORONARY ARTERY;  Surgeon: Devon Medeiros MD;  Location: [facility] CATH LAB;  Service: Cardiology;  Laterality: N/A;    CORONARY ANGIOPLASTY WITH STENT PLACEMENT      LEFT HEART CATHETERIZATION Left 2/1/2021    Procedure: CATHETERIZATION, HEART, LEFT;  Surgeon: Devon Medeiros MD;  Location: [facility] CATH LAB;  Service: Cardiology;  Laterality: Left;     Family History       Problem Relation (Age of Onset)    Diabetes  Mother    Heart disease Mother, Father    Hypertension Father          Tobacco Use    Smoking status: Every Day     Current packs/day: 0.50     Average packs/day: 0.5 packs/day for 55.0 years (27.5 ttl pk-yrs)     Types: Cigarettes    Smokeless tobacco: Never   Substance and Sexual Activity    Alcohol use: Never    Drug use: Never    Sexual activity: Not Currently     Review of patient's allergies indicates:   Allergen Reactions    Amlodipine      Leg weakness    Losartan        No current facility-administered medications on file prior to encounter.     Current Outpatient Medications on File Prior to Encounter   Medication Sig    amiodarone (PACERONE) 200 MG Tab Take 200 mg by mouth 2 (two) times daily.    atorvastatin (LIPITOR) 10 MG tablet Take 1 tablet (10 mg total) by mouth every evening.    hydrALAZINE (APRESOLINE) 100 MG tablet Take 1 tablet (100 mg total) by mouth 3 (three) times daily.    levothyroxine (SYNTHROID) 25 MCG tablet Take 1 tablet (25 mcg total) by mouth before breakfast.    lisinopriL (PRINIVIL,ZESTRIL) 20 MG tablet Take 1 tablet (20 mg total) by mouth 2 (two) times daily.    metoprolol succinate (TOPROL-XL) 50 MG 24 hr tablet Take 25 mg by mouth once daily.    montelukast (SINGULAIR) 10 mg tablet Take 10 mg by mouth.    sertraline (ZOLOFT) 50 MG tablet Take 1 tablet (50 mg total) by mouth once daily.    traZODone (DESYREL) 50 MG tablet Take 1 tablet (50 mg total) by mouth every evening.    docusate sodium (COLACE) 100 MG capsule Take 100 mg by mouth 2 (two) times daily as needed.    melatonin 3 mg Cap Take 1 tablet by mouth every evening.    nitroGLYCERIN 0.4 MG/DOSE TL SPRY (NITROLINGUAL) 400 mcg/spray spray nitroglycerin 400 mcg/spray translingual aerosol   at onset of chest pain may take up to three sprays every 5min during a 15 min period    polyethylene glycol (GLYCOLAX) 17 gram PwPk Take 17 g by mouth 2 (two) times a day. (Patient taking differently: Take 17 g by mouth 2 (two) times daily  "as needed for Constipation.)     Psychotherapeutics (From admission, onward)      Start     Stop Route Frequency Ordered    01/11/24 0900  sertraline tablet 100 mg         -- Oral Daily 01/10/24 1339          Review of Systems  Strengths and Liabilities: Strength: Patient is expressive/articulate., Strength: Patient has positive support network., Liability: Patient has poor health.    Objective:     Vital Signs (Most Recent):  Temp: 98.5 °F (36.9 °C) (01/11/24 1550)  Pulse: 64 (01/11/24 1550)  Resp: (P) 18 (01/11/24 1200)  BP: (!) (P) 169/66 (01/11/24 1601)  SpO2: (!) 83 % (01/11/24 1550) Vital Signs (24h Range):  Temp:  [98.5 °F (36.9 °C)-99.2 °F (37.3 °C)] 98.5 °F (36.9 °C)  Pulse:  [61-84] 64  Resp:  [16-20] (P) 18  SpO2:  [83 %-98 %] 83 %  BP: (122-189)/(58-73) (P) 169/66     Height: 5' 3" (160 cm)  Weight: 42.4 kg (93 lb 7.6 oz)  Body mass index is 16.56 kg/m².      Intake/Output Summary (Last 24 hours) at 1/11/2024 1718  Last data filed at 1/11/2024 0700  Gross per 24 hour   Intake 360 ml   Output --   Net 360 ml          Physical Exam    Mental status examination:  73-year-old white female who appears to be somewhat emotional at this time.  Whose speech was with good articulation and execution.  His thought processes were non spontaneous but when produced could be tracked.  His thought content demonstrated no clear evidence of any auditory or visual hallucinations.  She was making no active statements to harm self.  Patient was making no active statements to harm others.  She adamantly states no current plans to self injure in her denies appeared to be tearful consistent.  She did not show any delusions but she does ruminate about current life situation physical health.    On cognitive evaluation she is oriented to situation setting month and year but not to date.  She can perform basic calculations.  She could recall the year.  She did not know the president however.  She knew she was in [place] she knew she " was with a [facility].  She was able to call my name without difficulties during the course of the assessment.  Short-term memory appeared to be relatively intact based upon conversation.        Significant Labs: Last 72 Hours:   Recent Lab Results         01/09/24  1255   01/09/24  1227        Influenza B, Molecular   Negative       Albumin/Globulin Ratio 0.7         Albumin 2.6         ALP 90         ALT 74         AST 74         Baso #   0.05       Basophil %   0.6       BILIRUBIN TOTAL 0.5         BUN 13.0         Calcium 9.0         Chloride 105         CO2 26         ID NOW COVID-19, ([facility])   Negative       Creatinine 0.70         eGFR >60         Eos #   0.05       Eosinophil %   0.6       Globulin, Total 3.5         Glucose 109         Hematocrit   38.8       Hemoglobin   12.7       Immature Grans (Abs)   0.05       Immature Granulocytes   0.6       Influenza A   Negative       Lymph #   0.88       LYMPH %   11.2       MCH   28.9       MCHC   32.7       MCV   88.2       Mono #   0.86       Mono %   10.9       MPV   10.0       Neut #   5.99       Neut %   76.1       nRBC   0.0       Platelet Count   414       Potassium 3.6         PROTEIN TOTAL 6.1         RBC   4.40       RDW   14.1       Sodium 141         WBC   7.88               Significant Imaging:  Patient without significant change on radiology studies.

## 2024-01-11 NOTE — CONSULTS
[facility] - Medical Surgical Unit  Psychiatry  Consult Note    Patient Name: Cierra Main  MRN: [id number]   Code Status: Full Code  Admission Date: 2024  Hospital Length of Stay: 2 days  Attending Physician: Rafiq Eller MD  Primary Care Provider: Rafiq Eller MD    Current Legal Status: Formal Voluntary Admission (FVA)    Patient information was obtained from patient and ER records.   Consults  Subjective:     Principal Problem:Weakness    Chief Complaint:  I fell at home     HPI: 73-year-old white female who at this time was evaluated at request of attending physician over concerns of need for placement as well as evaluation for mental status and depression.      Patient has a history of CVA x2, generalized weakness, recent fall and she had a head strike with hematoma to her head, and issues of chronic depression.    Patient this time states she has been struggling at home with issues surrounding self-care.  Patient this time describes issues in which she has struggled with remaining independent in her home.  She states that this time she has support of her sister as well as her son.  Patient reports sign comes daily.  And sister does help prepare meals as son.  Patient does have attending care with her for part of the day.    Patient is time states the most recent fall was a result of her trying to transition from walker to wheelchair.  Patient was statements that time she fell out wheelchair and struck her head.      Patient reports she has struggled with depression particularly since the death of her  some 20+ years ago.  He apparently  when he was 56 years of age from a heart attack.  Patient has struggled with the ongoing issues in terms of depression and does have a history of previous questionable gesture or attempt to self injure with the past year.  Patient adamantly denies any current plans to self injure.  She does not report any plans at this time.  She continues  to state that she was no intention to do so this will be God's will when she was taken from the earth.    Patient does appear to have evidence of dysphoria has been most recently treated with trials of Zoloft as she reports trials of Zoloft been efficacious.    Orientation is she was surprisingly fairly well intact for history of having prior CVA.  Patient this time does know the year.  She does know the month.  She did not know the actual date however.  She could recall location.  She did recall her birth date as well as her age.  She was able to attain my name.  The course of the assessment.    Patient this time was again adamantly states that this time that she was just been overwhelmed current life situation.    Patient states she was having some difficulty sleeping here in the hospital but states she sleeps well at home.  She reports overall she feels as if the Zoloft has been useful in terms of helping mitigate difficulty with the anxiety as well as mood.    Patient did not appear to be imminent risk to self injure at this time.    Hospital Course: No notes on file         Patient History               Medical as of 1/11/2024       Past Medical History       Diagnosis Date Comments Source    Acute MI -- -- Provider    Anxiety -- -- Provider    Cardiac disease -- -- Provider    COPD (chronic obstructive pulmonary disease) -- -- Provider    Coronary artery disease -- -- Provider    Depression -- -- Provider    Hyperlipidemia -- -- Provider    Hypertension -- -- Provider    Hypothyroidism -- -- Provider    Other specified noninfective gastroenteritis and colitis -- -- Provider    Unspecified macular degeneration -- -- Provider                          Surgical as of 1/11/2024       Past Surgical History       Procedure Laterality Date Comments Source    CORONARY ANGIOPLASTY WITH STENT PLACEMENT -- -- -- Provider    LEFT HEART CATHETERIZATION Left 2/1/2021 Procedure: CATHETERIZATION, HEART, LEFT;  Surgeon: Devon  BRO Medeiros MD;  Location: [facility] CATH LAB;  Service: Cardiology;  Laterality: Left; Provider    CORONARY ANGIOGRAPHY N/A 2/1/2021 Procedure: ANGIOGRAM, CORONARY ARTERY;  Surgeon: Devon Medeiros MD;  Location: [facility] CATH LAB;  Service: Cardiology;  Laterality: N/A; Provider                          Family as of 1/11/2024       Problem Relation Name Age of Onset Comments Source    Diabetes Mother -- -- -- Provider    Heart disease Mother -- -- -- Provider    Hypertension Father -- -- -- Provider    Heart disease Father -- -- -- Provider                  Tobacco Use as of 1/11/2024       Smoking Status Smoking Start Date Last Attempt to Quit Current Packs/Day Average Packs/Day    Every Day -- -- 0.5 0.5 packs/day for 55.0 years (27.5 ttl pk-yrs)   Pack Year History   Packs/Day From To Years    0.5 -- -- 55.0      Smokeless Status Smokeless Type Smokeless Quit Date    Never -- --      Source    Provider                  Alcohol Use as of 1/11/2024       Alcohol Use Drinks/Week Alcohol/Week Comments Source    Never   -- -- Provider                  Drug Use as of 1/11/2024       Drug Use Types Frequency Comments Source    Never -- -- -- Provider                  Sexual Activity as of 1/11/2024       Sexually Active Birth Control Partners Comments Source    Not Currently -- -- -- Provider                  Activities of Daily Living as of 1/11/2024    None               Social Documentation as of 1/11/2024    None               Occupational as of 1/11/2024    None               Socioeconomic as of 1/11/2024       Marital Status Spouse Name Number of Children Years Education Education Level Preferred Language Ethnicity Race Source     -- -- -- -- English Not  or /a White --                  Pertinent History       Question Response Comments    Lives with -- --    Place in Birth Order -- --    Lives in -- --    Number of Siblings -- --    Raised by -- --    Legal Involvement -- --    Childhood Trauma -- --     Criminal History of -- --    Financial Status -- --    Highest Level of Education -- --    Does patient have access to a firearm? -- --     Service -- --    Primary Leisure Activity -- --    Spirituality -- --          Past Medical History:   Diagnosis Date    Acute MI     Anxiety     Cardiac disease     COPD (chronic obstructive pulmonary disease)     Coronary artery disease     Depression     Hyperlipidemia     Hypertension     Hypothyroidism     Other specified noninfective gastroenteritis and colitis     Unspecified macular degeneration      Past Surgical History:   Procedure Laterality Date    CORONARY ANGIOGRAPHY N/A 2/1/2021    Procedure: ANGIOGRAM, CORONARY ARTERY;  Surgeon: Devon Medeiros MD;  Location: [facility] CATH LAB;  Service: Cardiology;  Laterality: N/A;    CORONARY ANGIOPLASTY WITH STENT PLACEMENT      LEFT HEART CATHETERIZATION Left 2/1/2021    Procedure: CATHETERIZATION, HEART, LEFT;  Surgeon: Devon Medeiros MD;  Location: [facility] CATH LAB;  Service: Cardiology;  Laterality: Left;     Family History       Problem Relation (Age of Onset)    Diabetes Mother    Heart disease Mother, Father    Hypertension Father          Tobacco Use    Smoking status: Every Day     Current packs/day: 0.50     Average packs/day: 0.5 packs/day for 55.0 years (27.5 ttl pk-yrs)     Types: Cigarettes    Smokeless tobacco: Never   Substance and Sexual Activity    Alcohol use: Never    Drug use: Never    Sexual activity: Not Currently     Review of patient's allergies indicates:   Allergen Reactions    Amlodipine      Leg weakness    Losartan        No current facility-administered medications on file prior to encounter.     Current Outpatient Medications on File Prior to Encounter   Medication Sig    amiodarone (PACERONE) 200 MG Tab Take 200 mg by mouth 2 (two) times daily.    atorvastatin (LIPITOR) 10 MG tablet Take 1 tablet (10 mg total) by mouth every evening.    hydrALAZINE (APRESOLINE) 100 MG tablet Take 1  "tablet (100 mg total) by mouth 3 (three) times daily.    levothyroxine (SYNTHROID) 25 MCG tablet Take 1 tablet (25 mcg total) by mouth before breakfast.    lisinopriL (PRINIVIL,ZESTRIL) 20 MG tablet Take 1 tablet (20 mg total) by mouth 2 (two) times daily.    metoprolol succinate (TOPROL-XL) 50 MG 24 hr tablet Take 25 mg by mouth once daily.    montelukast (SINGULAIR) 10 mg tablet Take 10 mg by mouth.    sertraline (ZOLOFT) 50 MG tablet Take 1 tablet (50 mg total) by mouth once daily.    traZODone (DESYREL) 50 MG tablet Take 1 tablet (50 mg total) by mouth every evening.    docusate sodium (COLACE) 100 MG capsule Take 100 mg by mouth 2 (two) times daily as needed.    melatonin 3 mg Cap Take 1 tablet by mouth every evening.    nitroGLYCERIN 0.4 MG/DOSE TL SPRY (NITROLINGUAL) 400 mcg/spray spray nitroglycerin 400 mcg/spray translingual aerosol   at onset of chest pain may take up to three sprays every 5min during a 15 min period    polyethylene glycol (GLYCOLAX) 17 gram PwPk Take 17 g by mouth 2 (two) times a day. (Patient taking differently: Take 17 g by mouth 2 (two) times daily as needed for Constipation.)     Psychotherapeutics (From admission, onward)      Start     Stop Route Frequency Ordered    01/11/24 0900  sertraline tablet 100 mg         -- Oral Daily 01/10/24 1339          Review of Systems  Strengths and Liabilities: Strength: Patient is expressive/articulate., Strength: Patient has positive support network., Liability: Patient has poor health.    Objective:     Vital Signs (Most Recent):  Temp: 98.5 °F (36.9 °C) (01/11/24 1550)  Pulse: 64 (01/11/24 1550)  Resp: (P) 18 (01/11/24 1200)  BP: (!) (P) 169/66 (01/11/24 1601)  SpO2: (!) 83 % (01/11/24 1550) Vital Signs (24h Range):  Temp:  [98.5 °F (36.9 °C)-99.2 °F (37.3 °C)] 98.5 °F (36.9 °C)  Pulse:  [61-84] 64  Resp:  [16-20] (P) 18  SpO2:  [83 %-98 %] 83 %  BP: (122-189)/(58-73) (P) 169/66     Height: 5' 3" (160 cm)  Weight: 42.4 kg (93 lb 7.6 oz)  Body " mass index is 16.56 kg/m².      Intake/Output Summary (Last 24 hours) at 1/11/2024 1718  Last data filed at 1/11/2024 0700  Gross per 24 hour   Intake 360 ml   Output --   Net 360 ml          Physical Exam    Mental status examination:  73-year-old white female who appears to be somewhat emotional at this time.  Whose speech was with good articulation and execution.  His thought processes were non spontaneous but when produced could be tracked.  His thought content demonstrated no clear evidence of any auditory or visual hallucinations.  She was making no active statements to harm self.  Patient was making no active statements to harm others.  She adamantly states no current plans to self injure in her denies appeared to be tearful consistent.  She did not show any delusions but she does ruminate about current life situation physical health.    On cognitive evaluation she is oriented to situation setting month and year but not to date.  She can perform basic calculations.  She could recall the year.  She did not know the president however.  She knew she was in [place] she knew she was with a [facility].  She was able to call my name without difficulties during the course of the assessment.  Short-term memory appeared to be relatively intact based upon conversation.        Significant Labs: Last 72 Hours:   Recent Lab Results         01/09/24  1255   01/09/24  1227        Influenza B, Molecular   Negative       Albumin/Globulin Ratio 0.7         Albumin 2.6         ALP 90         ALT 74         AST 74         Baso #   0.05       Basophil %   0.6       BILIRUBIN TOTAL 0.5         BUN 13.0         Calcium 9.0         Chloride 105         CO2 26         ID NOW COVID-19, ([facility])   Negative       Creatinine 0.70         eGFR >60         Eos #   0.05       Eosinophil %   0.6       Globulin, Total 3.5         Glucose 109         Hematocrit   38.8       Hemoglobin   12.7       Immature Grans (Abs)   0.05       Immature  Granulocytes   0.6       Influenza A   Negative       Lymph #   0.88       LYMPH %   11.2       MCH   28.9       MCHC   32.7       MCV   88.2       Mono #   0.86       Mono %   10.9       MPV   10.0       Neut #   5.99       Neut %   76.1       nRBC   0.0       Platelet Count   414       Potassium 3.6         PROTEIN TOTAL 6.1         RBC   4.40       RDW   14.1       Sodium 141         WBC   7.88               Significant Imaging:  Patient without significant change on radiology studies.  Assessment/Plan:     Moderate episode of recurrent major depressive disorder  Patient with a history of ongoing difficulty with depression.  Patient has chronicity which has further exacerbated by underlying physical condition.  Patient has been on long-term management with trials of Zoloft and certainly continuation of Zoloft with a appeared to be appropriate.  Higher level of services for psychiatric care does not appear to be appropriate this time.         Total Time:  45 minutes     Arron Haney MD   Psychiatry  [facility] - Medical Surgical Unit

## 2024-01-11 NOTE — ASSESSMENT & PLAN NOTE
Patient with a history of ongoing difficulty with depression.  Patient has chronicity which has further exacerbated by underlying physical condition.  Patient has been on long-term management with trials of Zoloft and certainly continuation of Zoloft with a appeared to be appropriate.  Higher level of services for psychiatric care does not appear to be appropriate this time.

## 2024-01-11 NOTE — HPI
73-year-old white female who at this time was evaluated at request of attending physician over concerns of need for placement as well as evaluation for mental status and depression.      Patient has a history of CVA x2, generalized weakness, recent fall and she had a head strike with hematoma to her head, and issues of chronic depression.    Patient this time states she has been struggling at home with issues surrounding self-care.  Patient this time describes issues in which she has struggled with remaining independent in her home.  She states that this time she has support of her sister as well as her son.  Patient reports sign comes daily.  And sister does help prepare meals as son.  Patient does have attending care with her for part of the day.    Patient is time states the most recent fall was a result of her trying to transition from walker to wheelchair.  Patient was statements that time she fell out wheelchair and struck her head.      Patient reports she has struggled with depression particularly since the death of her  some 20+ years ago.  He apparently  when he was 55 years of age from a heart attack.  Patient has struggled with the ongoing issues in terms of depression and does have a history of previous questionable gesture or attempt to self injure with the past year.  Patient adamantly denies any current plans to self injure.  She does not report any plans at this time.  She continues to state that she was no intention to do so this will be God's will when she was taken from the earth.    Patient does appear to have evidence of dysphoria has been most recently treated with trials of Zoloft as she reports trials of Zoloft been efficacious.    Orientation is she was surprisingly fairly well intact for history of having prior CVA.  Patient this time does know the year.  She does know the month.  She did not know the actual date however.  She could recall location.  She did recall her birth  date as well as her age.  She was able to attain my name.  The course of the assessment.    Patient this time was again adamantly states that this time that she was just been overwhelmed current life situation.    Patient states she was having some difficulty sleeping here in the hospital but states she sleeps well at home.  She reports overall she feels as if the Zoloft has been useful in terms of helping mitigate difficulty with the anxiety as well as mood.    Patient did not appear to be imminent risk to self injure at this time.

## 2024-01-11 NOTE — HOSPITAL COURSE
01/11/2024:  Notified by nursing staff this a.m. she did participate with OT earlier this a.m. but was unable to perform PT due to headache/nausea.  Patient reports BM earlier.  On rounds, she continues to report headache with nausea    01/12/2024:  Patient sitting in bedside recliner performing neb treatment.  She did participate with PT/OT earlier today.   and I discussed with patient/sister at bedside about [facility] placement on discharge    1/13/24-No new issues today.  Waiting for placement.  Will continue with therapy.    1/14/24-No issues at this time.  Will continue with therapy until she is able to be placed.    1/15/24-Patient is resting in the bed in no distress.  Family at bedside has questions about her lab work from several days ago mainly the LFTS which are only slightly elevated.  She was concerned they were causing her discoloration of her skin.  The area in question is from trauma from previous fall and is just bruising and not jaundice.  Also re enforced the need for the patient to help more and be willing to get better.  She refused therapy this morning.    01/16/2024:  Patient resting in recliner, son at bedside.  Patient will be discharged home with [facility].  Awaiting further approval for placement at [facility].  Patient will be discharged home with son.  Patient will have someone with her at home 24 hours/day

## 2024-01-11 NOTE — PLAN OF CARE
Problem: Adult Inpatient Plan of Care  Goal: Plan of Care Review  Outcome: Ongoing, Progressing  Goal: Patient-Specific Goal (Individualized)  Outcome: Ongoing, Progressing  Goal: Absence of Hospital-Acquired Illness or Injury  Outcome: Ongoing, Progressing  Goal: Optimal Comfort and Wellbeing  Outcome: Ongoing, Progressing  Goal: Readiness for Transition of Care  Outcome: Ongoing, Progressing     Problem: Impaired Wound Healing  Goal: Optimal Wound Healing  Outcome: Ongoing, Progressing     Problem: Fall Injury Risk  Goal: Absence of Fall and Fall-Related Injury  Outcome: Ongoing, Progressing     Problem: Behavioral Health Comorbidity  Goal: Maintenance of Behavioral Health Symptom Control  Outcome: Ongoing, Progressing     Problem: COPD (Chronic Obstructive Pulmonary Disease) Comorbidity  Goal: Maintenance of COPD Symptom Control  Outcome: Ongoing, Progressing     Problem: Hypertension Comorbidity  Goal: Blood Pressure in Desired Range  Outcome: Ongoing, Progressing     Problem: Pain Acute  Goal: Acceptable Pain Control and Functional Ability  Outcome: Ongoing, Progressing     Problem: Gas Exchange Impaired  Goal: Optimal Gas Exchange  Outcome: Ongoing, Progressing     Problem: Mobility Impairment  Goal: Optimal Mobility  Outcome: Ongoing, Progressing     Problem: Anxiety  Goal: Anxiety Reduction or Resolution  Outcome: Ongoing, Progressing

## 2024-01-11 NOTE — SUBJECTIVE & OBJECTIVE
Interval History:     Review of Systems   Constitutional: Negative.    Respiratory: Negative.     Cardiovascular: Negative.    Gastrointestinal:  Positive for nausea.   Neurological:  Positive for headaches.   Psychiatric/Behavioral: Negative.       Objective:     Vital Signs (Most Recent):  Temp: 99.2 °F (37.3 °C) (01/11/24 1155)  Pulse: 61 (01/11/24 1155)  Resp: 18 (01/11/24 1155)  BP: (!) 145/66 (01/11/24 1155)  SpO2: (!) 91 % (01/11/24 1155) Vital Signs (24h Range):  Temp:  [98.1 °F (36.7 °C)-99.2 °F (37.3 °C)] 99.2 °F (37.3 °C)  Pulse:  [61-84] 61  Resp:  [16-20] 18  SpO2:  [90 %-98 %] 91 %  BP: (106-162)/(47-68) 145/66     Weight: 42.4 kg (93 lb 7.6 oz)  Body mass index is 16.56 kg/m².    Intake/Output Summary (Last 24 hours) at 1/11/2024 1242  Last data filed at 1/11/2024 0700  Gross per 24 hour   Intake 600 ml   Output --   Net 600 ml         Physical Exam  Constitutional:       Appearance: Normal appearance.   Cardiovascular:      Rate and Rhythm: Normal rate and regular rhythm.      Heart sounds: Normal heart sounds.   Pulmonary:      Effort: Pulmonary effort is normal.      Breath sounds: Normal breath sounds.   Abdominal:      General: Abdomen is flat. Bowel sounds are normal.      Palpations: Abdomen is soft.   Skin:     General: Skin is warm.   Neurological:      General: No focal deficit present.      Mental Status: She is alert and oriented to person, place, and time.      Cranial Nerves: No cranial nerve deficit.   Psychiatric:         Mood and Affect: Mood normal.         Behavior: Behavior normal.         Thought Content: Thought content normal.         Judgment: Judgment normal.             Significant Labs: All pertinent labs within the past 24 hours have been reviewed.    Significant Imaging: I have reviewed all pertinent imaging results/findings within the past 24 hours.

## 2024-01-11 NOTE — PT/OT/SLP PROGRESS
Name: Cierra Main    : 1950 (73 y.o.)  MRN: [id number]           Patient Not Seen      A CT of the head was ordered due to pt's c/o headache.  CT negative for any acute changes.  Returned to see pt this PM.  Pt was asleep in bed.  Will allow pt to rest and return tomorrow for therapy as appropriate.

## 2024-01-12 LAB
ALBUMIN SERPL-MCNC: 2.3 G/DL (ref 3.4–4.8)
ALBUMIN/GLOB SERPL: 0.7 RATIO (ref 1.1–2)
ALP SERPL-CCNC: 98 UNIT/L (ref 40–150)
ALT SERPL-CCNC: 64 UNIT/L (ref 0–55)
AST SERPL-CCNC: 52 UNIT/L (ref 5–34)
BASOPHILS # BLD AUTO: 0.06 X10(3)/MCL
BASOPHILS NFR BLD AUTO: 0.9 %
BILIRUB SERPL-MCNC: 0.3 MG/DL
BUN SERPL-MCNC: 14 MG/DL (ref 9.8–20.1)
CALCIUM SERPL-MCNC: 8.8 MG/DL (ref 8.4–10.2)
CHLORIDE SERPL-SCNC: 105 MMOL/L (ref 98–107)
CO2 SERPL-SCNC: 26 MMOL/L (ref 23–31)
CREAT SERPL-MCNC: 0.71 MG/DL (ref 0.55–1.02)
EOSINOPHIL # BLD AUTO: 0.17 X10(3)/MCL (ref 0–0.9)
EOSINOPHIL NFR BLD AUTO: 2.4 %
ERYTHROCYTE [DISTWIDTH] IN BLOOD BY AUTOMATED COUNT: 14.4 % (ref 11.5–17)
GFR SERPLBLD CREATININE-BSD FMLA CKD-EPI: >60 MLS/MIN/1.73/M2
GLOBULIN SER-MCNC: 3.2 GM/DL (ref 2.4–3.5)
GLUCOSE SERPL-MCNC: 100 MG/DL (ref 82–115)
HCT VFR BLD AUTO: 35.8 % (ref 37–47)
HGB BLD-MCNC: 11.3 G/DL (ref 12–16)
IMM GRANULOCYTES # BLD AUTO: 0.07 X10(3)/MCL (ref 0–0.04)
IMM GRANULOCYTES NFR BLD AUTO: 1 %
LYMPHOCYTES # BLD AUTO: 1.11 X10(3)/MCL (ref 0.6–4.6)
LYMPHOCYTES NFR BLD AUTO: 16 %
MCH RBC QN AUTO: 28 PG (ref 27–31)
MCHC RBC AUTO-ENTMCNC: 31.6 G/DL (ref 33–36)
MCV RBC AUTO: 88.8 FL (ref 80–94)
MONOCYTES # BLD AUTO: 0.77 X10(3)/MCL (ref 0.1–1.3)
MONOCYTES NFR BLD AUTO: 11.1 %
NEUTROPHILS # BLD AUTO: 4.76 X10(3)/MCL (ref 2.1–9.2)
NEUTROPHILS NFR BLD AUTO: 68.6 %
NRBC BLD AUTO-RTO: 0 %
PLATELET # BLD AUTO: 430 X10(3)/MCL (ref 130–400)
PMV BLD AUTO: 9.8 FL (ref 7.4–10.4)
POTASSIUM SERPL-SCNC: 4.5 MMOL/L (ref 3.5–5.1)
PROT SERPL-MCNC: 5.5 GM/DL (ref 5.8–7.6)
RBC # BLD AUTO: 4.03 X10(6)/MCL (ref 4.2–5.4)
SODIUM SERPL-SCNC: 137 MMOL/L (ref 136–145)
WBC # SPEC AUTO: 6.94 X10(3)/MCL (ref 4.5–11.5)

## 2024-01-12 PROCEDURE — 25000242 PHARM REV CODE 250 ALT 637 W/ HCPCS: Performed by: FAMILY MEDICINE

## 2024-01-12 PROCEDURE — 99231 SBSQ HOSP IP/OBS SF/LOW 25: CPT | Mod: ,,, | Performed by: FAMILY MEDICINE

## 2024-01-12 PROCEDURE — 80053 COMPREHEN METABOLIC PANEL: CPT | Performed by: FAMILY MEDICINE

## 2024-01-12 PROCEDURE — 25000003 PHARM REV CODE 250: Performed by: FAMILY MEDICINE

## 2024-01-12 PROCEDURE — 97530 THERAPEUTIC ACTIVITIES: CPT

## 2024-01-12 PROCEDURE — 94761 N-INVAS EAR/PLS OXIMETRY MLT: CPT

## 2024-01-12 PROCEDURE — 11000001 HC ACUTE MED/SURG PRIVATE ROOM

## 2024-01-12 PROCEDURE — 85025 COMPLETE CBC W/AUTO DIFF WBC: CPT | Performed by: FAMILY MEDICINE

## 2024-01-12 PROCEDURE — 94640 AIRWAY INHALATION TREATMENT: CPT

## 2024-01-12 PROCEDURE — 97535 SELF CARE MNGMENT TRAINING: CPT

## 2024-01-12 RX ADMIN — MONTELUKAST 10 MG: 10 TABLET, FILM COATED ORAL at 08:01

## 2024-01-12 RX ADMIN — IPRATROPIUM BROMIDE AND ALBUTEROL SULFATE 3 ML: 2.5; .5 SOLUTION RESPIRATORY (INHALATION) at 12:01

## 2024-01-12 RX ADMIN — AMIODARONE HYDROCHLORIDE 200 MG: 100 TABLET ORAL at 08:01

## 2024-01-12 RX ADMIN — HYDRALAZINE HYDROCHLORIDE 100 MG: 25 TABLET ORAL at 08:01

## 2024-01-12 RX ADMIN — LISINOPRIL 20 MG: 20 TABLET ORAL at 08:01

## 2024-01-12 RX ADMIN — METOPROLOL SUCCINATE 25 MG: 25 TABLET, EXTENDED RELEASE ORAL at 08:01

## 2024-01-12 RX ADMIN — LEVOTHYROXINE SODIUM 25 MCG: 0.03 TABLET ORAL at 06:01

## 2024-01-12 RX ADMIN — ACETAMINOPHEN 650 MG: 325 TABLET, FILM COATED ORAL at 08:01

## 2024-01-12 RX ADMIN — ACETAMINOPHEN 650 MG: 325 TABLET, FILM COATED ORAL at 01:01

## 2024-01-12 RX ADMIN — IPRATROPIUM BROMIDE AND ALBUTEROL SULFATE 3 ML: 2.5; .5 SOLUTION RESPIRATORY (INHALATION) at 06:01

## 2024-01-12 RX ADMIN — ATORVASTATIN CALCIUM 10 MG: 10 TABLET, FILM COATED ORAL at 08:01

## 2024-01-12 RX ADMIN — SERTRALINE HYDROCHLORIDE 100 MG: 50 TABLET ORAL at 08:01

## 2024-01-12 RX ADMIN — Medication 6 MG: at 08:01

## 2024-01-12 RX ADMIN — HYDRALAZINE HYDROCHLORIDE 100 MG: 25 TABLET ORAL at 03:01

## 2024-01-12 NOTE — PT/OT/SLP PROGRESS
"         Physical Therapy Treatment Note           Name: Cierra Main    : 1950 (73 y.o.)  MRN: [id number]             Subjective Assessment:     No complaints  Lethargic   X Awake, alert, cooperative  Uncooperative    Agitated  c/o pain    Appropriate X c/o fatigue   X Confused  Treated at bedside    X Emotionally labile  Treated in gym/dept.    Impulsive  Other:    Flat affect       Pain/Comfort:    Pain Rating 1: 0/10    Therapy Precautions:     X Cognitive deficits  Spinal precautions    Collar - hard  Sternal precautions    Collar - soft   TLSO   X Fall risk  LSO    Hip precautions - posterior  Knee immobilizer    Hip precautions - anterior  WBAT    Impaired communication  Partial weightbearing    Oxygen  TTWB    PEG tube  NWB   X Visual deficits  Other:    Hearing deficits        Vital Signs:     Vitals Value    Room air      Oxygen (L)     Blood pressure 176/72 post-ambulation  167/62 static standing    Heart rate          Mobility Training:     Assist Level Assistive Device Comments    Bed Mobility      Sit to stand/Stand to sit CGA-Lupe RW Sit to stand/stand to sit performed from recliner level and from WC level.  Reminders required for hand placement during both ascent and descent.     Transfers ModA-Lupe RW Stand step t/f performed from the recliner to the WC and from the WC back to the recliner. Pt initially demonstrated a strong posterior lean which appeared to be exacerbated by excessive fear of falling.  ModA required to prevent a LOB.  Upon transitioning back to the recliner, pt required only Lupe with safely maneuvering the RW in the small space.     Gait CGA-Lupe RW Pt able to ambulate 50 ft with a step through gait pattern and very slow gait speed. Pt c/o dizziness with mobility and stated "My legs are like jello".  No excessive stance instability observed by PT. Pt appeared to be experiencing global shaking due to fear rather than any physical stance instability. Pt declined additional " "ambulation stating "I could probably go further but I just don't feel well".     Balance Training      Wheelchair Mobility      Stair Climbing      Car Transfer      Other:           Assessment:     Patient tolerated session fairly but her fear of falling continues to limit her performance.  Due to c/o dizziness pt's BP was assessed as documented above.  Nsg notified and aware.  Pt remains unsafe to return home without 24 hour care.  PT recommending SNF with a transition to [facility] care if 24 hour care/assistance cannot be arranged.      GOALS:   Multidisciplinary Problems       Physical Therapy Goals          Problem: Physical Therapy    Goal Priority Disciplines Outcome Goal Variances Interventions   Physical Therapy Goal     PT, PT/OT Ongoing, Progressing     Description: Patient will increase functional independence with mobility by performin. Supine to sit with Modified [place]  2. Sit to supine with Modified [place]  3. Sit to stand transfer with Supervision  4. Gait  x 200 feet with Supervision using Rolling Walker.                            Discharge Recommendations:      SNF vs 24 hour care    Discharge Equipment Recommendations:     none     At end of treatment, patient remained:     X Up in chair     In bed   X With alarm activated    Bed rails up   X Call bell in reach      Family/friends present     Restraints secured properly     In bathroom with CNA/RN notified     In gym with PT/PTA/tech     Nurse aware     Other:           PT Start Time: 1024     PT Stop Time: 1044  PT Total Time (min): 20 min     Billable Minutes: Therapeutic Activity 2024    "

## 2024-01-12 NOTE — PROGRESS NOTES
[facility]  [facility] Medicine  Progress Note    Patient Name: Cierra Main  MRN: [id number]  Patient Class: IP- Inpatient   Admission Date: 1/9/2024  Length of Stay: 3 days  Attending Physician: Rafiq Eller MD  Primary Care Provider: Rafiq Eller MD        Subjective:     Principal Problem:Weakness        HPI:  Chief Complaint   Patient presents with    Fall       Arrived via [facility], fall last night, fell out of wheelchair, forehead first impact to bed frame.  LOC unknown, on floor for 1 hour.       This patient is a 73-year-old female who arrived by ambulance after a fall last night, patient states that she fell out of her wheelchair falling onto her forehead, unknown LOC and states that she was on the floor for about an hour.  Patient states that her sister and her son take care for and that she has home health but she is home most of the day alone.  Patient has a moderate hematoma on her forehead.  Patient states she has been weak for the past week and has not really gotten out of bed much but she can get up and walk with help.  She is normally in her wheelchair.  Patient recently admitted to [facility] for extended rehab.  Upon being home, patient reports having falls.  She does have family that checks on her periodically during the daytime but does stay home alone.  Due to recent multiple falls, patient will be admitted for further treatment of her lower extremity weakness.    Overview/Hospital Course:  01/11/2024:  Notified by nursing staff this a.m. she did participate with OT earlier this a.m. but was unable to perform PT due to headache/nausea.  Patient reports BM earlier.  On rounds, she continues to report headache with nausea    01/12/2024:  Patient sitting in bedside recliner performing neb treatment.  She did participate with PT/OT earlier today.   and I discussed with patient/sister at bedside about [facility] placement on discharge    Interval  History:     Review of Systems   Constitutional: Negative.    Respiratory: Negative.     Cardiovascular: Negative.    Gastrointestinal:  Positive for nausea.   Neurological:  Positive for weakness.   Psychiatric/Behavioral: Negative.       Objective:     Vital Signs (Most Recent):  Temp: 98.4 °F (36.9 °C) (01/12/24 1200)  Pulse: 60 (01/12/24 1252)  Resp: 18 (01/12/24 1252)  BP: (!) 114/55 (01/12/24 1200)  SpO2: (!) 93 % (01/12/24 1252) Vital Signs (24h Range):  Temp:  [98.4 °F (36.9 °C)-99.8 °F (37.7 °C)] 98.4 °F (36.9 °C)  Pulse:  [60-68] 60  Resp:  [16-18] 18  SpO2:  [83 %-93 %] 93 %  BP: (105-189)/(55-73) 114/55     Weight: 42.4 kg (93 lb 7.6 oz)  Body mass index is 16.56 kg/m².    Intake/Output Summary (Last 24 hours) at 1/12/2024 1312  Last data filed at 1/11/2024 1700  Gross per 24 hour   Intake 120 ml   Output --   Net 120 ml         Physical Exam  Constitutional:       Appearance: Normal appearance.   Cardiovascular:      Rate and Rhythm: Normal rate and regular rhythm.      Heart sounds: Normal heart sounds.   Pulmonary:      Effort: Pulmonary effort is normal.      Breath sounds: Normal breath sounds.   Abdominal:      General: Abdomen is flat. Bowel sounds are normal.      Palpations: Abdomen is soft.   Skin:     General: Skin is warm.   Neurological:      General: No focal deficit present.      Mental Status: She is alert and oriented to person, place, and time.      Cranial Nerves: No cranial nerve deficit.   Psychiatric:         Mood and Affect: Mood normal.         Behavior: Behavior normal.         Thought Content: Thought content normal.         Judgment: Judgment normal.             Significant Labs: All pertinent labs within the past 24 hours have been reviewed.  CBC:   Recent Labs   Lab 01/12/24 0427   WBC 6.94   HGB 11.3*   HCT 35.8*   *     CMP:   Recent Labs   Lab 01/12/24 0427      K 4.5   CO2 26   BUN 14.0   CREATININE 0.71   CALCIUM 8.8   ALBUMIN 2.3*   BILITOT 0.3   ALKPHOS  98   AST 52*   ALT 64*       Significant Imaging: I have reviewed all pertinent imaging results/findings within the past 24 hours.    Assessment/Plan:      * Weakness  PT/OT following along with patient         Moderate episode of recurrent major depressive disorder  Continue Zoloft 100 mg q.day  Patient seen by Psychiatry, Higher level of services for psychiatric care does not appear to be appropriate this time.       Hypothyroidism  Continue home medication  TSH 11/14/2023 within normal limits      Essential hypertension  Chronic, controlled. Latest blood pressure and vitals reviewed-     Temp:  [98.4 °F (36.9 °C)-99.8 °F (37.7 °C)]   Pulse:  [60-68]   Resp:  [16-18]   BP: (105-189)/(55-73)   SpO2:  [83 %-93 %] .   Home meds for hypertension were reviewed and noted below.   Hypertension Medications               hydrALAZINE (APRESOLINE) 100 MG tablet Take 1 tablet (100 mg total) by mouth 3 (three) times daily.    lisinopriL (PRINIVIL,ZESTRIL) 20 MG tablet Take 1 tablet (20 mg total) by mouth 2 (two) times daily.    metoprolol succinate (TOPROL-XL) 50 MG 24 hr tablet Take 25 mg by mouth once daily.    nitroGLYCERIN 0.4 MG/DOSE TL SPRY (NITROLINGUAL) 400 mcg/spray spray nitroglycerin 400 mcg/spray translingual aerosol   at onset of chest pain may take up to three sprays every 5min during a 15 min period            While in the hospital, will manage blood pressure as follows; Continue home antihypertensive regimen    Will utilize p.r.n. blood pressure medication only if patient's blood pressure greater than 180/110 and she develops symptoms such as worsening chest pain or shortness of breath.      VTE Risk Mitigation (From admission, onward)           Ordered     IP VTE LOW RISK PATIENT  Once         01/09/24 5832                    Discharge Planning   SUMMER:      Code Status: Full Code   Is the patient medically ready for discharge?:     Reason for patient still in hospital (select all that apply): Patient trending  condition  Discharge Plan A: (P) Skilled Nursing Facility                  Rafiq Eller MD  Department of Hospital Medicine   [facility]

## 2024-01-12 NOTE — SUBJECTIVE & OBJECTIVE
Interval History:     Review of Systems   Constitutional: Negative.    Respiratory: Negative.     Cardiovascular: Negative.    Gastrointestinal:  Positive for nausea.   Neurological:  Positive for weakness.   Psychiatric/Behavioral: Negative.       Objective:     Vital Signs (Most Recent):  Temp: 98.4 °F (36.9 °C) (01/12/24 1200)  Pulse: 60 (01/12/24 1252)  Resp: 18 (01/12/24 1252)  BP: (!) 114/55 (01/12/24 1200)  SpO2: (!) 93 % (01/12/24 1252) Vital Signs (24h Range):  Temp:  [98.4 °F (36.9 °C)-99.8 °F (37.7 °C)] 98.4 °F (36.9 °C)  Pulse:  [60-68] 60  Resp:  [16-18] 18  SpO2:  [83 %-93 %] 93 %  BP: (105-189)/(55-73) 114/55     Weight: 42.4 kg (93 lb 7.6 oz)  Body mass index is 16.56 kg/m².    Intake/Output Summary (Last 24 hours) at 1/12/2024 1312  Last data filed at 1/11/2024 1700  Gross per 24 hour   Intake 120 ml   Output --   Net 120 ml         Physical Exam  Constitutional:       Appearance: Normal appearance.   Cardiovascular:      Rate and Rhythm: Normal rate and regular rhythm.      Heart sounds: Normal heart sounds.   Pulmonary:      Effort: Pulmonary effort is normal.      Breath sounds: Normal breath sounds.   Abdominal:      General: Abdomen is flat. Bowel sounds are normal.      Palpations: Abdomen is soft.   Skin:     General: Skin is warm.   Neurological:      General: No focal deficit present.      Mental Status: She is alert and oriented to person, place, and time.      Cranial Nerves: No cranial nerve deficit.   Psychiatric:         Mood and Affect: Mood normal.         Behavior: Behavior normal.         Thought Content: Thought content normal.         Judgment: Judgment normal.             Significant Labs: All pertinent labs within the past 24 hours have been reviewed.  CBC:   Recent Labs   Lab 01/12/24 0427   WBC 6.94   HGB 11.3*   HCT 35.8*   *     CMP:   Recent Labs   Lab 01/12/24 0427      K 4.5   CO2 26   BUN 14.0   CREATININE 0.71   CALCIUM 8.8   ALBUMIN 2.3*   BILITOT 0.3    ALKPHOS 98   AST 52*   ALT 64*       Significant Imaging: I have reviewed all pertinent imaging results/findings within the past 24 hours.

## 2024-01-12 NOTE — NURSING
Attempted to call sister, Krystin, regarding new process since Level II was triggered for mental illness. Unable to reach and unable to leave voicemail.

## 2024-01-12 NOTE — PROGRESS NOTES
Inpatient Nutrition Assessment    Admit Date: 1/9/2024   Total duration of encounter: 3 days   Patient Age: 73 y.o.    Nutrition Recommendation/Prescription     Continue 2 gm Na+ diet as tolerated.   Will provide Boost (vanilla) TID. (Provides 240 kcal and 10 gm per serving)  Weigh daily  Monitor appetite and intake    Communication of Recommendations: reviewed with patient    Nutrition Assessment     Malnutrition Assessment/Nutrition-Focused Physical Exam    Malnutrition Context: chronic illness (01/10/24 1526)  Malnutrition Level: moderate (01/10/24 1526)  Energy Intake (Malnutrition): less than 75% for greater than 7 days (01/10/24 1526)     Subcutaneous Fat (Malnutrition): moderate depletion (01/10/24 1526)  Orbital Region (Subcutaneous Fat Loss): moderate depletion  Upper Arm Region (Subcutaneous Fat Loss): moderate depletion     Muscle Mass (Malnutrition): moderate depletion (01/10/24 1526)  [place] Region (Muscle Loss): moderate depletion  Clavicle Bone Region (Muscle Loss): moderate depletion  Clavicle and Acromion Bone Region (Muscle Loss): moderate depletion                          A minimum of two characteristics is recommended for diagnosis of either severe or non-severe malnutrition.    Chart Review    Reason Seen: follow-up    Malnutrition Screening Tool Results   Have you recently lost weight without trying?: No  Have you been eating poorly because of a decreased appetite?: No   MST Score: 0   Diagnosis:  Weakness, moderate episode of recurrent major depressive d/o, hypothyroidism, HTN    Relevant Medical History: acute MI, anxiety, cardiac dis, COPD, CAD, HLD, HTN, hypothyroidism, non-infective gastroenteritis & colitis, macular degeneration      Scheduled Medications:  albuterol-ipratropium, 3 mL, Q6H  amiodarone, 200 mg, BID  atorvastatin, 10 mg, QHS  hydrALAZINE, 100 mg, TID  levothyroxine, 25 mcg, Before breakfast  lisinopriL, 20 mg, BID  metoprolol succinate, 25 mg, Daily  montelukast, 10 mg,  "QHS  polyethylene glycol, 17 g, BID  sertraline, 100 mg, Daily    Continuous Infusions:   PRN Medications: acetaminophen, docusate sodium, melatonin, ondansetron, sodium chloride 0.9%    Calorie Containing IV Medications: no significant kcals from medications at this time    Recent Labs   Lab 01/09/24  1227 01/09/24  1255 01/12/24  0427   NA  --  141 137   K  --  3.6 4.5   CALCIUM  --  9.0 8.8   CHLORIDE  --  105 105   CO2  --  26 26   BUN  --  13.0 14.0   CREATININE  --  0.70 0.71   EGFRNORACEVR  --  >60 >60   GLUCOSE  --  109 100   BILITOT  --  0.5 0.3   ALKPHOS  --  90 98   ALT  --  74* 64*   AST  --  74* 52*   ALBUMIN  --  2.6* 2.3*   WBC 7.88  --  6.94   HGB 12.7  --  11.3*   HCT 38.8  --  35.8*       Nutrition Orders:  Diet Low Sodium, 2gm      Appetite/Oral Intake: fair/50-75% of meals  Factors Affecting Nutritional Intake: decreased appetite and prefers small meals  Food/Evangelical/Cultural Preferences: none reported  Food Allergies: none reported  Last Bowel Movement: 01/11/24  Wound(s):  Intact    Comments    (1/10) Pt reported poor intake over past few days, however, her intake and appetite has improved somewhat. Pt did consume 75% of lunch today. She has never really been a big eater. Prefers small meals. Pt agreeable to try Boost due to underweight status. Denied N/V/D/C. No difficulties chewing or swallowing. Able to feed self with set-up assist. Stated UBW to be around 90#. Reported no recent weight change. RDN consulted for wound healing, however, skin is intact at this time.     (1/12) Pt with continued fair appetite and intake. Consumes 50% of meals. No N/V/C/D.         Anthropometrics    Height: 5' 3" (160 cm), Height Method: Stated  Last Weight: 42.4 kg (93 lb 7.6 oz) (01/09/24 1747), Weight Method: Bed Scale  BMI (Calculated): 16.6  BMI Classification: underweight (BMI less than 18.5)     Ideal Body Weight (IBW), Female: 115 lb     % Ideal Body Weight, Female (lb): 81.29 %                        "      Usual Weight Provided By: patient    Wt Readings from Last 5 Encounters:   01/09/24 42.4 kg (93 lb 7.6 oz)   12/29/23 41.7 kg (92 lb)   12/05/23 41.7 kg (92 lb)   11/16/23 43.8 kg (96 lb 9 oz)   10/01/23 40.8 kg (90 lb)     Weight Change(s) Since Admission: new admit  Wt Readings from Last 1 Encounters:   01/09/24 1747 42.4 kg (93 lb 7.6 oz)   01/09/24 1200 40.8 kg (90 lb)   Admit Weight: 40.8 kg (90 lb) (01/09/24 1200), Weight Method: Stated    Estimated Needs    Weight Used For Calorie Calculations: 42.4 kg (93 lb 7.6 oz)  Energy Calorie Requirements (kcal): 1484 kcal (35 kcal/kg)  Energy Need Method: Kcal/kg  Weight Used For Protein Calculations: 42.4 kg (93 lb 7.6 oz)  Protein Requirements: 85 gm (2 gm/kg)       Enteral Nutrition     Patient not receiving enteral nutrition at this time.    Parenteral Nutrition     Patient not receiving parenteral nutrition support at this time.    Evaluation of Received Nutrient Intake    Calories: not meeting estimated needs  Protein: not meeting estimated needs    Patient Education     Not applicable.    Nutrition Diagnosis     PES: Inadequate energy intake related to chronic illness as evidenced by poor intake PTA and underweight status BMI 16.6. (active)     PES: Moderate chronic disease or condition related malnutrition related to inability to consume sufficient nutrients as evidenced by less than 75% needs met for greater than 7 days, moderate fat depletion, and moderate muscle depletion. (active)    Nutrition Interventions     Intervention(s): general/healthful diet, commercial beverage, and collaboration with other providers    Goal: Meet greater than 80% of nutritional needs by follow-up. (goal progressing)  Goal: Maintain weight throughout hospitalization. (goal progressing)    Nutrition Goals & Monitoring     Dietitian will monitor: energy intake, weight, electrolyte/renal panel, glucose/endocrine profile, and gastrointestinal profile    Nutrition Risk/Follow-Up:  high (follow-up in 1-4 days)   Please consult if re-assessment needed sooner.

## 2024-01-12 NOTE — ASSESSMENT & PLAN NOTE
Continue Zoloft 100 mg q.day  Patient seen by Psychiatry, Higher level of services for psychiatric care does not appear to be appropriate this time.

## 2024-01-12 NOTE — ASSESSMENT & PLAN NOTE
Chronic, controlled. Latest blood pressure and vitals reviewed-     Temp:  [98.4 °F (36.9 °C)-99.8 °F (37.7 °C)]   Pulse:  [60-68]   Resp:  [16-18]   BP: (105-189)/(55-73)   SpO2:  [83 %-93 %] .   Home meds for hypertension were reviewed and noted below.   Hypertension Medications               hydrALAZINE (APRESOLINE) 100 MG tablet Take 1 tablet (100 mg total) by mouth 3 (three) times daily.    lisinopriL (PRINIVIL,ZESTRIL) 20 MG tablet Take 1 tablet (20 mg total) by mouth 2 (two) times daily.    metoprolol succinate (TOPROL-XL) 50 MG 24 hr tablet Take 25 mg by mouth once daily.    nitroGLYCERIN 0.4 MG/DOSE TL SPRY (NITROLINGUAL) 400 mcg/spray spray nitroglycerin 400 mcg/spray translingual aerosol   at onset of chest pain may take up to three sprays every 5min during a 15 min period            While in the hospital, will manage blood pressure as follows; Continue home antihypertensive regimen    Will utilize p.r.n. blood pressure medication only if patient's blood pressure greater than 180/110 and she develops symptoms such as worsening chest pain or shortness of breath.

## 2024-01-12 NOTE — PT/OT/SLP PROGRESS
Occupational Therapy  Treatment    Name: Cierra Main    : 1950 (73 y.o.)  MRN: [id number]          TREATMENT SUMMARY AND RECOMMENDATIONS:      Subjective Assessment:   No complaints  Lethargic   x Awake, alert, cooperative  Uncooperative    Agitated  Flat affect    Appropriate  c/o fatigue   x Confused  Treated at bedside     Emotionally liable  Treated in gym/dept.    Impulsive x Other:  Pt very anxious and required constant encouragement.       Pain/Comfort:  Pain Rating 1: 4/10  Location 1: forehead  Pain Addressed 1: Distraction      Therapy Precautions:   x Cognitive deficits  Spinal precautions    Collar - hard  Sternal precautions    Collar - soft   TLSO   x Fall risk  LSO    Hip precautions - posterior  Knee immobilizer    Hip precautions - anterior  WBAT    Impaired communication  Partial weightbearing    Oxygen  TTWB    PEG tube  NWB   x Visual deficits  Other:    Hearing deficits           Vitals Value   x Room air      Oxygen (L)     Blood pressure     Heart rate         Treatment Objectives:     Mobility Training:    Mobility task Assist level Comments    Bed mobility Minn Transfer training supine to sit min assist, pt used therapist hand to pull up with HOB elevated.  Verbal cuing and tactile guiding to scoot foreward   Balance training CGA Dynamic standing bal activities performed during ADLs.  Pt able to perform with CGA and verbal cuing using RW and grab bar.   Transfer Min Transfer training performed bed>BSC>wc> TTB in walk-in shower>wc>recliner Min assist stand pivot transfer.  Pt required verbal cuing to sequence and perform all transfers secondary to visual deficits.   Functional mobility     Sit to stand transitions     Other:       ADL Training:    ADL Assist level Comments    Feeding     Grooming/hygiene Setup ADL grooming training performed;  pt setup with oral care sitting in wc at sink and hair grooming.   Bathing Max ADL bathing training performed with shower activity sitting on  TTB using HH shower.  Pt able to wash face, chest and allen area with setup and verbal cuing.  Assist required to wash and dry remaining.   Upper body dressing Max Doff/don gown   Lower body dressing Max Doff/don pullup and slipper socks.   Toileting Mod ADL toileting activity performed.  Pt able to perform hygiene after +urine and small +BM.  Max assist with garment management.    Toilet transfer Min    Adaptive equipment training     Other:           Additional Treatment:  Reinforced call bell function and call before you fall.    Assessment: Patient tolerated session fair.  Pt seen for ADL training.  Presently pt requiring maximal assistance with bathing and dressing activities.  Performing functional transfers with min assist stand pivot. Pt continues to be very anxious with gravitational insecurity requiring constant encouragement.  Pt continues to require 24 hour care.    OT Plan: Cont POC      GOALS:   Multidisciplinary Problems       Occupational Therapy Goals          Problem: Occupational Therapy    Goal Priority Disciplines Outcome Interventions   Occupational Therapy Goal     OT, PT/OT Ongoing, Progressing    Description:   Patient will increase functional independence with ADLs by performing:    Feeding with Stand-by Assistance.  Grooming while seated at sink with Stand-by Assistance.  Toileting from bedside commode with Contact Guard Assistance for hygiene and clothing management.   Bathing from  edge of bed with Minimal Assistance.  Toilet transfer to bedside commode with Contact Guard Assistance.                         Communication with Treatment Team:     Discharge Recommendations:    Discharge Equipment Recommendations:  to be determined by next level of care (TBD)  Barriers to discharge:  Decreased caregiver support      At end of treatment, patient remained:  x Up in chair     In bed   x With alarm activated    Bed rails up   x Call bell in reach     Family/friends present    Restraints secured  properly    In bathroom with CNA/RN notified    In gym with PT/PTA/tech   x Nurse aware    Other:         OT Date of Treatment: 01/12/24  OT Start Time: 0900  OT Stop Time: 0955  OT Total Time (min): 55 min    Billable Minutes:Self Care/Home Management 45  Therapeutic Activity 10      1/12/2024

## 2024-01-13 PROCEDURE — 97530 THERAPEUTIC ACTIVITIES: CPT

## 2024-01-13 PROCEDURE — 97116 GAIT TRAINING THERAPY: CPT

## 2024-01-13 PROCEDURE — 94761 N-INVAS EAR/PLS OXIMETRY MLT: CPT

## 2024-01-13 PROCEDURE — 25000003 PHARM REV CODE 250: Performed by: FAMILY MEDICINE

## 2024-01-13 PROCEDURE — 11000001 HC ACUTE MED/SURG PRIVATE ROOM

## 2024-01-13 PROCEDURE — 94640 AIRWAY INHALATION TREATMENT: CPT

## 2024-01-13 PROCEDURE — 25000242 PHARM REV CODE 250 ALT 637 W/ HCPCS: Performed by: FAMILY MEDICINE

## 2024-01-13 PROCEDURE — 27000221 HC OXYGEN, UP TO 24 HOURS

## 2024-01-13 RX ADMIN — IPRATROPIUM BROMIDE AND ALBUTEROL SULFATE 3 ML: 2.5; .5 SOLUTION RESPIRATORY (INHALATION) at 06:01

## 2024-01-13 RX ADMIN — IPRATROPIUM BROMIDE AND ALBUTEROL SULFATE 3 ML: 2.5; .5 SOLUTION RESPIRATORY (INHALATION) at 12:01

## 2024-01-13 RX ADMIN — MONTELUKAST 10 MG: 10 TABLET, FILM COATED ORAL at 08:01

## 2024-01-13 RX ADMIN — SERTRALINE HYDROCHLORIDE 100 MG: 50 TABLET ORAL at 08:01

## 2024-01-13 RX ADMIN — IPRATROPIUM BROMIDE AND ALBUTEROL SULFATE 3 ML: 2.5; .5 SOLUTION RESPIRATORY (INHALATION) at 05:01

## 2024-01-13 RX ADMIN — LISINOPRIL 20 MG: 20 TABLET ORAL at 08:01

## 2024-01-13 RX ADMIN — HYDRALAZINE HYDROCHLORIDE 100 MG: 25 TABLET ORAL at 08:01

## 2024-01-13 RX ADMIN — HYDRALAZINE HYDROCHLORIDE 100 MG: 25 TABLET ORAL at 02:01

## 2024-01-13 RX ADMIN — METOPROLOL SUCCINATE 25 MG: 25 TABLET, EXTENDED RELEASE ORAL at 08:01

## 2024-01-13 RX ADMIN — Medication 6 MG: at 08:01

## 2024-01-13 RX ADMIN — LEVOTHYROXINE SODIUM 25 MCG: 0.03 TABLET ORAL at 08:01

## 2024-01-13 RX ADMIN — POLYETHYLENE GLYCOL 3350 17 G: 17 POWDER, FOR SOLUTION ORAL at 08:01

## 2024-01-13 RX ADMIN — ATORVASTATIN CALCIUM 10 MG: 10 TABLET, FILM COATED ORAL at 08:01

## 2024-01-13 RX ADMIN — AMIODARONE HYDROCHLORIDE 200 MG: 100 TABLET ORAL at 08:01

## 2024-01-13 RX ADMIN — ACETAMINOPHEN 650 MG: 325 TABLET, FILM COATED ORAL at 10:01

## 2024-01-13 NOTE — PT/OT/SLP PROGRESS
Physical Therapy Treatment Note           Name: Cierra Main    : 1950 (73 y.o.)  MRN: [id number]             Subjective Assessment:     No complaints x Lethargic   x Awake, alert, cooperative  Uncooperative    Agitated  c/o pain    Appropriate  c/o fatigue    Confused  Treated at bedside     Emotionally labile  Treated in gym/dept.    Impulsive  Other:   x Flat affect       Pain/Comfort:         Therapy Precautions:      Cognitive deficits  Spinal precautions    Collar - hard  Sternal precautions    Collar - soft   TLSO   x Fall risk  LSO    Hip precautions - posterior  Knee immobilizer    Hip precautions - anterior  WBAT    Impaired communication  Partial weightbearing   x Oxygen  TTWB    PEG tube  NWB    Visual deficits  Other:    Hearing deficits        Vital Signs:     Vitals Value    Room air      Oxygen (L)     Blood pressure     Heart rate          Mobility Training:     Assist level Comments    Bed mobility CGA  Supine to sit and sit to supine with CGA and verbal cues for use of handrails    Sit to stand/stand to sit CGA 4 times with verbal cues for hand placement, foot placement and use of UT WS.   Transfers     Gait Min A 90 feet with VC's for step length and heel strike   Balance training     Wheelchair mobility     Car transfer     Other:          Therapeutic Exercise:     Exercise Sets Reps Comments                               Additional Treatment:    Patient requires constant encouragement for participation.  Demonstrates fear of falling.      Assessment:     Patient tolerated session good.    PT Plan:     Continue with POC.    GOALS:   Multidisciplinary Problems       Physical Therapy Goals          Problem: Physical Therapy    Goal Priority Disciplines Outcome Goal Variances Interventions   Physical Therapy Goal     PT, PT/OT Ongoing, Progressing     Description: Patient will increase functional independence with mobility by performin. Supine to sit with Modified  [place]  2. Sit to supine with Modified [place]  3. Sit to stand transfer with Supervision  4. Gait  x 200 feet with Supervision using Rolling Walker.                            Discharge Recommendations:            Discharge Equipment Recommendations:     none     At end of treatment, patient remained:      Up in chair    x In bed   x With alarm activated   x Bed rails up   x Call bell in reach      Family/friends present     Restraints secured properly     In bathroom with CNA/RN notified     In gym with PT/PTA/tech     Nurse aware     Other:           PT Start Time: 0935     PT Stop Time: 1005  PT Total Time (min): 30 min     Billable Minutes: Gait Training 20 and Therapeutic Activity 10      01/13/2024

## 2024-01-13 NOTE — SUBJECTIVE & OBJECTIVE
Interval History:     Review of Systems   Constitutional:  Positive for activity change and fatigue.   HENT: Negative.     Eyes: Negative.    Respiratory: Negative.     Cardiovascular: Negative.    Gastrointestinal: Negative.    Endocrine: Negative.    Genitourinary: Negative.    Musculoskeletal:  Positive for gait problem.   Skin:  Positive for wound.   Allergic/Immunologic: Negative.    Neurological:  Positive for weakness.   Hematological: Negative.    Psychiatric/Behavioral:  Positive for confusion.      Objective:     Vital Signs (Most Recent):  Temp: 98.2 °F (36.8 °C) (01/13/24 1241)  Pulse: 63 (01/13/24 1249)  Resp: 18 (01/13/24 1249)  BP: (!) 151/62 (01/13/24 1241)  SpO2: (!) 93 % (01/13/24 1249) Vital Signs (24h Range):  Temp:  [98.1 °F (36.7 °C)-99.1 °F (37.3 °C)] 98.2 °F (36.8 °C)  Pulse:  [61-73] 63  Resp:  [16-20] 18  SpO2:  [83 %-96 %] 93 %  BP: (107-194)/(51-83) 151/62     Weight: 42.4 kg (93 lb 7.6 oz)  Body mass index is 16.56 kg/m².    Intake/Output Summary (Last 24 hours) at 1/13/2024 1259  Last data filed at 1/13/2024 0810  Gross per 24 hour   Intake 240 ml   Output --   Net 240 ml         Physical Exam  Constitutional:       Appearance: She is underweight.   HENT:      Head: Normocephalic and atraumatic.      Nose: Nose normal.      Mouth/Throat:      Mouth: Mucous membranes are moist.      Pharynx: Oropharynx is clear.   Eyes:      Extraocular Movements: Extraocular movements intact.      Conjunctiva/sclera: Conjunctivae normal.      Pupils: Pupils are equal, round, and reactive to light.   Cardiovascular:      Rate and Rhythm: Normal rate and regular rhythm.      Pulses: Normal pulses.      Heart sounds: Normal heart sounds.   Pulmonary:      Effort: Pulmonary effort is normal.      Breath sounds: Normal breath sounds.   Abdominal:      General: Bowel sounds are normal.      Palpations: Abdomen is soft.   Musculoskeletal:         General: Normal range of motion.      Cervical back: Normal  "range of motion and neck supple.   Skin:     General: Skin is warm and dry.      Capillary Refill: Capillary refill takes 2 to 3 seconds.   Neurological:      General: No focal deficit present.      Mental Status: She is alert. Mental status is at baseline.   Psychiatric:         Mood and Affect: Mood normal.         Behavior: Behavior normal.         Thought Content: Thought content normal.         Judgment: Judgment normal.             Significant Labs: All pertinent labs within the past 24 hours have been reviewed.  BMP:   Recent Labs   Lab 01/12/24 0427      K 4.5   CO2 26   BUN 14.0   CREATININE 0.71   CALCIUM 8.8     CBC:   Recent Labs   Lab 01/12/24 0427   WBC 6.94   HGB 11.3*   HCT 35.8*   *     CMP:   Recent Labs   Lab 01/12/24 0427      K 4.5   CO2 26   BUN 14.0   CREATININE 0.71   CALCIUM 8.8   ALBUMIN 2.3*   BILITOT 0.3   ALKPHOS 98   AST 52*   ALT 64*     Magnesium: No results for input(s): "MG" in the last 48 hours.    Significant Imaging: I have reviewed all pertinent imaging results/findings within the past 24 hours.  "

## 2024-01-13 NOTE — PROGRESS NOTES
[facility]  [facility] Medicine  Progress Note    Patient Name: Cierra Main  MRN: [id number]  Patient Class: IP- Inpatient   Admission Date: 1/9/2024  Length of Stay: 4 days  Attending Physician: Rafiq Eller MD  Primary Care Provider: Rafiq Eller MD        Subjective:     Principal Problem:Weakness        HPI:  Chief Complaint   Patient presents with    Fall       Arrived via [facility], fall last night, fell out of wheelchair, forehead first impact to bed frame.  LOC unknown, on floor for 1 hour.       This patient is a 73-year-old female who arrived by ambulance after a fall last night, patient states that she fell out of her wheelchair falling onto her forehead, unknown LOC and states that she was on the floor for about an hour.  Patient states that her sister and her son take care for and that she has home health but she is home most of the day alone.  Patient has a moderate hematoma on her forehead.  Patient states she has been weak for the past week and has not really gotten out of bed much but she can get up and walk with help.  She is normally in her wheelchair.  Patient recently admitted to [facility] for extended rehab.  Upon being home, patient reports having falls.  She does have family that checks on her periodically during the daytime but does stay home alone.  Due to recent multiple falls, patient will be admitted for further treatment of her lower extremity weakness.    Overview/Hospital Course:  01/11/2024:  Notified by nursing staff this a.m. she did participate with OT earlier this a.m. but was unable to perform PT due to headache/nausea.  Patient reports BM earlier.  On rounds, she continues to report headache with nausea    01/12/2024:  Patient sitting in bedside recliner performing neb treatment.  She did participate with PT/OT earlier today.   and I discussed with patient/sister at bedside about [facility] placement on  discharge    1/13/24-No new issues today.  Waiting for placement.  Will continue with therapy.    Interval History:     Review of Systems   Constitutional:  Positive for activity change and fatigue.   HENT: Negative.     Eyes: Negative.    Respiratory: Negative.     Cardiovascular: Negative.    Gastrointestinal: Negative.    Endocrine: Negative.    Genitourinary: Negative.    Musculoskeletal:  Positive for gait problem.   Skin:  Positive for wound.   Allergic/Immunologic: Negative.    Neurological:  Positive for weakness.   Hematological: Negative.    Psychiatric/Behavioral:  Positive for confusion.      Objective:     Vital Signs (Most Recent):  Temp: 98.2 °F (36.8 °C) (01/13/24 1241)  Pulse: 63 (01/13/24 1249)  Resp: 18 (01/13/24 1249)  BP: (!) 151/62 (01/13/24 1241)  SpO2: (!) 93 % (01/13/24 1249) Vital Signs (24h Range):  Temp:  [98.1 °F (36.7 °C)-99.1 °F (37.3 °C)] 98.2 °F (36.8 °C)  Pulse:  [61-73] 63  Resp:  [16-20] 18  SpO2:  [83 %-96 %] 93 %  BP: (107-194)/(51-83) 151/62     Weight: 42.4 kg (93 lb 7.6 oz)  Body mass index is 16.56 kg/m².    Intake/Output Summary (Last 24 hours) at 1/13/2024 1259  Last data filed at 1/13/2024 0810  Gross per 24 hour   Intake 240 ml   Output --   Net 240 ml         Physical Exam  Constitutional:       Appearance: She is underweight.   HENT:      Head: Normocephalic and atraumatic.      Nose: Nose normal.      Mouth/Throat:      Mouth: Mucous membranes are moist.      Pharynx: Oropharynx is clear.   Eyes:      Extraocular Movements: Extraocular movements intact.      Conjunctiva/sclera: Conjunctivae normal.      Pupils: Pupils are equal, round, and reactive to light.   Cardiovascular:      Rate and Rhythm: Normal rate and regular rhythm.      Pulses: Normal pulses.      Heart sounds: Normal heart sounds.   Pulmonary:      Effort: Pulmonary effort is normal.      Breath sounds: Normal breath sounds.   Abdominal:      General: Bowel sounds are normal.      Palpations: Abdomen is  "soft.   Musculoskeletal:         General: Normal range of motion.      Cervical back: Normal range of motion and neck supple.   Skin:     General: Skin is warm and dry.      Capillary Refill: Capillary refill takes 2 to 3 seconds.   Neurological:      General: No focal deficit present.      Mental Status: She is alert. Mental status is at baseline.   Psychiatric:         Mood and Affect: Mood normal.         Behavior: Behavior normal.         Thought Content: Thought content normal.         Judgment: Judgment normal.             Significant Labs: All pertinent labs within the past 24 hours have been reviewed.  BMP:   Recent Labs   Lab 01/12/24 0427      K 4.5   CO2 26   BUN 14.0   CREATININE 0.71   CALCIUM 8.8     CBC:   Recent Labs   Lab 01/12/24 0427   WBC 6.94   HGB 11.3*   HCT 35.8*   *     CMP:   Recent Labs   Lab 01/12/24 0427      K 4.5   CO2 26   BUN 14.0   CREATININE 0.71   CALCIUM 8.8   ALBUMIN 2.3*   BILITOT 0.3   ALKPHOS 98   AST 52*   ALT 64*     Magnesium: No results for input(s): "MG" in the last 48 hours.    Significant Imaging: I have reviewed all pertinent imaging results/findings within the past 24 hours.    Assessment/Plan:      * Weakness  PT/OT following along with patient         Moderate episode of recurrent major depressive disorder  Continue Zoloft 100 mg q.day  Patient seen by Psychiatry, Higher level of services for psychiatric care does not appear to be appropriate this time.       Hypothyroidism  Continue home medication  TSH 11/14/2023 within normal limits      Essential hypertension  Chronic, controlled. Latest blood pressure and vitals reviewed-     Temp:  [98.4 °F (36.9 °C)-99.8 °F (37.7 °C)]   Pulse:  [60-68]   Resp:  [16-18]   BP: (105-189)/(55-73)   SpO2:  [83 %-93 %] .   Home meds for hypertension were reviewed and noted below.   Hypertension Medications               hydrALAZINE (APRESOLINE) 100 MG tablet Take 1 tablet (100 mg total) by mouth 3 (three) " times daily.    lisinopriL (PRINIVIL,ZESTRIL) 20 MG tablet Take 1 tablet (20 mg total) by mouth 2 (two) times daily.    metoprolol succinate (TOPROL-XL) 50 MG 24 hr tablet Take 25 mg by mouth once daily.    nitroGLYCERIN 0.4 MG/DOSE TL SPRY (NITROLINGUAL) 400 mcg/spray spray nitroglycerin 400 mcg/spray translingual aerosol   at onset of chest pain may take up to three sprays every 5min during a 15 min period            While in the hospital, will manage blood pressure as follows; Continue home antihypertensive regimen    Will utilize p.r.n. blood pressure medication only if patient's blood pressure greater than 180/110 and she develops symptoms such as worsening chest pain or shortness of breath.      VTE Risk Mitigation (From admission, onward)           Ordered     IP VTE LOW RISK PATIENT  Once         01/09/24 1743                Resume current meds  Therapy  OOB  Placement?    Discharge Planning   SUMMER:      Code Status: Full Code   Is the patient medically ready for discharge?:     Reason for patient still in hospital (select all that apply): Patient trending condition, Treatment, and PT / OT recommendations  Discharge Plan A: (P) Skilled Nursing Facility                  Arnold Gonsalez MD  Department of Hospital Medicine   [facility] - Medical Surgical Unit

## 2024-01-14 PROCEDURE — 25000003 PHARM REV CODE 250: Performed by: FAMILY MEDICINE

## 2024-01-14 PROCEDURE — 25000242 PHARM REV CODE 250 ALT 637 W/ HCPCS: Performed by: FAMILY MEDICINE

## 2024-01-14 PROCEDURE — 11000001 HC ACUTE MED/SURG PRIVATE ROOM

## 2024-01-14 PROCEDURE — 27000221 HC OXYGEN, UP TO 24 HOURS

## 2024-01-14 PROCEDURE — 94761 N-INVAS EAR/PLS OXIMETRY MLT: CPT

## 2024-01-14 PROCEDURE — 94640 AIRWAY INHALATION TREATMENT: CPT

## 2024-01-14 RX ADMIN — LISINOPRIL 20 MG: 20 TABLET ORAL at 08:01

## 2024-01-14 RX ADMIN — HYDRALAZINE HYDROCHLORIDE 100 MG: 25 TABLET ORAL at 08:01

## 2024-01-14 RX ADMIN — SERTRALINE HYDROCHLORIDE 100 MG: 50 TABLET ORAL at 08:01

## 2024-01-14 RX ADMIN — IPRATROPIUM BROMIDE AND ALBUTEROL SULFATE 3 ML: 2.5; .5 SOLUTION RESPIRATORY (INHALATION) at 12:01

## 2024-01-14 RX ADMIN — MONTELUKAST 10 MG: 10 TABLET, FILM COATED ORAL at 08:01

## 2024-01-14 RX ADMIN — ATORVASTATIN CALCIUM 10 MG: 10 TABLET, FILM COATED ORAL at 08:01

## 2024-01-14 RX ADMIN — AMIODARONE HYDROCHLORIDE 200 MG: 100 TABLET ORAL at 08:01

## 2024-01-14 RX ADMIN — ACETAMINOPHEN 650 MG: 325 TABLET, FILM COATED ORAL at 08:01

## 2024-01-14 RX ADMIN — HYDRALAZINE HYDROCHLORIDE 100 MG: 25 TABLET ORAL at 04:01

## 2024-01-14 RX ADMIN — IPRATROPIUM BROMIDE AND ALBUTEROL SULFATE 3 ML: 2.5; .5 SOLUTION RESPIRATORY (INHALATION) at 05:01

## 2024-01-14 RX ADMIN — METOPROLOL SUCCINATE 25 MG: 25 TABLET, EXTENDED RELEASE ORAL at 08:01

## 2024-01-14 RX ADMIN — IPRATROPIUM BROMIDE AND ALBUTEROL SULFATE 3 ML: 2.5; .5 SOLUTION RESPIRATORY (INHALATION) at 06:01

## 2024-01-14 RX ADMIN — LEVOTHYROXINE SODIUM 25 MCG: 0.03 TABLET ORAL at 06:01

## 2024-01-14 NOTE — PROGRESS NOTES
[facility]  [facility] Medicine  Progress Note    Patient Name: Cierra Main  MRN: [id number]  Patient Class: IP- Inpatient   Admission Date: 1/9/2024  Length of Stay: 5 days  Attending Physician: Rafiq Eller MD  Primary Care Provider: Rafiq Eller MD        Subjective:     Principal Problem:Weakness        HPI:  Chief Complaint   Patient presents with    Fall       Arrived via [facility], fall last night, fell out of wheelchair, forehead first impact to bed frame.  LOC unknown, on floor for 1 hour.       This patient is a 73-year-old female who arrived by ambulance after a fall last night, patient states that she fell out of her wheelchair falling onto her forehead, unknown LOC and states that she was on the floor for about an hour.  Patient states that her sister and her son take care for and that she has home health but she is home most of the day alone.  Patient has a moderate hematoma on her forehead.  Patient states she has been weak for the past week and has not really gotten out of bed much but she can get up and walk with help.  She is normally in her wheelchair.  Patient recently admitted to [facility] for extended rehab.  Upon being home, patient reports having falls.  She does have family that checks on her periodically during the daytime but does stay home alone.  Due to recent multiple falls, patient will be admitted for further treatment of her lower extremity weakness.    Overview/Hospital Course:  01/11/2024:  Notified by nursing staff this a.m. she did participate with OT earlier this a.m. but was unable to perform PT due to headache/nausea.  Patient reports BM earlier.  On rounds, she continues to report headache with nausea    01/12/2024:  Patient sitting in bedside recliner performing neb treatment.  She did participate with PT/OT earlier today.   and I discussed with patient/sister at bedside about [facility] placement on  discharge    1/13/24-No new issues today.  Waiting for placement.  Will continue with therapy.    1/14/24-No issues at this time.  Will continue with therapy until she is able to be placed.    Interval History:     Review of Systems   Constitutional:  Positive for activity change and fatigue.   HENT: Negative.     Eyes: Negative.    Respiratory: Negative.     Cardiovascular: Negative.    Gastrointestinal: Negative.    Endocrine: Negative.    Genitourinary: Negative.    Musculoskeletal:  Positive for gait problem.   Skin: Negative.    Allergic/Immunologic: Negative.    Neurological:  Positive for weakness.   Hematological: Negative.    Psychiatric/Behavioral:  Positive for confusion.      Objective:     Vital Signs (Most Recent):  Temp: 98.2 °F (36.8 °C) (01/14/24 0814)  Pulse: 63 (01/14/24 0814)  Resp: 18 (01/14/24 0814)  BP: (!) 195/79 (01/14/24 0814)  SpO2: (!) 92 % (01/14/24 0814) Vital Signs (24h Range):  Temp:  [98.2 °F (36.8 °C)-98.5 °F (36.9 °C)] 98.2 °F (36.8 °C)  Pulse:  [60-86] 63  Resp:  [17-20] 18  SpO2:  [83 %-95 %] 92 %  BP: (145-195)/(56-85) 195/79     Weight: 42.4 kg (93 lb 7.6 oz)  Body mass index is 16.56 kg/m².    Intake/Output Summary (Last 24 hours) at 1/14/2024 1206  Last data filed at 1/13/2024 1700  Gross per 24 hour   Intake 240 ml   Output --   Net 240 ml         Physical Exam  Constitutional:       Appearance: She is underweight.   HENT:      Head: Normocephalic and atraumatic.      Nose: Nose normal.      Mouth/Throat:      Mouth: Mucous membranes are moist.      Pharynx: Oropharynx is clear.   Eyes:      Extraocular Movements: Extraocular movements intact.      Conjunctiva/sclera: Conjunctivae normal.      Pupils: Pupils are equal, round, and reactive to light.   Cardiovascular:      Rate and Rhythm: Normal rate and regular rhythm.      Pulses: Normal pulses.      Heart sounds: Normal heart sounds.   Pulmonary:      Effort: Pulmonary effort is normal.      Breath sounds: Normal breath  "sounds.   Abdominal:      General: Abdomen is flat. Bowel sounds are normal.   Musculoskeletal:         General: Normal range of motion.      Cervical back: Normal range of motion and neck supple.   Skin:     General: Skin is warm and dry.      Capillary Refill: Capillary refill takes 2 to 3 seconds.   Neurological:      General: No focal deficit present.      Mental Status: She is alert. Mental status is at baseline.   Psychiatric:         Mood and Affect: Mood normal.         Behavior: Behavior normal.         Thought Content: Thought content normal.         Judgment: Judgment normal.             Significant Labs: All pertinent labs within the past 24 hours have been reviewed.  BMP: No results for input(s): "GLU", "NA", "K", "CL", "CO2", "BUN", "CREATININE", "CALCIUM", "MG" in the last 48 hours.  CBC: No results for input(s): "WBC", "HGB", "HCT", "PLT" in the last 48 hours.  CMP: No results for input(s): "NA", "K", "CL", "CO2", "GLU", "BUN", "CREATININE", "CALCIUM", "PROT", "ALBUMIN", "BILITOT", "ALKPHOS", "AST", "ALT", "ANIONGAP", "EGFRNONAA" in the last 48 hours.    Invalid input(s): "ESTGFAFRICA"  Magnesium: No results for input(s): "MG" in the last 48 hours.    Significant Imaging: I have reviewed all pertinent imaging results/findings within the past 24 hours.    Assessment/Plan:      * Weakness  PT/OT following along with patient         Moderate episode of recurrent major depressive disorder  Continue Zoloft 100 mg q.day  Patient seen by Psychiatry, Higher level of services for psychiatric care does not appear to be appropriate this time.       Hypothyroidism  Continue home medication  TSH 11/14/2023 within normal limits      Essential hypertension  Chronic, controlled. Latest blood pressure and vitals reviewed-     Temp:  [98.4 °F (36.9 °C)-99.8 °F (37.7 °C)]   Pulse:  [60-68]   Resp:  [16-18]   BP: (105-189)/(55-73)   SpO2:  [83 %-93 %] .   Home meds for hypertension were reviewed and noted below. "   Hypertension Medications               hydrALAZINE (APRESOLINE) 100 MG tablet Take 1 tablet (100 mg total) by mouth 3 (three) times daily.    lisinopriL (PRINIVIL,ZESTRIL) 20 MG tablet Take 1 tablet (20 mg total) by mouth 2 (two) times daily.    metoprolol succinate (TOPROL-XL) 50 MG 24 hr tablet Take 25 mg by mouth once daily.    nitroGLYCERIN 0.4 MG/DOSE TL SPRY (NITROLINGUAL) 400 mcg/spray spray nitroglycerin 400 mcg/spray translingual aerosol   at onset of chest pain may take up to three sprays every 5min during a 15 min period            While in the hospital, will manage blood pressure as follows; Continue home antihypertensive regimen    Will utilize p.r.n. blood pressure medication only if patient's blood pressure greater than 180/110 and she develops symptoms such as worsening chest pain or shortness of breath.      VTE Risk Mitigation (From admission, onward)           Ordered     IP VTE LOW RISK PATIENT  Once         01/09/24 1743                  Therapy  OOB  Waiting for placement  Discharge Planning   SUMMER:      Code Status: Full Code   Is the patient medically ready for discharge?:     Reason for patient still in hospital (select all that apply): Patient trending condition, Treatment, PT / OT recommendations, and Pending disposition  Discharge Plan A: (P) Skilled Nursing Facility                  Arnold Gonsalez MD  Department of Hospital Medicine   [facility] - Medical Surgical Unit

## 2024-01-14 NOTE — SUBJECTIVE & OBJECTIVE
Interval History:     Review of Systems   Constitutional:  Positive for activity change and fatigue.   HENT: Negative.     Eyes: Negative.    Respiratory: Negative.     Cardiovascular: Negative.    Gastrointestinal: Negative.    Endocrine: Negative.    Genitourinary: Negative.    Musculoskeletal:  Positive for gait problem.   Skin: Negative.    Allergic/Immunologic: Negative.    Neurological:  Positive for weakness.   Hematological: Negative.    Psychiatric/Behavioral:  Positive for confusion.      Objective:     Vital Signs (Most Recent):  Temp: 98.2 °F (36.8 °C) (01/14/24 0814)  Pulse: 63 (01/14/24 0814)  Resp: 18 (01/14/24 0814)  BP: (!) 195/79 (01/14/24 0814)  SpO2: (!) 92 % (01/14/24 0814) Vital Signs (24h Range):  Temp:  [98.2 °F (36.8 °C)-98.5 °F (36.9 °C)] 98.2 °F (36.8 °C)  Pulse:  [60-86] 63  Resp:  [17-20] 18  SpO2:  [83 %-95 %] 92 %  BP: (145-195)/(56-85) 195/79     Weight: 42.4 kg (93 lb 7.6 oz)  Body mass index is 16.56 kg/m².    Intake/Output Summary (Last 24 hours) at 1/14/2024 1206  Last data filed at 1/13/2024 1700  Gross per 24 hour   Intake 240 ml   Output --   Net 240 ml         Physical Exam  Constitutional:       Appearance: She is underweight.   HENT:      Head: Normocephalic and atraumatic.      Nose: Nose normal.      Mouth/Throat:      Mouth: Mucous membranes are moist.      Pharynx: Oropharynx is clear.   Eyes:      Extraocular Movements: Extraocular movements intact.      Conjunctiva/sclera: Conjunctivae normal.      Pupils: Pupils are equal, round, and reactive to light.   Cardiovascular:      Rate and Rhythm: Normal rate and regular rhythm.      Pulses: Normal pulses.      Heart sounds: Normal heart sounds.   Pulmonary:      Effort: Pulmonary effort is normal.      Breath sounds: Normal breath sounds.   Abdominal:      General: Abdomen is flat. Bowel sounds are normal.   Musculoskeletal:         General: Normal range of motion.      Cervical back: Normal range of motion and neck  "supple.   Skin:     General: Skin is warm and dry.      Capillary Refill: Capillary refill takes 2 to 3 seconds.   Neurological:      General: No focal deficit present.      Mental Status: She is alert. Mental status is at baseline.   Psychiatric:         Mood and Affect: Mood normal.         Behavior: Behavior normal.         Thought Content: Thought content normal.         Judgment: Judgment normal.             Significant Labs: All pertinent labs within the past 24 hours have been reviewed.  BMP: No results for input(s): "GLU", "NA", "K", "CL", "CO2", "BUN", "CREATININE", "CALCIUM", "MG" in the last 48 hours.  CBC: No results for input(s): "WBC", "HGB", "HCT", "PLT" in the last 48 hours.  CMP: No results for input(s): "NA", "K", "CL", "CO2", "GLU", "BUN", "CREATININE", "CALCIUM", "PROT", "ALBUMIN", "BILITOT", "ALKPHOS", "AST", "ALT", "ANIONGAP", "EGFRNONAA" in the last 48 hours.    Invalid input(s): "ESTGFAFRICA"  Magnesium: No results for input(s): "MG" in the last 48 hours.    Significant Imaging: I have reviewed all pertinent imaging results/findings within the past 24 hours.  "

## 2024-01-15 PROCEDURE — 25000242 PHARM REV CODE 250 ALT 637 W/ HCPCS: Performed by: FAMILY MEDICINE

## 2024-01-15 PROCEDURE — 25000003 PHARM REV CODE 250: Performed by: FAMILY MEDICINE

## 2024-01-15 PROCEDURE — 94761 N-INVAS EAR/PLS OXIMETRY MLT: CPT

## 2024-01-15 PROCEDURE — 11000001 HC ACUTE MED/SURG PRIVATE ROOM

## 2024-01-15 PROCEDURE — 94640 AIRWAY INHALATION TREATMENT: CPT

## 2024-01-15 PROCEDURE — 27000221 HC OXYGEN, UP TO 24 HOURS

## 2024-01-15 RX ADMIN — LISINOPRIL 20 MG: 20 TABLET ORAL at 09:01

## 2024-01-15 RX ADMIN — METOPROLOL SUCCINATE 25 MG: 25 TABLET, EXTENDED RELEASE ORAL at 08:01

## 2024-01-15 RX ADMIN — HYDRALAZINE HYDROCHLORIDE 100 MG: 25 TABLET ORAL at 03:01

## 2024-01-15 RX ADMIN — SERTRALINE HYDROCHLORIDE 100 MG: 50 TABLET ORAL at 08:01

## 2024-01-15 RX ADMIN — HYDRALAZINE HYDROCHLORIDE 100 MG: 25 TABLET ORAL at 09:01

## 2024-01-15 RX ADMIN — ATORVASTATIN CALCIUM 10 MG: 10 TABLET, FILM COATED ORAL at 09:01

## 2024-01-15 RX ADMIN — IPRATROPIUM BROMIDE AND ALBUTEROL SULFATE 3 ML: 2.5; .5 SOLUTION RESPIRATORY (INHALATION) at 05:01

## 2024-01-15 RX ADMIN — AMIODARONE HYDROCHLORIDE 200 MG: 100 TABLET ORAL at 08:01

## 2024-01-15 RX ADMIN — POLYETHYLENE GLYCOL 3350 17 G: 17 POWDER, FOR SOLUTION ORAL at 08:01

## 2024-01-15 RX ADMIN — MONTELUKAST 10 MG: 10 TABLET, FILM COATED ORAL at 09:01

## 2024-01-15 RX ADMIN — Medication 6 MG: at 09:01

## 2024-01-15 RX ADMIN — AMIODARONE HYDROCHLORIDE 200 MG: 100 TABLET ORAL at 09:01

## 2024-01-15 RX ADMIN — LISINOPRIL 20 MG: 20 TABLET ORAL at 08:01

## 2024-01-15 RX ADMIN — IPRATROPIUM BROMIDE AND ALBUTEROL SULFATE 3 ML: 2.5; .5 SOLUTION RESPIRATORY (INHALATION) at 12:01

## 2024-01-15 RX ADMIN — ONDANSETRON 4 MG: 4 TABLET ORAL at 09:01

## 2024-01-15 RX ADMIN — HYDRALAZINE HYDROCHLORIDE 100 MG: 25 TABLET ORAL at 08:01

## 2024-01-15 RX ADMIN — LEVOTHYROXINE SODIUM 25 MCG: 0.03 TABLET ORAL at 05:01

## 2024-01-15 RX ADMIN — IPRATROPIUM BROMIDE AND ALBUTEROL SULFATE 3 ML: 2.5; .5 SOLUTION RESPIRATORY (INHALATION) at 11:01

## 2024-01-15 RX ADMIN — POLYETHYLENE GLYCOL 3350 17 G: 17 POWDER, FOR SOLUTION ORAL at 09:01

## 2024-01-15 RX ADMIN — IPRATROPIUM BROMIDE AND ALBUTEROL SULFATE 3 ML: 2.5; .5 SOLUTION RESPIRATORY (INHALATION) at 06:01

## 2024-01-15 NOTE — SUBJECTIVE & OBJECTIVE
Interval History:     Review of Systems   Constitutional:  Positive for activity change and fatigue.   HENT: Negative.     Eyes: Negative.    Respiratory: Negative.     Cardiovascular: Negative.    Gastrointestinal: Negative.    Endocrine: Negative.    Genitourinary: Negative.    Musculoskeletal:  Positive for gait problem.   Skin: Negative.    Allergic/Immunologic: Negative.    Neurological:  Positive for weakness.   Hematological: Negative.    Psychiatric/Behavioral: Negative.       Objective:     Vital Signs (Most Recent):  Temp: 98.5 °F (36.9 °C) (01/15/24 1207)  Pulse: 67 (01/15/24 1207)  Resp: 18 (01/15/24 1207)  BP: (!) 183/65 (01/15/24 1207)  SpO2: (!) 92 % (01/15/24 1207) Vital Signs (24h Range):  Temp:  [98.1 °F (36.7 °C)-98.6 °F (37 °C)] 98.5 °F (36.9 °C)  Pulse:  [57-69] 67  Resp:  [12-22] 18  SpO2:  [91 %-96 %] 92 %  BP: (157-185)/(59-75) 183/65     Weight: 42.4 kg (93 lb 7.6 oz)  Body mass index is 16.56 kg/m².    Intake/Output Summary (Last 24 hours) at 1/15/2024 1256  Last data filed at 1/15/2024 0816  Gross per 24 hour   Intake 600 ml   Output --   Net 600 ml         Physical Exam  Constitutional:       Appearance: She is underweight.   HENT:      Head: Normocephalic and atraumatic.      Nose: Nose normal.      Mouth/Throat:      Mouth: Mucous membranes are moist.      Pharynx: Oropharynx is clear.   Eyes:      Extraocular Movements: Extraocular movements intact.      Conjunctiva/sclera: Conjunctivae normal.      Pupils: Pupils are equal, round, and reactive to light.   Cardiovascular:      Rate and Rhythm: Normal rate and regular rhythm.      Pulses: Normal pulses.      Heart sounds: Normal heart sounds.   Pulmonary:      Effort: Pulmonary effort is normal.      Breath sounds: Normal breath sounds.   Abdominal:      General: Bowel sounds are normal.      Palpations: Abdomen is soft.   Musculoskeletal:         General: Normal range of motion.      Cervical back: Normal range of motion and neck  "supple.   Skin:     General: Skin is warm and dry.      Findings: Bruising present.   Neurological:      General: No focal deficit present.      Mental Status: She is alert. Mental status is at baseline.   Psychiatric:         Attention and Perception: Attention normal.         Mood and Affect: Affect is labile and flat.         Speech: Speech normal.         Behavior: Behavior is withdrawn.         Cognition and Memory: Cognition is impaired. Memory is impaired.             Significant Labs: All pertinent labs within the past 24 hours have been reviewed.  BMP: No results for input(s): "GLU", "NA", "K", "CL", "CO2", "BUN", "CREATININE", "CALCIUM", "MG" in the last 48 hours.  CBC: No results for input(s): "WBC", "HGB", "HCT", "PLT" in the last 48 hours.  CMP: No results for input(s): "NA", "K", "CL", "CO2", "GLU", "BUN", "CREATININE", "CALCIUM", "PROT", "ALBUMIN", "BILITOT", "ALKPHOS", "AST", "ALT", "ANIONGAP", "EGFRNONAA" in the last 48 hours.    Invalid input(s): "ESTGFAFRICA"  Magnesium: No results for input(s): "MG" in the last 48 hours.    Significant Imaging: I have reviewed all pertinent imaging results/findings within the past 24 hours.  "

## 2024-01-15 NOTE — PT/OT/SLP PROGRESS
"Name: Cierra Main    : 1950 (73 y.o.)  MRN: [id number]           Patient Not Seen      Patient unable to be seen at this time secondary to: pt reports she was just able to fall asleep an dnow I woke her up. Says she is just too burned out to do anything today.  Says "tomorrow is a new day so we'll see".   Will return tomorrow for attempted treatments. RN updated and aware, will try to get out of bed to chair for next meal.       "

## 2024-01-15 NOTE — PROGRESS NOTES
[facility], [place], operated by [facility]  [facility] Medicine  Progress Note    Patient Name: Cierra Main  MRN: [id number]  Patient Class: IP- Inpatient   Admission Date: 1/9/2024  Length of Stay: 6 days  Attending Physician: Rafiq Eller MD  Primary Care Provider: Rafiq Eller MD        Subjective:     Principal Problem:Weakness        HPI:  Chief Complaint   Patient presents with    Fall       Arrived via [facility], fall last night, fell out of wheelchair, forehead first impact to bed frame.  LOC unknown, on floor for 1 hour.       This patient is a 73-year-old female who arrived by ambulance after a fall last night, patient states that she fell out of her wheelchair falling onto her forehead, unknown LOC and states that she was on the floor for about an hour.  Patient states that her sister and her son take care for and that she has home health but she is home most of the day alone.  Patient has a moderate hematoma on her forehead.  Patient states she has been weak for the past week and has not really gotten out of bed much but she can get up and walk with help.  She is normally in her wheelchair.  Patient recently admitted to [facility] for extended rehab.  Upon being home, patient reports having falls.  She does have family that checks on her periodically during the daytime but does stay home alone.  Due to recent multiple falls, patient will be admitted for further treatment of her lower extremity weakness.    Overview/Hospital Course:  01/11/2024:  Notified by nursing staff this a.m. she did participate with OT earlier this a.m. but was unable to perform PT due to headache/nausea.  Patient reports BM earlier.  On rounds, she continues to report headache with nausea    01/12/2024:  Patient sitting in bedside recliner performing neb treatment.  She did participate with PT/OT earlier today.   and I discussed with patient/sister at bedside about [facility] placement on  discharge    1/13/24-No new issues today.  Waiting for placement.  Will continue with therapy.    1/14/24-No issues at this time.  Will continue with therapy until she is able to be placed.    1/15/24-Patient is resting in the bed in no distress.  Family at bedside has questions about her lab work from several days ago mainly the LFTS which are only slightly elevated.  She was concerned they were causing her discoloration of her skin.  The area in question is from trauma from previous fall and is just bruising and not jaundice.  Also re enforced the need for the patient to help more and be willing to get better.  She refused therapy this morning.    Interval History:     Review of Systems   Constitutional:  Positive for activity change and fatigue.   HENT: Negative.     Eyes: Negative.    Respiratory: Negative.     Cardiovascular: Negative.    Gastrointestinal: Negative.    Endocrine: Negative.    Genitourinary: Negative.    Musculoskeletal:  Positive for gait problem.   Skin: Negative.    Allergic/Immunologic: Negative.    Neurological:  Positive for weakness.   Hematological: Negative.    Psychiatric/Behavioral: Negative.       Objective:     Vital Signs (Most Recent):  Temp: 98.5 °F (36.9 °C) (01/15/24 1207)  Pulse: 67 (01/15/24 1207)  Resp: 18 (01/15/24 1207)  BP: (!) 183/65 (01/15/24 1207)  SpO2: (!) 92 % (01/15/24 1207) Vital Signs (24h Range):  Temp:  [98.1 °F (36.7 °C)-98.6 °F (37 °C)] 98.5 °F (36.9 °C)  Pulse:  [57-69] 67  Resp:  [12-22] 18  SpO2:  [91 %-96 %] 92 %  BP: (157-185)/(59-75) 183/65     Weight: 42.4 kg (93 lb 7.6 oz)  Body mass index is 16.56 kg/m².    Intake/Output Summary (Last 24 hours) at 1/15/2024 1256  Last data filed at 1/15/2024 0816  Gross per 24 hour   Intake 600 ml   Output --   Net 600 ml         Physical Exam  Constitutional:       Appearance: She is underweight.   HENT:      Head: Normocephalic and atraumatic.      Nose: Nose normal.      Mouth/Throat:      Mouth: Mucous membranes  "are moist.      Pharynx: Oropharynx is clear.   Eyes:      Extraocular Movements: Extraocular movements intact.      Conjunctiva/sclera: Conjunctivae normal.      Pupils: Pupils are equal, round, and reactive to light.   Cardiovascular:      Rate and Rhythm: Normal rate and regular rhythm.      Pulses: Normal pulses.      Heart sounds: Normal heart sounds.   Pulmonary:      Effort: Pulmonary effort is normal.      Breath sounds: Normal breath sounds.   Abdominal:      General: Bowel sounds are normal.      Palpations: Abdomen is soft.   Musculoskeletal:         General: Normal range of motion.      Cervical back: Normal range of motion and neck supple.   Skin:     General: Skin is warm and dry.      Findings: Bruising present.   Neurological:      General: No focal deficit present.      Mental Status: She is alert. Mental status is at baseline.   Psychiatric:         Attention and Perception: Attention normal.         Mood and Affect: Affect is labile and flat.         Speech: Speech normal.         Behavior: Behavior is withdrawn.         Cognition and Memory: Cognition is impaired. Memory is impaired.             Significant Labs: All pertinent labs within the past 24 hours have been reviewed.  BMP: No results for input(s): "GLU", "NA", "K", "CL", "CO2", "BUN", "CREATININE", "CALCIUM", "MG" in the last 48 hours.  CBC: No results for input(s): "WBC", "HGB", "HCT", "PLT" in the last 48 hours.  CMP: No results for input(s): "NA", "K", "CL", "CO2", "GLU", "BUN", "CREATININE", "CALCIUM", "PROT", "ALBUMIN", "BILITOT", "ALKPHOS", "AST", "ALT", "ANIONGAP", "EGFRNONAA" in the last 48 hours.    Invalid input(s): "ESTGFAFRICA"  Magnesium: No results for input(s): "MG" in the last 48 hours.    Significant Imaging: I have reviewed all pertinent imaging results/findings within the past 24 hours.    Assessment/Plan:      * Weakness  PT/OT following along with patient         Moderate episode of recurrent major depressive " disorder  Continue Zoloft 100 mg q.day  Patient seen by Psychiatry, Higher level of services for psychiatric care does not appear to be appropriate this time.       Hypothyroidism  Continue home medication  TSH 11/14/2023 within normal limits      Essential hypertension  Chronic, controlled. Latest blood pressure and vitals reviewed-     Temp:  [98.4 °F (36.9 °C)-99.8 °F (37.7 °C)]   Pulse:  [60-68]   Resp:  [16-18]   BP: (105-189)/(55-73)   SpO2:  [83 %-93 %] .   Home meds for hypertension were reviewed and noted below.   Hypertension Medications               hydrALAZINE (APRESOLINE) 100 MG tablet Take 1 tablet (100 mg total) by mouth 3 (three) times daily.    lisinopriL (PRINIVIL,ZESTRIL) 20 MG tablet Take 1 tablet (20 mg total) by mouth 2 (two) times daily.    metoprolol succinate (TOPROL-XL) 50 MG 24 hr tablet Take 25 mg by mouth once daily.    nitroGLYCERIN 0.4 MG/DOSE TL SPRY (NITROLINGUAL) 400 mcg/spray spray nitroglycerin 400 mcg/spray translingual aerosol   at onset of chest pain may take up to three sprays every 5min during a 15 min period            While in the hospital, will manage blood pressure as follows; Continue home antihypertensive regimen    Will utilize p.r.n. blood pressure medication only if patient's blood pressure greater than 180/110 and she develops symptoms such as worsening chest pain or shortness of breath.      VTE Risk Mitigation (From admission, onward)           Ordered     IP VTE LOW RISK PATIENT  Once         01/09/24 0013                  Therapy  DVT prophylaxis  OOB to chair TID  Labs as needed  Discharge Planning   SUMMRE:      Code Status: Full Code   Is the patient medically ready for discharge?:     Reason for patient still in hospital (select all that apply): Patient trending condition, Laboratory test, Treatment, PT / OT recommendations, and Pending disposition  Discharge Plan A: (P) Skilled Nursing Facility                  Arnold Gonsalez MD  Department of Hospital  Medicine   [facility] - [facility] Surgical Unit

## 2024-01-15 NOTE — PT/OT/SLP PROGRESS
"Name: Cierra Main    : 1950 (73 y.o.)  MRN: [id number]           Patient Not Seen      Patient unable to be seen at this time secondary to: pt refusal at this time due to nausea and feeling too tired. Requests return later but states, "probably wont want to do anythign then as well, feel way too tired for anythign at this time."         "

## 2024-01-15 NOTE — PROGRESS NOTES
Inpatient Nutrition Assessment    Admit Date: 1/9/2024   Total duration of encounter: 6 days   Patient Age: 73 y.o.    Nutrition Recommendation/Prescription     Continue 2 gm Na+ diet as tolerated.   Continue Boost (vanilla) TID. (Provides 240 kcal and 10 gm per serving)  Weigh daily  Monitor appetite and intake    Communication of Recommendations: reviewed with patient    Nutrition Assessment     Malnutrition Assessment/Nutrition-Focused Physical Exam    Malnutrition Context: chronic illness (01/10/24 1526)  Malnutrition Level: moderate (01/10/24 1526)  Energy Intake (Malnutrition): less than 75% for greater than 7 days (01/10/24 1526)     Subcutaneous Fat (Malnutrition): moderate depletion (01/10/24 1526)  Orbital Region (Subcutaneous Fat Loss): moderate depletion  Upper Arm Region (Subcutaneous Fat Loss): moderate depletion     Muscle Mass (Malnutrition): moderate depletion (01/10/24 1526)  Latter-day Region (Muscle Loss): moderate depletion  Clavicle Bone Region (Muscle Loss): moderate depletion  Clavicle and Acromion Bone Region (Muscle Loss): moderate depletion                          A minimum of two characteristics is recommended for diagnosis of either severe or non-severe malnutrition.    Chart Review    Reason Seen: follow-up    Malnutrition Screening Tool Results   Have you recently lost weight without trying?: No  Have you been eating poorly because of a decreased appetite?: No   MST Score: 0   Diagnosis:  Weakness, moderate episode of recurrent major depressive d/o, hypothyroidism, HTN    Relevant Medical History: acute MI, anxiety, cardiac dis, COPD, CAD, HLD, HTN, hypothyroidism, non-infective gastroenteritis & colitis, macular degeneration      Scheduled Medications:  albuterol-ipratropium, 3 mL, Q6H  amiodarone, 200 mg, BID  atorvastatin, 10 mg, QHS  hydrALAZINE, 100 mg, TID  levothyroxine, 25 mcg, Before breakfast  lisinopriL, 20 mg, BID  metoprolol succinate, 25 mg, Daily  montelukast, 10 mg,  "QHS  polyethylene glycol, 17 g, BID  sertraline, 100 mg, Daily    Continuous Infusions:   PRN Medications: acetaminophen, docusate sodium, melatonin, ondansetron, sodium chloride 0.9%    Calorie Containing IV Medications: no significant kcals from medications at this time    Recent Labs   Lab 01/09/24  1227 01/09/24  1255 01/12/24  0427   NA  --  141 137   K  --  3.6 4.5   CALCIUM  --  9.0 8.8   CHLORIDE  --  105 105   CO2  --  26 26   BUN  --  13.0 14.0   CREATININE  --  0.70 0.71   EGFRNORACEVR  --  >60 >60   GLUCOSE  --  109 100   BILITOT  --  0.5 0.3   ALKPHOS  --  90 98   ALT  --  74* 64*   AST  --  74* 52*   ALBUMIN  --  2.6* 2.3*   WBC 7.88  --  6.94   HGB 12.7  --  11.3*   HCT 38.8  --  35.8*       Nutrition Orders:  Diet Low Sodium, 2gm      Appetite/Oral Intake: fair/50-75% of meals  Factors Affecting Nutritional Intake: decreased appetite and prefers small meals  Food/Presybeterian/Cultural Preferences: none reported  Food Allergies: none reported  Last Bowel Movement: 01/15/24  Wound(s):  Intact    Comments    (1/10) Pt reported poor intake over past few days, however, her intake and appetite has improved somewhat. Pt did consume 75% of lunch today. She has never really been a big eater. Prefers small meals. Pt agreeable to try Boost due to underweight status. Denied N/V/D/C. No difficulties chewing or swallowing. Able to feed self with set-up assist. Stated UBW to be around 90#. Reported no recent weight change. RDN consulted for wound healing, however, skin is intact at this time.     (1/12) Pt with continued fair appetite and intake. Consumes 50% of meals. No N/V/C/D.     (1/15) Pt with fair appetite and intake (which is her normal intake at home). Denied N/V/C/D. Also receives Boost Plus TID and is drinking some. Pt currently awaiting placement.      Anthropometrics    Height: 5' 3" (160 cm), Height Method: Stated  Last Weight: 42.4 kg (93 lb 7.6 oz) (01/09/24 3128), Weight Method: Bed Scale  BMI " (Calculated): 16.6  BMI Classification: underweight (BMI less than 18.5)     Ideal Body Weight (IBW), Female: 115 lb     % Ideal Body Weight, Female (lb): 81.29 %                             Usual Weight Provided By: patient    Wt Readings from Last 5 Encounters:   01/09/24 42.4 kg (93 lb 7.6 oz)   12/29/23 41.7 kg (92 lb)   12/05/23 41.7 kg (92 lb)   11/16/23 43.8 kg (96 lb 9 oz)   10/01/23 40.8 kg (90 lb)     Weight Change(s) Since Admission: new admit  Wt Readings from Last 1 Encounters:   01/09/24 1747 42.4 kg (93 lb 7.6 oz)   01/09/24 1200 40.8 kg (90 lb)   Admit Weight: 40.8 kg (90 lb) (01/09/24 1200), Weight Method: Stated    Estimated Needs    Weight Used For Calorie Calculations: 42.4 kg (93 lb 7.6 oz)  Energy Calorie Requirements (kcal): 1484 kcal (35 kcal/kg)  Energy Need Method: Kcal/kg  Weight Used For Protein Calculations: 42.4 kg (93 lb 7.6 oz)  Protein Requirements: 85 gm (2 gm/kg)       Enteral Nutrition     Patient not receiving enteral nutrition at this time.    Parenteral Nutrition     Patient not receiving parenteral nutrition support at this time.    Evaluation of Received Nutrient Intake    Calories: not meeting estimated needs  Protein: not meeting estimated needs    Patient Education     Not applicable.    Nutrition Diagnosis     PES: Inadequate energy intake related to chronic illness as evidenced by poor intake PTA and underweight status BMI 16.6. (active)     PES: Moderate chronic disease or condition related malnutrition related to inability to consume sufficient nutrients as evidenced by less than 75% needs met for greater than 7 days, moderate fat depletion, and moderate muscle depletion. (active)    Nutrition Interventions     Intervention(s): general/healthful diet, commercial beverage, and collaboration with other providers    Goal: Meet greater than 80% of nutritional needs by follow-up. (goal progressing)  Goal: Maintain weight throughout hospitalization. (goal  progressing)    Nutrition Goals & Monitoring     Dietitian will monitor: energy intake, weight, electrolyte/renal panel, glucose/endocrine profile, and gastrointestinal profile    Nutrition Risk/Follow-Up: high (follow-up in 1-4 days)   Please consult if re-assessment needed sooner.

## 2024-01-16 VITALS
BODY MASS INDEX: 16.57 KG/M2 | OXYGEN SATURATION: 94 % | RESPIRATION RATE: 15 BRPM | SYSTOLIC BLOOD PRESSURE: 116 MMHG | DIASTOLIC BLOOD PRESSURE: 59 MMHG | TEMPERATURE: 98 F | HEIGHT: 63 IN | WEIGHT: 93.5 LBS | HEART RATE: 66 BPM

## 2024-01-16 PROCEDURE — 97530 THERAPEUTIC ACTIVITIES: CPT

## 2024-01-16 PROCEDURE — 97535 SELF CARE MNGMENT TRAINING: CPT

## 2024-01-16 PROCEDURE — 94761 N-INVAS EAR/PLS OXIMETRY MLT: CPT

## 2024-01-16 PROCEDURE — 94640 AIRWAY INHALATION TREATMENT: CPT

## 2024-01-16 PROCEDURE — 25000003 PHARM REV CODE 250: Performed by: FAMILY MEDICINE

## 2024-01-16 PROCEDURE — 27000221 HC OXYGEN, UP TO 24 HOURS

## 2024-01-16 PROCEDURE — 99238 HOSP IP/OBS DSCHRG MGMT 30/<: CPT | Mod: ,,, | Performed by: FAMILY MEDICINE

## 2024-01-16 PROCEDURE — 25000242 PHARM REV CODE 250 ALT 637 W/ HCPCS: Performed by: FAMILY MEDICINE

## 2024-01-16 PROCEDURE — 1111F DSCHRG MED/CURRENT MED MERGE: CPT | Mod: CPTII,,, | Performed by: FAMILY MEDICINE

## 2024-01-16 RX ORDER — POLYETHYLENE GLYCOL 3350 17 G/17G
17 POWDER, FOR SOLUTION ORAL 2 TIMES DAILY PRN
Start: 2024-01-16

## 2024-01-16 RX ADMIN — SERTRALINE HYDROCHLORIDE 100 MG: 50 TABLET ORAL at 08:01

## 2024-01-16 RX ADMIN — IPRATROPIUM BROMIDE AND ALBUTEROL SULFATE 3 ML: 2.5; .5 SOLUTION RESPIRATORY (INHALATION) at 12:01

## 2024-01-16 RX ADMIN — IPRATROPIUM BROMIDE AND ALBUTEROL SULFATE 3 ML: 2.5; .5 SOLUTION RESPIRATORY (INHALATION) at 06:01

## 2024-01-16 RX ADMIN — METOPROLOL SUCCINATE 25 MG: 25 TABLET, EXTENDED RELEASE ORAL at 08:01

## 2024-01-16 RX ADMIN — LISINOPRIL 20 MG: 20 TABLET ORAL at 08:01

## 2024-01-16 RX ADMIN — ACETAMINOPHEN 650 MG: 325 TABLET, FILM COATED ORAL at 12:01

## 2024-01-16 RX ADMIN — AMIODARONE HYDROCHLORIDE 200 MG: 100 TABLET ORAL at 08:01

## 2024-01-16 RX ADMIN — HYDRALAZINE HYDROCHLORIDE 100 MG: 25 TABLET ORAL at 08:01

## 2024-01-16 RX ADMIN — LEVOTHYROXINE SODIUM 25 MCG: 0.03 TABLET ORAL at 06:01

## 2024-01-16 NOTE — PROGRESS NOTES
01/16/24 1330   Final Note   Assessment Type Final Discharge Note   Anticipated Discharge Disposition Home-Health   What phone number can be called within the next 1-3 days to see how you are doing after discharge? [phone]   Hospital Resources/Appts/Education Provided Appointments scheduled and added to AVS   Post-Acute Status   Post-Acute Authorization Home Health   Home Health Status Set-up Complete/Auth obtained   Discharge Delays None known at this time     Referral sent to [facility]. Plan to go home with them today awaiting 142.

## 2024-01-16 NOTE — PT/OT/SLP DISCHARGE
Speech Language Pathology Discharge Summary    Cierra Main  MRN: [id number]   Weakness     Date of Last Treatment Session: eval only 01/11/24    Past Medical History:   Diagnosis Date    Acute MI     Anxiety     Cardiac disease     COPD (chronic obstructive pulmonary disease)     Coronary artery disease     Depression     Hyperlipidemia     Hypertension     Hypothyroidism     Other specified noninfective gastroenteritis and colitis     Unspecified macular degeneration        Status at initiation of therapy: mild confusion reported by staff, structured testing WF    Treatment Area(s):  Cognition    Goals:   Multidisciplinary Problems       SLP Goals       Not on file              Multidisciplinary Problems (Resolved)          Problem: SLP    Goal Priority Disciplines Outcome   SLP Goal   (Resolved)     SLP Met   Description: LTGs:  1. Pt will demonstrate recall and problems solving for adequate safety.     STGs:  1. Pt will participate in further assessment of cognitive skills.                        Participation in Treatment (at discharge):  Cooperative    Functional Status at time of Discharge:    Cognition: Patient demonstrates no cognitive deficits.    Communication: Patient demonstrates no communication deficits.    Language: Patient demonstrates no language deficits.    Motor Speech: Patient demonstrates no motor speech deficits.    Swallow: Patient demonstrates no dysphagia.    Patient is discharged to Home                 SLP educated Pt and family on findings of cognitive eval and recent report of improved confusion from staff. Pt plans to d/c home today.

## 2024-01-16 NOTE — PLAN OF CARE
Pt d/c from ST services. No further cognitive evaluation warranted after speaking with Therapy team, Pt, and family. Pt plans to d/c home today.

## 2024-01-16 NOTE — PT/OT/SLP PROGRESS
"         Physical Therapy Treatment Note           Name: Cierra Main    : 1950 (73 y.o.)  MRN: [id number]             Subjective Assessment:     No complaints x Lethargic   x Awake, alert, cooperative  Uncooperative    Agitated  c/o pain    Appropriate  c/o fatigue    Confused  Treated at bedside     Emotionally labile  Treated in gym/dept.    Impulsive  Other:   x Flat affect       Pain/Comfort:    Pain Rating 1: 0/10    Therapy Precautions:      Cognitive deficits  Spinal precautions    Collar - hard  Sternal precautions    Collar - soft   TLSO   x Fall risk  LSO    Hip precautions - posterior  Knee immobilizer    Hip precautions - anterior  WBAT    Impaired communication  Partial weightbearing   x Oxygen  TTWB    PEG tube  NWB    Visual deficits  Other:    Hearing deficits        BP taken :    Sitting 166/68, standing 127/61 and 111/63       Mobility Training:     Assist level Comments    Bed mobility     Sit to stand/stand to sit CGA 4 times with verbal cues for hand placement, foot placement and use of RW.   Transfers     Gait None today Patient verbalized "room spinning" and "gait will not be happening today"; see BP taken in sitting and standing.    Balance training CGA Static standing 3 x 30+ seconds   Wheelchair mobility     Car transfer     Other:          Therapeutic Exercise:     Exercise Sets Reps Comments   LAQ 3 15    Hip flexion 3 15                  Additional Treatment:    Patient requires constant encouragement for participation.  Decreased fear of falling.      Assessment:     Patient was found up in the recliner following OT session. More agreeable and seemed interested in trying to ambulate, however with initial attempts into standing, pt with reports of increased dizziness limiting further mobility. CGA with sit to stand and tolerated standing 30+seconds each attempt, however unable to encourage to ambulate despite BP reading in normal ranges. Pt still reporting "room spinning when " "returned to sitting in chair". Lunch tray arrived and therapists assisted setting patient up for meal. .    PT Plan:     Continue with POC. 6x/week.   GOALS:   Multidisciplinary Problems       Physical Therapy Goals          Problem: Physical Therapy    Goal Priority Disciplines Outcome Goal Variances Interventions   Physical Therapy Goal     PT, PT/OT Ongoing, Progressing     Description: Patient will increase functional independence with mobility by performin. Supine to sit with Modified [place]  2. Sit to supine with Modified [place]  3. Sit to stand transfer with Supervision  4. Gait  x 200 feet with Supervision using Rolling Walker.                        Discharge Recommendations:        Recommend either home with home health or SNF (pending 142 per  at this time.)    Discharge Equipment Recommendations:     none     At end of treatment, patient remained:     x Up in chair     In bed   x With alarm activated    Bed rails up   x Call bell in reach      Family/friends present     Restraints secured properly     In bathroom with CNA/RN notified     In gym with PT/PTA/tech     Nurse aware   x  Other:in reclined position with food tray across for lunch        PT Start Time: 1040     PT Stop Time: 1105  PT Total Time (min): 25 min     Billable Minutes: Therapeutic Activity 2024    "

## 2024-01-16 NOTE — DISCHARGE SUMMARY
[facility] - Medical Surgical Unit  [facility] Medicine  Discharge Summary      Patient Name: Cierra Main  MRN: [id number]  AVA: [id number]  Patient Class: IP- Inpatient  Admission Date: 1/9/2024  Hospital Length of Stay: 7 days  Discharge Date and Time:  01/16/2024 1:41 PM  Attending Physician: Rafiq Eller MD   Discharging Provider: Rafiq Eller MD  Primary Care Provider: Rafiq Eller MD    Primary Care Team: Networked reference to record PCT     HPI:   Chief Complaint   Patient presents with    Fall       Arrived via [facility], fall last night, fell out of wheelchair, forehead first impact to bed frame.  LOC unknown, on floor for 1 hour.       This patient is a 73-year-old female who arrived by ambulance after a fall last night, patient states that she fell out of her wheelchair falling onto her forehead, unknown LOC and states that she was on the floor for about an hour.  Patient states that her sister and her son take care for and that she has home health but she is home most of the day alone.  Patient has a moderate hematoma on her forehead.  Patient states she has been weak for the past week and has not really gotten out of bed much but she can get up and walk with help.  She is normally in her wheelchair.  Patient recently admitted to [facility] for extended rehab.  Upon being home, patient reports having falls.  She does have family that checks on her periodically during the daytime but does stay home alone.  Due to recent multiple falls, patient will be admitted for further treatment of her lower extremity weakness.    * No surgery found *      Hospital Course:   01/11/2024:  Notified by nursing staff this a.m. she did participate with OT earlier this a.m. but was unable to perform PT due to headache/nausea.  Patient reports BM earlier.  On rounds, she continues to report headache with nausea    01/12/2024:  Patient sitting in bedside recliner performing neb treatment.  She did  participate with PT/OT earlier today.   and I discussed with patient/sister at bedside about [facility] placement on discharge    1/13/24-No new issues today.  Waiting for placement.  Will continue with therapy.    1/14/24-No issues at this time.  Will continue with therapy until she is able to be placed.    1/15/24-Patient is resting in the bed in no distress.  Family at bedside has questions about her lab work from several days ago mainly the LFTS which are only slightly elevated.  She was concerned they were causing her discoloration of her skin.  The area in question is from trauma from previous fall and is just bruising and not jaundice.  Also re enforced the need for the patient to help more and be willing to get better.  She refused therapy this morning.    01/16/2024:  Patient resting in recliner, son at bedside.  Patient will be discharged home with [facility].  Awaiting further approval for placement at [facility].  Patient will be discharged home with son.  Patient will have someone with her at home 24 hours/day     Goals of Care Treatment Preferences:  Code Status: Full Code          Physical Exam  Constitutional:       Appearance: Normal appearance. She is underweight.   Cardiovascular:      Rate and Rhythm: Normal rate and regular rhythm.   Pulmonary:      Effort: Pulmonary effort is normal.      Breath sounds: Normal breath sounds.   Abdominal:      Palpations: Abdomen is soft.   Skin:     General: Skin is warm.      Comments: Contusion forehead healing   Neurological:      Mental Status: She is alert.   Psychiatric:         Mood and Affect: Mood normal.         Behavior: Behavior normal.         Thought Content: Thought content normal.         Judgment: Judgment normal.             Consults:   Consults (From admission, onward)          Status Ordering Provider     Inpatient consult to Psychiatry  Once        Provider:  Arron Haney MD    Acknowledged CAITIE PACK  consult to case management  Once        Provider:  (Not yet assigned)    Acknowledged RAFIQ ELLER     Inpatient consult to Registered Dietitian/Nutritionist  Once        Provider:  (Not yet assigned)    Completed RAFIQ ELLER            Psychiatric  Moderate episode of recurrent major depressive disorder  Continue Zoloft 100 mg q.day        Cardiac/Vascular  Essential hypertension  Continue home medication  Monitor BP with home health    Endocrine  Hypothyroidism  Continue home medication      Other  * Weakness  Patient will be discharged with [facility] home Health, PT/OT to follow along with patient          Final Active Diagnoses:    Diagnosis Date Noted POA    PRINCIPAL PROBLEM:  Weakness [R53.1] 01/10/2024 Unknown    Moderate episode of recurrent major depressive disorder [F33.1] 01/10/2024 Yes    Essential hypertension [I10] 01/15/2019 Yes     Chronic    Hypothyroidism [E03.9] 01/15/2019 Yes      Problems Resolved During this Admission:       Discharged Condition: Good    Disposition: Home or Self Care    Follow Up:   Follow-up Information       Rafiq Eller MD. Go in 1 week(s).    Specialty: Family Medicine  Contact information:  [address] [place] [address]  [phone]                           Patient Instructions:      Ambulatory referral/consult to Home Health   Standing Status: Future   Referral Priority: Routine Referral Type: Home Health   Referral Reason: Specialty Services Required   Requested Specialty: Home Health Services   Number of Visits Requested: 1     Diet Adult Regular     Activity as tolerated           Pending Diagnostic Studies:       None           Medications:  Reconciled Home Medications:      Medication List        CONTINUE taking these medications      amiodarone 200 MG Tab  Commonly known as: PACERONE  Take 200 mg by mouth 2 (two) times daily.     atorvastatin 10 MG tablet  Commonly known as: LIPITOR  Take 1 tablet (10 mg total) by mouth every evening.      docusate sodium 100 MG capsule  Commonly known as: COLACE  Take 100 mg by mouth 2 (two) times daily as needed.     hydrALAZINE 100 MG tablet  Commonly known as: APRESOLINE  Take 1 tablet (100 mg total) by mouth 3 (three) times daily.     levothyroxine 25 MCG tablet  Commonly known as: SYNTHROID  Take 1 tablet (25 mcg total) by mouth before breakfast.     lisinopriL 20 MG tablet  Commonly known as: PRINIVIL,ZESTRIL  Take 1 tablet (20 mg total) by mouth 2 (two) times daily.     melatonin 3 mg Cap  Take 1 tablet by mouth every evening.     metoprolol succinate 50 MG 24 hr tablet  Commonly known as: TOPROL-XL  Take 25 mg by mouth once daily.     montelukast 10 mg tablet  Commonly known as: SINGULAIR  Take 10 mg by mouth.     nitroGLYCERIN 0.4 MG/DOSE TL SPRY 400 mcg/spray spray  Commonly known as: NITROLINGUAL  nitroglycerin 400 mcg/spray translingual aerosol   at onset of chest pain may take up to three sprays every 5min during a 15 min period     polyethylene glycol 17 gram Pwpk  Commonly known as: GLYCOLAX  Take 17 g by mouth 2 (two) times daily as needed for Constipation.     sertraline 50 MG tablet  Commonly known as: ZOLOFT  Take 1 tablet (50 mg total) by mouth once daily.     traZODone 50 MG tablet  Commonly known as: DESYREL  Take 1 tablet (50 mg total) by mouth every evening.              Indwelling Lines/Drains at time of discharge:   Lines/Drains/Airways       None                   Time spent on the discharge of patient: 45 minutes         Rafiq Eller MD  Department of Hospital Medicine  [facility] - Medical Surgical Unit

## 2024-01-16 NOTE — PLAN OF CARE
Problem: Adult Inpatient Plan of Care  Goal: Plan of Care Review  1/16/2024 1436 by Adia Santacruz RN  Outcome: Met  1/16/2024 1023 by Adia Santacruz RN  Outcome: Ongoing, Progressing  Goal: Patient-Specific Goal (Individualized)  1/16/2024 1436 by Adia Santacruz RN  Outcome: Met  1/16/2024 1023 by Adia Santacruz RN  Outcome: Ongoing, Progressing  Goal: Absence of Hospital-Acquired Illness or Injury  1/16/2024 1436 by Adia Santacruz RN  Outcome: Met  1/16/2024 1023 by Adia Santacruz RN  Outcome: Ongoing, Progressing  Goal: Optimal Comfort and Wellbeing  1/16/2024 1436 by Adia Santacruz RN  Outcome: Met  1/16/2024 1023 by Adia Santacruz RN  Outcome: Ongoing, Progressing  Goal: Readiness for Transition of Care  1/16/2024 1436 by Adia Santacruz RN  Outcome: Met  1/16/2024 1023 by Adia Santacruz RN  Outcome: Ongoing, Progressing     Problem: Impaired Wound Healing  Goal: Optimal Wound Healing  1/16/2024 1436 by Adia Santacruz RN  Outcome: Met  1/16/2024 1023 by Adia Santacruz RN  Outcome: Ongoing, Progressing     Problem: Fall Injury Risk  Goal: Absence of Fall and Fall-Related Injury  1/16/2024 1436 by Adia Santacruz RN  Outcome: Met  1/16/2024 1023 by Adia Santacruz RN  Outcome: Ongoing, Progressing     Problem: Behavioral Health Comorbidity  Goal: Maintenance of Behavioral Health Symptom Control  1/16/2024 1436 by Adia Santacruz RN  Outcome: Met  1/16/2024 1023 by Adia Santacruz RN  Outcome: Ongoing, Progressing     Problem: COPD (Chronic Obstructive Pulmonary Disease) Comorbidity  Goal: Maintenance of COPD Symptom Control  1/16/2024 1436 by Adia Santacruz RN  Outcome: Met  1/16/2024 1023 by Adia Santacruz RN  Outcome: Ongoing, Progressing     Problem: Hypertension Comorbidity  Goal: Blood Pressure in Desired Range  1/16/2024 1436 by Adia Santacruz RN  Outcome: Met  1/16/2024 1023 by Portier, Adia, RN  Outcome: Ongoing, Progressing     Problem: Pain Acute  Goal: Acceptable  Pain Control and Functional Ability  1/16/2024 1436 by Adia Santacruz RN  Outcome: Met  1/16/2024 1023 by Adia Santacruz RN  Outcome: Ongoing, Progressing     Problem: Gas Exchange Impaired  Goal: Optimal Gas Exchange  1/16/2024 1436 by Adia Santacruz RN  Outcome: Met  1/16/2024 1023 by Adia Santacruz RN  Outcome: Ongoing, Progressing     Problem: Mobility Impairment  Goal: Optimal Mobility  1/16/2024 1436 by Adia Santacruz RN  Outcome: Met  1/16/2024 1023 by Adia Santacruz RN  Outcome: Ongoing, Progressing     Problem: Anxiety  Goal: Anxiety Reduction or Resolution  1/16/2024 1436 by Adia Santacruz RN  Outcome: Met  1/16/2024 1023 by Adia Santacruz RN  Outcome: Ongoing, Progressing     Problem: Skin Injury Risk Increased  Goal: Skin Health and Integrity  1/16/2024 1436 by Adia Santacruz RN  Outcome: Met  1/16/2024 1023 by Adia Santacruz RN  Outcome: Ongoing, Progressing

## 2024-01-16 NOTE — PROGRESS NOTES
01/16/24 0914   Discharge Reassessment   Assessment Type Discharge Planning Reassessment   Did the patient's condition or plan change since previous assessment? No   Discharge Plan discussed with: POA   Discharge Plan A Skilled Nursing Facility   Discharge Plan B New Nursing Home placement - [facility] care facility   DME Needed Upon Discharge  none   Transition of Care Barriers None   Why the patient remains in the hospital Placement issues  (Triggered a level II)   Post-Acute Status   Post-Acute Authorization Placement   Post-Acute Placement Status Referrals Sent   Discharge Delays (!) Post-Acute Set-up     Pending 142 as patient triggered a Level II.

## 2024-01-16 NOTE — PT/OT/SLP PROGRESS
Occupational Therapy  Treatment    Name: Cierra Mian    : 1950 (73 y.o.)  MRN: [id number]          TREATMENT SUMMARY AND RECOMMENDATIONS:      Subjective Assessment:   No complaints  Lethargic   x Awake, alert, cooperative  Uncooperative    Agitated x Flat affect    Appropriate  c/o fatigue    Confused x Treated at bedside     Emotionally liable  Treated in gym/dept.    Impulsive  Other:       Pain/Comfort:  Pain Rating 1: 0/10      Therapy Precautions:   x Cognitive deficits  Spinal precautions    Collar - hard  Sternal precautions    Collar - soft   TLSO   x Fall risk  LSO    Hip precautions - posterior  Knee immobilizer    Hip precautions - anterior  WBAT    Impaired communication  Partial weightbearing    Oxygen  TTWB    PEG tube  NWB   x Visual deficits  Other:    Hearing deficits           Vitals Value   x Room air      Oxygen (L)     Blood pressure     Heart rate         Treatment Objectives:     Mobility Training:    Mobility task Assist level Comments    Bed mobility SBA Transfer training supine to sit SBA and verbal cuing with HOB elevated using bed rail   Balance training CGA Dynamic standing bal training during toileting activity, CGA without UE support when performing garment management   Transfer CGA Transfer training bed>BSC>Recliner CGA and verbal cuing for sequencing secondary to visual impairment using RW   Functional mobility     Sit to stand transitions CGA    Other:       ADL Training:    ADL Assist level Comments    Feeding     Grooming/hygiene Setup ADL grooming training performed sitting in recliner.  After setup, pt able to perform face washing, oral care and hair grooming with verbal cues.   Bathing     Upper body dressing     Lower body dressing     Toileting SBA ADL toileting activity.  Pt able to perform hygiene and clothing management with CGA after +urine.   Toilet transfer     Adaptive equipment training     Other:         Additional Treatment:  Reinforced call bell function  and call before you fall.    Assessment: Patient tolerated session well.  Noted pt requiring less assist with functional transfers with decreased gravitational insecurity.  Pt increased toileting to CGA.       OT Plan: Cont POC      GOALS:   Multidisciplinary Problems       Occupational Therapy Goals          Problem: Occupational Therapy    Goal Priority Disciplines Outcome Interventions   Occupational Therapy Goal     OT, PT/OT Ongoing, Progressing    Description:   Patient will increase functional independence with ADLs by performing:    Feeding with Stand-by Assistance.  Grooming while seated at sink with Stand-by Assistance.  Toileting from bedside commode with Contact Guard Assistance for hygiene and clothing management. (Goal Met)  Bathing from  edge of bed with Minimal Assistance.  Toilet transfer to bedside commode with Contact Guard Assistance. (Goal Met)                         Communication with Treatment Team:     Discharge Recommendations:    Discharge Equipment Recommendations:  to be determined by next level of care (TBD)  Barriers to discharge:  Decreased caregiver support      At end of treatment, patient remained:  x Up in chair     In bed   x With alarm activated    Bed rails up   x Call bell in reach     Family/friends present    Restraints secured properly    In bathroom with CNA/RN notified    In gym with PT/PTA/tech   x Nurse aware    Other:         OT Date of Treatment: 01/16/24  OT Start Time: 1015  OT Stop Time: 1035  OT Total Time (min): 20 min    Billable Minutes:Self Care/Home Management 20      1/16/2024

## 2024-01-16 NOTE — NURSING
Multiple attempts made to contact the state regarding the 142. Unable to reach. Office possibly closed due to weather. Will continue to follow up.

## 2024-01-17 ENCOUNTER — DOCUMENT SCAN (OUTPATIENT)
Dept: HOME HEALTH SERVICES | Facility: HOSPITAL | Age: 74
End: 2024-01-17
Payer: MEDICARE

## 2024-01-17 ENCOUNTER — PATIENT OUTREACH (OUTPATIENT)
Dept: ADMINISTRATIVE | Facility: CLINIC | Age: 74
End: 2024-01-17
Payer: MEDICARE

## 2024-01-17 NOTE — PT/OT/SLP DISCHARGE
Occupational Therapy Discharge Summary    Cierra Main  MRN: [id number]   Principal Problem: Weakness      Patient Discharged from acute Occupational Therapy on 01/16/2024.  Please refer to prior OT note dated 01/16/2024 for functional status.    Assessment:      Patient appropriate for care in another setting.    Objective:     GOALS:   Multidisciplinary Problems       Occupational Therapy Goals          Problem: Occupational Therapy    Goal Priority Disciplines Outcome Interventions   Occupational Therapy Goal     OT, PT/OT Ongoing, Progressing    Description:   Patient will increase functional independence with ADLs by performing:    Feeding with Stand-by Assistance.(Goal Met)  Grooming while seated at sink with Stand-by Assistance.(Goal Met)  Toileting from bedside commode with Contact Guard Assistance for hygiene and clothing management. (Goal Met)  Bathing from  edge of bed with Minimal Assistance.(Goal Not Met)  Toilet transfer to bedside commode with Contact Guard Assistance. (Goal Met)                         Reasons for Discontinuation of Therapy Services  Transfer to alternate level of care.      Plan:     Patient Discharged to: Skilled Nursing Facility    1/17/2024

## 2024-01-17 NOTE — PROGRESS NOTES
C3 nurse spoke with Cierra Main's sister for a TCC post hospital discharge follow up call. The patient has a scheduled [facility] appointment with Rafiq Eller MD (Family Medicine) on 1/22/24 @ 1:15 PM

## 2024-01-18 ENCOUNTER — HOSPITAL ENCOUNTER (INPATIENT)
Facility: HOSPITAL | Age: 74
LOS: 1 days | Discharge: HOME HEALTH | DRG: 192 | End: 2024-01-20
Attending: STUDENT IN AN ORGANIZED HEALTH CARE EDUCATION/TRAINING PROGRAM | Admitting: FAMILY MEDICINE
Payer: MEDICARE

## 2024-01-18 DIAGNOSIS — J44.1 COPD EXACERBATION: Primary | ICD-10-CM

## 2024-01-18 DIAGNOSIS — R06.02 SOB (SHORTNESS OF BREATH): ICD-10-CM

## 2024-01-18 DIAGNOSIS — J18.9 COMMUNITY ACQUIRED PNEUMONIA OF RIGHT LOWER LOBE OF LUNG: ICD-10-CM

## 2024-01-18 DIAGNOSIS — J96.01 ACUTE RESPIRATORY FAILURE WITH HYPOXIA: ICD-10-CM

## 2024-01-18 LAB
ALBUMIN SERPL-MCNC: 2.8 G/DL (ref 3.4–4.8)
ALBUMIN/GLOB SERPL: 0.8 RATIO (ref 1.1–2)
ALP SERPL-CCNC: 89 UNIT/L (ref 40–150)
ALT SERPL-CCNC: 85 UNIT/L (ref 0–55)
AST SERPL-CCNC: 69 UNIT/L (ref 5–34)
BASOPHILS # BLD AUTO: 0.06 X10(3)/MCL
BASOPHILS NFR BLD AUTO: 0.6 %
BILIRUB SERPL-MCNC: 0.4 MG/DL
BUN SERPL-MCNC: 20 MG/DL (ref 9.8–20.1)
CALCIUM SERPL-MCNC: 9.1 MG/DL (ref 8.4–10.2)
CHLORIDE SERPL-SCNC: 107 MMOL/L (ref 98–107)
CO2 SERPL-SCNC: 23 MMOL/L (ref 23–31)
CREAT SERPL-MCNC: 0.77 MG/DL (ref 0.55–1.02)
EOSINOPHIL # BLD AUTO: 0.1 X10(3)/MCL (ref 0–0.9)
EOSINOPHIL NFR BLD AUTO: 1.1 %
ERYTHROCYTE [DISTWIDTH] IN BLOOD BY AUTOMATED COUNT: 15.5 % (ref 11.5–17)
FIO2: 32
FLUAV AG UPPER RESP QL IA.RAPID: NOT DETECTED
FLUBV AG UPPER RESP QL IA.RAPID: NOT DETECTED
GFR SERPLBLD CREATININE-BSD FMLA CKD-EPI: >60 MLS/MIN/1.73/M2
GLOBULIN SER-MCNC: 3.6 GM/DL (ref 2.4–3.5)
GLUCOSE SERPL-MCNC: 131 MG/DL (ref 82–115)
HCO3 UR-SCNC: 25.7 MMOL/L (ref 24–28)
HCT VFR BLD AUTO: 36 % (ref 37–47)
HGB BLD-MCNC: 11.4 G/DL (ref 12–16)
IMM GRANULOCYTES # BLD AUTO: 0.09 X10(3)/MCL (ref 0–0.04)
IMM GRANULOCYTES NFR BLD AUTO: 1 %
LYMPHOCYTES # BLD AUTO: 1.11 X10(3)/MCL (ref 0.6–4.6)
LYMPHOCYTES NFR BLD AUTO: 11.7 %
MCH RBC QN AUTO: 28.5 PG (ref 27–31)
MCHC RBC AUTO-ENTMCNC: 31.7 G/DL (ref 33–36)
MCV RBC AUTO: 90 FL (ref 80–94)
MONOCYTES # BLD AUTO: 0.97 X10(3)/MCL (ref 0.1–1.3)
MONOCYTES NFR BLD AUTO: 10.3 %
NEUTROPHILS # BLD AUTO: 7.13 X10(3)/MCL (ref 2.1–9.2)
NEUTROPHILS NFR BLD AUTO: 75.3 %
NRBC BLD AUTO-RTO: 0 %
PCO2 BLDA: 35.7 MMHG (ref 35–45)
PH SMN: 7.46 [PH] (ref 7.35–7.45)
PLATELET # BLD AUTO: 493 X10(3)/MCL (ref 130–400)
PMV BLD AUTO: 9.6 FL (ref 7.4–10.4)
PO2 BLDA: 79 MMHG (ref 80–100)
POC BE: 2 MMOL/L
POC SATURATED O2: 96 % (ref 95–100)
POC TCO2: 27 MMOL/L (ref 23–27)
POTASSIUM SERPL-SCNC: 4.2 MMOL/L (ref 3.5–5.1)
PROT SERPL-MCNC: 6.4 GM/DL (ref 5.8–7.6)
RBC # BLD AUTO: 4 X10(6)/MCL (ref 4.2–5.4)
RSV A 5' UTR RNA NPH QL NAA+PROBE: NOT DETECTED
SAMPLE: ABNORMAL
SARS-COV-2 RNA RESP QL NAA+PROBE: NOT DETECTED
SODIUM SERPL-SCNC: 139 MMOL/L (ref 136–145)
TROPONIN I SERPL-MCNC: 0.02 NG/ML (ref 0–0.04)
WBC # SPEC AUTO: 9.46 X10(3)/MCL (ref 4.5–11.5)

## 2024-01-18 PROCEDURE — 36600 WITHDRAWAL OF ARTERIAL BLOOD: CPT

## 2024-01-18 PROCEDURE — 82803 BLOOD GASES ANY COMBINATION: CPT

## 2024-01-18 PROCEDURE — 80053 COMPREHEN METABOLIC PANEL: CPT | Performed by: STUDENT IN AN ORGANIZED HEALTH CARE EDUCATION/TRAINING PROGRAM

## 2024-01-18 PROCEDURE — G0378 HOSPITAL OBSERVATION PER HR: HCPCS

## 2024-01-18 PROCEDURE — 0241U COVID/RSV/FLU A&B PCR: CPT | Performed by: STUDENT IN AN ORGANIZED HEALTH CARE EDUCATION/TRAINING PROGRAM

## 2024-01-18 PROCEDURE — 96375 TX/PRO/DX INJ NEW DRUG ADDON: CPT

## 2024-01-18 PROCEDURE — 93010 ELECTROCARDIOGRAM REPORT: CPT | Mod: ,,, | Performed by: INTERNAL MEDICINE

## 2024-01-18 PROCEDURE — 63600175 PHARM REV CODE 636 W HCPCS: Performed by: STUDENT IN AN ORGANIZED HEALTH CARE EDUCATION/TRAINING PROGRAM

## 2024-01-18 PROCEDURE — 25000003 PHARM REV CODE 250: Performed by: STUDENT IN AN ORGANIZED HEALTH CARE EDUCATION/TRAINING PROGRAM

## 2024-01-18 PROCEDURE — 93005 ELECTROCARDIOGRAM TRACING: CPT

## 2024-01-18 PROCEDURE — 99222 1ST HOSP IP/OBS MODERATE 55: CPT | Mod: AI,,, | Performed by: FAMILY MEDICINE

## 2024-01-18 PROCEDURE — 87040 BLOOD CULTURE FOR BACTERIA: CPT | Performed by: STUDENT IN AN ORGANIZED HEALTH CARE EDUCATION/TRAINING PROGRAM

## 2024-01-18 PROCEDURE — 96365 THER/PROPH/DIAG IV INF INIT: CPT

## 2024-01-18 PROCEDURE — 25000242 PHARM REV CODE 250 ALT 637 W/ HCPCS: Performed by: STUDENT IN AN ORGANIZED HEALTH CARE EDUCATION/TRAINING PROGRAM

## 2024-01-18 PROCEDURE — 99900031 HC PATIENT EDUCATION (STAT)

## 2024-01-18 PROCEDURE — 94761 N-INVAS EAR/PLS OXIMETRY MLT: CPT | Mod: XB

## 2024-01-18 PROCEDURE — 94640 AIRWAY INHALATION TREATMENT: CPT | Mod: XB

## 2024-01-18 PROCEDURE — 84484 ASSAY OF TROPONIN QUANT: CPT | Performed by: STUDENT IN AN ORGANIZED HEALTH CARE EDUCATION/TRAINING PROGRAM

## 2024-01-18 PROCEDURE — 27000221 HC OXYGEN, UP TO 24 HOURS

## 2024-01-18 PROCEDURE — 85025 COMPLETE CBC W/AUTO DIFF WBC: CPT | Performed by: STUDENT IN AN ORGANIZED HEALTH CARE EDUCATION/TRAINING PROGRAM

## 2024-01-18 PROCEDURE — 99291 CRITICAL CARE FIRST HOUR: CPT

## 2024-01-18 RX ORDER — IPRATROPIUM BROMIDE AND ALBUTEROL SULFATE 2.5; .5 MG/3ML; MG/3ML
3 SOLUTION RESPIRATORY (INHALATION)
Status: COMPLETED | OUTPATIENT
Start: 2024-01-18 | End: 2024-01-19

## 2024-01-18 RX ORDER — ATORVASTATIN CALCIUM 10 MG/1
10 TABLET, FILM COATED ORAL NIGHTLY
Status: DISCONTINUED | OUTPATIENT
Start: 2024-01-18 | End: 2024-01-20 | Stop reason: HOSPADM

## 2024-01-18 RX ORDER — DEXAMETHASONE SODIUM PHOSPHATE 4 MG/ML
8 INJECTION, SOLUTION INTRA-ARTICULAR; INTRALESIONAL; INTRAMUSCULAR; INTRAVENOUS; SOFT TISSUE
Status: COMPLETED | OUTPATIENT
Start: 2024-01-18 | End: 2024-01-18

## 2024-01-18 RX ORDER — HYDRALAZINE HYDROCHLORIDE 25 MG/1
100 TABLET, FILM COATED ORAL 3 TIMES DAILY
Status: DISCONTINUED | OUTPATIENT
Start: 2024-01-18 | End: 2024-01-20 | Stop reason: HOSPADM

## 2024-01-18 RX ORDER — AMIODARONE HYDROCHLORIDE 100 MG/1
200 TABLET ORAL 2 TIMES DAILY
Status: DISCONTINUED | OUTPATIENT
Start: 2024-01-18 | End: 2024-01-20 | Stop reason: HOSPADM

## 2024-01-18 RX ORDER — SERTRALINE HYDROCHLORIDE 50 MG/1
50 TABLET, FILM COATED ORAL DAILY
Status: DISCONTINUED | OUTPATIENT
Start: 2024-01-19 | End: 2024-01-20 | Stop reason: HOSPADM

## 2024-01-18 RX ORDER — LEVOTHYROXINE SODIUM 25 UG/1
25 TABLET ORAL
Status: DISCONTINUED | OUTPATIENT
Start: 2024-01-19 | End: 2024-01-20 | Stop reason: HOSPADM

## 2024-01-18 RX ORDER — IPRATROPIUM BROMIDE AND ALBUTEROL SULFATE 2.5; .5 MG/3ML; MG/3ML
3 SOLUTION RESPIRATORY (INHALATION)
Status: COMPLETED | OUTPATIENT
Start: 2024-01-18 | End: 2024-01-18

## 2024-01-18 RX ORDER — SODIUM CHLORIDE 0.9 % (FLUSH) 0.9 %
10 SYRINGE (ML) INJECTION
Status: DISCONTINUED | OUTPATIENT
Start: 2024-01-18 | End: 2024-01-20 | Stop reason: HOSPADM

## 2024-01-18 RX ORDER — PREDNISONE 20 MG/1
40 TABLET ORAL DAILY
Status: DISCONTINUED | OUTPATIENT
Start: 2024-01-19 | End: 2024-01-20 | Stop reason: HOSPADM

## 2024-01-18 RX ORDER — DOXYCYCLINE HYCLATE 100 MG
100 TABLET ORAL EVERY 12 HOURS
Status: DISCONTINUED | OUTPATIENT
Start: 2024-01-18 | End: 2024-01-20 | Stop reason: HOSPADM

## 2024-01-18 RX ORDER — LISINOPRIL 20 MG/1
20 TABLET ORAL 2 TIMES DAILY
Status: DISCONTINUED | OUTPATIENT
Start: 2024-01-18 | End: 2024-01-20 | Stop reason: HOSPADM

## 2024-01-18 RX ORDER — METOPROLOL SUCCINATE 25 MG/1
25 TABLET, EXTENDED RELEASE ORAL DAILY
Status: DISCONTINUED | OUTPATIENT
Start: 2024-01-19 | End: 2024-01-20 | Stop reason: HOSPADM

## 2024-01-18 RX ADMIN — IPRATROPIUM BROMIDE AND ALBUTEROL SULFATE 3 ML: 2.5; .5 SOLUTION RESPIRATORY (INHALATION) at 01:01

## 2024-01-18 RX ADMIN — CEFTRIAXONE SODIUM 1 G: 1 INJECTION, POWDER, FOR SOLUTION INTRAMUSCULAR; INTRAVENOUS at 05:01

## 2024-01-18 RX ADMIN — HYDRALAZINE HYDROCHLORIDE 100 MG: 25 TABLET, FILM COATED ORAL at 08:01

## 2024-01-18 RX ADMIN — LISINOPRIL 20 MG: 20 TABLET ORAL at 08:01

## 2024-01-18 RX ADMIN — DOXYCYCLINE HYCLATE 100 MG: 100 TABLET, COATED ORAL at 08:01

## 2024-01-18 RX ADMIN — DEXAMETHASONE SODIUM PHOSPHATE 8 MG: 4 INJECTION, SOLUTION INTRA-ARTICULAR; INTRALESIONAL; INTRAMUSCULAR; INTRAVENOUS; SOFT TISSUE at 01:01

## 2024-01-18 RX ADMIN — ATORVASTATIN CALCIUM 10 MG: 10 TABLET, FILM COATED ORAL at 08:01

## 2024-01-18 RX ADMIN — IPRATROPIUM BROMIDE AND ALBUTEROL SULFATE 3 ML: 2.5; .5 SOLUTION RESPIRATORY (INHALATION) at 08:01

## 2024-01-18 NOTE — ASSESSMENT & PLAN NOTE
Patient's COPD is with exacerbation noted by continued dyspnea and worsening of baseline hypoxia currently.  Patient is currently on COPD Pathway. Continue scheduled inhalers Steroids, Antibiotics, and Supplemental oxygen and monitor respiratory status closely.   Continue O2 nasal cannula   Continue Rocephin/doxycycline  Continue neb treatments   Continue daily prednisone

## 2024-01-18 NOTE — ED PROVIDER NOTES
Encounter Date: 1/18/2024       History     Chief Complaint   Patient presents with    Shortness of Breath     Arrived via [facility] for SOB. Home SPO2 70% on RA.       Patient is a 73-year-old white female with a history of emphysema not on home oxygen, CAD status post stents but not on anticoagulation, hypertension and hyperlipidemia who presented via EMS for shortness of breath for last 3 days.  EMS arrived on scene and noted patient to have SpO2 in the mid 70s and thus she was placed on supplemental oxygen at 4 L and improved to the mid 90s.  Patient states she has also had a nonproductive cough but denies any chest pain, fever, chills.  She denies any abdominal pain, dysuria, hematuria.        Review of patient's allergies indicates:   Allergen Reactions    Amlodipine      Leg weakness    Losartan      Past Medical History:   Diagnosis Date    Acute MI     Anxiety     Cardiac disease     COPD (chronic obstructive pulmonary disease)     Coronary artery disease     Depression     Hyperlipidemia     Hypertension     Hypothyroidism     Other specified noninfective gastroenteritis and colitis     Unspecified macular degeneration      Past Surgical History:   Procedure Laterality Date    CORONARY ANGIOGRAPHY N/A 2/1/2021    Procedure: ANGIOGRAM, CORONARY ARTERY;  Surgeon: Devon Medeiros MD;  Location: [facility] CATH LAB;  Service: Cardiology;  Laterality: N/A;    CORONARY ANGIOPLASTY WITH STENT PLACEMENT      LEFT HEART CATHETERIZATION Left 2/1/2021    Procedure: CATHETERIZATION, HEART, LEFT;  Surgeon: Devon Medeiros MD;  Location: [facility] CATH LAB;  Service: Cardiology;  Laterality: Left;     Family History   Problem Relation Age of Onset    Diabetes Mother     Heart disease Mother     Hypertension Father     Heart disease Father      Social History     Tobacco Use    Smoking status: Every Day     Current packs/day: 0.50     Average packs/day: 0.5 packs/day for 55.0 years (27.5 ttl pk-yrs)     Types: Cigarettes    Smokeless  tobacco: Never   Substance Use Topics    Alcohol use: Never    Drug use: Never     Review of Systems   Constitutional:  Negative for chills, fatigue and fever.   HENT:  Negative for congestion, sore throat and trouble swallowing.    Eyes:  Negative for pain and visual disturbance.   Respiratory:  Positive for cough and shortness of breath. Negative for wheezing.    Cardiovascular:  Negative for chest pain and palpitations.   Gastrointestinal:  Negative for abdominal pain, blood in stool, constipation, diarrhea, nausea and vomiting.   Genitourinary:  Negative for dysuria and hematuria.   Musculoskeletal:  Negative for back pain and myalgias.   Skin:  Negative for rash and wound.   Neurological:  Negative for seizures, syncope and headaches.   Psychiatric/Behavioral:  Negative for confusion. The patient is not nervous/anxious.        Physical Exam     Initial Vitals [01/18/24 1200]   BP Pulse Resp Temp SpO2   130/61 60 20 97.3 °F (36.3 °C) (!) 81 %      MAP       --         Physical Exam    Nursing note and vitals reviewed.  Constitutional: She appears well-developed and well-nourished. She is not diaphoretic. No distress.   HENT:   Head: Normocephalic.   Right Ear: External ear normal.   Left Ear: External ear normal.   Nose: Nose normal.   Eyes: Conjunctivae and EOM are normal. Right eye exhibits no discharge. Left eye exhibits no discharge. No scleral icterus.   Neck:   Normal range of motion.  Cardiovascular:  Normal rate, regular rhythm and normal heart sounds.     Exam reveals no gallop and no friction rub.       No murmur heard.  Pulmonary/Chest: No stridor. No respiratory distress. She has wheezes. She has rhonchi. She has no rales.   Abdominal: Abdomen is soft. She exhibits no distension. There is no abdominal tenderness. There is no rebound and no guarding.   Musculoskeletal:         General: No edema. Normal range of motion.      Cervical back: Normal range of motion.      Comments: No lower extremity edema  bilaterally.     Neurological: She is alert.   Skin: Skin is warm. No rash noted. No erythema.   Psychiatric: She has a normal mood and affect. Her behavior is normal.         ED Course   Critical Care    Date/Time: 1/18/2024 4:27 PM    Performed by: Vitaly Hays MD  Authorized by: Vitaly Hays MD  Direct patient critical care time: 20 minutes  Additional history critical care time: 10 minutes  Ordering / reviewing critical care time: 10 minutes  Documentation critical care time: 10 minutes  Consulting other physicians critical care time: 10 minutes  Total critical care time (exclusive of procedural time) : 60 minutes  Critical care was necessary to treat or prevent imminent or life-threatening deterioration of the following conditions: respiratory failure.  Critical care was time spent personally by me on the following activities: blood draw for specimens, development of treatment plan with patient or surrogate, discussions with consultants, evaluation of patient's response to treatment, examination of patient, obtaining history from patient or surrogate, ordering and performing treatments and interventions, ordering and review of laboratory studies, ordering and review of radiographic studies, pulse oximetry, re-evaluation of patient's condition and review of old charts.        Labs Reviewed   COMPREHENSIVE METABOLIC PANEL - Abnormal; Notable for the following components:       Result Value    Glucose Level 131 (*)     Albumin Level 2.8 (*)     Globulin 3.6 (*)     Albumin/Globulin Ratio 0.8 (*)     Alanine Aminotransferase 85 (*)     Aspartate Aminotransferase 69 (*)     All other components within normal limits   CBC WITH DIFFERENTIAL - Abnormal; Notable for the following components:    RBC 4.00 (*)     Hgb 11.4 (*)     Hct 36.0 (*)     MCHC 31.7 (*)     Platelet 493 (*)     IG# 0.09 (*)     All other components within normal limits   ISTAT PROCEDURE - Abnormal; Notable for the following components:    POC  PH 7.464 (*)     POC PO2 79 (*)     All other components within normal limits   COVID/RSV/FLU A&B PCR - Normal    Narrative:     The Xpert Xpress SARS-CoV-2/FLU/RSV plus is a rapid, multiplexed real-time PCR test intended for the simultaneous qualitative detection and differentiation of SARS-CoV-2, Influenza A, Influenza B, and respiratory syncytial virus (RSV) viral RNA in either nasopharyngeal swab or nasal swab specimens.         TROPONIN I - Normal   BLOOD CULTURE OLG   BLOOD CULTURE OLG   CBC W/ AUTO DIFFERENTIAL    Narrative:     The following orders were created for panel order CBC Auto Differential.  Procedure                               Abnormality         Status                     ---------                               -----------         ------                     CBC with Differential[[id number]]       Abnormal            Final result                 Please view results for these tests on the individual orders.     EKG Readings: (Independently Interpreted)   Initial Reading: No STEMI. Rhythm: Sinus Bradycardia. Heart Rate: 59. Ectopy: No Ectopy. ST Segments: Normal ST Segments. T Waves: Normal. Axis: Right Axis Deviation.       Imaging Results              X-Ray Chest 1 View (Final result)  Result time 01/18/24 13:15:41      Final result by Joel Liu MD (01/18/24 13:15:41)                   Impression:      Hazy opacification of the right lung with trace effusion.  Recommend continued follow-up.      Electronically signed by: Joel Liu  Date:    01/18/2024  Time:    13:15               Narrative:    EXAMINATION:  XR CHEST 1 VIEW    CLINICAL HISTORY:  sob;    TECHNIQUE:  Single view of the chest    COMPARISON:  10/01/2023    FINDINGS:  Trace right effusion.  No pneumothorax.  No acute osseous abnormality.                                       Medications   dexAMETHasone injection 8 mg (8 mg Intravenous Given 1/18/24 1332)   albuterol-ipratropium 2.5 mg-0.5 mg/3 mL nebulizer solution 3 mL  (3 mLs Nebulization Given 1/18/24 1346)     Medical Decision Making  Differentials:  COPD exacerbation, ACS, pneumonia, pneumothorax, COVID, flu   73-year-old white female with a history of emphysema not on home oxygen presents for shortness of breath.  SpO2 on arrival is 84% on room air and thus 2 L nasal cannula in place with SpO2 improvement up to 90s.  X-ray shows concern for possible consolidation in the right lower lobe.  Physical exam findings consistent with wheezing and rhonchi thus Decadron and DuoNebs given.  Basic labs otherwise unremarkable.  Blood cultures drawn Rocephin and doxy started as azithromycin would be contraindicated due to her prolonged QTC.  Discussed the case with hospitalist Dr. Eller who accepted for further care.    Amount and/or Complexity of Data Reviewed  Labs: ordered. Decision-making details documented in ED Course.  Radiology: ordered. Decision-making details documented in ED Course.  ECG/medicine tests: ordered and independent interpretation performed. Decision-making details documented in ED Course.    Risk  Prescription drug management.                                      Clinical Impression:  Final diagnoses:  [J44.1] COPD exacerbation (Primary)  [J18.9] Community acquired pneumonia of right lower lobe of lung  [J96.01] Acute respiratory failure with hypoxia          ED Disposition Condition    Observation Stable                Vitaly Hays MD  01/18/24 2954

## 2024-01-18 NOTE — ED NOTES
"Pt arrived via [facility]. Pt appears extremely lethargic and pale. Pt brought in via [facility] for o2 sat of 70% on room air at home. EMS states o2 sat is 96% on 4L NC. Pt is 82% rm air at this time. Pt placed on 3L NC. Pt now 98%. Pt states she "doesn't feel right." Pt resting in bed at this time. MD, RN staff, and House supervisor at bedside.   "

## 2024-01-18 NOTE — PT/OT/SLP DISCHARGE
Physical Therapy Discharge Summary    Name: Cierra Main  MRN: [id number]   Principal Problem: Weakness     Patient Discharged from acute Physical Therapy on 2024.     Assessment:     Patient appropriate for care in another setting.    Objective:     GOALS:   Multidisciplinary Problems       Physical Therapy Goals          Problem: Physical Therapy    Goal Priority Disciplines Outcome Goal Variances Interventions   Physical Therapy Goal     PT, PT/OT Adequate for Care Transition     Description: Patient will increase functional independence with mobility by performin. Supine to sit with Modified Bollinger  2. Sit to supine with Modified Bollinger  3. Sit to stand transfer with Supervision  4. Gait  x 200 feet with Supervision using Rolling Walker.                          Reasons for Discontinuation of Therapy Services  Transfer to alternate level of care.      Plan:     Patient Discharged to:  Home with 24 hour care while awaiting approval for SNF placement .      2024

## 2024-01-18 NOTE — H&P
[facility] Medical Surgical [facility] Medicine  History & Physical    Patient Name: Cierra Main  MRN: [id number]  Patient Class: OP- Observation  Admission Date: 1/18/2024  Attending Physician: Vitaly Hays MD   Primary Care Provider: Rafiq Eller MD         Patient information was obtained from patient and ER records.     Subjective:     Principal Problem:COPD exacerbation    Chief Complaint:   Chief Complaint   Patient presents with    Shortness of Breath     Arrived via [facility] for SOB. Home SPO2 70% on RA.          HPI: Patient is a 73-year-old white female with a history of emphysema not on home oxygen, CAD status post stents but not on anticoagulation, hypertension and hyperlipidemia who presented via EMS for shortness of breath for last 3 days.  EMS arrived on scene and noted patient to have SpO2 in the mid 70s and thus she was placed on supplemental oxygen at 4 L and improved to the mid 90s.  Patient states she has also had a nonproductive cough but denies any chest pain, fever, chills.  She denies any abdominal pain, dysuria, hematuria. SpO2 on arrival is 84% on room air and thus 2 L nasal cannula in place with SpO2 improvement up to 90s.  X-ray shows concern for possible consolidation in the right lower lobe. Physical exam findings in ER consistent with wheezing and rhonchi thus Decadron and DuoNebs given.  Basic labs otherwise unremarkable.  Blood cultures drawn.  Patient will be admitted and placed on Rocephin and doxy     Past Medical History:   Diagnosis Date    Acute MI     Anxiety     Cardiac disease     COPD (chronic obstructive pulmonary disease)     Coronary artery disease     Depression     Hyperlipidemia     Hypertension     Hypothyroidism     Other specified noninfective gastroenteritis and colitis     Unspecified macular degeneration        Past Surgical History:   Procedure Laterality Date    CORONARY ANGIOGRAPHY N/A 2/1/2021    Procedure: ANGIOGRAM, CORONARY ARTERY;   Surgeon: Devon Medeiros MD;  Location: [facility] CATH LAB;  Service: Cardiology;  Laterality: N/A;    CORONARY ANGIOPLASTY WITH STENT PLACEMENT      LEFT HEART CATHETERIZATION Left 2/1/2021    Procedure: CATHETERIZATION, HEART, LEFT;  Surgeon: Devon Medeiros MD;  Location: [facility] CATH LAB;  Service: Cardiology;  Laterality: Left;       Review of patient's allergies indicates:   Allergen Reactions    Amlodipine      Leg weakness    Losartan        No current facility-administered medications on file prior to encounter.     Current Outpatient Medications on File Prior to Encounter   Medication Sig    amiodarone (PACERONE) 200 MG Tab Take 200 mg by mouth 2 (two) times daily.    atorvastatin (LIPITOR) 10 MG tablet Take 1 tablet (10 mg total) by mouth every evening.    docusate sodium (COLACE) 100 MG capsule Take 100 mg by mouth 2 (two) times daily as needed.    hydrALAZINE (APRESOLINE) 100 MG tablet Take 1 tablet (100 mg total) by mouth 3 (three) times daily.    levothyroxine (SYNTHROID) 25 MCG tablet Take 1 tablet (25 mcg total) by mouth before breakfast.    lisinopriL (PRINIVIL,ZESTRIL) 20 MG tablet Take 1 tablet (20 mg total) by mouth 2 (two) times daily.    melatonin 3 mg Cap Take 1 tablet by mouth every evening.    metoprolol succinate (TOPROL-XL) 50 MG 24 hr tablet Take 25 mg by mouth once daily.    sertraline (ZOLOFT) 50 MG tablet Take 1 tablet (50 mg total) by mouth once daily.    montelukast (SINGULAIR) 10 mg tablet Take 10 mg by mouth.    nitroGLYCERIN 0.4 MG/DOSE TL SPRY (NITROLINGUAL) 400 mcg/spray spray nitroglycerin 400 mcg/spray translingual aerosol   at onset of chest pain may take up to three sprays every 5min during a 15 min period    polyethylene glycol (GLYCOLAX) 17 gram PwPk Take 17 g by mouth 2 (two) times daily as needed for Constipation.    traZODone (DESYREL) 50 MG tablet Take 1 tablet (50 mg total) by mouth every evening. (Patient not taking: Reported on 1/17/2024)     Family History       Problem  Relation (Age of Onset)    Diabetes Mother    Heart disease Mother, Father    Hypertension Father          Tobacco Use    Smoking status: Every Day     Current packs/day: 0.50     Average packs/day: 0.5 packs/day for 55.0 years (27.5 ttl pk-yrs)     Types: Cigarettes    Smokeless tobacco: Never   Substance and Sexual Activity    Alcohol use: Never    Drug use: Never    Sexual activity: Not Currently     Review of Systems   Constitutional:  Negative for chills and fever.   Respiratory:  Positive for choking, shortness of breath and wheezing.    Cardiovascular: Negative.    Gastrointestinal: Negative.    Neurological:  Positive for weakness.   Psychiatric/Behavioral: Negative.       Objective:     Vital Signs (Most Recent):  Temp: 98.7 °F (37.1 °C) (01/18/24 1640)  Pulse: 62 (01/18/24 1640)  Resp: 18 (01/18/24 1640)  BP: (!) 182/73 (01/18/24 1640)  SpO2: (!) 91 % (01/18/24 1640) Vital Signs (24h Range):  Temp:  [97.3 °F (36.3 °C)-98.7 °F (37.1 °C)] 98.7 °F (37.1 °C)  Pulse:  [53-62] 62  Resp:  [16-23] 18  SpO2:  [81 %-99 %] 91 %  BP: (130-182)/(61-77) 182/73     Weight: 40.7 kg (89 lb 11.6 oz)  Body mass index is 15.89 kg/m².     Physical Exam  Constitutional:       Appearance: She is underweight.      Interventions: Nasal cannula in place.      Comments: Nasal cannula at 2 L   Cardiovascular:      Rate and Rhythm: Normal rate and regular rhythm.   Pulmonary:      Effort: Pulmonary effort is normal.      Breath sounds: Normal breath sounds.   Abdominal:      General: Abdomen is flat. Bowel sounds are normal.      Palpations: Abdomen is soft.   Musculoskeletal:         General: No swelling. Normal range of motion.   Skin:     General: Skin is warm.   Neurological:      Mental Status: She is alert.   Psychiatric:         Mood and Affect: Mood normal.         Behavior: Behavior normal.         Thought Content: Thought content normal.         Judgment: Judgment normal.                Significant Labs: All pertinent labs  within the past 24 hours have been reviewed.  CBC:   Recent Labs   Lab 01/18/24  1212   WBC 9.46   HGB 11.4*   HCT 36.0*   *     CMP:   Recent Labs   Lab 01/18/24  1212      K 4.2   CO2 23   BUN 20.0   CREATININE 0.77   CALCIUM 9.1   ALBUMIN 2.8*   BILITOT 0.4   ALKPHOS 89   AST 69*   ALT 85*       Significant Imaging: I have reviewed all pertinent imaging results/findings within the past 24 hours.          Assessment/Plan:     * COPD exacerbation  Patient's COPD is with exacerbation noted by continued dyspnea and worsening of baseline hypoxia currently.  Patient is currently on COPD Pathway. Continue scheduled inhalers Steroids, Antibiotics, and Supplemental oxygen and monitor respiratory status closely.   Continue O2 nasal cannula   Continue Rocephin/doxycycline  Continue neb treatments   Continue daily prednisone      VTE Risk Mitigation (From admission, onward)           Ordered     IP VTE HIGH RISK PATIENT  Once         01/18/24 1657     Place sequential compression device  Until discontinued         01/18/24 1657                       On 01/18/2024, patient should be placed in hospital observation services under my care.             Rafiq Eller MD  Department of Hospital Medicine  [facility]

## 2024-01-19 LAB
ANION GAP SERPL CALC-SCNC: 8 MEQ/L
BASOPHILS # BLD AUTO: 0.02 X10(3)/MCL
BASOPHILS NFR BLD AUTO: 0.3 %
BUN SERPL-MCNC: 19 MG/DL (ref 9.8–20.1)
CALCIUM SERPL-MCNC: 8.9 MG/DL (ref 8.4–10.2)
CHLORIDE SERPL-SCNC: 107 MMOL/L (ref 98–107)
CO2 SERPL-SCNC: 25 MMOL/L (ref 23–31)
CREAT SERPL-MCNC: 0.65 MG/DL (ref 0.55–1.02)
CREAT/UREA NIT SERPL: 29
EOSINOPHIL # BLD AUTO: 0 X10(3)/MCL (ref 0–0.9)
EOSINOPHIL NFR BLD AUTO: 0 %
ERYTHROCYTE [DISTWIDTH] IN BLOOD BY AUTOMATED COUNT: 15.2 % (ref 11.5–17)
GFR SERPLBLD CREATININE-BSD FMLA CKD-EPI: >60 MLS/MIN/1.73/M2
GLUCOSE SERPL-MCNC: 127 MG/DL (ref 82–115)
HCT VFR BLD AUTO: 35.9 % (ref 37–47)
HGB BLD-MCNC: 11.1 G/DL (ref 12–16)
IMM GRANULOCYTES # BLD AUTO: 0.13 X10(3)/MCL (ref 0–0.04)
IMM GRANULOCYTES NFR BLD AUTO: 1.9 %
LYMPHOCYTES # BLD AUTO: 0.78 X10(3)/MCL (ref 0.6–4.6)
LYMPHOCYTES NFR BLD AUTO: 11.5 %
MCH RBC QN AUTO: 28 PG (ref 27–31)
MCHC RBC AUTO-ENTMCNC: 30.9 G/DL (ref 33–36)
MCV RBC AUTO: 90.4 FL (ref 80–94)
MONOCYTES # BLD AUTO: 0.47 X10(3)/MCL (ref 0.1–1.3)
MONOCYTES NFR BLD AUTO: 6.9 %
NEUTROPHILS # BLD AUTO: 5.41 X10(3)/MCL (ref 2.1–9.2)
NEUTROPHILS NFR BLD AUTO: 79.4 %
NRBC BLD AUTO-RTO: 0 %
PLATELET # BLD AUTO: 429 X10(3)/MCL (ref 130–400)
PMV BLD AUTO: 9.5 FL (ref 7.4–10.4)
POTASSIUM SERPL-SCNC: 4.5 MMOL/L (ref 3.5–5.1)
RBC # BLD AUTO: 3.97 X10(6)/MCL (ref 4.2–5.4)
SODIUM SERPL-SCNC: 140 MMOL/L (ref 136–145)
WBC # SPEC AUTO: 6.81 X10(3)/MCL (ref 4.5–11.5)

## 2024-01-19 PROCEDURE — 63600175 PHARM REV CODE 636 W HCPCS: Performed by: STUDENT IN AN ORGANIZED HEALTH CARE EDUCATION/TRAINING PROGRAM

## 2024-01-19 PROCEDURE — 80048 BASIC METABOLIC PNL TOTAL CA: CPT | Performed by: STUDENT IN AN ORGANIZED HEALTH CARE EDUCATION/TRAINING PROGRAM

## 2024-01-19 PROCEDURE — 27000221 HC OXYGEN, UP TO 24 HOURS

## 2024-01-19 PROCEDURE — 85025 COMPLETE CBC W/AUTO DIFF WBC: CPT | Performed by: STUDENT IN AN ORGANIZED HEALTH CARE EDUCATION/TRAINING PROGRAM

## 2024-01-19 PROCEDURE — 94761 N-INVAS EAR/PLS OXIMETRY MLT: CPT

## 2024-01-19 PROCEDURE — 94640 AIRWAY INHALATION TREATMENT: CPT

## 2024-01-19 PROCEDURE — 99231 SBSQ HOSP IP/OBS SF/LOW 25: CPT | Mod: ,,, | Performed by: FAMILY MEDICINE

## 2024-01-19 PROCEDURE — 25000003 PHARM REV CODE 250: Performed by: FAMILY MEDICINE

## 2024-01-19 PROCEDURE — 25000003 PHARM REV CODE 250: Performed by: STUDENT IN AN ORGANIZED HEALTH CARE EDUCATION/TRAINING PROGRAM

## 2024-01-19 PROCEDURE — 11000001 HC ACUTE MED/SURG PRIVATE ROOM

## 2024-01-19 PROCEDURE — 25000242 PHARM REV CODE 250 ALT 637 W/ HCPCS: Performed by: STUDENT IN AN ORGANIZED HEALTH CARE EDUCATION/TRAINING PROGRAM

## 2024-01-19 RX ORDER — TALC
6 POWDER (GRAM) TOPICAL NIGHTLY PRN
Status: DISCONTINUED | OUTPATIENT
Start: 2024-01-19 | End: 2024-01-20 | Stop reason: HOSPADM

## 2024-01-19 RX ORDER — ACETAMINOPHEN 325 MG/1
650 TABLET ORAL EVERY 6 HOURS PRN
Status: DISCONTINUED | OUTPATIENT
Start: 2024-01-19 | End: 2024-01-20 | Stop reason: HOSPADM

## 2024-01-19 RX ADMIN — AMIODARONE HYDROCHLORIDE 200 MG: 100 TABLET ORAL at 09:01

## 2024-01-19 RX ADMIN — ACETAMINOPHEN 650 MG: 325 TABLET, FILM COATED ORAL at 11:01

## 2024-01-19 RX ADMIN — SERTRALINE HYDROCHLORIDE 50 MG: 50 TABLET ORAL at 09:01

## 2024-01-19 RX ADMIN — DOXYCYCLINE HYCLATE 100 MG: 100 TABLET, COATED ORAL at 09:01

## 2024-01-19 RX ADMIN — PREDNISONE 40 MG: 20 TABLET ORAL at 09:01

## 2024-01-19 RX ADMIN — IPRATROPIUM BROMIDE AND ALBUTEROL SULFATE 3 ML: 2.5; .5 SOLUTION RESPIRATORY (INHALATION) at 06:01

## 2024-01-19 RX ADMIN — METOPROLOL SUCCINATE 25 MG: 25 TABLET, EXTENDED RELEASE ORAL at 09:01

## 2024-01-19 RX ADMIN — HYDRALAZINE HYDROCHLORIDE 100 MG: 25 TABLET, FILM COATED ORAL at 09:01

## 2024-01-19 RX ADMIN — CEFTRIAXONE SODIUM 1 G: 1 INJECTION, POWDER, FOR SOLUTION INTRAMUSCULAR; INTRAVENOUS at 05:01

## 2024-01-19 RX ADMIN — Medication 6 MG: at 11:01

## 2024-01-19 RX ADMIN — LEVOTHYROXINE SODIUM 25 MCG: 0.03 TABLET ORAL at 06:01

## 2024-01-19 RX ADMIN — LISINOPRIL 20 MG: 20 TABLET ORAL at 09:01

## 2024-01-19 RX ADMIN — ATORVASTATIN CALCIUM 10 MG: 10 TABLET, FILM COATED ORAL at 09:01

## 2024-01-19 RX ADMIN — HYDRALAZINE HYDROCHLORIDE 100 MG: 25 TABLET, FILM COATED ORAL at 03:01

## 2024-01-19 NOTE — PROGRESS NOTES
[facility] Medicine  Progress Note    Patient Name: Cierra Main  MRN: [id number]  Patient Class: OP- Observation   Admission Date: 1/18/2024  Length of Stay: 0 days  Attending Physician: Rafiq Eller MD  Primary Care Provider: Rafiq Eller MD        Subjective:     Principal Problem:COPD exacerbation        HPI:  Patient is a 73-year-old white female with a history of emphysema not on home oxygen, CAD status post stents but not on anticoagulation, hypertension and hyperlipidemia who presented via EMS for shortness of breath for last 3 days.  EMS arrived on scene and noted patient to have SpO2 in the mid 70s and thus she was placed on supplemental oxygen at 4 L and improved to the mid 90s.  Patient states she has also had a nonproductive cough but denies any chest pain, fever, chills.  She denies any abdominal pain, dysuria, hematuria. SpO2 on arrival is 84% on room air and thus 2 L nasal cannula in place with SpO2 improvement up to 90s.  X-ray shows concern for possible consolidation in the right lower lobe. Physical exam findings in ER consistent with wheezing and rhonchi thus Decadron and DuoNebs given.  Basic labs otherwise unremarkable.  Blood cultures drawn.  Patient will be admitted and placed on Rocephin and doxy     Overview/Hospital Course:  1/19/2024: Patient seen on rounds lying in bed resting. Patient states she rested well and in good spirits.  No shortness of breath.    Interval History:     Review of Systems   Constitutional:  Negative for chills and fever.   Respiratory: Negative.     Cardiovascular: Negative.    Gastrointestinal: Negative.    Neurological:  Positive for weakness.   Psychiatric/Behavioral: Negative.       Objective:     Vital Signs (Most Recent):  Temp: 98 °F (36.7 °C) (01/19/24 1159)  Pulse: 60 (01/19/24 1159)  Resp: 18 (01/19/24 1159)  BP: (!) 157/66 (01/19/24 1159)  SpO2: 96 % (01/19/24 1159) Vital Signs (24h Range):  Temp:   [97.7 °F (36.5 °C)-98.7 °F (37.1 °C)] 98 °F (36.7 °C)  Pulse:  [53-66] 60  Resp:  [16-23] 18  SpO2:  [86 %-99 %] 96 %  BP: (150-183)/(65-77) 157/66     Weight: 40.7 kg (89 lb 11.6 oz)  Body mass index is 15.89 kg/m².    Intake/Output Summary (Last 24 hours) at 1/19/2024 1209  Last data filed at 1/19/2024 0800  Gross per 24 hour   Intake 480 ml   Output --   Net 480 ml         Physical Exam  Constitutional:       Appearance: She is underweight.      Interventions: Nasal cannula in place.      Comments: Nasal cannula at 2 L   Cardiovascular:      Rate and Rhythm: Normal rate and regular rhythm.   Pulmonary:      Effort: Pulmonary effort is normal.      Breath sounds: Normal breath sounds.   Abdominal:      General: Abdomen is flat. Bowel sounds are normal.      Palpations: Abdomen is soft.   Musculoskeletal:         General: No swelling. Normal range of motion.   Skin:     General: Skin is warm.   Neurological:      Mental Status: She is alert.   Psychiatric:         Mood and Affect: Mood normal.         Behavior: Behavior normal.         Thought Content: Thought content normal.         Judgment: Judgment normal.             Significant Labs: All pertinent labs within the past 24 hours have been reviewed.  BMP:   Recent Labs   Lab 01/19/24  0409      K 4.5   CO2 25   BUN 19.0   CREATININE 0.65   CALCIUM 8.9     CBC:   Recent Labs   Lab 01/18/24  1212 01/19/24  0409   WBC 9.46 6.81   HGB 11.4* 11.1*   HCT 36.0* 35.9*   * 429*       Significant Imaging: I have reviewed all pertinent imaging results/findings within the past 24 hours.    Assessment/Plan:      * COPD exacerbation  Patient's COPD is with exacerbation noted by continued dyspnea and worsening of baseline hypoxia currently.  Patient is currently on COPD Pathway. Continue scheduled inhalers Steroids, Antibiotics, and Supplemental oxygen and monitor respiratory status closely.   Continue O2 nasal cannula   Continue Rocephin/doxycycline  Continue  neb treatments   Continue daily prednisone   sitting up patient for home O2  Repeat chest x-ray in a.m.      VTE Risk Mitigation (From admission, onward)           Ordered     IP VTE HIGH RISK PATIENT  Once         01/18/24 1657     Place sequential compression device  Until discontinued         01/18/24 1657                    Discharge Planning   SUMMER:      Code Status: Full Code   Is the patient medically ready for discharge?:     Reason for patient still in hospital (select all that apply): Patient trending condition                     Rafiq Eller MD  Department of Hospital Medicine   [facility] Unit

## 2024-01-19 NOTE — PLAN OF CARE
Problem: Adult Inpatient Plan of Care  Goal: Plan of Care Review  Outcome: Ongoing, Progressing  Goal: Patient-Specific Goal (Individualized)  Outcome: Ongoing, Progressing  Goal: Absence of Hospital-Acquired Illness or Injury  Outcome: Ongoing, Progressing  Goal: Optimal Comfort and Wellbeing  Outcome: Ongoing, Progressing  Goal: Readiness for Transition of Care  Outcome: Ongoing, Progressing     Problem: Impaired Wound Healing  Goal: Optimal Wound Healing  Outcome: Ongoing, Progressing     Problem: Fall Injury Risk  Goal: Absence of Fall and Fall-Related Injury  Outcome: Ongoing, Progressing     Problem: Fatigue  Goal: Improved Activity Tolerance  Outcome: Ongoing, Progressing     Problem: Gas Exchange Impaired  Goal: Optimal Gas Exchange  Outcome: Ongoing, Progressing     Problem: Pain Acute  Goal: Acceptable Pain Control and Functional Ability  Outcome: Ongoing, Progressing     Problem: COPD (Chronic Obstructive Pulmonary Disease) Comorbidity  Goal: Maintenance of COPD Symptom Control  Outcome: Ongoing, Progressing     Problem: Hypertension Comorbidity  Goal: Blood Pressure in Desired Range  Outcome: Ongoing, Progressing

## 2024-01-19 NOTE — HOSPITAL COURSE
1/19/2024: Patient seen on rounds lying in bed resting. Patient states she rested well and in good spirits.  No shortness of breath.    1/20/24: Pt states she is feeling okay. Has been off of O2 since last night. Started feeling sob when I walked in the room due to anxiety. She Anxious when talking about her home situation. Concerned about leaving. She feels generalized weakness and has not gotten out of bed much. I will consult PT for evaluation.Will ask for respiratory stress test while walking with PT to evaluate need for home o2. Bld cx negative at 24 hrs. Chest xray read states grossly unchanged. Xray findings lag behind clinical picture.  Later, family arrived and said home o2 had been delivered and they were here to bring pt home. They would not be  able to provide transportation tomorrow. Pt now states she's ready to go home. Doxy sent to the pharm.    PE:   General: alert and oriented.  HEENT: NC, AT.  Resp: CTAB.  Cardio: RRR, no m/r/g. No edema.  Abd: soft, NT, ND, + BS x 4

## 2024-01-19 NOTE — SUBJECTIVE & OBJECTIVE
Interval History:     Review of Systems   Constitutional:  Negative for chills and fever.   Respiratory: Negative.     Cardiovascular: Negative.    Gastrointestinal: Negative.    Neurological:  Positive for weakness.   Psychiatric/Behavioral: Negative.       Objective:     Vital Signs (Most Recent):  Temp: 98 °F (36.7 °C) (01/19/24 1159)  Pulse: 60 (01/19/24 1159)  Resp: 18 (01/19/24 1159)  BP: (!) 157/66 (01/19/24 1159)  SpO2: 96 % (01/19/24 1159) Vital Signs (24h Range):  Temp:  [97.7 °F (36.5 °C)-98.7 °F (37.1 °C)] 98 °F (36.7 °C)  Pulse:  [53-66] 60  Resp:  [16-23] 18  SpO2:  [86 %-99 %] 96 %  BP: (150-183)/(65-77) 157/66     Weight: 40.7 kg (89 lb 11.6 oz)  Body mass index is 15.89 kg/m².    Intake/Output Summary (Last 24 hours) at 1/19/2024 1209  Last data filed at 1/19/2024 0800  Gross per 24 hour   Intake 480 ml   Output --   Net 480 ml         Physical Exam  Constitutional:       Appearance: She is underweight.      Interventions: Nasal cannula in place.      Comments: Nasal cannula at 2 L   Cardiovascular:      Rate and Rhythm: Normal rate and regular rhythm.   Pulmonary:      Effort: Pulmonary effort is normal.      Breath sounds: Normal breath sounds.   Abdominal:      General: Abdomen is flat. Bowel sounds are normal.      Palpations: Abdomen is soft.   Musculoskeletal:         General: No swelling. Normal range of motion.   Skin:     General: Skin is warm.   Neurological:      Mental Status: She is alert.   Psychiatric:         Mood and Affect: Mood normal.         Behavior: Behavior normal.         Thought Content: Thought content normal.         Judgment: Judgment normal.             Significant Labs: All pertinent labs within the past 24 hours have been reviewed.  BMP:   Recent Labs   Lab 01/19/24  0409      K 4.5   CO2 25   BUN 19.0   CREATININE 0.65   CALCIUM 8.9     CBC:   Recent Labs   Lab 01/18/24  1212 01/19/24  0409   WBC 9.46 6.81   HGB 11.4* 11.1*   HCT 36.0* 35.9*   * 429*        Significant Imaging: I have reviewed all pertinent imaging results/findings within the past 24 hours.

## 2024-01-19 NOTE — ASSESSMENT & PLAN NOTE
Patient's COPD is with exacerbation noted by continued dyspnea and worsening of baseline hypoxia currently.  Patient is currently on COPD Pathway. Continue scheduled inhalers Steroids, Antibiotics, and Supplemental oxygen and monitor respiratory status closely.   Continue O2 nasal cannula   Continue Rocephin/doxycycline  Continue neb treatments   Continue daily prednisone   sitting up patient for home O2  Repeat chest x-ray in a.m.

## 2024-01-19 NOTE — PROGRESS NOTES
Inpatient Nutrition Assessment    Admit Date: 1/18/2024   Total duration of encounter: 1 day   Patient Age: 73 y.o.    Nutrition Recommendation/Prescription     Continue Cardiac diet as tolerated. RDN obtained some food preferences. Honor food preferences due to weight loss and decreased intake. Nursing and dietary notified.   Daily weights  Will provide Magic Cup BID with lunch and dinner (290 kcal and 4 gm protein per serving)  Consider appetite stimulant due to weight loss  Monitor for meals to encourage good p.o. intake.     Communication of Recommendations: reviewed with nurse and reviewed with patient    Nutrition Assessment     Malnutrition Assessment/Nutrition-Focused Physical Exam    Malnutrition Context: acute illness or injury (01/19/24 1004)  Malnutrition Level: moderate (01/19/24 1004)  Energy Intake (Malnutrition): less than or equal to 50% for greater than or equal to 1 month (01/19/24 1004)  Weight Loss (Malnutrition): 1-2% in 1 week (01/19/24 1004)  Subcutaneous Fat (Malnutrition): moderate depletion (01/19/24 1004)  Orbital Region (Subcutaneous Fat Loss): moderate depletion  Upper Arm Region (Subcutaneous Fat Loss): moderate depletion     Muscle Mass (Malnutrition): moderate depletion (01/19/24 1004)  [place] Region (Muscle Loss): moderate depletion  Clavicle Bone Region (Muscle Loss): moderate depletion                             A minimum of two characteristics is recommended for diagnosis of either severe or non-severe malnutrition.    Chart Review    Reason Seen: continuous nutrition monitoring    Malnutrition Screening Tool Results   Have you recently lost weight without trying?: No  Have you been eating poorly because of a decreased appetite?: Yes   MST Score: 1   Diagnosis:  COPD exacerbation    Relevant Medical History: HTN, COPD, HLD, anxiety, macular degeneration, acute MI, CAD, heart disease, hypothyroidism, depression, noninfective gastroenteritis and colitis    Scheduled  "Medications:  albuterol-ipratropium, 3 mL, Q6H WAKE  amiodarone, 200 mg, BID  atorvastatin, 10 mg, QHS  cefTRIAXone (Rocephin) IV (PEDS and ADULTS), 1 g, Q24H  doxycycline, 100 mg, Q12H  hydrALAZINE, 100 mg, TID  levothyroxine, 25 mcg, Before breakfast  lisinopriL, 20 mg, BID  metoprolol succinate, 25 mg, Daily  predniSONE, 40 mg, Daily  sertraline, 50 mg, Daily    Continuous Infusions:   PRN Medications: sodium chloride 0.9%    Calorie Containing IV Medications: no significant kcals from medications at this time    Recent Labs   Lab 24  1212 24  0409    140   K 4.2 4.5   CALCIUM 9.1 8.9   CHLORIDE 107 107   CO2 23 25   BUN 20.0 19.0   CREATININE 0.77 0.65   EGFRNORACEVR >60 >60   GLUCOSE 131* 127*   BILITOT 0.4  --    ALKPHOS 89  --    ALT 85*  --    AST 69*  --    ALBUMIN 2.8*  --    WBC 9.46 6.81   HGB 11.4* 11.1*   HCT 36.0* 35.9*     Nutrition Orders:  Diet Cardiac      Appetite/Oral Intake: fair/25-50% of meals  Factors Affecting Nutritional Intake: decreased appetite  Food/Cheondoism/Cultural Preferences:  Fresh fruit with breakfast; side salad with lunch and dinner  Food Allergies: none reported  Last Bowel Movement: 24  Wound(s):  intact    Comments    () Pt was recently discharged from hospital. Met with pt. Pt stated appetite is "ok". RDN observed breakfast was untouched this morning. Denied N/C/V/D. Stated no difficulties chewing or swallowing. Pt's UBW to be around 93#. Pt has lost 4# (4.3%) in 9 days.     Anthropometrics    Height: 5' 3" (160 cm), Height Method: Stated  Last Weight: 40.7 kg (89 lb 11.6 oz) (24 1640), Weight Method: Bed Scale  BMI (Calculated): 15.9  BMI Classification: underweight (BMI less than 18.5)     Ideal Body Weight (IBW), Female: 115 lb     % Ideal Body Weight, Female (lb): 78.03 %                    Usual Body Weight (UBW), k.3 kg  % Usual Body Weight: 96.42     Usual Weight Provided By: patient and EMR weight history    Wt Readings from " Last 5 Encounters:   01/18/24 40.7 kg (89 lb 11.6 oz)   01/09/24 42.4 kg (93 lb 7.6 oz)   12/29/23 41.7 kg (92 lb)   12/05/23 41.7 kg (92 lb)   11/16/23 43.8 kg (96 lb 9 oz)     Weight Change(s) Since Admission: new admit  Wt Readings from Last 1 Encounters:   01/18/24 1640 40.7 kg (89 lb 11.6 oz)   01/18/24 1200 42.3 kg (93 lb 3.2 oz)   Admit Weight: 42.3 kg (93 lb 3.2 oz) (01/18/24 1200), Weight Method: Bed Scale    Estimated Needs    Weight Used For Calorie Calculations: 40.7 kg (89 lb 11.6 oz)  Energy Calorie Requirements (kcal): 1425 kcal (35 kcal/kg)  Energy Need Method: Kcal/kg  Weight Used For Protein Calculations: 40.7 kg (89 lb 11.6 oz)  Protein Requirements: 73 gm (1.8 gm/kg)  Fluid Requirements (mL): 1425 ml (1 ml/kcal)    Enteral Nutrition     Patient not receiving enteral nutrition at this time.    Parenteral Nutrition     Patient not receiving parenteral nutrition support at this time.    Evaluation of Received Nutrient Intake    Calories: not meeting estimated needs  Protein: not meeting estimated needs    Patient Education     Not applicable.    Nutrition Diagnosis     PES: Inadequate energy intake related to inability to consume sufficient nutrients as evidenced by intake of <50% for >7 days. (new)     PES: Moderate acute disease or injury related malnutrition related to inability to consume sufficient nutrients as evidenced by less than or equal to 50% needs met for greater than or equal to 5 days, moderate fat depletion, moderate muscle depletion, and 1-2% weight loss in 1 week. (new)    Nutrition Interventions     Intervention(s): general/healthful diet, commercial food, and collaboration with other providers    Goal: Meet greater than 80% of nutritional needs by follow-up. (new)  Goal: Maintain weight throughout hospitalization. (new)    Nutrition Goals & Monitoring     Dietitian will monitor: food and beverage intake, weight, electrolyte/renal panel, glucose/endocrine profile, and  gastrointestinal profile    Nutrition Risk/Follow-Up: high (follow-up in 1-4 days)   Please consult if re-assessment needed sooner.

## 2024-01-20 ENCOUNTER — DOCUMENT SCAN (OUTPATIENT)
Dept: HOME HEALTH SERVICES | Facility: HOSPITAL | Age: 74
End: 2024-01-20
Payer: MEDICARE

## 2024-01-20 VITALS
OXYGEN SATURATION: 94 % | SYSTOLIC BLOOD PRESSURE: 159 MMHG | BODY MASS INDEX: 15.9 KG/M2 | HEART RATE: 55 BPM | HEIGHT: 63 IN | DIASTOLIC BLOOD PRESSURE: 57 MMHG | RESPIRATION RATE: 18 BRPM | WEIGHT: 89.75 LBS | TEMPERATURE: 98 F

## 2024-01-20 PROBLEM — J44.1 COPD EXACERBATION: Status: RESOLVED | Noted: 2021-01-30 | Resolved: 2024-01-20

## 2024-01-20 PROCEDURE — 94761 N-INVAS EAR/PLS OXIMETRY MLT: CPT

## 2024-01-20 PROCEDURE — 63600175 PHARM REV CODE 636 W HCPCS: Performed by: STUDENT IN AN ORGANIZED HEALTH CARE EDUCATION/TRAINING PROGRAM

## 2024-01-20 PROCEDURE — 97162 PT EVAL MOD COMPLEX 30 MIN: CPT

## 2024-01-20 PROCEDURE — 25000003 PHARM REV CODE 250: Performed by: STUDENT IN AN ORGANIZED HEALTH CARE EDUCATION/TRAINING PROGRAM

## 2024-01-20 RX ORDER — DOXYCYCLINE HYCLATE 100 MG
100 TABLET ORAL EVERY 12 HOURS
Qty: 10 TABLET | Refills: 0 | Status: SHIPPED | OUTPATIENT
Start: 2024-01-20 | End: 2024-01-25

## 2024-01-20 RX ADMIN — AMIODARONE HYDROCHLORIDE 200 MG: 100 TABLET ORAL at 09:01

## 2024-01-20 RX ADMIN — DOXYCYCLINE HYCLATE 100 MG: 100 TABLET, COATED ORAL at 09:01

## 2024-01-20 RX ADMIN — HYDRALAZINE HYDROCHLORIDE 100 MG: 25 TABLET, FILM COATED ORAL at 09:01

## 2024-01-20 RX ADMIN — LISINOPRIL 20 MG: 20 TABLET ORAL at 09:01

## 2024-01-20 RX ADMIN — LEVOTHYROXINE SODIUM 25 MCG: 0.03 TABLET ORAL at 06:01

## 2024-01-20 RX ADMIN — SERTRALINE HYDROCHLORIDE 50 MG: 50 TABLET ORAL at 09:01

## 2024-01-20 RX ADMIN — PREDNISONE 40 MG: 20 TABLET ORAL at 09:01

## 2024-01-20 RX ADMIN — METOPROLOL SUCCINATE 25 MG: 25 TABLET, EXTENDED RELEASE ORAL at 09:01

## 2024-01-20 NOTE — DISCHARGE SUMMARY
[facility] - Medical Surgical Unit  [facility] Medicine  Discharge Summary      Patient Name: Cierra Main  MRN: [id number]  AVA: [id number]  Patient Class: IP- Inpatient  Admission Date: 1/18/2024  Hospital Length of Stay: 1 days  Discharge Date and Time:  01/20/2024 12:56 PM  Attending Physician: Rafiq Eller MD   Discharging Provider: MARY ALICE BANGURA DO  Primary Care Provider: Rafiq Eller MD    Primary Care Team: Networked reference to record PCT     HPI:   Patient is a 73-year-old white female with a history of emphysema not on home oxygen, CAD status post stents but not on anticoagulation, hypertension and hyperlipidemia who presented via EMS for shortness of breath for last 3 days.  EMS arrived on scene and noted patient to have SpO2 in the mid 70s and thus she was placed on supplemental oxygen at 4 L and improved to the mid 90s.  Patient states she has also had a nonproductive cough but denies any chest pain, fever, chills.  She denies any abdominal pain, dysuria, hematuria. SpO2 on arrival is 84% on room air and thus 2 L nasal cannula in place with SpO2 improvement up to 90s.  X-ray shows concern for possible consolidation in the right lower lobe. Physical exam findings in ER consistent with wheezing and rhonchi thus Decadron and DuoNebs given.  Basic labs otherwise unremarkable.  Blood cultures drawn.  Patient will be admitted and placed on Rocephin and doxy     * No surgery found *      Hospital Course:   1/19/2024: Patient seen on rounds lying in bed resting. Patient states she rested well and in good spirits.  No shortness of breath.    1/20/24: Pt states she is feeling okay. Has been off of O2 since last night. Started feeling sob when I walked in the room due to anxiety. She Anxious when talking about her home situation. Concerned about leaving. She feels generalized weakness and has not gotten out of bed much. I will consult PT for evaluation.Will ask for respiratory stress  test while walking with PT to evaluate need for home o2. Bld cx negative at 24 hrs. Chest xray read states grossly unchanged. Xray findings lag behind clinical picture.  Later, family arrived and said home o2 had been delivered and they were here to bring pt home. They would not be  able to provide transportation tomorrow. Pt now states she's ready to go home. Doxy sent to the pharm.    PE:   General: alert and oriented.  HEENT: NC, AT.  Resp: CTAB.  Cardio: RRR, no m/r/g. No edema.  Abd: soft, NT, ND, + BS x 4         Goals of Care Treatment Preferences:  Code Status: Full Code      Consults:     Psychiatric  Anxiety disorder, unspecified    Continue home med.    Cardiac/Vascular  Essential hypertension  Continue home meds.    Hyperlipidemia  Continue home med.      Endocrine  Hypothyroidism  Continue home med.        Final Active Diagnoses:    Diagnosis Date Noted POA    Anxiety disorder, unspecified [F41.9] 01/30/2021 Yes     Chronic    Hyperlipidemia [E78.5] 01/30/2021 Yes     Chronic    Essential hypertension [I10] 01/15/2019 Yes     Chronic    Hypothyroidism [E03.9] 01/15/2019 Yes      Problems Resolved During this Admission:    Diagnosis Date Noted Date Resolved POA    PRINCIPAL PROBLEM:  COPD exacerbation [J44.1] 01/30/2021 01/20/2024 Yes       Discharged Condition: good    Disposition: Home-Health Care [facility]    Follow Up:   Follow-up Information       Jaime Horan MD. Go on 1/19/2024.    Specialty: Cardiology  Why: left message  Contact information:  [address] [facility] [address]  [phone]               Rafiq Eller MD. Go on 1/19/2024.    Specialty: Family Medicine  Contact information:  [address]  [facility] [address]  [phone]               [facility] Follow up.    Why: Oxygen ordered. Please call to verify delivery  Contact information:  [address] [facility] [address]  [facility] [address] [phone]                         Patient Instructions:  "     OXYGEN FOR HOME USE     Order Specific Question Answer Comments   Liter Flow 2    Duration Continuous    Qualifying Test Performed at: Rest    Oxygen saturation: 86    Portable mode: continuous    Route nasal cannula    Device: home concentrator with portable tanks    Length of need (in months): 12 mos    Patient condition with qualifying saturation COPD    Height: 5' 3" (1.6 m)    Weight: 40.7 kg (89 lb 11.6 oz)    Alternative treatment measures have been tried or considered and deemed clinically ineffective. Yes        Significant Diagnostic Studies: N/A    Pending Diagnostic Studies:       None           Medications:  Reconciled Home Medications:      Medication List        START taking these medications      doxycycline 100 MG tablet  Commonly known as: VIBRA-TABS  Take 1 tablet (100 mg total) by mouth every 12 (twelve) hours. for 5 days            CONTINUE taking these medications      amiodarone 200 MG Tab  Commonly known as: PACERONE  Take 200 mg by mouth 2 (two) times daily.     atorvastatin 10 MG tablet  Commonly known as: LIPITOR  Take 1 tablet (10 mg total) by mouth every evening.     docusate sodium 100 MG capsule  Commonly known as: COLACE  Take 100 mg by mouth 2 (two) times daily as needed.     hydrALAZINE 100 MG tablet  Commonly known as: APRESOLINE  Take 1 tablet (100 mg total) by mouth 3 (three) times daily.     levothyroxine 25 MCG tablet  Commonly known as: SYNTHROID  Take 1 tablet (25 mcg total) by mouth before breakfast.     lisinopriL 20 MG tablet  Commonly known as: PRINIVIL,ZESTRIL  Take 1 tablet (20 mg total) by mouth 2 (two) times daily.     melatonin 3 mg Cap  Take 1 tablet by mouth every evening.     metoprolol succinate 50 MG 24 hr tablet  Commonly known as: TOPROL-XL  Take 25 mg by mouth once daily.     montelukast 10 mg tablet  Commonly known as: SINGULAIR  Take 10 mg by mouth.     nitroGLYCERIN 0.4 MG/DOSE TL SPRY 400 mcg/spray spray  Commonly known as: " NITROLINGUAL  nitroglycerin 400 mcg/spray translingual aerosol   at onset of chest pain may take up to three sprays every 5min during a 15 min period     polyethylene glycol 17 gram Pwpk  Commonly known as: GLYCOLAX  Take 17 g by mouth 2 (two) times daily as needed for Constipation.     sertraline 50 MG tablet  Commonly known as: ZOLOFT  Take 1 tablet (50 mg total) by mouth once daily.     traZODone 50 MG tablet  Commonly known as: DESYREL  Take 1 tablet (50 mg total) by mouth every evening.              Indwelling Lines/Drains at time of discharge:   Lines/Drains/Airways       None                   Time spent on the discharge of patient: 33 minutes         MARY ALICE BANGURA DO  Department of Hospital Medicine  [facility]

## 2024-01-20 NOTE — NURSING
"1156 Spoke with Scooter "on call" for [employer] stated Home oxygen was delivered and portable oxygen was delivered per Rob to hospital and is at bedside. Dr. Woods aware at this time.   "

## 2024-01-20 NOTE — ASSESSMENT & PLAN NOTE
Patient's COPD is with exacerbation noted by continued dyspnea and worsening of baseline hypoxia currently.  Patient is currently on COPD Pathway. Continue scheduled inhalers Steroids, Antibiotics, and Supplemental oxygen and monitor respiratory status closely.   Continue O2 nasal cannula   Continue Rocephin/doxycycline  Continue neb treatments   Continue daily prednisone   setting up patient for home O2  Repeat chest x-ray in a.m.    1/20/24: chest xray unchanged, however, xray findings will lag behind clinical picture. Overall weakness. Has been off of O2 since last night. Consulting PT for evaluation and pulm stress test to evaluate need for home o2. If SpO2 drop below 88% at rest or with ambulation, pt will qualify.

## 2024-01-20 NOTE — PROGRESS NOTES
[facility]  [facility] Medicine  Progress Note    Patient Name: Cierra Main  MRN: [id number]  Patient Class: IP- Inpatient   Admission Date: 1/18/2024  Length of Stay: 1 days  Attending Physician: Rafiq Eller MD  Primary Care Provider: Rafiq Eller MD        Subjective:     Principal Problem:COPD exacerbation        HPI:  Patient is a 73-year-old white female with a history of emphysema not on home oxygen, CAD status post stents but not on anticoagulation, hypertension and hyperlipidemia who presented via EMS for shortness of breath for last 3 days.  EMS arrived on scene and noted patient to have SpO2 in the mid 70s and thus she was placed on supplemental oxygen at 4 L and improved to the mid 90s.  Patient states she has also had a nonproductive cough but denies any chest pain, fever, chills.  She denies any abdominal pain, dysuria, hematuria. SpO2 on arrival is 84% on room air and thus 2 L nasal cannula in place with SpO2 improvement up to 90s.  X-ray shows concern for possible consolidation in the right lower lobe. Physical exam findings in ER consistent with wheezing and rhonchi thus Decadron and DuoNebs given.  Basic labs otherwise unremarkable.  Blood cultures drawn.  Patient will be admitted and placed on Rocephin and doxy     Overview/Hospital Course:  1/19/2024: Patient seen on rounds lying in bed resting. Patient states she rested well and in good spirits.  No shortness of breath.    1/20/24: Pt states she is feeling okay. Has been off of O2 since last night. Started feeling sob when I walked in the room due to anxiety. She Anxious when talking about her home situation. Concerned about leaving. I explained she would not be dc'd today. She feels generalized weakness and has not gotten out of bed much. I will consult PT for evaluation.Will ask for respiratory stress test while walking with PT to evaluate need for home o2. Bld cx negative at 24 hrs. Chest xray  read states grossly unchanged. Xray findings lag behind clinical picture.    Interval History:     Review of Systems   Constitutional:  Negative for chills and fever.   HENT:  Positive for postnasal drip. Negative for congestion.    Respiratory:  Positive for shortness of breath. Negative for cough, chest tightness and wheezing.    Cardiovascular: Negative.  Negative for chest pain and leg swelling.   Gastrointestinal: Negative.  Negative for abdominal pain and nausea.   Neurological:  Positive for weakness.   Psychiatric/Behavioral:  The patient is nervous/anxious.      Objective:     Vital Signs (Most Recent):  Temp: 97.8 °F (36.6 °C) (01/20/24 0808)  Pulse: (!) 56 (01/20/24 0809)  Resp: 18 (01/20/24 0808)  BP: (!) 188/69 (01/20/24 0809)  SpO2: 95 % (01/20/24 0808) Vital Signs (24h Range):  Temp:  [97.8 °F (36.6 °C)-98.2 °F (36.8 °C)] 97.8 °F (36.6 °C)  Pulse:  [53-64] 56  Resp:  [16-22] 18  SpO2:  [86 %-96 %] 95 %  BP: (157-188)/(61-71) 188/69     Weight: 40.7 kg (89 lb 11.6 oz)  Body mass index is 15.89 kg/m².    Intake/Output Summary (Last 24 hours) at 1/20/2024 0935  Last data filed at 1/20/2024 0800  Gross per 24 hour   Intake 540 ml   Output --   Net 540 ml           Physical Exam  Vitals and nursing note reviewed.   Constitutional:       General: She is not in acute distress.     Appearance: Normal appearance. She is underweight.      Interventions: Nasal cannula in place.      Comments: Nasal cannula at 2 L   HENT:      Head: Normocephalic and atraumatic.      Nose: Nose normal.   Eyes:      Conjunctiva/sclera: Conjunctivae normal.   Cardiovascular:      Rate and Rhythm: Normal rate and regular rhythm.      Pulses: Normal pulses.      Heart sounds: Normal heart sounds. No murmur heard.     No gallop.   Pulmonary:      Effort: Pulmonary effort is normal.      Breath sounds: Normal breath sounds. No wheezing, rhonchi or rales.   Abdominal:      General: Abdomen is flat. Bowel sounds are normal. There is no  distension.      Palpations: Abdomen is soft.      Tenderness: There is no abdominal tenderness. There is no guarding.   Musculoskeletal:         General: No swelling or deformity. Normal range of motion.      Cervical back: Normal range of motion and neck supple.      Right lower leg: No edema.      Left lower leg: No edema.   Skin:     General: Skin is warm and dry.      Findings: No rash.   Neurological:      General: No focal deficit present.      Mental Status: She is alert and oriented to person, place, and time. Mental status is at baseline.      Gait: Gait normal.   Psychiatric:         Mood and Affect: Mood is anxious.         Thought Content: Thought content normal.         Judgment: Judgment normal.             Significant Labs: All pertinent labs within the past 24 hours have been reviewed.  BMP:   Recent Labs   Lab 01/19/24  0409      K 4.5   CO2 25   BUN 19.0   CREATININE 0.65   CALCIUM 8.9       CBC:   Recent Labs   Lab 01/18/24  1212 01/19/24  0409   WBC 9.46 6.81   HGB 11.4* 11.1*   HCT 36.0* 35.9*   * 429*         Significant Imaging: I have reviewed all pertinent imaging results/findings within the past 24 hours.    Assessment/Plan:      * COPD exacerbation  Patient's COPD is with exacerbation noted by continued dyspnea and worsening of baseline hypoxia currently.  Patient is currently on COPD Pathway. Continue scheduled inhalers Steroids, Antibiotics, and Supplemental oxygen and monitor respiratory status closely.   Continue O2 nasal cannula   Continue Rocephin/doxycycline  Continue neb treatments   Continue daily prednisone   setting up patient for home O2  Repeat chest x-ray in a.m.    1/20/24: chest xray unchanged, however, xray findings will lag behind clinical picture. Overall weakness. Has been off of O2 since last night. Consulting PT for evaluation and pulm stress test to evaluate need for home o2. If SpO2 drop below 88% at rest or with ambulation, pt will  qualify.     Hypothyroidism  Continue home med.      Essential hypertension  Continue home meds.    Hyperlipidemia  Continue home med.      Anxiety disorder, unspecified    Continue home med.      VTE Risk Mitigation (From admission, onward)           Ordered     IP VTE HIGH RISK PATIENT  Once         01/18/24 1657     Place sequential compression device  Until discontinued         01/18/24 1657                    Discharge Planning   SUMMER:      Code Status: Full Code   Is the patient medically ready for discharge?:     Reason for patient still in hospital (select all that apply): Patient trending condition, Treatment, and PT / OT recommendations                     MARY ALICE BANGURA DO  Department of Hospital Medicine   [facility] - Medical Surgical Unit

## 2024-01-20 NOTE — PLAN OF CARE
Problem: Adult Inpatient Plan of Care  Goal: Plan of Care Review  Outcome: Ongoing, Progressing  Goal: Patient-Specific Goal (Individualized)  Outcome: Ongoing, Progressing  Goal: Absence of Hospital-Acquired Illness or Injury  Outcome: Ongoing, Progressing  Goal: Optimal Comfort and Wellbeing  Outcome: Ongoing, Progressing  Goal: Readiness for Transition of Care  Outcome: Ongoing, Progressing     Problem: Impaired Wound Healing  Goal: Optimal Wound Healing  Outcome: Ongoing, Progressing     Problem: Fall Injury Risk  Goal: Absence of Fall and Fall-Related Injury  Outcome: Ongoing, Progressing     Problem: Fatigue  Goal: Improved Activity Tolerance  Outcome: Ongoing, Progressing     Problem: Gas Exchange Impaired  Goal: Optimal Gas Exchange  Outcome: Ongoing, Progressing     Problem: Pain Acute  Goal: Acceptable Pain Control and Functional Ability  Outcome: Ongoing, Progressing     Problem: COPD (Chronic Obstructive Pulmonary Disease) Comorbidity  Goal: Maintenance of COPD Symptom Control  Outcome: Ongoing, Progressing     Problem: Hypertension Comorbidity  Goal: Blood Pressure in Desired Range  Outcome: Ongoing, Progressing     Problem: Skin Injury Risk Increased  Goal: Skin Health and Integrity  Outcome: Ongoing, Progressing

## 2024-01-20 NOTE — PLAN OF CARE
Problem: Adult Inpatient Plan of Care  Goal: Plan of Care Review  Outcome: Met  Goal: Patient-Specific Goal (Individualized)  Outcome: Met  Goal: Absence of Hospital-Acquired Illness or Injury  Outcome: Met  Goal: Optimal Comfort and Wellbeing  Outcome: Met  Goal: Readiness for Transition of Care  Outcome: Met     Problem: Impaired Wound Healing  Goal: Optimal Wound Healing  Outcome: Met     Problem: Fall Injury Risk  Goal: Absence of Fall and Fall-Related Injury  Outcome: Met     Problem: Fatigue  Goal: Improved Activity Tolerance  Outcome: Met     Problem: Gas Exchange Impaired  Goal: Optimal Gas Exchange  Outcome: Met     Problem: Pain Acute  Goal: Acceptable Pain Control and Functional Ability  Outcome: Met     Problem: COPD (Chronic Obstructive Pulmonary Disease) Comorbidity  Goal: Maintenance of COPD Symptom Control  Outcome: Met     Problem: Hypertension Comorbidity  Goal: Blood Pressure in Desired Range  Outcome: Met     Problem: Skin Injury Risk Increased  Goal: Skin Health and Integrity  Outcome: Met

## 2024-01-20 NOTE — PT/OT/SLP EVAL
Physical Therapy Acute Care Evaluation    Patient Name:  Cierra Main   MRN:  [id number]    History:     Principal Problem:COPD exacerbation     HPI:  Patient is a 73-year-old white female with a history of emphysema not on home oxygen, CAD status post stents but not on anticoagulation, hypertension and hyperlipidemia who presented via EMS for shortness of breath for last 3 days.  EMS arrived on scene and noted patient to have SpO2 in the mid 70s and thus she was placed on supplemental oxygen at 4 L and improved to the mid 90s.  Patient states she has also had a nonproductive cough but denies any chest pain, fever, chills.  She denies any abdominal pain, dysuria, hematuria. SpO2 on arrival is 84% on room air and thus 2 L nasal cannula in place with SpO2 improvement up to 90s.  X-ray shows concern for possible consolidation in the right lower lobe. Physical exam findings in ER consistent with wheezing and rhonchi thus Decadron and DuoNebs given.  Basic labs otherwise unremarkable.  Blood cultures drawn.  Patient will be admitted and placed on Rocephin and doxy     Past Medical History:   Diagnosis Date    Acute MI     Anxiety     Cardiac disease     COPD (chronic obstructive pulmonary disease)     Coronary artery disease     Depression     Hyperlipidemia     Hypertension     Hypothyroidism     Other specified noninfective gastroenteritis and colitis     Unspecified macular degeneration        Past Surgical History:   Procedure Laterality Date    CORONARY ANGIOGRAPHY N/A 2/1/2021    Procedure: ANGIOGRAM, CORONARY ARTERY;  Surgeon: Devon Medeiros MD;  Location: [facility] CATH LAB;  Service: Cardiology;  Laterality: N/A;    CORONARY ANGIOPLASTY WITH STENT PLACEMENT      LEFT HEART CATHETERIZATION Left 2/1/2021    Procedure: CATHETERIZATION, HEART, LEFT;  Surgeon: Devon Medeiros MD;  Location: [facility] CATH LAB;  Service: Cardiology;  Laterality: Left;       Recent Surgery: * No surgery found *      Subjective     Chief  Complaint: Weak and SOB  Patient/Family Comments/goals: To get stronger  Pain/Comfort:  Pain Rating 1: 4/10  Location 1: back  Pain Rating Post-Intervention 1: 2/10      Living Environment:  Pt lives at home mostly alone. Pt's son and sister will check on her at her house. Pt has one step to get into her house but once in the house no stairs. Pt has fallen 3x recently 2x using RW and 1x out of WC. Pt does not do much around the house and depends on assistance.   Prior to admission, patients level of function was Mod A.  Equipment used at home: grab bar, raised toilet, wheelchair, walker, rolling.  DME owned (not currently used): none.        Objective:      Patient found supine with bed alarm, telemetry, pulse ox (continuous)  upon PT entry to room.    General Precautions: Standard, blind, fall   Orthopedic Precautions:Full weight bearing   Braces: N/A  Respiratory Status: Room air     Vitals Value   X Room air  94    Oxygen (L)     Blood pressure     Heart rate         Exams:  Gross Motor Coordination:  Able to tap toes on B feet; Able to tap finger tips on R hands  RLE Strength: 3/5  LLE Strength: 3/5  Pt is very weak in her legs    Functional Mobility:    Mobility  Assist level Comments    Bed mobility Min A Pt needs assist for object placement due to vision. Pt grabs bed rail to help sit up on the EOB.    Sit to stand transitions Mod A Pt is very fearful of moving due to recent falls. Pt needs cues for hand placement to push up from surface. Pt is able to perform sit to stand with RW from EOB 3x with B knee buckling while standing.    Transfers Unable Pt refused to get into WC or recliner due to fear of moving out of bed.    Gait Unable Pt refused to ambulate in room due to fear of falling and feeling really weak.    Balance Training     Other:         Therapeutic Activities and Exercises:   Performed seated exercises on the EOB: LAQ, marches, heel raises, hip abd 2x10 with increased fatigue and frequent rest  breaks.     Assessment:     Cierra Main is a 73 y.o. female admitted with a medical diagnosis of COPD exacerbation.  She presents with the following impairments/functional limitations:  weakness, impaired endurance, impaired self care skills, impaired functional mobility, gait instability, impaired balance, decreased safety awareness, decreased lower extremity function, visual deficits .    Rehab Prognosis: Fair; patient would benefit from acute skilled PT services to address these deficits and reach maximum level of function.      Additional information: Pt tolerated evaluation fairly well with attempting to perform some movement. Pt was very fearful of performing task due to recent falls at home. Pt was able to stand at EOB with therapist assist with B knees buckling. Pt needs extra motivation to perform standing task and gait around the room pt refused to perform today with PT. Pt will mostly likely need 24 hr care when D/C from hospital. Pt refused to sit in recliner or WC and went back into bed.     Patient left HOB elevated with call button in reach, bed alarm on, and RN notified.      Plan:     During this hospitalization, patient to be seen 6 x/week to address the identified rehab impairments via gait training, therapeutic activities, therapeutic exercises, neuromuscular re-education and progress toward the following goals:    GOALS:   Multidisciplinary Problems       Physical Therapy Goals          Problem: Physical Therapy    Goal Priority Disciplines Outcome Goal Variances Interventions   Physical Therapy Goal     PT, PT/OT      Description: Patient will increase functional independence with mobility by performin. Supine to sit with Modified [place]  2. Sit to supine with Modified [place]  3. Sit to stand transfer with Stand-by Assistance  4. Bed to chair transfer with Stand-by Assistance using Rolling Walker  5. Gait  x 100 feet with Stand-by Assistance using Rolling Walker.   6.  Ascend/descend 2 stair with right Handrails Contact Guard Assistance using Rolling Walker.                          Recommendations:     Discharge Recommendations:      Discharge Equipment Recommendations: none     Time Tracking:       PT Start Time: 0830     PT Stop Time: 0900  PT Total Time (min): 30 min     Billable Minutes: Evaluation 30 01/20/2024

## 2024-01-20 NOTE — DISCHARGE INSTRUCTIONS
Pt to D/C home with Home O2 provided by [employer] in [place].    Call your primary care and cardiologist on Monday to get a follow up appointment.    Take all medications as prescribed.

## 2024-01-20 NOTE — SUBJECTIVE & OBJECTIVE
Interval History:     Review of Systems   Constitutional:  Negative for chills and fever.   HENT:  Positive for postnasal drip. Negative for congestion.    Respiratory:  Positive for shortness of breath. Negative for cough, chest tightness and wheezing.    Cardiovascular: Negative.  Negative for chest pain and leg swelling.   Gastrointestinal: Negative.  Negative for abdominal pain and nausea.   Neurological:  Positive for weakness.   Psychiatric/Behavioral:  The patient is nervous/anxious.      Objective:     Vital Signs (Most Recent):  Temp: 97.8 °F (36.6 °C) (01/20/24 0808)  Pulse: (!) 56 (01/20/24 0809)  Resp: 18 (01/20/24 0808)  BP: (!) 188/69 (01/20/24 0809)  SpO2: 95 % (01/20/24 0808) Vital Signs (24h Range):  Temp:  [97.8 °F (36.6 °C)-98.2 °F (36.8 °C)] 97.8 °F (36.6 °C)  Pulse:  [53-64] 56  Resp:  [16-22] 18  SpO2:  [86 %-96 %] 95 %  BP: (157-188)/(61-71) 188/69     Weight: 40.7 kg (89 lb 11.6 oz)  Body mass index is 15.89 kg/m².    Intake/Output Summary (Last 24 hours) at 1/20/2024 0935  Last data filed at 1/20/2024 0800  Gross per 24 hour   Intake 540 ml   Output --   Net 540 ml           Physical Exam  Vitals and nursing note reviewed.   Constitutional:       General: She is not in acute distress.     Appearance: Normal appearance. She is underweight.      Interventions: Nasal cannula in place.      Comments: Nasal cannula at 2 L   HENT:      Head: Normocephalic and atraumatic.      Nose: Nose normal.   Eyes:      Conjunctiva/sclera: Conjunctivae normal.   Cardiovascular:      Rate and Rhythm: Normal rate and regular rhythm.      Pulses: Normal pulses.      Heart sounds: Normal heart sounds. No murmur heard.     No gallop.   Pulmonary:      Effort: Pulmonary effort is normal.      Breath sounds: Normal breath sounds. No wheezing, rhonchi or rales.   Abdominal:      General: Abdomen is flat. Bowel sounds are normal. There is no distension.      Palpations: Abdomen is soft.      Tenderness: There is no  abdominal tenderness. There is no guarding.   Musculoskeletal:         General: No swelling or deformity. Normal range of motion.      Cervical back: Normal range of motion and neck supple.      Right lower leg: No edema.      Left lower leg: No edema.   Skin:     General: Skin is warm and dry.      Findings: No rash.   Neurological:      General: No focal deficit present.      Mental Status: She is alert and oriented to person, place, and time. Mental status is at baseline.      Gait: Gait normal.   Psychiatric:         Mood and Affect: Mood is anxious.         Thought Content: Thought content normal.         Judgment: Judgment normal.             Significant Labs: All pertinent labs within the past 24 hours have been reviewed.  BMP:   Recent Labs   Lab 01/19/24  0409      K 4.5   CO2 25   BUN 19.0   CREATININE 0.65   CALCIUM 8.9       CBC:   Recent Labs   Lab 01/18/24  1212 01/19/24  0409   WBC 9.46 6.81   HGB 11.4* 11.1*   HCT 36.0* 35.9*   * 429*         Significant Imaging: I have reviewed all pertinent imaging results/findings within the past 24 hours.

## 2024-01-22 ENCOUNTER — PATIENT OUTREACH (OUTPATIENT)
Dept: ADMINISTRATIVE | Facility: HOSPITAL | Age: 74
End: 2024-01-22
Payer: MEDICARE

## 2024-01-22 ENCOUNTER — OFFICE VISIT (OUTPATIENT)
Dept: FAMILY MEDICINE | Facility: CLINIC | Age: 74
End: 2024-01-22
Payer: MEDICARE

## 2024-01-22 VITALS
DIASTOLIC BLOOD PRESSURE: 72 MMHG | TEMPERATURE: 97 F | OXYGEN SATURATION: 91 % | SYSTOLIC BLOOD PRESSURE: 138 MMHG | BODY MASS INDEX: 15.06 KG/M2 | HEIGHT: 63 IN | RESPIRATION RATE: 18 BRPM | WEIGHT: 85 LBS | HEART RATE: 63 BPM

## 2024-01-22 DIAGNOSIS — I48.0 PAROXYSMAL ATRIAL FIBRILLATION: ICD-10-CM

## 2024-01-22 DIAGNOSIS — I20.9 ANGINA PECTORIS: ICD-10-CM

## 2024-01-22 DIAGNOSIS — J18.9 PNEUMONIA OF RIGHT LUNG DUE TO INFECTIOUS ORGANISM, UNSPECIFIED PART OF LUNG: Primary | ICD-10-CM

## 2024-01-22 DIAGNOSIS — F41.9 ANXIETY DISORDER, UNSPECIFIED TYPE: Chronic | ICD-10-CM

## 2024-01-22 DIAGNOSIS — E46 MALNUTRITION, UNSPECIFIED TYPE: ICD-10-CM

## 2024-01-22 PROCEDURE — 3075F SYST BP GE 130 - 139MM HG: CPT | Mod: CPTII,,, | Performed by: FAMILY MEDICINE

## 2024-01-22 PROCEDURE — 1157F ADVNC CARE PLAN IN RCRD: CPT | Mod: CPTII,,, | Performed by: FAMILY MEDICINE

## 2024-01-22 PROCEDURE — 99214 OFFICE O/P EST MOD 30 MIN: CPT | Mod: ,,, | Performed by: FAMILY MEDICINE

## 2024-01-22 PROCEDURE — 1159F MED LIST DOCD IN RCRD: CPT | Mod: CPTII,,, | Performed by: FAMILY MEDICINE

## 2024-01-22 PROCEDURE — 1111F DSCHRG MED/CURRENT MED MERGE: CPT | Mod: CPTII,,, | Performed by: FAMILY MEDICINE

## 2024-01-22 PROCEDURE — 1160F RVW MEDS BY RX/DR IN RCRD: CPT | Mod: CPTII,,, | Performed by: FAMILY MEDICINE

## 2024-01-22 PROCEDURE — 3078F DIAST BP <80 MM HG: CPT | Mod: CPTII,,, | Performed by: FAMILY MEDICINE

## 2024-01-22 RX ORDER — SERTRALINE HYDROCHLORIDE 100 MG/1
100 TABLET, FILM COATED ORAL DAILY
Qty: 90 TABLET | Refills: 1 | Status: SHIPPED | OUTPATIENT
Start: 2024-01-22 | End: 2024-07-20

## 2024-01-22 NOTE — PROGRESS NOTES
Subjective:       Patient ID: Cierra Main is a 73 y.o. female.    Chief Complaint: TCM Low O2-uses 2l at night, [facility]      TCM      HPI:   Patient is a 73-year-old white female with a history of emphysema not on home oxygen, CAD status post stents but not on anticoagulation, hypertension and hyperlipidemia who presented via EMS for shortness of breath for last 3 days.  EMS arrived on scene and noted patient to have SpO2 in the mid 70s and thus she was placed on supplemental oxygen at 4 L and improved to the mid 90s.  Patient states she has also had a nonproductive cough but denies any chest pain, fever, chills.  She denies any abdominal pain, dysuria, hematuria. SpO2 on arrival is 84% on room air and thus 2 L nasal cannula in place with SpO2 improvement up to 90s.  X-ray shows concern for possible consolidation in the right lower lobe. Physical exam findings in ER consistent with wheezing and rhonchi thus Decadron and DuoNebs given.  Basic labs otherwise unremarkable.  Blood cultures drawn.  Patient will be admitted and placed on Rocephin and doxy      * No surgery found *       Hospital Course:   1/19/2024: Patient seen on rounds lying in bed resting. Patient states she rested well and in good spirits.  No shortness of breath.     1/20/24: Pt states she is feeling okay. Has been off of O2 since last night. Started feeling sob when I walked in the room due to anxiety. She Anxious when talking about her home situation. Concerned about leaving. She feels generalized weakness and has not gotten out of bed much. I will consult PT for evaluation.Will ask for respiratory stress test while walking with PT to evaluate need for home o2. Bld cx negative at 24 hrs. Chest xray read states grossly unchanged. Xray findings lag behind clinical picture.  Later, family arrived and said home o2 had been delivered and they were here to bring pt home. They would not be  able to provide transportation tomorrow. Pt now states she's  "ready to go home. Doxy sent to the pharm.    Review of Systems   Constitutional: Negative.  Negative for chills and fever.   HENT:  Positive for rhinorrhea.    Respiratory:  Positive for shortness of breath (wearing O2 at bedtime).         Currently on Doxycycline   Cardiovascular: Negative.    Gastrointestinal: Negative.    Psychiatric/Behavioral: Negative.             Objective:      /72 (BP Location: Left arm, Patient Position: Sitting, BP Method: Large (Manual))   Pulse 63   Temp 97.1 °F (36.2 °C)   Resp 18   Ht 5' 3" (1.6 m)   Wt 38.6 kg (85 lb)   SpO2 (!) 91%   BMI 15.06 kg/m²    Physical Exam  Constitutional:       Appearance: Normal appearance. She is underweight.   Cardiovascular:      Rate and Rhythm: Normal rate and regular rhythm.      Heart sounds: Normal heart sounds.   Pulmonary:      Effort: Pulmonary effort is normal.      Breath sounds: Normal breath sounds.   Neurological:      Mental Status: She is alert.   Psychiatric:         Mood and Affect: Mood normal.         Behavior: Behavior normal.         Thought Content: Thought content normal.         Judgment: Judgment normal.               Assessment:       Problem List Items Addressed This Visit          Cardiac/Vascular    Paroxysmal atrial fibrillation    Current Assessment & Plan     Continue current medication   Followed by Dr. Horan         Angina pectoris    Current Assessment & Plan     Continue current medication            Endocrine    Malnutrition    Current Assessment & Plan     Discussed increased p.o. intake          Other Visit Diagnoses       Pneumonia of right lung due to infectious organism, unspecified part of lung    -  Primary               Plan:   1. Pneumonia of right lung due to infectious organism, unspecified part of lung  Continue antibiotics to completion   Continue home O2 as needed   Continue home health   Patient encouraged to increase p.o. intake and participate with PT  Monitor   Return to clinic with " any concerns    2. Paroxysmal atrial fibrillation  Assessment & Plan:  Continue current medication   Followed by Dr. Horan    3. Malnutrition, unspecified type  Assessment & Plan:  Discussed increased p.o. intake    4. Angina pectoris  Assessment & Plan:  Continue current medication

## 2024-01-22 NOTE — PT/OT/SLP DISCHARGE
Physical Therapy Discharge Summary    Name: Cierra Main  MRN: [id number]   Principal Problem: COPD exacerbation     Patient Discharged from acute Physical Therapy on 2024.     Assessment:     Patient appropriate for care in another setting.  Pt D/C from hospital prior to initiation of additional PT services.  PT recommended 24 hour care.  Unsure if family will be providing this level of care despite PT's recommendations.     Objective:     GOALS:   Multidisciplinary Problems       Physical Therapy Goals          Problem: Physical Therapy    Goal Priority Disciplines Outcome Goal Variances Interventions   Physical Therapy Goal     PT, PT/OT Unable to Meet, Plan Revised     Description: Patient will increase functional independence with mobility by performin. Supine to sit with Modified [place]  2. Sit to supine with Modified [place]  3. Sit to stand transfer with Stand-by Assistance  4. Bed to chair transfer with Stand-by Assistance using Rolling Walker  5. Gait  x 100 feet with Stand-by Assistance using Rolling Walker.   6. Ascend/descend 2 stair with right Handrails Contact Guard Assistance using Rolling Walker.                          Reasons for Discontinuation of Therapy Services  Transfer to alternate level of care.      Plan:     Patient Discharged to: Home with Home Health Service.      2024

## 2024-01-23 LAB
BACTERIA BLD CULT: NORMAL
BACTERIA BLD CULT: NORMAL

## 2024-01-29 ENCOUNTER — PATIENT MESSAGE (OUTPATIENT)
Dept: ADMINISTRATIVE | Facility: HOSPITAL | Age: 74
End: 2024-01-29
Payer: MEDICARE

## 2024-02-06 DIAGNOSIS — Z63.9 FAMILY CIRCUMSTANCE: Primary | ICD-10-CM

## 2024-02-06 DIAGNOSIS — R41.82 ALTERED MENTAL STATUS, UNSPECIFIED ALTERED MENTAL STATUS TYPE: ICD-10-CM

## 2024-02-06 SDOH — SOCIAL DETERMINANTS OF HEALTH (SDOH): PROBLEM RELATED TO PRIMARY SUPPORT GROUP, UNSPECIFIED: Z63.9

## 2024-02-12 ENCOUNTER — DOCUMENTATION ONLY (OUTPATIENT)
Dept: FAMILY MEDICINE | Facility: CLINIC | Age: 74
End: 2024-02-12
Payer: MEDICARE

## 2024-02-16 ENCOUNTER — TELEPHONE (OUTPATIENT)
Dept: FAMILY MEDICINE | Facility: CLINIC | Age: 74
End: 2024-02-16
Payer: MEDICARE

## 2024-02-28 ENCOUNTER — EXTERNAL HOME HEALTH (OUTPATIENT)
Dept: HOME HEALTH SERVICES | Facility: HOSPITAL | Age: 74
End: 2024-02-28
Payer: MEDICARE

## 2024-03-07 ENCOUNTER — DOCUMENT SCAN (OUTPATIENT)
Dept: HOME HEALTH SERVICES | Facility: HOSPITAL | Age: 74
End: 2024-03-07
Payer: MEDICARE

## 2024-03-25 ENCOUNTER — OFFICE VISIT (OUTPATIENT)
Dept: FAMILY MEDICINE | Facility: CLINIC | Age: 74
End: 2024-03-25
Payer: MEDICARE

## 2024-03-25 ENCOUNTER — HOSPITAL ENCOUNTER (OUTPATIENT)
Dept: RADIOLOGY | Facility: HOSPITAL | Age: 74
Discharge: HOME | End: 2024-03-25
Attending: NURSE PRACTITIONER
Payer: MEDICARE

## 2024-03-25 VITALS
SYSTOLIC BLOOD PRESSURE: 120 MMHG | HEART RATE: 55 BPM | WEIGHT: 86 LBS | RESPIRATION RATE: 18 BRPM | HEIGHT: 63 IN | BODY MASS INDEX: 15.24 KG/M2 | DIASTOLIC BLOOD PRESSURE: 78 MMHG | TEMPERATURE: 97 F | OXYGEN SATURATION: 88 %

## 2024-03-25 DIAGNOSIS — R52 PAIN: ICD-10-CM

## 2024-03-25 DIAGNOSIS — H61.22 HEARING LOSS OF LEFT EAR DUE TO CERUMEN IMPACTION: ICD-10-CM

## 2024-03-25 DIAGNOSIS — M54.6 ACUTE MIDLINE THORACIC BACK PAIN: Primary | ICD-10-CM

## 2024-03-25 DIAGNOSIS — E03.9 HYPOTHYROIDISM, UNSPECIFIED TYPE: ICD-10-CM

## 2024-03-25 DIAGNOSIS — R42 DIZZINESS: ICD-10-CM

## 2024-03-25 PROCEDURE — 3288F FALL RISK ASSESSMENT DOCD: CPT | Mod: CPTII,,, | Performed by: FAMILY MEDICINE

## 2024-03-25 PROCEDURE — 4010F ACE/ARB THERAPY RXD/TAKEN: CPT | Mod: CPTII,,, | Performed by: FAMILY MEDICINE

## 2024-03-25 PROCEDURE — 69210 REMOVE IMPACTED EAR WAX UNI: CPT | Mod: ,,, | Performed by: FAMILY MEDICINE

## 2024-03-25 PROCEDURE — 1101F PT FALLS ASSESS-DOCD LE1/YR: CPT | Mod: CPTII,,, | Performed by: FAMILY MEDICINE

## 2024-03-25 PROCEDURE — 1160F RVW MEDS BY RX/DR IN RCRD: CPT | Mod: CPTII,,, | Performed by: FAMILY MEDICINE

## 2024-03-25 PROCEDURE — 3008F BODY MASS INDEX DOCD: CPT | Mod: CPTII,,, | Performed by: FAMILY MEDICINE

## 2024-03-25 PROCEDURE — 3074F SYST BP LT 130 MM HG: CPT | Mod: CPTII,,, | Performed by: FAMILY MEDICINE

## 2024-03-25 PROCEDURE — 1159F MED LIST DOCD IN RCRD: CPT | Mod: CPTII,,, | Performed by: FAMILY MEDICINE

## 2024-03-25 PROCEDURE — 3078F DIAST BP <80 MM HG: CPT | Mod: CPTII,,, | Performed by: FAMILY MEDICINE

## 2024-03-25 PROCEDURE — 72070 X-RAY EXAM THORAC SPINE 2VWS: CPT | Mod: TC

## 2024-03-25 PROCEDURE — 1157F ADVNC CARE PLAN IN RCRD: CPT | Mod: CPTII,,, | Performed by: FAMILY MEDICINE

## 2024-03-25 PROCEDURE — 1125F AMNT PAIN NOTED PAIN PRSNT: CPT | Mod: CPTII,,, | Performed by: FAMILY MEDICINE

## 2024-03-25 PROCEDURE — 99214 OFFICE O/P EST MOD 30 MIN: CPT | Mod: 25,,, | Performed by: FAMILY MEDICINE

## 2024-03-25 RX ORDER — MECLIZINE HYDROCHLORIDE 25 MG/1
25 TABLET ORAL 3 TIMES DAILY PRN
Qty: 30 TABLET | Refills: 0 | Status: SHIPPED | OUTPATIENT
Start: 2024-03-25 | End: 2024-04-04

## 2024-03-25 RX ORDER — LEVOTHYROXINE SODIUM 25 UG/1
25 TABLET ORAL
Qty: 90 TABLET | Refills: 1 | Status: SHIPPED | OUTPATIENT
Start: 2024-03-25 | End: 2024-09-21

## 2024-03-25 NOTE — PROGRESS NOTES
"Subjective:       Patient ID: Cierra Main is a 73 y.o. female.    Chief Complaint: 2 MONTH , Back Pain, and Shortness of Breath      Routine    Back Pain  Associated symptoms include chest pain (sharp, intermittent, lasting for 30-60 min, relieved on its own).   Shortness of Breath  Associated symptoms include chest pain (sharp, intermittent, lasting for 30-60 min, relieved on its own).       Review of Systems   HENT:  Negative for tinnitus.    Respiratory:  Positive for shortness of breath (ULRICH, no orthopnea).    Cardiovascular:  Positive for chest pain (sharp, intermittent, lasting for 30-60 min, relieved on its own).        Followed by cardiology   Gastrointestinal:  Positive for nausea.   Musculoskeletal:  Positive for back pain (made better with tylenol).        Back pain: thoracic, reports fall at home   Neurological:  Positive for dizziness.        Unsteady gait           Objective:      /78 (BP Location: Left arm, Patient Position: Sitting, BP Method: Large (Manual))   Pulse (!) 55   Temp 97 °F (36.1 °C)   Resp 18   Ht 5' 3" (1.6 m)   Wt 39 kg (86 lb)   SpO2 (!) 88%   BMI 15.23 kg/m²    Physical Exam  Constitutional:       Appearance: Normal appearance.   HENT:      Right Ear: Tympanic membrane and ear canal normal.      Left Ear: There is impacted cerumen.   Cardiovascular:      Rate and Rhythm: Normal rate and regular rhythm.      Heart sounds: Normal heart sounds.   Pulmonary:      Effort: Pulmonary effort is normal.      Breath sounds: Normal breath sounds.   Neurological:      Mental Status: She is alert.   Psychiatric:         Mood and Affect: Mood normal.         Behavior: Behavior normal.         Thought Content: Thought content normal.         Judgment: Judgment normal.               Assessment:       Problem List Items Addressed This Visit          Endocrine    Hypothyroidism    Relevant Medications    levothyroxine (SYNTHROID) 25 MCG tablet     Other Visit Diagnoses       Acute " midline thoracic back pain    -  Primary    Dizziness        Relevant Medications    meclizine (ANTIVERT) 25 mg tablet    Hearing loss of left ear due to cerumen impaction                   Plan:   1. Acute midline thoracic back pain  Continue Tylenol p.r.n.  Continue heat/ice p.r.n.   T-spine x-ray results pending   Monitor   Return to clinic with any concerns     2. Dizziness  -     meclizine (ANTIVERT) 25 mg tablet; Take 1 tablet (25 mg total) by mouth 3 (three) times daily as needed for Dizziness.  Dispense: 30 tablet; Refill: 0  Rx for meclizine t.i.d. p.r.n.  Monitor   Return to clinic with any concerns     3. Hypothyroidism, unspecified type  -     levothyroxine (SYNTHROID) 25 MCG tablet; Take 1 tablet (25 mcg total) by mouth before breakfast.  Dispense: 90 tablet; Refill: 1  Continue current medication     4. Hearing loss of left ear due to cerumen impaction  Cerumen Impaction left  *Procedure Note*  Posterior traction applied on helix  Bionix lighted ear currette placed in ear canal  Cerumen impaction removed  Ear canal re-examined  Canal normal  Tympanic membrane clear

## 2024-04-03 ENCOUNTER — TELEPHONE (OUTPATIENT)
Dept: FAMILY MEDICINE | Facility: CLINIC | Age: 74
End: 2024-04-03
Payer: MEDICARE

## 2024-04-03 ENCOUNTER — DOCUMENTATION ONLY (OUTPATIENT)
Dept: FAMILY MEDICINE | Facility: CLINIC | Age: 74
End: 2024-04-03
Payer: MEDICARE

## 2024-04-25 ENCOUNTER — DOCUMENT SCAN (OUTPATIENT)
Dept: HOME HEALTH SERVICES | Facility: HOSPITAL | Age: 74
End: 2024-04-25
Payer: MEDICARE

## 2024-06-05 DIAGNOSIS — E78.00 HYPERCHOLESTEREMIA: Primary | ICD-10-CM

## 2024-06-05 RX ORDER — ATORVASTATIN CALCIUM 10 MG/1
10 TABLET, FILM COATED ORAL NIGHTLY
Qty: 90 TABLET | Refills: 1 | Status: SHIPPED | OUTPATIENT
Start: 2024-06-05

## 2024-07-08 ENCOUNTER — HOSPITAL ENCOUNTER (EMERGENCY)
Facility: HOSPITAL | Age: 74
Discharge: HOME | End: 2024-07-09
Attending: STUDENT IN AN ORGANIZED HEALTH CARE EDUCATION/TRAINING PROGRAM
Payer: MEDICARE

## 2024-07-08 DIAGNOSIS — S70.01XA CONTUSION OF RIGHT HIP, INITIAL ENCOUNTER: Primary | ICD-10-CM

## 2024-07-08 DIAGNOSIS — R52 PAIN: ICD-10-CM

## 2024-07-08 PROCEDURE — 25000003 PHARM REV CODE 250: Performed by: STUDENT IN AN ORGANIZED HEALTH CARE EDUCATION/TRAINING PROGRAM

## 2024-07-08 PROCEDURE — 99284 EMERGENCY DEPT VISIT MOD MDM: CPT | Mod: 25

## 2024-07-08 RX ORDER — TRAMADOL HYDROCHLORIDE 50 MG/1
100 TABLET ORAL
Status: COMPLETED | OUTPATIENT
Start: 2024-07-08 | End: 2024-07-08

## 2024-07-08 RX ADMIN — TRAMADOL HYDROCHLORIDE 100 MG: 50 TABLET, COATED ORAL at 11:07

## 2024-07-09 ENCOUNTER — TELEPHONE (OUTPATIENT)
Dept: EMERGENCY MEDICINE | Facility: HOSPITAL | Age: 74
End: 2024-07-09
Payer: MEDICARE

## 2024-07-09 VITALS
SYSTOLIC BLOOD PRESSURE: 167 MMHG | OXYGEN SATURATION: 91 % | BODY MASS INDEX: 15.82 KG/M2 | HEART RATE: 61 BPM | HEIGHT: 63 IN | RESPIRATION RATE: 18 BRPM | WEIGHT: 89.31 LBS | DIASTOLIC BLOOD PRESSURE: 83 MMHG | TEMPERATURE: 98 F

## 2024-07-09 RX ORDER — KETOROLAC TROMETHAMINE 10 MG/1
10 TABLET, FILM COATED ORAL 2 TIMES DAILY
Qty: 10 TABLET | Refills: 0 | Status: ON HOLD | OUTPATIENT
Start: 2024-07-09 | End: 2024-07-14

## 2024-07-09 RX ORDER — TRAMADOL HYDROCHLORIDE 50 MG/1
50 TABLET ORAL EVERY 6 HOURS PRN
Qty: 9 TABLET | Refills: 0 | Status: ON HOLD | OUTPATIENT
Start: 2024-07-09

## 2024-07-09 NOTE — TELEPHONE ENCOUNTER
Attempted to call son of patient Aly again regarding discrepancy with prelim xr read and plan going forward.  No answer again.  Will attempt again later.    Vitaly Hays MD

## 2024-07-09 NOTE — ED NOTES
73 YEAR OLD FEMALE TO ER PER WC COMPLAINING OF RIGHT HIP PAIN.  PT. STATES SHE FELL 6 DAYS AGO AND WAS AMBULATING.  LAST NIGHT PAIN INCREASED AND SHE BEGAN NONAMBULATORY DUE TO PAIN.

## 2024-07-09 NOTE — ED PROVIDER NOTES
Encounter Date: 7/8/2024       History     Chief Complaint   Patient presents with    Fall     Pt states she fell 6 days ago and impacted her hip onto the floor Family notes pt was able to ambulate and bear weight up to tonight . Pt states she can't walk on it or bear weight, that the pain is located at right hip and right femur as well as radiating to groin      Patient is a 73-year-old white female with a previous stroke who ambulates with a walker and presented to the ER today due to a fall that occurred 6 days ago.  Patient's son at bedside assisting with the history and states it was mother was walking with a walker getting up from the dinner table when her walker turned sideways causing her to fall onto her right hip.  They deny any loss of consciousness, neck pain, back pain, chest pain or abdominal pain.  She states she was able to bear full weight but states that is become more and more painful.  She points to her right hip as the source of the pain.  Denies any lower leg swelling.  Denies any other complaints.      Review of patient's allergies indicates:   Allergen Reactions    Amlodipine      Leg weakness    Losartan      Past Medical History:   Diagnosis Date    Acute MI     Anxiety     Cardiac disease     COPD (chronic obstructive pulmonary disease)     Coronary artery disease     Depression     Hyperlipidemia     Hypertension     Hypothyroidism     Other specified noninfective gastroenteritis and colitis     Unspecified macular degeneration      Past Surgical History:   Procedure Laterality Date    CORONARY ANGIOGRAPHY N/A 2/1/2021    Procedure: ANGIOGRAM, CORONARY ARTERY;  Surgeon: Devon Medeiros MD;  Location: [facility], [place] CATH LAB;  Service: Cardiology;  Laterality: N/A;    CORONARY ANGIOPLASTY WITH STENT PLACEMENT      LEFT HEART CATHETERIZATION Left 2/1/2021    Procedure: CATHETERIZATION, HEART, LEFT;  Surgeon: Devon Medeiros MD;  Location: [facility], [place] CATH LAB;  Service: Cardiology;  Laterality: Left;      Family History   Problem Relation Name Age of Onset    Diabetes Mother      Heart disease Mother      Hypertension Father      Heart disease Father       Social History     Tobacco Use    Smoking status: Former     Current packs/day: 0.00     Types: Cigarettes     Quit date: 3/25/1969     Years since quittin.3    Smokeless tobacco: Never   Substance Use Topics    Alcohol use: Never    Drug use: Never     Review of Systems   Constitutional:  Negative for chills, fatigue and fever.   HENT:  Negative for congestion, sore throat and trouble swallowing.    Eyes:  Negative for pain and visual disturbance.   Respiratory:  Negative for cough, shortness of breath and wheezing.    Cardiovascular:  Negative for chest pain and palpitations.   Gastrointestinal:  Negative for abdominal pain, blood in stool, constipation, diarrhea, nausea and vomiting.   Genitourinary:  Negative for dysuria and hematuria.   Musculoskeletal:  Positive for arthralgias. Negative for back pain and myalgias.   Skin:  Negative for rash and wound.   Neurological:  Negative for seizures, syncope and headaches.   Psychiatric/Behavioral:  Negative for confusion. The patient is not nervous/anxious.        Physical Exam     Initial Vitals [24 2251]   BP Pulse Resp Temp SpO2   (!) 198/95 65 20 98.1 °F (36.7 °C) (!) 92 %      MAP       --         Physical Exam    Nursing note and vitals reviewed.  Constitutional: She appears well-developed and well-nourished. She is not diaphoretic. No distress.   HENT:   Head: Normocephalic.   Right Ear: External ear normal.   Left Ear: External ear normal.   Nose: Nose normal.   Eyes: Conjunctivae and EOM are normal. Right eye exhibits no discharge. Left eye exhibits no discharge. No scleral icterus.   Neck:   Normal range of motion.  Cardiovascular:  Normal rate, regular rhythm and normal heart sounds.     Exam reveals no gallop and no friction rub.       No murmur heard.  Pulmonary/Chest: Breath sounds  normal. No stridor. No respiratory distress. She has no wheezes. She has no rhonchi. She has no rales.   Abdominal: Abdomen is soft. She exhibits no distension. There is no abdominal tenderness. There is no rebound and no guarding.   Musculoskeletal:         General: Normal range of motion.      Cervical back: Normal range of motion.      Comments: Hip right: DP pulses appreciated.  No edema, erythema or calf tenderness.  No clinical signs of DVT.  No leg-length discrepancies appreciated.  Pain with internal and external rotation of the hip.  No pain at rest.  Ecchymosis noted over the greater trochanter of the right hip.  No large hematomas appreciated.  All pain is reproducible with palpation of the greater trochanter of the right hip.     Neurological: She is alert.   Skin: Skin is warm. No rash noted. No erythema.   Psychiatric: She has a normal mood and affect. Her behavior is normal.         ED Course   Procedures  Labs Reviewed - No data to display       Imaging Results              X-Ray Hip 2 or 3 views Right with Pelvis when performed (Preliminary result)  Result time 07/08/24 23:44:09      Preliminary result by Waldo Laguna MD (07/08/24 23:44:09)                   Narrative:    START OF REPORT:  Technique: AP 2view and internal right hip with pelvis study was performed.    Clinical history: Trauma to right hip.    Findings:  Alignment: Alignment is within normal limits.  Mineralization: The visualized bony structures of the pelvis demonstrate normal bony mineralization.  Bones: No acute displaced fracture is identified of any of the pelvic bony structures.  Femur: Visualized femur intact with no fracture.    Miscellaneous:  Soft Tissues: The visualized soft tissues appear unremarkable.      Impression:  1. No acute fracture dislocation or subluxation identified in the right hip or visualized pelvis. Details as above.                                         Medications   traMADoL tablet 100 mg (100 mg  Oral Given 7/8/24 5860)     Medical Decision Making  Differentials:  Hip fracture, pelvis fracture, DVT  Overall well-appearing 73-year-old female with relatively stable vital signs presents with right hip pain.  He has been ambulating on an area that has been in pain.  Reproducible pain with palpation of the greater trochanter.  X-ray per preliminary read showed no acute osseous abnormalities.  There was some concern on the inferior pubic ramus on the right in his was called and confirmed with Radiology to likely have been an old fracture.  No clinical signs of DVT on exam.  Tramadol seemed to help pain.  She has a wheelchair at home to ambulate with.  Fall precautions discussed at length.  Toradol at a low dose was called into the pharmacy.  Tramadol was sent for breakthrough pain and driving restrictions and fall precautions were discussed.  Patient has a son at bedside has a ride home.  All questions were answered in layman's terms and return precautions were discussed.    Amount and/or Complexity of Data Reviewed  Radiology: ordered. Decision-making details documented in ED Course.    Risk  Prescription drug management.                                      Clinical Impression:  Final diagnoses:  [R52] Pain  [S70.01XA] Contusion of right hip, initial encounter (Primary)          ED Disposition Condition    Discharge Stable          ED Prescriptions       Medication Sig Dispense Start Date End Date Auth. Provider    ketorolac (TORADOL) 10 mg tablet Take 1 tablet (10 mg total) by mouth 2 (two) times a day. for 5 days 10 tablet 7/9/2024 7/14/2024 Vitaly Hays MD    traMADoL (ULTRAM) 50 mg tablet Take 1 tablet (50 mg total) by mouth every 6 (six) hours as needed for Pain. 9 tablet 7/9/2024 -- Vitaly Hays MD          Follow-up Information       Follow up With Specialties Details Why Contact Info    [facility] - Emergency Dept Emergency Medicine  If symptoms worsen [address] [facility]  [id number]  [phone]             Vitaly Hays MD  07/09/24 0044

## 2024-07-09 NOTE — TELEPHONE ENCOUNTER
Final xr read discrepancy from prelim radiologist read.  Attempted to call son greer about results with a plan going forward.  No answer x2.  Will attempt again later.  Plan will be return to ED for further eval if pt not having improvement.       Vitaly Hays MD

## 2024-07-10 ENCOUNTER — PATIENT OUTREACH (OUTPATIENT)
Dept: EMERGENCY MEDICINE | Facility: HOSPITAL | Age: 74
End: 2024-07-10
Payer: MEDICARE

## 2024-07-10 ENCOUNTER — TELEPHONE (OUTPATIENT)
Dept: FAMILY MEDICINE | Facility: CLINIC | Age: 74
End: 2024-07-10
Payer: MEDICARE

## 2024-07-10 NOTE — PROGRESS NOTES
Per chart patient's son Aly declined scheduling assistance from PCP and will call to schedule when it's convenient.

## 2024-07-10 NOTE — TELEPHONE ENCOUNTER
[facility] ([facility]) is requesting Dr. Eller to fill out ppw for patient because they are needing him to write something about patient's mood, judgement, and insight and also the need for 24 hr care. Patient is currently at home right now with no care or anyone to take care of her so caren is trying to get her approved by state to go into nursing home. Patient had recent fall and went to an [facility] ER. Called bennett patient's daughter to schedule but she is in hosp with her  she informed me to call greer patients son and I spoke with greer and he will call back to schedule appt because he works out of town.    Both bennett and greer verbalized understanding of why patient needs appt and knows everything that is currently going on in regards to ppw, nursing home etc

## 2024-07-12 ENCOUNTER — HOSPITAL ENCOUNTER (EMERGENCY)
Facility: HOSPITAL | Age: 74
Discharge: ANOTHER HOSPITAL | End: 2024-07-12
Attending: STUDENT IN AN ORGANIZED HEALTH CARE EDUCATION/TRAINING PROGRAM
Payer: MEDICARE

## 2024-07-12 ENCOUNTER — TELEPHONE (OUTPATIENT)
Dept: EMERGENCY MEDICINE | Facility: HOSPITAL | Age: 74
End: 2024-07-12
Payer: MEDICARE

## 2024-07-12 ENCOUNTER — HOSPITAL ENCOUNTER (INPATIENT)
Facility: HOSPITAL | Age: 74
LOS: 10 days | Discharge: NURSING, REHAB OR LONG-TERM CARE | DRG: 956 | End: 2024-07-22
Attending: INTERNAL MEDICINE | Admitting: HOSPITALIST
Payer: MEDICARE

## 2024-07-12 VITALS
TEMPERATURE: 99 F | WEIGHT: 85 LBS | RESPIRATION RATE: 20 BRPM | BODY MASS INDEX: 15.06 KG/M2 | HEIGHT: 63 IN | OXYGEN SATURATION: 95 % | DIASTOLIC BLOOD PRESSURE: 104 MMHG | SYSTOLIC BLOOD PRESSURE: 174 MMHG | HEART RATE: 57 BPM

## 2024-07-12 DIAGNOSIS — S32.509A CLOSED FRACTURE OF PUBIS, UNSPECIFIED PORTION OF PUBIS, UNSPECIFIED LATERALITY, INITIAL ENCOUNTER: Primary | ICD-10-CM

## 2024-07-12 DIAGNOSIS — R53.83 FATIGUE: ICD-10-CM

## 2024-07-12 DIAGNOSIS — S32.9XXA PELVIC FRACTURE: ICD-10-CM

## 2024-07-12 DIAGNOSIS — S72.141S CLOSED 2-PART INTERTROCHANTERIC FRACTURE OF PROXIMAL END OF RIGHT FEMUR, SEQUELA: Primary | ICD-10-CM

## 2024-07-12 DIAGNOSIS — Z01.818 PRE-OP EVALUATION: ICD-10-CM

## 2024-07-12 LAB
ABORH RETYPE: NORMAL
ALBUMIN SERPL-MCNC: 3.7 G/DL (ref 3.4–4.8)
ALBUMIN/GLOB SERPL: 0.9 RATIO (ref 1.1–2)
ALP SERPL-CCNC: 127 UNIT/L (ref 40–150)
ALT SERPL-CCNC: 57 UNIT/L (ref 0–55)
ANION GAP SERPL CALC-SCNC: 11 MEQ/L
APTT PPP: 28.8 SECONDS (ref 21.4–28.3)
AST SERPL-CCNC: 60 UNIT/L (ref 5–34)
BACTERIA #/AREA URNS AUTO: NORMAL /HPF
BASOPHILS # BLD AUTO: 0.07 X10(3)/MCL
BASOPHILS NFR BLD AUTO: 0.8 %
BILIRUB SERPL-MCNC: 0.5 MG/DL
BILIRUB UR QL STRIP.AUTO: NEGATIVE
BUN SERPL-MCNC: 28 MG/DL (ref 9.8–20.1)
CALCIUM SERPL-MCNC: 10.1 MG/DL (ref 8.4–10.2)
CHLORIDE SERPL-SCNC: 106 MMOL/L (ref 98–107)
CLARITY UR: CLEAR
CO2 SERPL-SCNC: 22 MMOL/L (ref 23–31)
COLOR UR AUTO: YELLOW
CREAT SERPL-MCNC: 0.93 MG/DL (ref 0.55–1.02)
CREAT/UREA NIT SERPL: 30
EOSINOPHIL # BLD AUTO: 0.31 X10(3)/MCL (ref 0–0.9)
EOSINOPHIL NFR BLD AUTO: 3.5 %
ERYTHROCYTE [DISTWIDTH] IN BLOOD BY AUTOMATED COUNT: 14.7 % (ref 11.5–17)
GFR SERPLBLD CREATININE-BSD FMLA CKD-EPI: >60 ML/MIN/1.73/M2
GLOBULIN SER-MCNC: 4.2 GM/DL (ref 2.4–3.5)
GLUCOSE SERPL-MCNC: 102 MG/DL (ref 82–115)
GLUCOSE UR QL STRIP: NEGATIVE
GROUP & RH: NORMAL
HCT VFR BLD AUTO: 45 % (ref 37–47)
HGB BLD-MCNC: 14.1 G/DL (ref 12–16)
HGB UR QL STRIP: NEGATIVE
IMM GRANULOCYTES # BLD AUTO: 0.04 X10(3)/MCL (ref 0–0.04)
IMM GRANULOCYTES NFR BLD AUTO: 0.5 %
INDIRECT COOMBS: NORMAL
INR PPP: 0.9
KETONES UR QL STRIP: NEGATIVE
LEUKOCYTE ESTERASE UR QL STRIP: ABNORMAL
LYMPHOCYTES # BLD AUTO: 1.34 X10(3)/MCL (ref 0.6–4.6)
LYMPHOCYTES NFR BLD AUTO: 15.3 %
MCH RBC QN AUTO: 27.1 PG (ref 27–31)
MCHC RBC AUTO-ENTMCNC: 31.3 G/DL (ref 33–36)
MCV RBC AUTO: 86.5 FL (ref 80–94)
MONOCYTES # BLD AUTO: 0.85 X10(3)/MCL (ref 0.1–1.3)
MONOCYTES NFR BLD AUTO: 9.7 %
NEUTROPHILS # BLD AUTO: 6.17 X10(3)/MCL (ref 2.1–9.2)
NEUTROPHILS NFR BLD AUTO: 70.2 %
NITRITE UR QL STRIP: NEGATIVE
NRBC BLD AUTO-RTO: 0 %
PH UR STRIP: 6 [PH]
PLATELET # BLD AUTO: 336 X10(3)/MCL (ref 130–400)
PMV BLD AUTO: 10 FL (ref 7.4–10.4)
POTASSIUM SERPL-SCNC: 4.8 MMOL/L (ref 3.5–5.1)
PROT SERPL-MCNC: 7.9 GM/DL (ref 5.8–7.6)
PROT UR QL STRIP: NEGATIVE
PROTHROMBIN TIME: 9.8 SECONDS (ref 9.1–10.9)
RBC # BLD AUTO: 5.2 X10(6)/MCL (ref 4.2–5.4)
RBC #/AREA URNS AUTO: NORMAL /HPF
SODIUM SERPL-SCNC: 139 MMOL/L (ref 136–145)
SP GR UR STRIP.AUTO: 1.01 (ref 1–1.03)
SPECIMEN OUTDATE: NORMAL
SQUAMOUS #/AREA URNS AUTO: NORMAL /HPF
TROPONIN I SERPL-MCNC: 0.01 NG/ML (ref 0–0.04)
UROBILINOGEN UR STRIP-ACNC: 0.2
WBC # BLD AUTO: 8.78 X10(3)/MCL (ref 4.5–11.5)
WBC #/AREA URNS AUTO: NORMAL /HPF

## 2024-07-12 PROCEDURE — 11000001 HC ACUTE MED/SURG PRIVATE ROOM

## 2024-07-12 PROCEDURE — 85730 THROMBOPLASTIN TIME PARTIAL: CPT | Performed by: STUDENT IN AN ORGANIZED HEALTH CARE EDUCATION/TRAINING PROGRAM

## 2024-07-12 PROCEDURE — 99285 EMERGENCY DEPT VISIT HI MDM: CPT | Mod: 25

## 2024-07-12 PROCEDURE — 86850 RBC ANTIBODY SCREEN: CPT | Performed by: STUDENT IN AN ORGANIZED HEALTH CARE EDUCATION/TRAINING PROGRAM

## 2024-07-12 PROCEDURE — 86901 BLOOD TYPING SEROLOGIC RH(D): CPT | Performed by: STUDENT IN AN ORGANIZED HEALTH CARE EDUCATION/TRAINING PROGRAM

## 2024-07-12 PROCEDURE — 96374 THER/PROPH/DIAG INJ IV PUSH: CPT

## 2024-07-12 PROCEDURE — 85025 COMPLETE CBC W/AUTO DIFF WBC: CPT | Performed by: STUDENT IN AN ORGANIZED HEALTH CARE EDUCATION/TRAINING PROGRAM

## 2024-07-12 PROCEDURE — 63600175 PHARM REV CODE 636 W HCPCS: Performed by: STUDENT IN AN ORGANIZED HEALTH CARE EDUCATION/TRAINING PROGRAM

## 2024-07-12 PROCEDURE — 25000003 PHARM REV CODE 250: Performed by: GENERAL PRACTICE

## 2024-07-12 PROCEDURE — 85610 PROTHROMBIN TIME: CPT | Performed by: STUDENT IN AN ORGANIZED HEALTH CARE EDUCATION/TRAINING PROGRAM

## 2024-07-12 PROCEDURE — 84484 ASSAY OF TROPONIN QUANT: CPT | Performed by: STUDENT IN AN ORGANIZED HEALTH CARE EDUCATION/TRAINING PROGRAM

## 2024-07-12 PROCEDURE — 86900 BLOOD TYPING SEROLOGIC ABO: CPT | Performed by: STUDENT IN AN ORGANIZED HEALTH CARE EDUCATION/TRAINING PROGRAM

## 2024-07-12 PROCEDURE — 93005 ELECTROCARDIOGRAM TRACING: CPT

## 2024-07-12 PROCEDURE — 81003 URINALYSIS AUTO W/O SCOPE: CPT | Performed by: STUDENT IN AN ORGANIZED HEALTH CARE EDUCATION/TRAINING PROGRAM

## 2024-07-12 PROCEDURE — 96376 TX/PRO/DX INJ SAME DRUG ADON: CPT

## 2024-07-12 PROCEDURE — 80053 COMPREHEN METABOLIC PANEL: CPT | Performed by: STUDENT IN AN ORGANIZED HEALTH CARE EDUCATION/TRAINING PROGRAM

## 2024-07-12 RX ORDER — AMIODARONE HYDROCHLORIDE 200 MG/1
200 TABLET ORAL
Status: DISCONTINUED | OUTPATIENT
Start: 2024-07-12 | End: 2024-07-12 | Stop reason: HOSPADM

## 2024-07-12 RX ORDER — MORPHINE SULFATE 2 MG/ML
2 INJECTION, SOLUTION INTRAMUSCULAR; INTRAVENOUS
Status: COMPLETED | OUTPATIENT
Start: 2024-07-12 | End: 2024-07-12

## 2024-07-12 RX ORDER — LISINOPRIL 5 MG/1
20 TABLET ORAL
Status: COMPLETED | OUTPATIENT
Start: 2024-07-12 | End: 2024-07-12

## 2024-07-12 RX ORDER — HYDRALAZINE HYDROCHLORIDE 25 MG/1
100 TABLET, FILM COATED ORAL
Status: COMPLETED | OUTPATIENT
Start: 2024-07-12 | End: 2024-07-12

## 2024-07-12 RX ADMIN — MORPHINE SULFATE 2 MG: 2 INJECTION, SOLUTION INTRAMUSCULAR; INTRAVENOUS at 05:07

## 2024-07-12 RX ADMIN — HYDRALAZINE HYDROCHLORIDE 100 MG: 25 TABLET ORAL at 07:07

## 2024-07-12 RX ADMIN — LISINOPRIL 20 MG: 5 TABLET ORAL at 07:07

## 2024-07-12 RX ADMIN — MORPHINE SULFATE 2 MG: 2 INJECTION, SOLUTION INTRAMUSCULAR; INTRAVENOUS at 06:07

## 2024-07-12 NOTE — PROGRESS NOTES
Pt has pelvic ring fracture. LC1 on the right. She is likely going to be treated with WB trial. She has been ambulating for the last 2 weeks per ED note. We will eval and make her NPO in case she needs posterior fixation. Not urgent.    Admit to medicine  Possible non op mgt  NPO MN    This note/OR report was created with the assistance of  voice recognition software or phone  dictation.  There may be transcription errors as a result of using this technology however minimal. Effort has been made to assure accuracy of transcription but any obvious errors or omissions should be clarified with the author of the document.       Joselito Montiel, DO  Orthopedic Trauma Surgery

## 2024-07-12 NOTE — TELEPHONE ENCOUNTER
Was able to call and discuss with Aly (son of patient) the discrepancy in the x-ray read.  After discussion with him he states it was mother it was ambulating full weight-bearing and doing significantly better.  I did advise him to come in for a CT scan to further assess the possibility of a pelvis fracture.  He states she has had multiple falls in the past and it maybe an old fracture as the x-ray read alluded to.  He states he will discuss with the mother to see if she wants to come back in for re-evaluation.    Vitaly Hays MD

## 2024-07-12 NOTE — ED PROVIDER NOTES
Encounter Date: 7/12/2024       History     Chief Complaint   Patient presents with    Hip Pain    Dysuria     C/o R hip pain, dysuria. Pt seen in Ed few days prior for hip injury.      Patient is a 73-year-old white female with a history of CAD, COPD, hyperlipidemia, hypertension, thyroid disease who presented to the ER today due to continued hip pain.  This patient's son was called earlier today by myself due to discrepancy in the final x-ray read with the night Hawk/life track x-ray read.  She was called back in further imaging with possible CT scan.  She has been improving per the son Aly but still having continued pain.  They deny any previous pelvis fractures that were known however he did state that she would multiple falls in the last few years.  On review of systems patient did state that she has had some dysuria and had overall fatigue.  She denies any chest pain, shortness of breath, abdominal pain.  The fall was approx 10-12 days ago and was mechanical in nature as her walker turned causing the fall.        Review of patient's allergies indicates:   Allergen Reactions    Amlodipine      Leg weakness    Losartan      Past Medical History:   Diagnosis Date    Acute MI     Anxiety     Cardiac disease     COPD (chronic obstructive pulmonary disease)     Coronary artery disease     Depression     Hyperlipidemia     Hypertension     Hypothyroidism     Other specified noninfective gastroenteritis and colitis     Unspecified macular degeneration      Past Surgical History:   Procedure Laterality Date    CORONARY ANGIOGRAPHY N/A 2/1/2021    Procedure: ANGIOGRAM, CORONARY ARTERY;  Surgeon: Devon Medeiros MD;  Location: [facility] CATH LAB;  Service: Cardiology;  Laterality: N/A;    CORONARY ANGIOPLASTY WITH STENT PLACEMENT      LEFT HEART CATHETERIZATION Left 2/1/2021    Procedure: CATHETERIZATION, HEART, LEFT;  Surgeon: Devon Medeiros MD;  Location: [facility] CATH LAB;  Service: Cardiology;  Laterality: Left;     Family  History   Problem Relation Name Age of Onset    Diabetes Mother      Heart disease Mother      Hypertension Father      Heart disease Father       Social History     Tobacco Use    Smoking status: Former     Current packs/day: 0.00     Types: Cigarettes     Quit date: 3/25/1969     Years since quittin.3    Smokeless tobacco: Never   Substance Use Topics    Alcohol use: Never    Drug use: Never     Review of Systems   Constitutional:  Negative for chills, fatigue and fever.   HENT:  Negative for congestion, sore throat and trouble swallowing.    Eyes:  Negative for pain and visual disturbance.   Respiratory:  Negative for cough, shortness of breath and wheezing.    Cardiovascular:  Negative for chest pain and palpitations.   Gastrointestinal:  Negative for abdominal pain, blood in stool, constipation, diarrhea, nausea and vomiting.   Genitourinary:  Negative for dysuria and hematuria.   Musculoskeletal:  Positive for arthralgias. Negative for back pain and myalgias.   Skin:  Negative for rash and wound.   Neurological:  Negative for seizures, syncope and headaches.   Psychiatric/Behavioral:  Negative for confusion. The patient is not nervous/anxious.        Physical Exam     Initial Vitals   BP Pulse Resp Temp SpO2   24 1543 24 1543 24 1543 24 1547 24 1543   (!) 177/78 (!) 57 18 98.6 °F (37 °C) 97 %      MAP       --                Physical Exam    Nursing note and vitals reviewed.  Constitutional: She appears well-developed and well-nourished. She is not diaphoretic. No distress.   HENT:   Head: Normocephalic.   Right Ear: External ear normal.   Left Ear: External ear normal.   Nose: Nose normal.   Eyes: Conjunctivae and EOM are normal. Right eye exhibits no discharge. Left eye exhibits no discharge. No scleral icterus.   Neck:   Normal range of motion.  Cardiovascular:  Normal rate, regular rhythm and normal heart sounds.     Exam reveals no gallop and no friction rub.       No  murmur heard.  Pulmonary/Chest: Breath sounds normal. No stridor. No respiratory distress. She has no wheezes. She has no rhonchi. She has no rales.   Abdominal: Abdomen is soft. She exhibits no distension. There is no abdominal tenderness. There is no rebound and no guarding.   Musculoskeletal:      Cervical back: Normal range of motion.      Comments: No leg-length discrepancies appreciated.  No unilateral edema.  No calf tenderness.  DP pulses appreciated equal bilaterally.  Pain with passive internal and external rotation of the right hip.     Neurological: She is alert.   Skin: Skin is warm. No rash noted. No erythema.   Psychiatric: She has a normal mood and affect. Her behavior is normal.         ED Course   Procedures  Labs Reviewed   COMPREHENSIVE METABOLIC PANEL - Abnormal; Notable for the following components:       Result Value    CO2 22 (*)     Blood Urea Nitrogen 28.0 (*)     Protein Total 7.9 (*)     Globulin 4.2 (*)     Albumin/Globulin Ratio 0.9 (*)     ALT 57 (*)     AST 60 (*)     All other components within normal limits   CBC WITH DIFFERENTIAL - Abnormal; Notable for the following components:    MCHC 31.3 (*)     All other components within normal limits   APTT - Abnormal; Notable for the following components:    PTT 28.8 (*)     All other components within normal limits   TROPONIN I - Normal   CBC W/ AUTO DIFFERENTIAL    Narrative:     The following orders were created for panel order CBC Auto Differential.  Procedure                               Abnormality         Status                     ---------                               -----------         ------                     CBC with Differential[[id number]]       Abnormal            Final result                 Please view results for these tests on the individual orders.   URINALYSIS, REFLEX TO URINE CULTURE   PROTIME-INR   TYPE & SCREEN   ABORH RETYPE     EKG Readings: (Independently Interpreted)   Initial Reading: No STEMI. Rhythm:  Sinus Bradycardia. Heart Rate: 55. Ectopy: No Ectopy. Conduction: Normal. ST Segments: Normal ST Segments. T Waves: Normal. Axis: Normal.     ECG Results              EKG 12-lead (In process)        Collection Time Result Time QRS Duration OHS QTC Calculation    07/12/24 16:05:18 07/12/24 16:57:23 140 484                     In process by Interface, Lab In [facility] (07/12/24 16:57:30)                   Narrative:    Test Reason : R53.83,    Vent. Rate : 055 BPM     Atrial Rate : 055 BPM     P-R Int : 166 ms          QRS Dur : 140 ms      QT Int : 506 ms       P-R-T Axes : 077 093 067 degrees     QTc Int : 484 ms    Sinus bradycardia  Right bundle branch block  T wave abnormality, consider lateral ischemia  Abnormal ECG  When compared with ECG of 18-JAN-2024 12:05,  Right bundle branch block has replaced Nonspecific intraventricular block    Referred By: [email or web address]   SELF           Confirmed By:                                   Imaging Results              CT Pelvis Without Contrast (Final result)  Result time 07/12/24 16:40:40      Final result by Anthony Ryan MD (07/12/24 16:40:40)                   Impression:      Fracture of the pubis bilaterally and the inferior pubic ramus bilaterally    Fracture along the lateral aspect of the upper sacrum on the right side    Nondisplaced fracture along the anterior aspect of the acetabulum best seen on sagittal image 43 series 14 and axial image number 44 series 6.      Electronically signed by: Deniz Ryan  Date:    07/12/2024  Time:    16:40               Narrative:    EXAMINATION:  CT PELVIS WITHOUT CONTRAST    CLINICAL HISTORY:  Pelvic fracture;    TECHNIQUE:  Low dose axial images, sagittal and coronal reformations were obtained from iliac crest to the pubic symphysis.  No contrast was administered.  Automatic exposure control is utilized to reduce patient radiation exposure.    COMPARISON:  10/06/2020    FINDINGS:  There is a fracture at the pubis on the  left side.  It is acute.  There is a subtle nondisplaced fracture of the inferior pubic ramus on the left side.  There is a fracture at the pubis on the right side.  It is nondisplaced.  There is a slightly displaced fracture of the inferior pubic ramus on the right side.  There is a fracture along the anterior aspect of the acetabulum on the right side.  It is nondisplaced.  The iliac bone is intact.  There is a fracture of the lateral aspect of the sacrum on the right side involving the S1 vertebral body.  The intrapelvic structures appear unremarkable.                                       Medications   morphine injection 2 mg (2 mg Intravenous Given 7/12/24 1728)     Medical Decision Making  Differentials, fracture, dislocation, UTI   Historian is the patient, EMS, son   73-year-old well-appearing female with relatively stable vital signs presents for generalized fatigue, continued right hip pain after a fall almost 2 weeks ago.  There was a discrepancy in the life track read versus final read and family was unable to be reached for the last few days.  Son was called reached today and advise mother to come back in for repeat imaging.  CT scan done showing concerns for multiple fractures in the pelvis.  Coags ordered.  Basic labs largely noncontributory. Seeking ortho eval.  Spoke with provider for Dr. Davila ([facility]) who accepted for direct admission [facility].     Amount and/or Complexity of Data Reviewed  Labs: ordered. Decision-making details documented in ED Course.  Radiology: ordered. Decision-making details documented in ED Course.  ECG/medicine tests: ordered and independent interpretation performed. Decision-making details documented in ED Course.     Details: EKG shows mild sinus bradycardia but no other dysrhythmias or ischemic changes.    Risk  Prescription drug management.                                      Clinical Impression:  Final diagnoses:  [R53.83] Fatigue  [S32.509A] Closed fracture of pubis,  unspecified portion of pubis, unspecified laterality, initial encounter (Primary)          ED Disposition Condition    Transfer to Another Facility Stable                Vitaly Hays MD  07/12/24 [id number]

## 2024-07-13 ENCOUNTER — ANESTHESIA EVENT (OUTPATIENT)
Dept: SURGERY | Facility: HOSPITAL | Age: 74
End: 2024-07-13
Payer: MEDICARE

## 2024-07-13 ENCOUNTER — ANESTHESIA (OUTPATIENT)
Dept: SURGERY | Facility: HOSPITAL | Age: 74
End: 2024-07-13
Payer: MEDICARE

## 2024-07-13 PROBLEM — S32.9XXA: Status: ACTIVE | Noted: 2024-07-13

## 2024-07-13 PROBLEM — S72.141A: Status: ACTIVE | Noted: 2024-07-13

## 2024-07-13 LAB
ANION GAP SERPL CALC-SCNC: 12 MEQ/L
BASOPHILS # BLD AUTO: 0.13 X10(3)/MCL
BASOPHILS NFR BLD AUTO: 1.2 %
BUN SERPL-MCNC: 20.6 MG/DL (ref 9.8–20.1)
CALCIUM SERPL-MCNC: 9.4 MG/DL (ref 8.4–10.2)
CHLORIDE SERPL-SCNC: 107 MMOL/L (ref 98–107)
CO2 SERPL-SCNC: 20 MMOL/L (ref 23–31)
CREAT SERPL-MCNC: 0.76 MG/DL (ref 0.55–1.02)
CREAT/UREA NIT SERPL: 27
EOSINOPHIL # BLD AUTO: 0.28 X10(3)/MCL (ref 0–0.9)
EOSINOPHIL NFR BLD AUTO: 2.5 %
ERYTHROCYTE [DISTWIDTH] IN BLOOD BY AUTOMATED COUNT: 14.7 % (ref 11.5–17)
GFR SERPLBLD CREATININE-BSD FMLA CKD-EPI: >60 ML/MIN/1.73/M2
GLUCOSE SERPL-MCNC: 97 MG/DL (ref 82–115)
HCT VFR BLD AUTO: 43.5 % (ref 37–47)
HGB BLD-MCNC: 13.9 G/DL (ref 12–16)
IMM GRANULOCYTES # BLD AUTO: 0.07 X10(3)/MCL (ref 0–0.04)
IMM GRANULOCYTES NFR BLD AUTO: 0.6 %
LYMPHOCYTES # BLD AUTO: 1.37 X10(3)/MCL (ref 0.6–4.6)
LYMPHOCYTES NFR BLD AUTO: 12.2 %
MCH RBC QN AUTO: 27.6 PG (ref 27–31)
MCHC RBC AUTO-ENTMCNC: 32 G/DL (ref 33–36)
MCV RBC AUTO: 86.5 FL (ref 80–94)
MONOCYTES # BLD AUTO: 1.01 X10(3)/MCL (ref 0.1–1.3)
MONOCYTES NFR BLD AUTO: 9 %
NEUTROPHILS # BLD AUTO: 8.39 X10(3)/MCL (ref 2.1–9.2)
NEUTROPHILS NFR BLD AUTO: 74.5 %
NRBC BLD AUTO-RTO: 0 %
PLATELET # BLD AUTO: 360 X10(3)/MCL (ref 130–400)
PMV BLD AUTO: 10.4 FL (ref 7.4–10.4)
POTASSIUM SERPL-SCNC: 3.9 MMOL/L (ref 3.5–5.1)
RBC # BLD AUTO: 5.03 X10(6)/MCL (ref 4.2–5.4)
SODIUM SERPL-SCNC: 139 MMOL/L (ref 136–145)
WBC # BLD AUTO: 11.25 X10(3)/MCL (ref 4.5–11.5)

## 2024-07-13 PROCEDURE — 93005 ELECTROCARDIOGRAM TRACING: CPT

## 2024-07-13 PROCEDURE — 36000710: Performed by: ORTHOPAEDIC SURGERY

## 2024-07-13 PROCEDURE — 0QH636Z INSERTION OF INTRAMEDULLARY INTERNAL FIXATION DEVICE INTO RIGHT UPPER FEMUR, PERCUTANEOUS APPROACH: ICD-10-PCS | Performed by: ORTHOPAEDIC SURGERY

## 2024-07-13 PROCEDURE — 36415 COLL VENOUS BLD VENIPUNCTURE: CPT | Performed by: NURSE PRACTITIONER

## 2024-07-13 PROCEDURE — C1713 ANCHOR/SCREW BN/BN,TIS/BN: HCPCS | Performed by: ORTHOPAEDIC SURGERY

## 2024-07-13 PROCEDURE — 63600175 PHARM REV CODE 636 W HCPCS

## 2024-07-13 PROCEDURE — C1769 GUIDE WIRE: HCPCS | Performed by: ORTHOPAEDIC SURGERY

## 2024-07-13 PROCEDURE — 71000033 HC RECOVERY, INTIAL HOUR: Performed by: ORTHOPAEDIC SURGERY

## 2024-07-13 PROCEDURE — D9220A PRA ANESTHESIA: Mod: ANES,,, | Performed by: ANESTHESIOLOGY

## 2024-07-13 PROCEDURE — 25000003 PHARM REV CODE 250

## 2024-07-13 PROCEDURE — D9220A PRA ANESTHESIA: Mod: CRNA,,,

## 2024-07-13 PROCEDURE — 99223 1ST HOSP IP/OBS HIGH 75: CPT | Mod: ,,, | Performed by: ORTHOPAEDIC SURGERY

## 2024-07-13 PROCEDURE — 85025 COMPLETE CBC W/AUTO DIFF WBC: CPT | Performed by: NURSE PRACTITIONER

## 2024-07-13 PROCEDURE — 37000009 HC ANESTHESIA EA ADD 15 MINS: Performed by: ORTHOPAEDIC SURGERY

## 2024-07-13 PROCEDURE — 11000001 HC ACUTE MED/SURG PRIVATE ROOM

## 2024-07-13 PROCEDURE — 93010 ELECTROCARDIOGRAM REPORT: CPT | Mod: ,,, | Performed by: INTERNAL MEDICINE

## 2024-07-13 PROCEDURE — 63600175 PHARM REV CODE 636 W HCPCS: Performed by: ORTHOPAEDIC SURGERY

## 2024-07-13 PROCEDURE — 25000003 PHARM REV CODE 250: Performed by: HOSPITALIST

## 2024-07-13 PROCEDURE — 80048 BASIC METABOLIC PNL TOTAL CA: CPT | Performed by: NURSE PRACTITIONER

## 2024-07-13 PROCEDURE — 63600175 PHARM REV CODE 636 W HCPCS: Performed by: NURSE PRACTITIONER

## 2024-07-13 PROCEDURE — 25000242 PHARM REV CODE 250 ALT 637 W/ HCPCS: Performed by: ANESTHESIOLOGY

## 2024-07-13 PROCEDURE — 36000711: Performed by: ORTHOPAEDIC SURGERY

## 2024-07-13 PROCEDURE — 37000008 HC ANESTHESIA 1ST 15 MINUTES: Performed by: ORTHOPAEDIC SURGERY

## 2024-07-13 PROCEDURE — 27201423 OPTIME MED/SURG SUP & DEVICES STERILE SUPPLY: Performed by: ORTHOPAEDIC SURGERY

## 2024-07-13 DEVICE — SCREW LOK XL25 5X30MM: Type: IMPLANTABLE DEVICE | Site: FEMUR | Status: FUNCTIONAL

## 2024-07-13 DEVICE — BLADE HELICAL PERF GOLD 85MM: Type: IMPLANTABLE DEVICE | Site: FEMUR | Status: FUNCTIONAL

## 2024-07-13 RX ORDER — ROCURONIUM BROMIDE 10 MG/ML
INJECTION, SOLUTION INTRAVENOUS
Status: DISCONTINUED | OUTPATIENT
Start: 2024-07-13 | End: 2024-07-13

## 2024-07-13 RX ORDER — ONDANSETRON HYDROCHLORIDE 2 MG/ML
4 INJECTION, SOLUTION INTRAVENOUS EVERY 6 HOURS PRN
Status: DISCONTINUED | OUTPATIENT
Start: 2024-07-13 | End: 2024-07-22 | Stop reason: HOSPADM

## 2024-07-13 RX ORDER — CEFAZOLIN SODIUM 1 G/3ML
INJECTION, POWDER, FOR SOLUTION INTRAMUSCULAR; INTRAVENOUS
Status: DISCONTINUED | OUTPATIENT
Start: 2024-07-13 | End: 2024-07-13

## 2024-07-13 RX ORDER — HYDRALAZINE HYDROCHLORIDE 50 MG/1
100 TABLET, FILM COATED ORAL 3 TIMES DAILY
Status: DISCONTINUED | OUTPATIENT
Start: 2024-07-13 | End: 2024-07-22 | Stop reason: HOSPADM

## 2024-07-13 RX ORDER — DOCUSATE SODIUM 100 MG/1
100 CAPSULE, LIQUID FILLED ORAL 2 TIMES DAILY PRN
Status: DISCONTINUED | OUTPATIENT
Start: 2024-07-13 | End: 2024-07-22 | Stop reason: HOSPADM

## 2024-07-13 RX ORDER — MEPERIDINE HYDROCHLORIDE 25 MG/ML
12.5 INJECTION INTRAMUSCULAR; INTRAVENOUS; SUBCUTANEOUS EVERY 10 MIN PRN
Status: DISCONTINUED | OUTPATIENT
Start: 2024-07-13 | End: 2024-07-13 | Stop reason: HOSPADM

## 2024-07-13 RX ORDER — GLUCAGON 1 MG
1 KIT INJECTION
Status: DISCONTINUED | OUTPATIENT
Start: 2024-07-13 | End: 2024-07-13 | Stop reason: HOSPADM

## 2024-07-13 RX ORDER — TRAMADOL HYDROCHLORIDE 50 MG/1
50 TABLET ORAL EVERY 6 HOURS PRN
Status: DISCONTINUED | OUTPATIENT
Start: 2024-07-13 | End: 2024-07-18

## 2024-07-13 RX ORDER — ATORVASTATIN CALCIUM 10 MG/1
10 TABLET, FILM COATED ORAL NIGHTLY
Status: DISCONTINUED | OUTPATIENT
Start: 2024-07-13 | End: 2024-07-22 | Stop reason: HOSPADM

## 2024-07-13 RX ORDER — ONDANSETRON HYDROCHLORIDE 2 MG/ML
INJECTION, SOLUTION INTRAVENOUS
Status: DISCONTINUED | OUTPATIENT
Start: 2024-07-13 | End: 2024-07-13

## 2024-07-13 RX ORDER — VANCOMYCIN HYDROCHLORIDE 1 G/20ML
INJECTION, POWDER, LYOPHILIZED, FOR SOLUTION INTRAVENOUS
Status: DISCONTINUED | OUTPATIENT
Start: 2024-07-13 | End: 2024-07-13 | Stop reason: HOSPADM

## 2024-07-13 RX ORDER — PROCHLORPERAZINE EDISYLATE 5 MG/ML
5 INJECTION INTRAMUSCULAR; INTRAVENOUS EVERY 30 MIN PRN
Status: DISCONTINUED | OUTPATIENT
Start: 2024-07-13 | End: 2024-07-13 | Stop reason: HOSPADM

## 2024-07-13 RX ORDER — AMIODARONE HYDROCHLORIDE 200 MG/1
200 TABLET ORAL 2 TIMES DAILY
Status: DISCONTINUED | OUTPATIENT
Start: 2024-07-13 | End: 2024-07-22 | Stop reason: HOSPADM

## 2024-07-13 RX ORDER — KETOROLAC TROMETHAMINE 30 MG/ML
15 INJECTION, SOLUTION INTRAMUSCULAR; INTRAVENOUS EVERY 6 HOURS PRN
Status: DISPENSED | OUTPATIENT
Start: 2024-07-13 | End: 2024-07-16

## 2024-07-13 RX ORDER — HYDROMORPHONE HYDROCHLORIDE 2 MG/ML
0.2 INJECTION, SOLUTION INTRAMUSCULAR; INTRAVENOUS; SUBCUTANEOUS EVERY 5 MIN PRN
Status: DISCONTINUED | OUTPATIENT
Start: 2024-07-13 | End: 2024-07-13 | Stop reason: HOSPADM

## 2024-07-13 RX ORDER — IPRATROPIUM BROMIDE AND ALBUTEROL SULFATE 2.5; .5 MG/3ML; MG/3ML
3 SOLUTION RESPIRATORY (INHALATION) ONCE AS NEEDED
Status: COMPLETED | OUTPATIENT
Start: 2024-07-13 | End: 2024-07-13

## 2024-07-13 RX ORDER — LISINOPRIL 20 MG/1
20 TABLET ORAL 2 TIMES DAILY
Status: DISCONTINUED | OUTPATIENT
Start: 2024-07-13 | End: 2024-07-22 | Stop reason: HOSPADM

## 2024-07-13 RX ORDER — SERTRALINE HYDROCHLORIDE 50 MG/1
100 TABLET, FILM COATED ORAL DAILY
Status: DISCONTINUED | OUTPATIENT
Start: 2024-07-13 | End: 2024-07-22 | Stop reason: HOSPADM

## 2024-07-13 RX ORDER — FENTANYL CITRATE 50 UG/ML
INJECTION, SOLUTION INTRAMUSCULAR; INTRAVENOUS
Status: DISCONTINUED | OUTPATIENT
Start: 2024-07-13 | End: 2024-07-13

## 2024-07-13 RX ORDER — CEFAZOLIN SODIUM 2 G/50ML
2 SOLUTION INTRAVENOUS
Status: DISCONTINUED | OUTPATIENT
Start: 2024-07-13 | End: 2024-07-22 | Stop reason: HOSPADM

## 2024-07-13 RX ORDER — ONDANSETRON HYDROCHLORIDE 2 MG/ML
4 INJECTION, SOLUTION INTRAVENOUS DAILY PRN
Status: DISCONTINUED | OUTPATIENT
Start: 2024-07-13 | End: 2024-07-13 | Stop reason: HOSPADM

## 2024-07-13 RX ORDER — MORPHINE SULFATE 4 MG/ML
2 INJECTION, SOLUTION INTRAMUSCULAR; INTRAVENOUS EVERY 4 HOURS PRN
Status: DISCONTINUED | OUTPATIENT
Start: 2024-07-13 | End: 2024-07-18

## 2024-07-13 RX ORDER — ONDANSETRON 4 MG/1
8 TABLET, ORALLY DISINTEGRATING ORAL EVERY 6 HOURS PRN
OUTPATIENT
Start: 2024-07-13

## 2024-07-13 RX ORDER — EPHEDRINE SULFATE 50 MG/ML
INJECTION, SOLUTION INTRAVENOUS
Status: DISCONTINUED | OUTPATIENT
Start: 2024-07-13 | End: 2024-07-13

## 2024-07-13 RX ORDER — IPRATROPIUM BROMIDE AND ALBUTEROL SULFATE 2.5; .5 MG/3ML; MG/3ML
3 SOLUTION RESPIRATORY (INHALATION)
Status: DISCONTINUED | OUTPATIENT
Start: 2024-07-13 | End: 2024-07-13 | Stop reason: HOSPADM

## 2024-07-13 RX ORDER — LIDOCAINE HYDROCHLORIDE 20 MG/ML
INJECTION INTRAVENOUS
Status: DISCONTINUED | OUTPATIENT
Start: 2024-07-13 | End: 2024-07-13

## 2024-07-13 RX ORDER — DEXAMETHASONE SODIUM PHOSPHATE 4 MG/ML
INJECTION, SOLUTION INTRA-ARTICULAR; INTRALESIONAL; INTRAMUSCULAR; INTRAVENOUS; SOFT TISSUE
Status: DISCONTINUED | OUTPATIENT
Start: 2024-07-13 | End: 2024-07-13

## 2024-07-13 RX ORDER — LIDOCAINE HYDROCHLORIDE 10 MG/ML
1 INJECTION, SOLUTION EPIDURAL; INFILTRATION; INTRACAUDAL; PERINEURAL ONCE
OUTPATIENT
Start: 2024-07-13 | End: 2024-07-13

## 2024-07-13 RX ORDER — LEVOTHYROXINE SODIUM 25 UG/1
25 TABLET ORAL
Status: DISCONTINUED | OUTPATIENT
Start: 2024-07-13 | End: 2024-07-22 | Stop reason: HOSPADM

## 2024-07-13 RX ORDER — HYDRALAZINE HYDROCHLORIDE 20 MG/ML
10 INJECTION INTRAMUSCULAR; INTRAVENOUS EVERY 4 HOURS PRN
Status: DISCONTINUED | OUTPATIENT
Start: 2024-07-13 | End: 2024-07-18

## 2024-07-13 RX ORDER — SODIUM CHLORIDE, SODIUM GLUCONATE, SODIUM ACETATE, POTASSIUM CHLORIDE AND MAGNESIUM CHLORIDE 30; 37; 368; 526; 502 MG/100ML; MG/100ML; MG/100ML; MG/100ML; MG/100ML
INJECTION, SOLUTION INTRAVENOUS CONTINUOUS
OUTPATIENT
Start: 2024-07-13 | End: 2024-08-12

## 2024-07-13 RX ORDER — METOPROLOL SUCCINATE 25 MG/1
25 TABLET, EXTENDED RELEASE ORAL DAILY
Status: DISCONTINUED | OUTPATIENT
Start: 2024-07-13 | End: 2024-07-18

## 2024-07-13 RX ORDER — PROPOFOL 10 MG/ML
VIAL (ML) INTRAVENOUS
Status: DISCONTINUED | OUTPATIENT
Start: 2024-07-13 | End: 2024-07-13

## 2024-07-13 RX ORDER — PHENYLEPHRINE HYDROCHLORIDE 10 MG/ML
INJECTION INTRAVENOUS
Status: DISCONTINUED | OUTPATIENT
Start: 2024-07-13 | End: 2024-07-13

## 2024-07-13 RX ORDER — PROCHLORPERAZINE EDISYLATE 5 MG/ML
5 INJECTION INTRAMUSCULAR; INTRAVENOUS EVERY 6 HOURS PRN
Status: DISCONTINUED | OUTPATIENT
Start: 2024-07-13 | End: 2024-07-22 | Stop reason: HOSPADM

## 2024-07-13 RX ORDER — MONTELUKAST SODIUM 10 MG/1
10 TABLET ORAL DAILY
Status: DISCONTINUED | OUTPATIENT
Start: 2024-07-13 | End: 2024-07-22 | Stop reason: HOSPADM

## 2024-07-13 RX ADMIN — EPHEDRINE SULFATE 10 MG: 50 INJECTION INTRAVENOUS at 04:07

## 2024-07-13 RX ADMIN — ONDANSETRON 4 MG: 2 INJECTION INTRAMUSCULAR; INTRAVENOUS at 09:07

## 2024-07-13 RX ADMIN — IPRATROPIUM BROMIDE AND ALBUTEROL SULFATE 3 ML: .5; 3 SOLUTION RESPIRATORY (INHALATION) at 03:07

## 2024-07-13 RX ADMIN — MONTELUKAST 10 MG: 10 TABLET, FILM COATED ORAL at 09:07

## 2024-07-13 RX ADMIN — SODIUM CHLORIDE, SODIUM GLUCONATE, SODIUM ACETATE, POTASSIUM CHLORIDE AND MAGNESIUM CHLORIDE: 526; 502; 368; 37; 30 INJECTION, SOLUTION INTRAVENOUS at 04:07

## 2024-07-13 RX ADMIN — DEXAMETHASONE SODIUM PHOSPHATE 4 MG: 4 INJECTION, SOLUTION INTRA-ARTICULAR; INTRALESIONAL; INTRAMUSCULAR; INTRAVENOUS; SOFT TISSUE at 04:07

## 2024-07-13 RX ADMIN — HYDRALAZINE HYDROCHLORIDE 10 MG: 20 INJECTION INTRAMUSCULAR; INTRAVENOUS at 12:07

## 2024-07-13 RX ADMIN — PROPOFOL 70 MG: 10 INJECTION, EMULSION INTRAVENOUS at 04:07

## 2024-07-13 RX ADMIN — SERTRALINE HYDROCHLORIDE 100 MG: 50 TABLET ORAL at 09:07

## 2024-07-13 RX ADMIN — LEVOTHYROXINE SODIUM 25 MCG: 25 TABLET ORAL at 09:07

## 2024-07-13 RX ADMIN — CEFAZOLIN 2 G: 330 INJECTION, POWDER, FOR SOLUTION INTRAMUSCULAR; INTRAVENOUS at 04:07

## 2024-07-13 RX ADMIN — ONDANSETRON 4 MG: 2 INJECTION INTRAMUSCULAR; INTRAVENOUS at 05:07

## 2024-07-13 RX ADMIN — FENTANYL CITRATE 50 MCG: 50 INJECTION, SOLUTION INTRAMUSCULAR; INTRAVENOUS at 04:07

## 2024-07-13 RX ADMIN — AMIODARONE HYDROCHLORIDE 200 MG: 200 TABLET ORAL at 09:07

## 2024-07-13 RX ADMIN — SUGAMMADEX 200 MG: 100 INJECTION, SOLUTION INTRAVENOUS at 05:07

## 2024-07-13 RX ADMIN — LISINOPRIL 20 MG: 20 TABLET ORAL at 09:07

## 2024-07-13 RX ADMIN — FENTANYL CITRATE 50 MCG: 50 INJECTION, SOLUTION INTRAMUSCULAR; INTRAVENOUS at 05:07

## 2024-07-13 RX ADMIN — MORPHINE SULFATE 2 MG: 4 INJECTION INTRAVENOUS at 09:07

## 2024-07-13 RX ADMIN — HYDRALAZINE HYDROCHLORIDE 100 MG: 50 TABLET ORAL at 09:07

## 2024-07-13 RX ADMIN — LIDOCAINE HYDROCHLORIDE 60 MG: 20 INJECTION INTRAVENOUS at 04:07

## 2024-07-13 RX ADMIN — PHENYLEPHRINE HYDROCHLORIDE 100 MCG: 10 INJECTION INTRAVENOUS at 04:07

## 2024-07-13 RX ADMIN — METOPROLOL SUCCINATE 25 MG: 25 TABLET, EXTENDED RELEASE ORAL at 09:07

## 2024-07-13 RX ADMIN — ROCURONIUM BROMIDE 50 MG: 10 SOLUTION INTRAVENOUS at 04:07

## 2024-07-13 NOTE — NURSING
Nurses Note -- 4 Eyes      7/13/2024   12:11 AM      Skin assessed during: Admit      [x] No Altered Skin Integrity Present    []Prevention Measures Documented      [] Yes- Altered Skin Integrity Present or Discovered   [] LDA Added if Not in Epic (Describe Wound)   [] New Altered Skin Integrity was Present on Admit and Documented in LDA   [] Wound Image Taken    Wound Care Consulted? No    Attending Nurse:   Hugh Leo LPN    Second RN/Staff Member:   Katalina CHEUNG

## 2024-07-13 NOTE — OP NOTE
OPERATIVE REPORT    Patient: Cierra Main   : 1950    MRN: [id number]  Date: 2024      Surgeon:Joselito Montiel DO    Preoperative Diagnosis: : Closed right intertrochanteric femur fracture, pelvic ring fracture  Postoperative Diagnosis: Same  Procedure:  Treatment right intertrochanteric femur fracture with intramedullary nail CPT 81378    Anesthesiologist: Mark Bhagat Jr., MD  OR Staff: Radiology Technologist: Danuta Montesinos RT  Scrub Person: Deandre Gibson ST  Registered Nurse: Jj Lozano RN; Tawanda Guillory RN  Implants:   Implant Name Type Inv. Item Serial No.  Lot No. LRB No. Used Action   BRANDON REAM BALL TP [id number] - [id number]  BRANDON REAM BALL TP [id number]  [employer]  Right 1 Implanted and Explanted   9mm 130 deg ti luciano tfna      Right 1 Implanted   BLADE HELICAL PERF GOLD 85MM - [id number]  BLADE HELICAL PERF GOLD 85MM  [employer] [id number] Right 1 Implanted   SCREW WILMAN XL25 5X30MM - [id number]  SCREW WILMAN XL25 5X30MM  [employer] [id number] Right 1 Implanted     EBL: 100cc  Complications: None  Disposition: To PACU, stable    Indications: Cierra Main is a 73 y.o. female presenting with the aforementioned injuries. The procedure is indicated to best obtain and maintain stability of the femur while allowing early ROM.  The patient is awake and alert. After thorough discussion of the risks, benefits, expected outcomes, and alternatives to surgical intervention, the patient agreed to proceed with surgical treatment.  Specific risks discussed included, but were not limited to: superficial or deep infection, wound healing complications, DVT/PE, significant bleeding requiring transfusion, damage to named anatomic structures in the immediate area including named neurovascular structures, implant failure, and general risks of anesthesia.  The patient voiced understanding and written as well as verbal consent was obtained by myself prior to the procedure.    Procedure in  Detail:  The patient's right lower extremity was marked by me in the preoperative area.  She was taken to the operating room, and after satisfactory induction of anesthesia, the patient was placed in the supine position with a small bump under the ipsilateral hip.   The ipsilateral lower extremity was then sterilely prepped and draped in the usual sterile fashion.  Standard time out procedure was performed confirming the correct surgeon, site, side, patient ID, procedure, and administration of antibiotics.       A 3 cm longitudinal incision was made just proximal to the greater trochanter.  The subcutaneous tissue and gluteal fascia were incised.  A threaded guide pin was then placed in the tip of the greater trochanter and extended and introduced into the proximal femur under AP and lateral fluoroscopy.  The dbTwang one-step reamer was then used to ream proximally.  A  short Synthes TFN  appropiate size  was then passed into the canal.  The proximal interlocking device was then placed and a 2-cm longitudinal incision was made over the lateral aspect of the proximal thigh and the fascia ovidio was incised.  A threaded guide pin was then placed through the lateral cortex of the femur through the femoral neck and into the femoral head in the center-center position.  A helical blade was then placed under fluoroscopy and satisfactory position was noted on AP and lateral fluoroscopy and the setscrew was then set. The proximal interlocking device was then removed.    Distal interlocking screw were done with  single incision through the jig.  This was confirmed under under fluoroscopic guidance.  All wounds were then thoroughly irrigated.  Vancomycin added to the wound.  Subcutaneous tissues were closed with interrupted inverted of 2-0 Monocryl.  Skin was approximated using skin staples.  Sterile dressing was applied.  General anesthesia was reversed and she was returned to the Postanesthesia Care Unit in stable  condition.      All sponge and needle counts were correct at the end of the case.  I was present and participated in all aspects of the procedure.    Prognosis:  The patient will be kept WBAT on the ipsilateral extremity.  DVT prophylaxis is indicated for this patient and procedure.  The patient is at risk of pain, infection, and nonunion, and we will watch for all of these, amongst other issues, as the patient continues to heal.    We will continue to evaluate the patient's pelvic ring fractures.  If she is still has significant pain unable to ambulate we will proceed with posterior fixation.    This note/OR report was created with the assistance of  voice recognition software or phone  dictation.  There may be transcription errors as a result of using this technology however minimal. Effort has been made to assure accuracy of transcription but any obvious errors or omissions should be clarified with the author of the document.       Joselito Montiel, DO  Orthopedic Trauma Surgery

## 2024-07-13 NOTE — CONSULTS
[facility] - 8th Floor Med Surg  Orthopedic Trauma  Consult Note    Patient Name: Cierra Main  MRN: [id number]  Admission Date: 7/12/2024  Hospital Length of Stay: 1 days  Attending Provider: Jeffrey Woodard MD  Primary Care Provider: Maegan Primary Doctor        Inpatient consult to Orthopedic Surgery  Consult performed by: Joselito Montiel DO  Consult ordered by: Rosmery Vásquez FNP        Subjective:         Chief Complaint: No chief complaint on file.       HPI:  Patient is a 73-year-old female who had a fall from standing proximally 1 week ago.  She would inability to ambulate she has pain in the posterior side of the hip and in the groin.  She has pelvic ring fracture and occult proximal femur fracture.  She has a community ambulator assist at baseline no numbness no tingling currently.  Does not wish to participate in exam because of pain    Past Medical History:   Diagnosis Date    Acute MI     Anxiety     Cardiac disease     COPD (chronic obstructive pulmonary disease)     Coronary artery disease     Depression     Hyperlipidemia     Hypertension     Hypothyroidism     Other specified noninfective gastroenteritis and colitis     Unspecified macular degeneration        Past Surgical History:   Procedure Laterality Date    CORONARY ANGIOGRAPHY N/A 2/1/2021    Procedure: ANGIOGRAM, CORONARY ARTERY;  Surgeon: Devon Medeiros MD;  Location: [facility] CATH LAB;  Service: Cardiology;  Laterality: N/A;    CORONARY ANGIOPLASTY WITH STENT PLACEMENT      LEFT HEART CATHETERIZATION Left 2/1/2021    Procedure: CATHETERIZATION, HEART, LEFT;  Surgeon: Devon Medeiros MD;  Location: [facility] CATH LAB;  Service: Cardiology;  Laterality: Left;       Review of patient's allergies indicates:   Allergen Reactions    Amlodipine      Leg weakness    Losartan        Current Facility-Administered Medications   Medication    amiodarone tablet 200 mg    atorvastatin tablet 10 mg    docusate sodium capsule 100 mg    hydrALAZINE  "injection 10 mg    hydrALAZINE tablet 100 mg    ketorolac injection 15 mg    levothyroxine tablet 25 mcg    lisinopriL tablet 20 mg    metoprolol succinate (TOPROL-XL) 24 hr tablet 25 mg    montelukast tablet 10 mg    morphine injection 2 mg    ondansetron injection 4 mg    prochlorperazine injection Soln 5 mg    sertraline tablet 100 mg    traMADoL tablet 50 mg     Family History       Problem Relation (Age of Onset)    Diabetes Mother    Heart disease Mother, Father    Hypertension Father          Tobacco Use    Smoking status: Former     Current packs/day: 0.00     Types: Cigarettes     Quit date: 3/25/1969     Years since quittin.3    Smokeless tobacco: Never   Substance and Sexual Activity    Alcohol use: Never    Drug use: Never    Sexual activity: Not Currently       ROS:  Constitutional: Denies fever chills  Eyes: No change in vision  ENT: No ringing or current infections  CV: No chest pain  Resp: No labored breathing  MSK: Pain evident at site of injury located in HPI,   Integ: No signs of abrasions or lacerations  Neuro: No numbness or tingling  Lymphatic: No swelling outside the area of injury   Objective:     Vital Signs (Most Recent):  Temp: 97.7 °F (36.5 °C) (24)  Pulse: 63 (24)  Resp: 18 (24 0444)  BP: (!) 173/64 (24)  SpO2: (!) 94 % (24) Vital Signs (24h Range):  Temp:  [97.7 °F (36.5 °C)-98.6 °F (37 °C)] 97.7 °F (36.5 °C)  Pulse:  [55-68] 63  Resp:  [16-20] 18  SpO2:  [94 %-97 %] 94 %  BP: (150-220)/() 173/64     Weight: 38.6 kg (85 lb 1.6 oz)  Height: 5' 3" (160 cm)  Body mass index is 15.07 kg/m².    No intake or output data in the 24 hours ending 24 0954    Ortho/SPM Exam  General the patient is alert and oriented x3 no acute distress nontoxic-appearing appropriate affect.    Constitutional: Vital signs are examined and stable.  Resp: No signs of labored breathing                          RLE: -Skin:  Painful logroll painful axial " compression test patient is in the flexed position           -MSK: : EHL/FHL, Gastroc/Tib anterior Strength 5/5           -Neuro:  Sensation intact to light touch L3-S1 dermatomes           -Lymphatic: No signs of lymphadenopathy           -CV: Capillary refill is less than 2 seconds. DP/PT pulses  2/4. Compartments soft and compressible.     Significant Labs:  I have reviewed all labs in relation to Orthopedics  Recent Lab Results         07/13/24  0516   07/12/24  2152   07/12/24  1729   07/12/24  1604        Albumin/Globulin Ratio       0.9       ABO and RH     O POS         Albumin       3.7       ALP       127       ALT       57       Anion Gap 12.0       11.0       Appearance, UA   Clear           PTT     28.8  Comment: For Minimal Heparin Infusion, the goal aPTT 64-85 seconds corresponds to an anti-Xa of 0.3-0.5.    For Low Intensity and High Intensity Heparin, the goal aPTT  seconds corresponds to an anti-Xa of 0.3-0.7         AST       60       Bacteria, UA   Rare           Baso # 0.13       0.07       Basophil % 1.2       0.8       Bilirubin (UA)   Negative           BILIRUBIN TOTAL       0.5       BUN 20.6       28.0       BUN/CREAT RATIO 27       30       Calcium 9.4       10.1       Chloride 107       106       CO2 20       22       Color, UA   Yellow           Creatinine 0.76       0.93       eGFR >60       >60       Eos # 0.28       0.31       Eos % 2.5       3.5       Globulin, Total       4.2       Glucose 97       102       Glucose, UA   Negative           Group & Rh       O POS       Hematocrit 43.5       45.0       Hemoglobin 13.9       14.1       Immature Grans (Abs) 0.07       0.04       Immature Granulocytes 0.6       0.5       Indirect Marla GEL       NEG       INR     0.9         Ketones, UA   Negative           Leukocyte Esterase, UA   1+           Lymph # 1.37       1.34       LYMPH % 12.2       15.3       MCH 27.6       27.1       MCHC 32.0       31.3       MCV 86.5       86.5        Mono # 1.01       0.85       Mono % 9.0       9.7       MPV 10.4       10.0       Neut # 8.39       6.17       Neut % 74.5       70.2       NITRITE UA   Negative           nRBC 0.0       0.0       Blood, UA   Negative           pH, UA   6.0           Platelet Count 360       336       Potassium 3.9       4.8       PROTEIN TOTAL       7.9       Protein, UA   Negative           PT     9.8         RBC 5.03       5.20       RBC, UA   0-2           RDW 14.7       14.7       Sodium 139       139       Specific Gravity,UA   1.010           Specimen Outdate       07/15/2024 23:59       Squam Epithel, UA   Rare           Troponin I       0.015       Urobilinogen, UA   0.2           WBC, UA   3-5           WBC 11.25       8.78               Significant Imaging: I have reviewed all pertinent imaging results/findings.  CT Pelvis Without Contrast    Result Date: 7/12/2024  EXAMINATION: CT PELVIS WITHOUT CONTRAST CLINICAL HISTORY: Pelvic fracture; TECHNIQUE: Low dose axial images, sagittal and coronal reformations were obtained from iliac crest to the pubic symphysis.  No contrast was administered.  Automatic exposure control is utilized to reduce patient radiation exposure. COMPARISON: 10/06/2020 FINDINGS: There is a fracture at the pubis on the left side.  It is acute.  There is a subtle nondisplaced fracture of the inferior pubic ramus on the left side.  There is a fracture at the pubis on the right side.  It is nondisplaced.  There is a slightly displaced fracture of the inferior pubic ramus on the right side.  There is a fracture along the anterior aspect of the acetabulum on the right side.  It is nondisplaced.  The iliac bone is intact.  There is a fracture of the lateral aspect of the sacrum on the right side involving the S1 vertebral body.  The intrapelvic structures appear unremarkable.     Fracture of the pubis bilaterally and the inferior pubic ramus bilaterally Fracture along the lateral aspect of the upper  sacrum on the right side Nondisplaced fracture along the anterior aspect of the acetabulum best seen on sagittal image 43 series 14 and axial image number 44 series 6. Electronically signed by: Deniz Ryan Date:    07/12/2024 Time:    16:40    X-Ray Hip 2 or 3 views Right with Pelvis when performed    Result Date: 7/9/2024  EXAMINATION: XR HIP WITH PELVIS WHEN PERFORMED 2 OR 3 VIEWS RIGHT CLINICAL HISTORY: Pain, unspecified COMPARISON: None. FINDINGS: There is some deformity of the right inferior pubic ramus and left symphysis pubis of questionable age although it might be related to remote trauma an acute process cannot be completely excluded clinical correlation is suggested There is some narrowing of the inferior medial aspect of both hip joints with some degenerative changes of the lumbosacral spine articular spaces are otherwise preserved with smooth articular surfaces No blastic or lytic lesions. Soft tissues within normal limits.     Degenerative changes.. Deformities of the right inferior pubic ramus and left symphysis pubis although these might be related to remote trauma an acute process cannot be completely excluded clinical correlation is suggested. This report was flagged in Epic as abnormal. Electronically signed by: Scout Santiago Date:    07/09/2024 Time:    05:43       Assessment/Plan:     Active Diagnoses:    Diagnosis Date Noted POA    PRINCIPAL PROBLEM:  Closed 2-part intertrochanteric fracture of proximal femur, right, initial encounter [S72.141A] 07/13/2024 Yes    Closed fracture of right pelvis [S32.9XXA] 07/13/2024 Yes      Problems Resolved During this Admission:       Independent Radiology ordered by other provider:       Pt has acute injury with risk of severe bodily function with their injury to the right hip and pelvis.     -Risks included with this type of injury painful ambulation multiple surgeries    Patient has a pelvic ring fracture and a nondisplaced right intertrochanteric  femur fracture seen best on CT pelvis coronal films posteriorly.  She is painful logroll painful axial compression test does not I can not ambulate currently.  She has groin pain.  Based on her current physical exam concerned about an occult intertrochanteric femur fracture.  We do see signs on CT scan.  We will proceed with intra medullary nailing of the femur followed by staged pelvis fixation if necessary.    I explained that surgery and the nature of their condition are not without risks. These include, but are not limited to, bleeding, infection, neurovascular compromise, malunion, nonunion, hardware complications, wound complications, scarring, cosmetic defects, need for later and/or repeated surgeries, avascular necrosis, bone death due to initial trauma, pain, loss of ROM, loss of function, PTOA, deformity, stance/gait and/or functional abnormalities, thromboembolic complications, compartment syndrome, loss of limb, loss of life, anesthetic complications, and other imponderables. I explained that these can occur despite the adequacy of treatments rendered, and that their risks are heightened given the nature of their condition.  I have also discussed the importance not using nicotine products due to the increased risk of infection surgical wound healing complications and nonunion of the fracture.  They verbalized understanding.  No guarantees were made.  They would like to continue with surgery at this time. If appropriate family was involved with surgical discussion.           This note/OR report was created with the assistance of  voice recognition software or phone  dictation.  There may be transcription errors as a result of using this technology however minimal. Effort has been made to assure accuracy of transcription but any obvious errors or omissions should be clarified with the author of the document.       Joselito Montiel, DO   Orthopedic Trauma Surgery  [facility] - 8th Floor Med  Surg

## 2024-07-13 NOTE — ANESTHESIA PROCEDURE NOTES
Intubation    Date/Time: 7/13/2024 4:17 PM    Performed by: Taylor Crenshaw CRNA  Authorized by: Mark Bhagat Jr., MD    Intubation:     Induction:  Intravenous    Intubated:  Postinduction    Mask Ventilation:  Easy mask    Attempts:  2    Attempted By:  CRNA    Method of Intubation:  Direct    Blade:  Lynda 3    Laryngeal View Grade: Grade III - only epiglottis visible      Attempted By (2nd Attempt):  CRNA    Method of Intubation (2nd Attempt):  Video laryngoscopy    Blade (2nd Attempt):  Sanchez 3    Laryngeal View Grade (2nd Attempt): Grade IIa - cords partially seen      Difficult Airway Encountered?: No      Complications:  None    Airway Device:  Oral endotracheal tube    Airway Device Size:  7.0    Style/Cuff Inflation:  Cuffed (inflated to minimal occlusive pressure)    Tube secured:  21    Secured at:  The lips    Placement Verified By:  Capnometry    Complicating Factors:  Deviated trachea and anterior larynx    Findings Post-Intubation:  BS equal bilateral and atraumatic/condition of teeth unchanged

## 2024-07-13 NOTE — ANESTHESIA PREPROCEDURE EVALUATION
07/13/2024  Cierra Main is a 73 y.o., female with prior MI/CAD status post stent (preserved EF noted in October), COPD admitted earlier today after a fall and sustaining closed proximal right femur fracture and pelvic ring fracture.  Patient presents today for right IM joann femur and arrives to holding without peripheral IV access.    Left heart catheterization October 2023   Left main normal   Lad proximal stents patent with 60 70% stenosis distal  Left circumflex with om 30 40% ostial   RCA mild InStent restenosis of proximal stent--30%  EF 70%    Transthoracic echo October 2023   EF 60%   1-2+ MR/TR      Pre-op Assessment    I have reviewed the Patient Summary Reports.    I have reviewed the NPO Status.   I have reviewed the Medications.     Review of Systems  Anesthesia Hx:               Denies Personal Hx of Anesthesia complications.                    Social:  Former Smoker       Cardiovascular:     Hypertension                Functional Capacity 2 METS   Coronary Artery Disease:  hx of myocardial infarction (MI).     S/P Percutaneous Coronary Intervention (PCI)                            Pulmonary:   COPD, moderate                     Neurological:   CVA, residual symptoms                                    Endocrine:   Hypothyroidism              Physical Exam  General: Cooperative, Alert, Oriented and Cachexia    Airway:  Mallampati: II   Mouth Opening: Small, but > 3cm  TM Distance: Normal  Tongue: Normal  Neck ROM: Normal ROM    Dental:  Periodontal disease    Chest/Lungs:  Clear to auscultation, Normal Respiratory Rate    Heart:  Rate: Normal  Rhythm: Regular Rhythm        Anesthesia Plan  Type of Anesthesia, risks & benefits discussed:    Anesthesia Type: Gen ETT  Intra-op Monitoring Plan: Standard ASA Monitors  Post Op Pain Control Plan: multimodal analgesia and IV/PO Opioids PRN  Induction:   IV  Airway Plan: Direct  Informed Consent: Informed consent signed with the Patient and all parties understand the risks and agree with anesthesia plan.  All questions answered.   ASA Score: 4  Day of Surgery Review of History & Physical: H&P Update referred to the surgeon/provider.  Anesthesia Plan Notes: No benzo  Duo neb preop    Ready For Surgery From Anesthesia Perspective.     .

## 2024-07-13 NOTE — TRANSFER OF CARE
"Anesthesia Transfer of Care Note    Patient: Cierra Main    Procedure(s) Performed: Procedure(s) (LRB):  INSERTION, INTRAMEDULLARY BRANDON, FEMUR (Right)    Patient location: PACU    Anesthesia Type: general    Transport from OR: Transported from OR on room air with adequate spontaneous ventilation    Post pain: adequate analgesia    Post assessment: no apparent anesthetic complications and tolerated procedure well    Post vital signs: stable    Level of consciousness: awake    Nausea/Vomiting: no nausea/vomiting    Complications: none    Transfer of care protocol was followed    Last vitals: Visit Vitals  /81 (BP Location: Right arm, Patient Position: Lying)   Pulse 74   Temp 36.4 °C (97.5 °F) (Temporal)   Resp 12   Ht 5' 3" (1.6 m)   Wt 38.6 kg (85 lb 1.6 oz)   SpO2 100%   BMI 15.07 kg/m²     "

## 2024-07-13 NOTE — NURSING
Nurses Note -- 4 Eyes      7/13/2024   0645AM       Skin assessed during: Q Shift Change      [x] No Altered Skin Integrity Present    []Prevention Measures Documented      [] Yes- Altered Skin Integrity Present or Discovered   [] LDA Added if Not in Epic (Describe Wound)   [] New Altered Skin Integrity was Present on Admit and Documented in LDA   [] Wound Image Taken    Bruising to R hip    Wound Care Consulted? No    Attending Nurse:  Estelle Cloud RN/Staff Member:   Hugh

## 2024-07-13 NOTE — ED NOTES
Bed assigned [facility] main rm [id number], accepted by Dr. Priscilla Rossi, call report to [phone].

## 2024-07-14 LAB
ANION GAP SERPL CALC-SCNC: 12 MEQ/L
BASOPHILS # BLD AUTO: 0.04 X10(3)/MCL
BASOPHILS NFR BLD AUTO: 0.3 %
BUN SERPL-MCNC: 30.7 MG/DL (ref 9.8–20.1)
CALCIUM SERPL-MCNC: 8.7 MG/DL (ref 8.4–10.2)
CHLORIDE SERPL-SCNC: 108 MMOL/L (ref 98–107)
CO2 SERPL-SCNC: 18 MMOL/L (ref 23–31)
CREAT SERPL-MCNC: 1.11 MG/DL (ref 0.55–1.02)
CREAT/UREA NIT SERPL: 28
EOSINOPHIL # BLD AUTO: 0 X10(3)/MCL (ref 0–0.9)
EOSINOPHIL NFR BLD AUTO: 0 %
ERYTHROCYTE [DISTWIDTH] IN BLOOD BY AUTOMATED COUNT: 14.9 % (ref 11.5–17)
GFR SERPLBLD CREATININE-BSD FMLA CKD-EPI: 52 ML/MIN/1.73/M2
GLUCOSE SERPL-MCNC: 142 MG/DL (ref 82–115)
HCT VFR BLD AUTO: 29.6 % (ref 37–47)
HCT VFR BLD AUTO: 31.9 % (ref 37–47)
HGB BLD-MCNC: 10.1 G/DL (ref 12–16)
HGB BLD-MCNC: 9 G/DL (ref 12–16)
IMM GRANULOCYTES # BLD AUTO: 0.05 X10(3)/MCL (ref 0–0.04)
IMM GRANULOCYTES NFR BLD AUTO: 0.4 %
LYMPHOCYTES # BLD AUTO: 0.74 X10(3)/MCL (ref 0.6–4.6)
LYMPHOCYTES NFR BLD AUTO: 5.5 %
MCH RBC QN AUTO: 27.7 PG (ref 27–31)
MCHC RBC AUTO-ENTMCNC: 31.7 G/DL (ref 33–36)
MCV RBC AUTO: 87.6 FL (ref 80–94)
MONOCYTES # BLD AUTO: 0.99 X10(3)/MCL (ref 0.1–1.3)
MONOCYTES NFR BLD AUTO: 7.3 %
NEUTROPHILS # BLD AUTO: 11.69 X10(3)/MCL (ref 2.1–9.2)
NEUTROPHILS NFR BLD AUTO: 86.5 %
NRBC BLD AUTO-RTO: 0 %
PLATELET # BLD AUTO: 356 X10(3)/MCL (ref 130–400)
PMV BLD AUTO: 10.3 FL (ref 7.4–10.4)
POTASSIUM SERPL-SCNC: 4.4 MMOL/L (ref 3.5–5.1)
RBC # BLD AUTO: 3.64 X10(6)/MCL (ref 4.2–5.4)
SODIUM SERPL-SCNC: 138 MMOL/L (ref 136–145)
WBC # BLD AUTO: 13.51 X10(3)/MCL (ref 4.5–11.5)

## 2024-07-14 PROCEDURE — 25000003 PHARM REV CODE 250: Performed by: ORTHOPAEDIC SURGERY

## 2024-07-14 PROCEDURE — 80048 BASIC METABOLIC PNL TOTAL CA: CPT | Performed by: HOSPITALIST

## 2024-07-14 PROCEDURE — 85025 COMPLETE CBC W/AUTO DIFF WBC: CPT | Performed by: HOSPITALIST

## 2024-07-14 PROCEDURE — A4216 STERILE WATER/SALINE, 10 ML: HCPCS | Performed by: HOSPITALIST

## 2024-07-14 PROCEDURE — 25000003 PHARM REV CODE 250: Performed by: HOSPITALIST

## 2024-07-14 PROCEDURE — 25000003 PHARM REV CODE 250: Performed by: NURSE PRACTITIONER

## 2024-07-14 PROCEDURE — 76937 US GUIDE VASCULAR ACCESS: CPT

## 2024-07-14 PROCEDURE — 63600175 PHARM REV CODE 636 W HCPCS: Performed by: HOSPITALIST

## 2024-07-14 PROCEDURE — 36415 COLL VENOUS BLD VENIPUNCTURE: CPT | Performed by: NURSE PRACTITIONER

## 2024-07-14 PROCEDURE — 85018 HEMOGLOBIN: CPT | Performed by: NURSE PRACTITIONER

## 2024-07-14 PROCEDURE — 97162 PT EVAL MOD COMPLEX 30 MIN: CPT

## 2024-07-14 PROCEDURE — 63600175 PHARM REV CODE 636 W HCPCS: Performed by: NURSE PRACTITIONER

## 2024-07-14 PROCEDURE — C1751 CATH, INF, PER/CENT/MIDLINE: HCPCS

## 2024-07-14 PROCEDURE — 11000001 HC ACUTE MED/SURG PRIVATE ROOM

## 2024-07-14 PROCEDURE — 63600175 PHARM REV CODE 636 W HCPCS: Performed by: ORTHOPAEDIC SURGERY

## 2024-07-14 PROCEDURE — 36415 COLL VENOUS BLD VENIPUNCTURE: CPT | Performed by: HOSPITALIST

## 2024-07-14 PROCEDURE — 85014 HEMATOCRIT: CPT | Performed by: NURSE PRACTITIONER

## 2024-07-14 PROCEDURE — 36410 VNPNXR 3YR/> PHY/QHP DX/THER: CPT

## 2024-07-14 RX ORDER — SODIUM CHLORIDE 0.9 % (FLUSH) 0.9 %
10 SYRINGE (ML) INJECTION
Status: DISCONTINUED | OUTPATIENT
Start: 2024-07-14 | End: 2024-07-22 | Stop reason: HOSPADM

## 2024-07-14 RX ORDER — ENOXAPARIN SODIUM 100 MG/ML
30 INJECTION SUBCUTANEOUS EVERY 24 HOURS
Status: DISCONTINUED | OUTPATIENT
Start: 2024-07-14 | End: 2024-07-22 | Stop reason: HOSPADM

## 2024-07-14 RX ORDER — SODIUM CHLORIDE 0.9 % (FLUSH) 0.9 %
10 SYRINGE (ML) INJECTION EVERY 6 HOURS
Status: DISCONTINUED | OUTPATIENT
Start: 2024-07-14 | End: 2024-07-22 | Stop reason: HOSPADM

## 2024-07-14 RX ORDER — MUPIROCIN 20 MG/G
OINTMENT TOPICAL 2 TIMES DAILY
Status: COMPLETED | OUTPATIENT
Start: 2024-07-14 | End: 2024-07-18

## 2024-07-14 RX ORDER — MIDODRINE HYDROCHLORIDE 5 MG/1
5 TABLET ORAL ONCE
Status: COMPLETED | OUTPATIENT
Start: 2024-07-14 | End: 2024-07-14

## 2024-07-14 RX ORDER — SODIUM CHLORIDE, SODIUM LACTATE, POTASSIUM CHLORIDE, CALCIUM CHLORIDE 600; 310; 30; 20 MG/100ML; MG/100ML; MG/100ML; MG/100ML
INJECTION, SOLUTION INTRAVENOUS CONTINUOUS
Status: DISCONTINUED | OUTPATIENT
Start: 2024-07-14 | End: 2024-07-16

## 2024-07-14 RX ADMIN — SERTRALINE HYDROCHLORIDE 100 MG: 50 TABLET ORAL at 09:07

## 2024-07-14 RX ADMIN — DEXTROSE MONOHYDRATE 1 G: 5 INJECTION INTRAVENOUS at 12:07

## 2024-07-14 RX ADMIN — ENOXAPARIN SODIUM 30 MG: 30 INJECTION SUBCUTANEOUS at 05:07

## 2024-07-14 RX ADMIN — ATORVASTATIN CALCIUM 10 MG: 10 TABLET, FILM COATED ORAL at 08:07

## 2024-07-14 RX ADMIN — SODIUM CHLORIDE, POTASSIUM CHLORIDE, SODIUM LACTATE AND CALCIUM CHLORIDE 500 ML: 600; 310; 30; 20 INJECTION, SOLUTION INTRAVENOUS at 07:07

## 2024-07-14 RX ADMIN — KETOROLAC TROMETHAMINE 15 MG: 30 INJECTION, SOLUTION INTRAMUSCULAR at 01:07

## 2024-07-14 RX ADMIN — SODIUM CHLORIDE, POTASSIUM CHLORIDE, SODIUM LACTATE AND CALCIUM CHLORIDE 500 ML: 600; 310; 30; 20 INJECTION, SOLUTION INTRAVENOUS at 12:07

## 2024-07-14 RX ADMIN — SODIUM CHLORIDE, PRESERVATIVE FREE 10 ML: 5 INJECTION INTRAVENOUS at 05:07

## 2024-07-14 RX ADMIN — SODIUM CHLORIDE, POTASSIUM CHLORIDE, SODIUM LACTATE AND CALCIUM CHLORIDE: 600; 310; 30; 20 INJECTION, SOLUTION INTRAVENOUS at 01:07

## 2024-07-14 RX ADMIN — MUPIROCIN: 20 OINTMENT TOPICAL at 08:07

## 2024-07-14 RX ADMIN — MONTELUKAST 10 MG: 10 TABLET, FILM COATED ORAL at 09:07

## 2024-07-14 RX ADMIN — LEVOTHYROXINE SODIUM 25 MCG: 25 TABLET ORAL at 05:07

## 2024-07-14 RX ADMIN — MIDODRINE HYDROCHLORIDE 5 MG: 5 TABLET ORAL at 08:07

## 2024-07-14 RX ADMIN — AMIODARONE HYDROCHLORIDE 200 MG: 200 TABLET ORAL at 08:07

## 2024-07-14 RX ADMIN — DEXTROSE MONOHYDRATE 1 G: 5 INJECTION INTRAVENOUS at 05:07

## 2024-07-14 RX ADMIN — MUPIROCIN: 20 OINTMENT TOPICAL at 09:07

## 2024-07-14 RX ADMIN — SODIUM CHLORIDE, PRESERVATIVE FREE 10 ML: 5 INJECTION INTRAVENOUS at 12:07

## 2024-07-14 RX ADMIN — SODIUM CHLORIDE, POTASSIUM CHLORIDE, SODIUM LACTATE AND CALCIUM CHLORIDE: 600; 310; 30; 20 INJECTION, SOLUTION INTRAVENOUS at 05:07

## 2024-07-14 NOTE — PROGRESS NOTES
[facility] - 8th Floor Med Surg  Orthopedics  Progress Note    Patient Name: Cierra Main  MRN: [id number]  Admission Date: 7/12/2024  Hospital Length of Stay: 2 days  Attending Provider: Jeffrey Woodard MD  Primary Care Provider: Maegan Primary Doctor    Subjective:     Principal Problem:Closed 2-part intertrochanteric fracture of proximal femur, right, initial encounter    Principal Orthopedic Problem: 1 Day Post-Op   IMN R IT; non-op pelvic injury     Interval History: Seen this am. Pain controlled at rest. Has been sleepy and with hypotension overnight. Per nursing has refused to move. Discussed therapy today.     Review of patient's allergies indicates:   Allergen Reactions    Amlodipine      Leg weakness    Losartan        Current Facility-Administered Medications   Medication    amiodarone tablet 200 mg    atorvastatin tablet 10 mg    ceFAZolin (ANCEF) 1 g in D5W 50 mL IVPB (MB+)    cefazolin (ANCEF) 2 gram in dextrose 5% 50 mL IVPB (premix)    docusate sodium capsule 100 mg    hydrALAZINE injection 10 mg    hydrALAZINE tablet 100 mg    ketorolac injection 15 mg    lactated ringers infusion    levothyroxine tablet 25 mcg    lisinopriL tablet 20 mg    metoprolol succinate (TOPROL-XL) 24 hr tablet 25 mg    montelukast tablet 10 mg    morphine injection 2 mg    mupirocin 2 % ointment    ondansetron injection 4 mg    prochlorperazine injection Soln 5 mg    sertraline tablet 100 mg    sodium chloride 0.9% flush 10 mL    And    sodium chloride 0.9% flush 10 mL    traMADoL tablet 50 mg     Objective:     Vital Signs (Most Recent):  Temp: 98.8 °F (37.1 °C) (07/14/24 0830)  Pulse: 70 (07/14/24 0830)  Resp: 18 (07/14/24 0830)  BP: 125/65 (07/14/24 0830)  SpO2: 100 % (07/14/24 0830) Vital Signs (24h Range):  Temp:  [97.5 °F (36.4 °C)-98.8 °F (37.1 °C)] 98.8 °F (37.1 °C)  Pulse:  [59-79] 70  Resp:  [12-27] 18  SpO2:  [88 %-100 %] 100 %  BP: ()/(35-81) 125/65     Weight: 38.6 kg (85 lb 1.6 oz)  Height:  "5' 3" (160 cm)  Body mass index is 15.07 kg/m².      Intake/Output Summary (Last 24 hours) at 7/14/2024 1006  Last data filed at 7/14/2024 0607  Gross per 24 hour   Intake 1524.97 ml   Output 1150 ml   Net 374.97 ml       Physical Exam:   General the patient is alert and in no acute distress;  nontoxic-appearing appropriate affect.    Constitutional: Vital signs are examined and stable.  Resp: No signs of labored breathing                      RLE: -Skin: Dressing CDI           -MSK: +Hip and Knee F/E, +EHL/FHL, + DF/PF           -Neuro:  Sensation intact to light touch throughout           -CV: Capillary refill is less than 2 seconds. +DP. Compartments soft and compressible    Diagnostic Findings:     Significant Labs: CBC:   Recent Labs   Lab 07/12/24  1604 07/13/24  0516 07/14/24  0400   WBC 8.78 11.25 13.51*   HGB 14.1 13.9 10.1*   HCT 45.0 43.5 31.9*    360 356     All pertinent labs within the past 24 hours have been reviewed.    Significant Imaging: I have reviewed all pertinent imaging results/findings.     Assessment/Plan:     Active Diagnoses:    Diagnosis Date Noted POA    PRINCIPAL PROBLEM:  Closed 2-part intertrochanteric fracture of proximal femur, right, initial encounter [S72.141A] 07/13/2024 Yes    Closed fracture of right pelvis [S32.9XXA] 07/13/2024 Yes      Problems Resolved During this Admission:   POD #1 IMN R IT; non-op pelvic injury     Diet: As tolerated  Pain: multimodal PRN  ABLA: expected; hgb stable 10 this am  DVT: Lovenox OK from ortho standpoint   ABX: periop ancef  PT/OT: Eval and Treat  Activity: WBAT RLE  Dressing changes begin tomorrow    The above findings, diagnostics, and treatment plan were discussed with Dr Montiel who is in agreement with the plan of care except as stated in additional documentation.      Shweta England, ADITIP   Orthopedic Trauma Surgery  [facility]    "

## 2024-07-14 NOTE — PLAN OF CARE
Problem: Physical Therapy  Goal: Physical Therapy Goal  Description: Goals to be met by: 24     Patient will increase functional independence with mobility by performin. Supine to sit with Set-up Clyde  2. Sit to supine with Set-up Clyde  3. Sit to stand transfer with Stand-by Assistance  4. Bed to chair transfer with Stand-by Assistance using Rolling Walker  5. Gait  x 100 feet with Stand-by Assistance using Rolling Walker.     Outcome: Progressing

## 2024-07-14 NOTE — NURSING
Nurses Note -- 4 Eyes      7/14/2024   0645      Skin assessed during: Q Shift Change      [] No Altered Skin Integrity Present    []Prevention Measures Documented      [x] Yes- Altered Skin Integrity Present or Discovered   [x] LDA Added if Not in Epic (Describe Wound)   [x] New Altered Skin Integrity was Present on Admit and Documented in LDA   [] Wound Image Taken    Wound Care Consulted? No    Attending Nurse:  Estelle Cloud RN/Staff Member:   Liz

## 2024-07-14 NOTE — PT/OT/SLP EVAL
Physical Therapy Evaluation    Patient Name:  Cierra Main   MRN:  [id number]    Recommendations:     Discharge therapy intensity: Moderate Intensity Therapy   Discharge Equipment Recommendations:  (TBD by next level of care)   Barriers to discharge: None    Assessment:     Cierra Main is a 73 y.o. female admitted falling a fall. Found to have a pelvic ring fracture and a nondisplaced right intertrochanteric femur fracture. S/p IM nail RLE. She is to be WBAT BLE's. Prior to admit, patient lived with son in a H. At baseline, she is independent and ambulates with a rollator.  She presents with the following impairments/functional limitations: weakness, impaired endurance, impaired self care skills, impaired functional mobility, gait instability, impaired balance, decreased lower extremity function, decreased safety awareness, pain. She required MOD A for bed mobility. MOD A for sit<>stand. Only able to put minimal weight through RLE. She was unable to take a step. Patient will benefit from continued PT services while in hospital to improve functional mobility & return to PLOF. Recommending MOD intensity therapy at discharge. Progress as tolerated.     Rehab Prognosis: Good; patient would benefit from acute skilled PT services to address these deficits and reach maximum level of function.    Recent Surgery: Procedure(s) (LRB):  INSERTION, INTRAMEDULLARY BRANDON, FEMUR (Right) 1 Day Post-Op    Plan:     During this hospitalization, patient would benefit from acute PT services 6 x/week to address the identified rehab impairments via gait training, therapeutic activities, therapeutic exercises, neuromuscular re-education and progress toward the following goals:    Plan of Care Expires:  08/14/24    Subjective     Chief Complaint: pain  Patient/Family Comments/goals: none  Pain/Comfort:  Pain Rating 1: 8/10  Location - Side 1: Right  Location 1: hip  Pain Addressed 1: Reposition, Distraction  Pain Rating  Post-Intervention 1: 8/10    Patients cultural, spiritual, Confucianist conflicts given the current situation: no    Living Environment:  Prior to admit, patient lived with son in a H. At baseline, she is independent and ambulates with a rollator. Equipment used at home: rollator, shower chair.  DME owned (not currently used): none.  Upon discharge, patient will have assistance from TBD.    Objective:     Communicated with nurse prior to session.  Patient found supine with telemetry, peripheral IV  upon PT entry to room.    General Precautions: Standard, fall  Orthopedic Precautions:RLE weight bearing as tolerated, LLE weight bearing as tolerated   Braces: N/A  Respiratory Status: 4L/min O2. Sats 89%-100% during session  Blood Pressure: 125/65      Exams:  Cognitive Exam:  Patient is oriented to Person  Sensation: -       Intact  RLE ROM: Limited by pain  RLE Strength: Unable to accurately assess 2/2 pain  LLE ROM: WFL  LLE Strength: WFL  Skin integrity: Visible skin intact      Functional Mobility:  Bed Mobility:  Supine to Sit: moderate assistance  Sit to Supine: moderate assistance  Transfers:  Sit to Stand:  moderate assistance with rolling walker  Sitting balance: fair  Standing balance/tolerance: Only able to put minimal weight through RLE. Stood for ~8-10 seconds each time.       AM-PAC 6 CLICK MOBILITY  Total Score:      Education Provided:  Role and goals of PT, transfer training, bed mobility, gait training, balance training, safety awareness, assistive device, strengthening exercises, and importance of participating in PT to return to PLOF.       Patient left supine with all lines intact, call button in reach, and bed alarm on.    GOALS:   Multidisciplinary Problems       Physical Therapy Goals          Problem: Physical Therapy    Goal Priority Disciplines Outcome Goal Variances Interventions   Physical Therapy Goal     PT, PT/OT Progressing     Description: Goals to be met by: 8/14/24     Patient will  increase functional independence with mobility by performin. Supine to sit with Set-up [place]  2. Sit to supine with Set-up [place]  3. Sit to stand transfer with Stand-by Assistance  4. Bed to chair transfer with Stand-by Assistance using Rolling Walker  5. Gait  x 100 feet with Stand-by Assistance using Rolling Walker.                          History:     Past Medical History:   Diagnosis Date    Acute MI     Anxiety     Cardiac disease     COPD (chronic obstructive pulmonary disease)     Coronary artery disease     Depression     Hyperlipidemia     Hypertension     Hypothyroidism     Other specified noninfective gastroenteritis and colitis     Unspecified macular degeneration        Past Surgical History:   Procedure Laterality Date    CORONARY ANGIOGRAPHY N/A 2021    Procedure: ANGIOGRAM, CORONARY ARTERY;  Surgeon: Devon Medeiros MD;  Location: [facility] CATH LAB;  Service: Cardiology;  Laterality: N/A;    CORONARY ANGIOPLASTY WITH STENT PLACEMENT      LEFT HEART CATHETERIZATION Left 2021    Procedure: CATHETERIZATION, HEART, LEFT;  Surgeon: Devon Medeiros MD;  Location: [facility] CATH LAB;  Service: Cardiology;  Laterality: Left;       Time Tracking:     PT Received On: 24  PT Start Time: 0814     PT Stop Time: 0835  PT Total Time (min): 21 min     Billable Minutes: Evaluation 21 minutes      2024

## 2024-07-14 NOTE — PROGRESS NOTES
[facility]  Hospital Medicine Progress Note        Chief Complaint: Inpatient Follow-up     HPI:   73-year-old female who he was transferred from [facility] secondary to pelvic ring fracture. Patient was had multiple falls previously. He was had significant pain as an outpatient. Orthopedics reviewed charts in may not be operable but will give us further recommendations this morning. On arrival she was continued pain but it was okay at rest. States that she has been significantly weak for some time. She reports good bowel movements but having difficulty urinating. Nursing at the bedside place a Osman catheter. Vital signs are stable. No significant issues on labs. She was currently NPO for orthopedic evaluation     Interval Hx:  Patient was taken for intramedullary nailing of the right femur yesterday.  Returned to the floor in stable condition.  Vital signs stable overnight.  She reports that her pain is currently well controlled.  He was not tried to move.  PT and OT to evaluate today.    Objective/physical exam:  General: In no acute distress, afebrile  Chest: Clear to auscultation bilaterally  Heart: RRR, +S1, S2, no appreciable murmur  Abdomen: Soft, nontender, BS +  MSK: Warm, no lower extremity edema, no clubbing or cyanosis  Neurologic: Alert and oriented x4, Cranial nerve II-XII intact, Strength 5/5 in all 4 extremities    VITAL SIGNS: 24 HRS MIN & MAX LAST   Temp  Min: 97.5 °F (36.4 °C)  Max: 98.3 °F (36.8 °C) 97.9 °F (36.6 °C)   BP  Min: 87/35  Max: 173/64 103/64   Pulse  Min: 59  Max: 79  76   Resp  Min: 12  Max: 27 17   SpO2  Min: 88 %  Max: 100 % 98 %       Recent Labs   Lab 07/12/24  1604 07/13/24  0516 07/14/24  0400   WBC 8.78 11.25 13.51*   RBC 5.20 5.03 3.64*   HGB 14.1 13.9 10.1*   HCT 45.0 43.5 31.9*   MCV 86.5 86.5 87.6   MCH 27.1 27.6 27.7   MCHC 31.3* 32.0* 31.7*   RDW 14.7 14.7 14.9    360 356   MPV 10.0 10.4 10.3       Recent Labs   Lab 07/12/24  1604  07/13/24  0516 07/14/24  0401    139 138   K 4.8 3.9 4.4    107 108*   CO2 22* 20* 18*   BUN 28.0* 20.6* 30.7*   CREATININE 0.93 0.76 1.11*   CALCIUM 10.1 9.4 8.7   ALBUMIN 3.7  --   --    ALKPHOS 127  --   --    ALT 57*  --   --    AST 60*  --   --    BILITOT 0.5  --   --           Microbiology Results (last 7 days)       ** No results found for the last 168 hours. **             Radiology:  SURG FL Surgery Fluoro Usage  See OP Notes for results.     IMPRESSION: See OP Notes for results.     This procedure was auto-finalized by: Virtual Radiologist        Medications:  Scheduled Meds:   amiodarone  200 mg Oral BID    atorvastatin  10 mg Oral QHS    ceFAZolin (Ancef) IV (PEDS and ADULTS)  1 g Intravenous Q8H    hydrALAZINE  100 mg Oral TID    levothyroxine  25 mcg Oral Before breakfast    lisinopriL  20 mg Oral BID    metoprolol succinate  25 mg Oral Daily    montelukast  10 mg Oral Daily    mupirocin   Nasal BID    sertraline  100 mg Oral Daily    sodium chloride 0.9%  10 mL Intravenous Q6H     Continuous Infusions:   lactated ringers   Intravenous Continuous 100 mL/hr at 07/14/24 0607 Rate Verify at 07/14/24 0607     PRN Meds:.  Current Facility-Administered Medications:     ceFAZolin 2 g/50mL Dextrose IVPB, 2 g, Intravenous, On Call Procedure    docusate sodium, 100 mg, Oral, BID PRN    hydrALAZINE, 10 mg, Intravenous, Q4H PRN    ketorolac, 15 mg, Intravenous, Q6H PRN    morphine, 2 mg, Intravenous, Q4H PRN    ondansetron, 4 mg, Intravenous, Q6H PRN    prochlorperazine, 5 mg, Intravenous, Q6H PRN    Flushing PICC/Midline Protocol, , , Until Discontinued **AND** sodium chloride 0.9%, 10 mL, Intravenous, Q6H **AND** sodium chloride 0.9%, 10 mL, Intravenous, PRN    traMADoL, 50 mg, Oral, Q6H PRN        Assessment/Plan:   Bilateral pelvic ring fracture  Urinary retention  Coronary artery disease  COPD, without exacerbation   Hypertension   Hypothyroidism   Hyperlipidemia   Depression    Advance to regular  diet if not already done.    Continue p.r.n. analgesia.    Slight drop in her hemoglobin to be expected after procedure.  Stable from a cardiac standpoint   On room air  PT OT today.    Will likely require SNF placement.  Case management consult for tomorrow      Jeffrey Woodard MD   07/14/2024     All diagnosis and differential diagnosis have been reviewed; assessment and plan has been documented; I have personally reviewed the labs and test results that are presently available; I have reviewed the patients medication list; I have reviewed the consulting providers response and recommendations. I have reviewed or attempted to review medical records based upon their availability    All of the patient's questions have been  addressed and answered. Patient's is agreeable to the above stated plan. I will continue to monitor closely and make adjustments to medical management as needed.  _____________________________________________________________________

## 2024-07-14 NOTE — ANESTHESIA POSTPROCEDURE EVALUATION
Anesthesia Post Evaluation    Patient: Cierra Main    Procedure(s) Performed: Procedure(s) (LRB):  INSERTION, INTRAMEDULLARY BRANDON, FEMUR (Right)    Final Anesthesia Type: general      Patient location during evaluation: floor  Patient participation: Yes- Able to Participate  Level of consciousness: awake and alert  Post-procedure vital signs: reviewed and stable  Pain management: adequate  Airway patency: patent    PONV status at discharge: No PONV  Anesthetic complications: no      Cardiovascular status: blood pressure returned to baseline  Respiratory status: spontaneous ventilation and nasal cannula  Hydration status: euvolemic  Follow-up not needed.              Vitals Value Taken Time   /64 07/14/24 0341   Temp 36.6 °C (97.9 °F) 07/14/24 0341   Pulse 76 07/14/24 0341   Resp 17 07/14/24 0341   SpO2 98 % 07/14/24 0341         Event Time   Out of Recovery 07/13/2024 18:10:00         Pain/Rupesh Score: Pain Rating Prior to Med Admin: 8 (7/13/2024  9:54 AM)  Pain Rating Post Med Admin: 0 (7/13/2024 10:24 AM)  Rupesh Score: 9 (7/13/2024  6:18 PM)

## 2024-07-14 NOTE — PROCEDURES
"Cierra Main is a 73 y.o. female patient.    Temp: 97.6 °F (36.4 °C) (07/13/24 1959)  Pulse: (!) 59 (07/13/24 2314)  Resp: 16 (07/13/24 1959)  BP: (!) 87/35 (07/13/24 2314)  SpO2: 98 % (07/13/24 2314)  Weight: 38.6 kg (85 lb 1.6 oz) (07/13/24 0837)  Height: 5' 3" (160 cm) (07/13/24 0837)    PICC  Date/Time: 7/14/2024 1:37 AM  Performed by: Andre Campoverde RN  Consent Done: Yes  Time out: Immediately prior to procedure a time out was called to verify the correct patient, procedure, equipment, support staff and site/side marked as required  Indications: med administration and vascular access  Anesthesia: local infiltration  Local anesthetic: lidocaine 1% without epinephrine  Anesthetic Total (mL): 5  Preparation: skin prepped with ChloraPrep  Skin prep agent dried: skin prep agent completely dried prior to procedure  Sterile barriers: all five maximum sterile barriers used - cap, mask, sterile gown, sterile gloves, and large sterile sheet  Hand hygiene: hand hygiene performed prior to central venous catheter insertion  Location details: right basilic  Catheter type: single lumen  Catheter size: 4 Fr  Catheter Length: 15cm    Ultrasound guidance: yes  Vessel Caliber: medium and patent, compressibility normal  Needle advanced into vessel with real time Ultrasound guidance.  Guidewire confirmed in vessel.  Sterile sheath used.  Number of attempts: 1  Post-procedure: blood return through all ports and sterile dressing applied    Complications: none  Comments: Irina Campoverde RN  7/14/2024    "

## 2024-07-15 LAB
ANION GAP SERPL CALC-SCNC: 6 MEQ/L
BASOPHILS # BLD AUTO: 0.08 X10(3)/MCL
BASOPHILS NFR BLD AUTO: 0.8 %
BUN SERPL-MCNC: 31.1 MG/DL (ref 9.8–20.1)
CALCIUM SERPL-MCNC: 8.4 MG/DL (ref 8.4–10.2)
CHLORIDE SERPL-SCNC: 108 MMOL/L (ref 98–107)
CO2 SERPL-SCNC: 24 MMOL/L (ref 23–31)
CREAT SERPL-MCNC: 0.8 MG/DL (ref 0.55–1.02)
CREAT/UREA NIT SERPL: 39
EOSINOPHIL # BLD AUTO: 0.4 X10(3)/MCL (ref 0–0.9)
EOSINOPHIL NFR BLD AUTO: 3.8 %
ERYTHROCYTE [DISTWIDTH] IN BLOOD BY AUTOMATED COUNT: 15.2 % (ref 11.5–17)
GFR SERPLBLD CREATININE-BSD FMLA CKD-EPI: >60 ML/MIN/1.73/M2
GLUCOSE SERPL-MCNC: 130 MG/DL (ref 82–115)
HCT VFR BLD AUTO: 27.9 % (ref 37–47)
HGB BLD-MCNC: 8.7 G/DL (ref 12–16)
IMM GRANULOCYTES # BLD AUTO: 0.05 X10(3)/MCL (ref 0–0.04)
IMM GRANULOCYTES NFR BLD AUTO: 0.5 %
IRON SATN MFR SERPL: 7 % (ref 20–50)
IRON SERPL-MCNC: 11 UG/DL (ref 50–170)
LYMPHOCYTES # BLD AUTO: 1.19 X10(3)/MCL (ref 0.6–4.6)
LYMPHOCYTES NFR BLD AUTO: 11.4 %
MCH RBC QN AUTO: 28 PG (ref 27–31)
MCHC RBC AUTO-ENTMCNC: 31.2 G/DL (ref 33–36)
MCV RBC AUTO: 89.7 FL (ref 80–94)
MONOCYTES # BLD AUTO: 1.07 X10(3)/MCL (ref 0.1–1.3)
MONOCYTES NFR BLD AUTO: 10.2 %
NEUTROPHILS # BLD AUTO: 7.65 X10(3)/MCL (ref 2.1–9.2)
NEUTROPHILS NFR BLD AUTO: 73.3 %
NRBC BLD AUTO-RTO: 0 %
OHS QRS DURATION: 138 MS
OHS QRS DURATION: 140 MS
OHS QTC CALCULATION: 484 MS
OHS QTC CALCULATION: 554 MS
PLATELET # BLD AUTO: 272 X10(3)/MCL (ref 130–400)
PMV BLD AUTO: 9.8 FL (ref 7.4–10.4)
POTASSIUM SERPL-SCNC: 4.5 MMOL/L (ref 3.5–5.1)
RBC # BLD AUTO: 3.11 X10(6)/MCL (ref 4.2–5.4)
SODIUM SERPL-SCNC: 138 MMOL/L (ref 136–145)
TIBC SERPL-MCNC: 148 UG/DL (ref 70–310)
TIBC SERPL-MCNC: 159 UG/DL (ref 250–450)
TRANSFERRIN SERPL-MCNC: 146 MG/DL (ref 173–360)
WBC # BLD AUTO: 10.44 X10(3)/MCL (ref 4.5–11.5)

## 2024-07-15 PROCEDURE — 99900035 HC TECH TIME PER 15 MIN (STAT)

## 2024-07-15 PROCEDURE — 85025 COMPLETE CBC W/AUTO DIFF WBC: CPT | Performed by: HOSPITALIST

## 2024-07-15 PROCEDURE — 63600175 PHARM REV CODE 636 W HCPCS: Performed by: NURSE PRACTITIONER

## 2024-07-15 PROCEDURE — 63600175 PHARM REV CODE 636 W HCPCS: Performed by: HOSPITALIST

## 2024-07-15 PROCEDURE — 25000003 PHARM REV CODE 250: Performed by: HOSPITALIST

## 2024-07-15 PROCEDURE — 27000221 HC OXYGEN, UP TO 24 HOURS

## 2024-07-15 PROCEDURE — 11000001 HC ACUTE MED/SURG PRIVATE ROOM

## 2024-07-15 PROCEDURE — 97535 SELF CARE MNGMENT TRAINING: CPT

## 2024-07-15 PROCEDURE — A4216 STERILE WATER/SALINE, 10 ML: HCPCS | Performed by: HOSPITALIST

## 2024-07-15 PROCEDURE — 83540 ASSAY OF IRON: CPT | Performed by: HOSPITALIST

## 2024-07-15 PROCEDURE — 97166 OT EVAL MOD COMPLEX 45 MIN: CPT

## 2024-07-15 PROCEDURE — 97530 THERAPEUTIC ACTIVITIES: CPT

## 2024-07-15 PROCEDURE — 80048 BASIC METABOLIC PNL TOTAL CA: CPT | Performed by: HOSPITALIST

## 2024-07-15 PROCEDURE — 51798 US URINE CAPACITY MEASURE: CPT

## 2024-07-15 PROCEDURE — 83550 IRON BINDING TEST: CPT | Performed by: HOSPITALIST

## 2024-07-15 PROCEDURE — 36415 COLL VENOUS BLD VENIPUNCTURE: CPT | Performed by: HOSPITALIST

## 2024-07-15 RX ADMIN — SODIUM CHLORIDE 125 MG: 9 INJECTION, SOLUTION INTRAVENOUS at 08:07

## 2024-07-15 RX ADMIN — SODIUM CHLORIDE, POTASSIUM CHLORIDE, SODIUM LACTATE AND CALCIUM CHLORIDE: 600; 310; 30; 20 INJECTION, SOLUTION INTRAVENOUS at 07:07

## 2024-07-15 RX ADMIN — HYDRALAZINE HYDROCHLORIDE 100 MG: 50 TABLET ORAL at 03:07

## 2024-07-15 RX ADMIN — ONDANSETRON 4 MG: 2 INJECTION INTRAMUSCULAR; INTRAVENOUS at 02:07

## 2024-07-15 RX ADMIN — SODIUM CHLORIDE, PRESERVATIVE FREE 10 ML: 5 INJECTION INTRAVENOUS at 12:07

## 2024-07-15 RX ADMIN — SODIUM CHLORIDE, PRESERVATIVE FREE 10 ML: 5 INJECTION INTRAVENOUS at 02:07

## 2024-07-15 RX ADMIN — ENOXAPARIN SODIUM 30 MG: 30 INJECTION SUBCUTANEOUS at 05:07

## 2024-07-15 RX ADMIN — ATORVASTATIN CALCIUM 10 MG: 10 TABLET, FILM COATED ORAL at 09:07

## 2024-07-15 RX ADMIN — LEVOTHYROXINE SODIUM 25 MCG: 25 TABLET ORAL at 05:07

## 2024-07-15 RX ADMIN — MUPIROCIN: 20 OINTMENT TOPICAL at 09:07

## 2024-07-15 RX ADMIN — MONTELUKAST 10 MG: 10 TABLET, FILM COATED ORAL at 08:07

## 2024-07-15 RX ADMIN — SODIUM CHLORIDE, PRESERVATIVE FREE 10 ML: 5 INJECTION INTRAVENOUS at 05:07

## 2024-07-15 RX ADMIN — KETOROLAC TROMETHAMINE 15 MG: 30 INJECTION, SOLUTION INTRAMUSCULAR at 09:07

## 2024-07-15 RX ADMIN — SERTRALINE HYDROCHLORIDE 100 MG: 50 TABLET ORAL at 08:07

## 2024-07-15 RX ADMIN — LISINOPRIL 20 MG: 20 TABLET ORAL at 08:07

## 2024-07-15 RX ADMIN — HYDRALAZINE HYDROCHLORIDE 10 MG: 20 INJECTION INTRAMUSCULAR; INTRAVENOUS at 05:07

## 2024-07-15 RX ADMIN — AMIODARONE HYDROCHLORIDE 200 MG: 200 TABLET ORAL at 08:07

## 2024-07-15 RX ADMIN — AMIODARONE HYDROCHLORIDE 200 MG: 200 TABLET ORAL at 09:07

## 2024-07-15 RX ADMIN — SODIUM CHLORIDE, POTASSIUM CHLORIDE, SODIUM LACTATE AND CALCIUM CHLORIDE: 600; 310; 30; 20 INJECTION, SOLUTION INTRAVENOUS at 05:07

## 2024-07-15 NOTE — PROGRESS NOTES
[facility] - 8th Floor Med Surg  Orthopedics  Progress Note    Patient Name: Cierra Main  MRN: [id number]  Admission Date: 7/12/2024  Hospital Length of Stay: 3 days  Attending Provider: Jeffrey Woodard MD  Primary Care Provider: Maegan Primary Doctor    Subjective:     Principal Problem:Closed 2-part intertrochanteric fracture of proximal femur, right, initial encounter    Principal Orthopedic Problem: 2 Days Post-Op   IMN R IT; non-op pelvic injury     Interval History: Resting this morning. Comfortable. States pain controlled with medications. Endorses she has not been able to work with therapy much. Doing well otherwise. Would like to sit up and eat breakfast today. No BM    Review of patient's allergies indicates:   Allergen Reactions    Amlodipine      Leg weakness    Losartan        Current Facility-Administered Medications   Medication    amiodarone tablet 200 mg    atorvastatin tablet 10 mg    cefazolin (ANCEF) 2 gram in dextrose 5% 50 mL IVPB (premix)    docusate sodium capsule 100 mg    enoxaparin injection 30 mg    ferric gluconate (FERRLECIT) 125 mg in 0.9% NaCl 100 mL IVPB    hydrALAZINE injection 10 mg    hydrALAZINE tablet 100 mg    ketorolac injection 15 mg    lactated ringers infusion    levothyroxine tablet 25 mcg    lisinopriL tablet 20 mg    metoprolol succinate (TOPROL-XL) 24 hr tablet 25 mg    montelukast tablet 10 mg    morphine injection 2 mg    mupirocin 2 % ointment    ondansetron injection 4 mg    prochlorperazine injection Soln 5 mg    sertraline tablet 100 mg    sodium chloride 0.9% flush 10 mL    And    sodium chloride 0.9% flush 10 mL    traMADoL tablet 50 mg     Objective:     Vital Signs (Most Recent):  Temp: 98.4 °F (36.9 °C) (07/15/24 0748)  Pulse: 78 (07/15/24 0748)  Resp: 18 (07/15/24 0748)  BP: 121/64 (07/15/24 0748)  SpO2: 96 % (07/15/24 0748) Vital Signs (24h Range):  Temp:  [97.9 °F (36.6 °C)-98.9 °F (37.2 °C)] 98.4 °F (36.9 °C)  Pulse:  [67-78] 78  Resp:   "[16-18] 18  SpO2:  [89 %-100 %] 96 %  BP: ()/(38-73) 121/64     Weight: 38.6 kg (85 lb 1.6 oz)  Height: 5' 3" (160 cm)  Body mass index is 15.07 kg/m².      Intake/Output Summary (Last 24 hours) at 7/15/2024 0816  Last data filed at 7/15/2024 0513  Gross per 24 hour   Intake 1753.91 ml   Output 475 ml   Net 1278.91 ml       Physical Exam:   General the patient is alert and oriented x3 no acute distress nontoxic-appearing appropriate affect.    Constitutional: Vital signs are examined and stable.  Resp: No signs of labored breathing                        RLE: -Skin: Dressing CDI without drainage, mild swelling in the thigh, No signs of new abrasions or lacerations, no scars           -MSK: : Hip and Knee F/E, EHL/FHL, Gastroc/Tib anterior Strength 5/5           -Neuro:  Sensation intact to light touch L3-S1 dermatomes           -Lymphatic: No signs of lymphadenopathy           -CV: Capillary refill is less than 2 seconds. DP/PT pulses  2/4. Compartments soft and compressible.      Diagnostic Findings:     Significant Labs: CBC:   Recent Labs   Lab 07/14/24  0400 07/14/24  1952 07/15/24  0339   WBC 13.51*  --  10.44   HGB 10.1* 9.0* 8.7*   HCT 31.9* 29.6* 27.9*     --  272     All pertinent labs within the past 24 hours have been reviewed.    Significant Imaging: I have reviewed all pertinent imaging results/findings.     Assessment/Plan:     Active Diagnoses:    Diagnosis Date Noted POA    PRINCIPAL PROBLEM:  Closed 2-part intertrochanteric fracture of proximal femur, right, initial encounter [S72.141A] 07/13/2024 Yes    Closed fracture of right pelvis [S32.9XXA] 07/13/2024 Yes      Problems Resolved During this Admission:   POD #2 IMN R IT; non-op pelvic injury   ABLA stable and improving over the last 24 hours  Diet: As tolerated  Pain: multimodal PRN  Lovenox for DVT ppx during stay and x 30 days at discharge. Okay to resume previous anticoags if present  Activity: WBAT RLE; ROMAT.  Dressing changes " begin today. May leave open to air if no drainage. Keep clean and dry. No ointments or creams.   Follow up with Dr. Montiel/Brenda Goldman in 3 weeks for wound check or repeat x rays. Please call with questions or concerns. Ortho stable for DC likely to rehab     The above findings, diagnostics, and treatment plan were discussed with Dr Montiel who is in agreement with the plan of care except as stated in additional documentation.      Brenda Goldman PA-C   Orthopedic Trauma Surgery  [facility]

## 2024-07-15 NOTE — PT/OT/SLP PROGRESS
Physical Therapy Treatment    Patient Name:  Cierra Main   MRN:  [id number]    Recommendations:     Discharge therapy intensity: Moderate Intensity Therapy   Discharge Equipment Recommendations:  (TBD by next level of care)  Barriers to discharge: Impaired mobility and Ongoing medical needs    Assessment:     Cierra Main is a 73 y.o. female due to a fall. Found to have a pelvic ring fracture and a nondisplaced right intertrochanteric femur fracture. S/p IM nail RLE. She is to be WBAT BLE's . She presents with the following impairments/functional limitations: weakness, impaired endurance, impaired self care skills, impaired functional mobility, gait instability, impaired balance, decreased lower extremity function, decreased safety awareness, pain.    Rehab Prognosis: Good; patient would benefit from acute skilled PT services to address these deficits and reach maximum level of function.    Recent Surgery: Procedure(s) (LRB):  INSERTION, INTRAMEDULLARY BRANDON, FEMUR (Right) 2 Days Post-Op    Plan:     During this hospitalization, patient would benefit from acute PT services 6 x/week to address the identified rehab impairments via gait training, therapeutic activities, therapeutic exercises, neuromuscular re-education and progress toward the following goals:    Plan of Care Expires:  08/14/24    Subjective     Chief Complaint: Pain in her RLE  Patient/Family Comments/goals: PLOF  Pain/Comfort:         Objective:     Communicated with RN prior to session.  Patient found up in chair with telemetry, peripheral IV upon PT entry to room.     General Precautions: Standard, fall  Orthopedic Precautions: RLE weight bearing as tolerated, LLE weight bearing as tolerated  Braces: N/A  Respiratory Status: Nasal cannula, flow 4 L/min  Blood Pressure: NT  Skin Integrity: Visible skin intact      Functional Mobility:  Bed Mobility:     Sit to Supine: maximal assistance and of 2 persons  Transfers:     Sit to Stand:  moderate  assistance x 2 with rolling walker (from bed side chair)  Gait: Pt took 3 side steps from bed side chair to bed Mod A with RW. Pt amb with minimal weight through RLE demo heel off. Encouraged pt to put as much weight through RLE.     Therapeutic Activities/Exercises:  Pt's activity tolerance limited by pain today. Unable to take more than 3 side steps.      Education:  Patient provided with verbal education education regarding PT role/goals/POC, post-op precautions, and fall prevention.  Understanding was verbalized.     Patient left HOB elevated with all lines intact, call button in reach, and wedge under R side    GOALS:   Multidisciplinary Problems       Physical Therapy Goals          Problem: Physical Therapy    Goal Priority Disciplines Outcome Goal Variances Interventions   Physical Therapy Goal     PT, PT/OT Progressing     Description: Goals to be met by: 24     Patient will increase functional independence with mobility by performin. Supine to sit with Set-up [place]  2. Sit to supine with Set-up [place]  3. Sit to stand transfer with Stand-by Assistance  4. Bed to chair transfer with Stand-by Assistance using Rolling Walker  5. Gait  x 100 feet with Stand-by Assistance using Rolling Walker.                          Time Tracking:     PT Received On: 07/15/24  PT Start Time: 1257     PT Stop Time: 1317  PT Total Time (min): 20 min     Billable Minutes: Therapeutic Activity 20             Number of PTA visits since last PT visit: 0     07/15/2024

## 2024-07-15 NOTE — PLAN OF CARE
Problem: Occupational Therapy  Goal: Occupational Therapy Goal  Description: Goals to be met by: 8/15/24     Patient will increase functional independence with ADLs by performing:    UE Dressing with Modified [place].  LE Dressing with Modified [place].  Grooming while standing at sink with Modified [place].  Toileting from toilet with Modified [place] for hygiene and clothing management.   Toilet transfer to toilet with Modified [place].    Outcome: Progressing

## 2024-07-15 NOTE — PROGRESS NOTES
[facility]  Hospital Medicine Progress Note        Chief Complaint: Inpatient Follow-up     HPI:   73-year-old female who he was transferred from [facility] secondary to pelvic ring fracture. Patient was had multiple falls previously. He was had significant pain as an outpatient. Orthopedics reviewed charts in may not be operable but will give us further recommendations this morning. On arrival she was continued pain but it was okay at rest. States that she has been significantly weak for some time. She reports good bowel movements but having difficulty urinating. Nursing at the bedside place a Osman catheter. Vital signs are stable. No significant issues on labs. She was currently NPO for orthopedic evaluation. Patient was taken for intramedullary nailing of the right femur 7/13.  Returned to the floor in stable condition.       Interval Hx:  No significant changes overnight.  Pain controlled but not moving much.  We discussed removing her Osman today.  She is having Bms. Appetite still     Objective/physical exam:  General: In no acute distress, afebrile  Chest: Clear to auscultation bilaterally  Heart: RRR, +S1, S2, no appreciable murmur  Abdomen: Soft, nontender, BS +  MSK: Warm, no lower extremity edema, no clubbing or cyanosis  Neurologic: Alert and oriented x4, Cranial nerve II-XII intact, Strength 5/5 in all 4 extremities    VITAL SIGNS: 24 HRS MIN & MAX LAST   Temp  Min: 97.9 °F (36.6 °C)  Max: 98.9 °F (37.2 °C) 98.3 °F (36.8 °C)   BP  Min: 82/38  Max: 129/73 129/73   Pulse  Min: 67  Max: 76  69   Resp  Min: 16  Max: 18 16   SpO2  Min: 89 %  Max: 100 % 99 %       Recent Labs   Lab 07/13/24  0516 07/14/24  0400 07/14/24  1952 07/15/24  0339   WBC 11.25 13.51*  --  10.44   RBC 5.03 3.64*  --  3.11*   HGB 13.9 10.1* 9.0* 8.7*   HCT 43.5 31.9* 29.6* 27.9*   MCV 86.5 87.6  --  89.7   MCH 27.6 27.7  --  28.0   MCHC 32.0* 31.7*  --  31.2*   RDW 14.7 14.9  --  15.2    356  --  909    MPV 10.4 10.3  --  9.8       Recent Labs   Lab 07/12/24  1604 07/13/24  0516 07/14/24  0401 07/15/24  0339    139 138 138   K 4.8 3.9 4.4 4.5    107 108* 108*   CO2 22* 20* 18* 24   BUN 28.0* 20.6* 30.7* 31.1*   CREATININE 0.93 0.76 1.11* 0.80   CALCIUM 10.1 9.4 8.7 8.4   ALBUMIN 3.7  --   --   --    ALKPHOS 127  --   --   --    ALT 57*  --   --   --    AST 60*  --   --   --    BILITOT 0.5  --   --   --           Microbiology Results (last 7 days)       ** No results found for the last 168 hours. **             Radiology:  X-Ray Femur 2 View Right  Narrative: EXAMINATION:  XR FEMUR 2 VIEW RIGHT    CLINICAL HISTORY:  psot op;    TECHNIQUE:  Two views of the right femur    COMPARISON:  Radiography 07/08/2024    FINDINGS:  Internal fixation of the proximal femur.  Femoral alignment is anatomic.  Impression: Right femur fixation.    Electronically signed by: Mejia Patel  Date:    07/14/2024  Time:    09:22        Medications:  Scheduled Meds:   amiodarone  200 mg Oral BID    atorvastatin  10 mg Oral QHS    enoxparin  30 mg Subcutaneous Q24H (prophylaxis, 1700)    hydrALAZINE  100 mg Oral TID    levothyroxine  25 mcg Oral Before breakfast    lisinopriL  20 mg Oral BID    metoprolol succinate  25 mg Oral Daily    montelukast  10 mg Oral Daily    mupirocin   Nasal BID    sertraline  100 mg Oral Daily    sodium chloride 0.9%  10 mL Intravenous Q6H     Continuous Infusions:   lactated ringers   Intravenous Continuous 100 mL/hr at 07/15/24 0703 New Bag at 07/15/24 0703     PRN Meds:.  Current Facility-Administered Medications:     ceFAZolin 2 g/50mL Dextrose IVPB, 2 g, Intravenous, On Call Procedure    docusate sodium, 100 mg, Oral, BID PRN    hydrALAZINE, 10 mg, Intravenous, Q4H PRN    ketorolac, 15 mg, Intravenous, Q6H PRN    morphine, 2 mg, Intravenous, Q4H PRN    ondansetron, 4 mg, Intravenous, Q6H PRN    prochlorperazine, 5 mg, Intravenous, Q6H PRN    Flushing PICC/Midline Protocol, , , Until Discontinued  **AND** sodium chloride 0.9%, 10 mL, Intravenous, Q6H **AND** sodium chloride 0.9%, 10 mL, Intravenous, PRN    traMADoL, 50 mg, Oral, Q6H PRN        Assessment/Plan:   Bilateral pelvic ring fracture  Urinary retention  Coronary artery disease  COPD, without exacerbation   Hypertension   Hypothyroidism   Hyperlipidemia   Depression    DC morales  Monitor hgb. Still slightly low  CM consult for placement  PT/OT  Prn analgesia      Jeffrey Woodard MD   07/15/2024     All diagnosis and differential diagnosis have been reviewed; assessment and plan has been documented; I have personally reviewed the labs and test results that are presently available; I have reviewed the patients medication list; I have reviewed the consulting providers response and recommendations. I have reviewed or attempted to review medical records based upon their availability    All of the patient's questions have been  addressed and answered. Patient's is agreeable to the above stated plan. I will continue to monitor closely and make adjustments to medical management as needed.  _____________________________________________________________________

## 2024-07-15 NOTE — PT/OT/SLP EVAL
Occupational Therapy  Evaluation    Name: Cierra Main  MRN: [id number]  Admitting Diagnosis: hip pain  Recent Surgery: Procedure(s) (LRB):  INSERTION, INTRAMEDULLARY BRANDON, FEMUR (Right) 2 Days Post-Op    Recommendations:     Discharge therapy intensity: Moderate Intensity Therapy   Discharge Equipment Recommendations:  to be determined by next level of care  Barriers to discharge:  None    Assessment:     Cierra Main is a 73 y.o. female with a medical diagnosis of bilateral pelvic ring fracture and a nondisplaced right intertrochanteric femur fracture. S/p IM nail RLE. She is to be WBAT BLE's.  She presents with the following performance deficits affecting function: weakness, impaired endurance, impaired self care skills, impaired functional mobility, gait instability, impaired balance, pain, decreased safety awareness, decreased lower extremity function, visual deficits.     Pt with good tolerance to OT evaluation, reports pain in RLE with movement, requiring encouragement for mobility. Pt presents fearful but actively participated throughout. Pt with poor vision at baseline, reports she only sees shadows. Pt required max A for bed mobility due to pain and Min A for sit>stand at RW. Pt transferred to bedside chair with Mod A via stand step with RW, cues for RW mgmt due to visual impairment. Pt then set up with lunch tray and CNA present to assist with feeding due to vision. Recommending moderate intensity therapy upon discharge.     Rehab Prognosis: Good; patient would benefit from acute skilled OT services to address these deficits and reach maximum level of function.       Plan:     Patient to be seen 5 x/week to address the above listed problems via self-care/home management, therapeutic activities, therapeutic exercises  Plan of Care Expires: 08/15/24  Plan of Care Reviewed with: patient    Subjective     Chief Complaint: RLE pain  Patient/Family Comments/goals: to get better    Occupational  Profile:  Living Environment: lives with son in [facility], 1 [address], tub shower without DME  Previous level of function: Independent, assist for transfers into/out of tub  Equipment Used at Home: walker, rolling  Assistance upon Discharge: family (unsure about 24/7) pt reports her son works, his girlfriend stays with her during the day    Pain/Comfort:  Pain Rating 1: other (see comments) (no pain at rest, pain with mobility in RLE (did not rate))  Pain Addressed 1: Reposition, Distraction, Cessation of Activity    Patients cultural, spiritual, Shinto conflicts given the current situation: no    Objective:     OT communicated with RN prior to session.      Patient was found HOB elevated with peripheral IV, oxygen, telemetry, SCD, morales catheter upon OT entry to room.    General Precautions: Standard, fall, vision impaired  Orthopedic Precautions: RLE weight bearing as tolerated, LLE weight bearing as tolerated (ROMAT to RLE)  Braces: N/A    Bed Mobility:    Patient completed Supine to Sit with maximal assistance    Functional Mobility/Transfers:  Patient completed Sit <> Stand Transfer with minimum assistance  with  rolling walker   Patient completed Bed <> Chair Transfer using Step Transfer technique with moderate assistance with rolling walker  Functional Mobility: mod A due to cueing needed for visual impairment    Activities of Daily Living:  Feeding:  set up A for food and SPV for feeding due to vision    Lower Body Dressing: maximal assistance for donning socks and brief for sitting up in chair    Functional Cognition:  Orientation: oriented to Person, Place, Time, and Situation  Safety Awareness: Impaired.      Visual Perceptual Skills:  Pt with poor vision at baseline, reports she can only see shadows    Upper Extremity Function:  Right Upper Extremity:   Strength: WFL    Left Upper Extremity:  Strength: WFL    Balance:   Static sitting balance: WFL  Dynamic sitting balance:WFL  Static standing balance:Min A at  RW    Therapeutic Positioning  Risk for acquired pressure injuries is decreased due to ability to get to BSC/toilet with assist.    OT interventions performed during the course of today's session:   Education was provided on benefits of and recommendations for therapeutic positioning    Skin assessment: all bony prominences were assessed    Findings: no redness or breakdown noted    OT recommendations for therapeutic positioning throughout hospitalization:   Follow [facility] Pressure Injury Prevention Protocol  Geomat recommended for sacral protection while UIC    Additional Treatment:  ADL Training: set up of meal tray and orientation to each food, pt reports she can only see the round plate shape but able to demo scooping food and bringing to mouth    Patient Education:  Patient provided with verbal education education regarding OT role/goals/POC, post op precautions, fall prevention, safety awareness, and Discharge/DME recommendations.  Understanding was verbalized, however additional teaching warranted.     Patient left up in chair with all lines intact, call button in reach, and CNA present.    GOALS:   Multidisciplinary Problems       Occupational Therapy Goals          Problem: Occupational Therapy    Goal Priority Disciplines Outcome Interventions   Occupational Therapy Goal     OT, PT/OT Progressing    Description: Goals to be met by: 8/15/24     Patient will increase functional independence with ADLs by performing:    UE Dressing with Modified [place].  LE Dressing with Modified [place].  Grooming while standing at sink with Modified [place].  Toileting from toilet with Modified [place] for hygiene and clothing management.   Toilet transfer to toilet with Modified [place].                         History:     Past Medical History:   Diagnosis Date    Acute MI     Anxiety     Cardiac disease     COPD (chronic obstructive pulmonary disease)     Coronary artery disease     Depression      Hyperlipidemia     Hypertension     Hypothyroidism     Other specified noninfective gastroenteritis and colitis     Unspecified macular degeneration          Past Surgical History:   Procedure Laterality Date    CORONARY ANGIOGRAPHY N/A 2/1/2021    Procedure: ANGIOGRAM, CORONARY ARTERY;  Surgeon: Devon Medeiros MD;  Location: [facility] CATH LAB;  Service: Cardiology;  Laterality: N/A;    CORONARY ANGIOPLASTY WITH STENT PLACEMENT      LEFT HEART CATHETERIZATION Left 2/1/2021    Procedure: CATHETERIZATION, HEART, LEFT;  Surgeon: Devon Medeiros MD;  Location: [facility] CATH LAB;  Service: Cardiology;  Laterality: Left;       Time Tracking:     OT Date of Treatment: 07/15/24  OT Start Time: 1118  OT Stop Time: 1148  OT Total Time (min): 30 min    Billable Minutes:Evaluation 20 mins  Self Care/Home Management 10 mins    7/15/2024

## 2024-07-15 NOTE — PLAN OF CARE
07/15/24 1418   Discharge Assessment   Assessment Type Discharge Planning Assessment   Confirmed/corrected address, phone number and insurance Yes   Confirmed Demographics Correct on Facesheet   Source of Information patient   Communicated SUMMER with patient/caregiver Date not available/Unable to determine   Reason For Admission R femur fx, s/p IMN R femur   People in Home child(ashok), adult  (Pt lives with her son, Aly and Aly's girl friend in a single story home with one step to enter the home)   Do you expect to return to your current living situation? No  (pt needs placement)   Do you have help at home or someone to help you manage your care at home? Yes   Who are your caregiver(s) and their phone number(s)? aly son ([phone]) or sisterKrystin ([phone])   Prior to hospitilization cognitive status: Unable to Assess   Current cognitive status: Alert/Oriented   Walking or Climbing Stairs Difficulty yes   Walking or Climbing Stairs ambulation difficulty, requires equipment   Mobility Management RW   Dressing/Bathing Difficulty no   Home Layout Able to live on 1st floor   Equipment Currently Used at Home walker, rolling   Readmission within 30 days? No   Patient currently being followed by outpatient case management? No   Do you currently have service(s) that help you manage your care at home? No   Do you take prescription medications? Yes   Do you have prescription coverage? Yes   Coverage [employer]   Who is going to help you get home at discharge? pt will need placement   How do you get to doctors appointments? family or friend will provide   Are you on dialysis? No   Discharge Plan A Other  (swing bed)   Discharge Plan B Skilled Nursing Facility   DME Needed Upon Discharge  other (see comments)  (TBD)   Discharge Plan discussed with: Patient   Transition of Care Barriers None   Housing Stability   In the last 12 months, was there a time when you were not able to pay the mortgage or rent on time? N    Transportation Needs   Has the lack of transportation kept you from medical appointments, meetings, work or from getting things needed for daily living? No   Food Insecurity   Within the past 12 months, you worried that your food would run out before you got the money to buy more. Never true   OTHER   Name(s) of People in Home greer, son and greer's girl friend     Pt's PCP is Dr Eller. Pt's  is her son, greer ([phone]). Pt had HH services in the past. Pt uses [facility] in [place]. Pt does not drive.   Therapy is recommending moderate intensity. Spoke with pt re SNF vs swing bed. Left a list in pt's room. She will discuss with her son. CM to follow

## 2024-07-15 NOTE — NURSING
Nurses Note -- 4 Eyes      7/15/2024   3:08 PM      Skin assessed during: Q Shift Change      [] No Altered Skin Integrity Present    []Prevention Measures Documented      [x] Yes- Altered Skin Integrity Present or Discovered   [] LDA Added if Not in Epic (Describe Wound)   [] New Altered Skin Integrity was Present on Admit and Documented in LDA   [] Wound Image Taken    Wound Care Consulted? No    Attending Nurse:  Danny DURAND RN    Second RN/Staff Member:   Laith FLOWER RN

## 2024-07-15 NOTE — PLAN OF CARE
Problem: Adult Inpatient Plan of Care  Goal: Plan of Care Review  7/15/2024 1507 by Danny Bird RN  Outcome: Progressing  7/15/2024 1506 by Danny Bird RN  Outcome: Progressing  Goal: Patient-Specific Goal (Individualized)  7/15/2024 1507 by Danny Bird RN  Outcome: Progressing  7/15/2024 1506 by Danny Bird RN  Outcome: Progressing  Goal: Absence of Hospital-Acquired Illness or Injury  7/15/2024 1507 by Danny Bird RN  Outcome: Progressing  7/15/2024 1506 by Danny Bird RN  Outcome: Progressing  Goal: Optimal Comfort and Wellbeing  7/15/2024 1507 by Danny Bird RN  Outcome: Progressing  7/15/2024 1506 by Danny Bird RN  Outcome: Progressing  Goal: Readiness for Transition of Care  7/15/2024 1507 by Danny Bird RN  Outcome: Progressing  7/15/2024 1506 by Danny Bird RN  Outcome: Progressing     Problem: Wound  Goal: Optimal Coping  7/15/2024 1507 by Danny Bird RN  Outcome: Progressing  7/15/2024 1506 by Danny Bird RN  Outcome: Progressing  Goal: Optimal Functional Ability  7/15/2024 1507 by Danny Bird RN  Outcome: Progressing  7/15/2024 1506 by Danny Bird RN  Outcome: Progressing  Goal: Absence of Infection Signs and Symptoms  7/15/2024 1507 by Danny Bird RN  Outcome: Progressing  7/15/2024 1506 by Danny Bird RN  Outcome: Progressing  Goal: Improved Oral Intake  7/15/2024 1507 by Danny Bird RN  Outcome: Progressing  7/15/2024 1506 by Danny Bird RN  Outcome: Progressing  Goal: Optimal Pain Control and Function  7/15/2024 1507 by Danny Bidr RN  Outcome: Progressing  7/15/2024 1506 by Danny Bird RN  Outcome: Progressing  Goal: Skin Health and Integrity  7/15/2024 1507 by Danny Bird, RN  Outcome: Progressing  7/15/2024 1506 by Danny Bird RN  Outcome: Progressing  Goal: Optimal Wound Healing  7/15/2024 1507 by Danny Bird, SKYE  Outcome:  Progressing  7/15/2024 1506 by Danny Bird RN  Outcome: Progressing     Problem: Fall Injury Risk  Goal: Absence of Fall and Fall-Related Injury  7/15/2024 1507 by Danny Bird RN  Outcome: Progressing  7/15/2024 1506 by Danny Bird RN  Outcome: Progressing     Problem: Infection  Goal: Absence of Infection Signs and Symptoms  7/15/2024 1507 by Danny Bird RN  Outcome: Progressing  7/15/2024 1506 by Danny Bird RN  Outcome: Progressing

## 2024-07-16 PROCEDURE — 97530 THERAPEUTIC ACTIVITIES: CPT | Mod: CQ

## 2024-07-16 PROCEDURE — 25000003 PHARM REV CODE 250: Performed by: HOSPITALIST

## 2024-07-16 PROCEDURE — 97535 SELF CARE MNGMENT TRAINING: CPT | Mod: CO

## 2024-07-16 PROCEDURE — 63600175 PHARM REV CODE 636 W HCPCS: Performed by: HOSPITALIST

## 2024-07-16 PROCEDURE — 11000001 HC ACUTE MED/SURG PRIVATE ROOM

## 2024-07-16 PROCEDURE — 63600175 PHARM REV CODE 636 W HCPCS: Performed by: NURSE PRACTITIONER

## 2024-07-16 PROCEDURE — A4216 STERILE WATER/SALINE, 10 ML: HCPCS | Performed by: HOSPITALIST

## 2024-07-16 RX ADMIN — HYDRALAZINE HYDROCHLORIDE 100 MG: 50 TABLET ORAL at 09:07

## 2024-07-16 RX ADMIN — SODIUM CHLORIDE, PRESERVATIVE FREE 10 ML: 5 INJECTION INTRAVENOUS at 10:07

## 2024-07-16 RX ADMIN — MORPHINE SULFATE 2 MG: 4 INJECTION INTRAVENOUS at 09:07

## 2024-07-16 RX ADMIN — TRAMADOL HYDROCHLORIDE 50 MG: 50 TABLET, COATED ORAL at 05:07

## 2024-07-16 RX ADMIN — DOCUSATE SODIUM 100 MG: 100 CAPSULE, LIQUID FILLED ORAL at 09:07

## 2024-07-16 RX ADMIN — DOCUSATE SODIUM 100 MG: 100 CAPSULE, LIQUID FILLED ORAL at 05:07

## 2024-07-16 RX ADMIN — MUPIROCIN: 20 OINTMENT TOPICAL at 09:07

## 2024-07-16 RX ADMIN — ONDANSETRON 4 MG: 2 INJECTION INTRAMUSCULAR; INTRAVENOUS at 10:07

## 2024-07-16 RX ADMIN — LISINOPRIL 20 MG: 20 TABLET ORAL at 09:07

## 2024-07-16 RX ADMIN — SODIUM CHLORIDE, PRESERVATIVE FREE 10 ML: 5 INJECTION INTRAVENOUS at 04:07

## 2024-07-16 RX ADMIN — SODIUM CHLORIDE, POTASSIUM CHLORIDE, SODIUM LACTATE AND CALCIUM CHLORIDE: 600; 310; 30; 20 INJECTION, SOLUTION INTRAVENOUS at 04:07

## 2024-07-16 RX ADMIN — METOPROLOL SUCCINATE 25 MG: 25 TABLET, EXTENDED RELEASE ORAL at 09:07

## 2024-07-16 RX ADMIN — HYDRALAZINE HYDROCHLORIDE 100 MG: 50 TABLET ORAL at 04:07

## 2024-07-16 RX ADMIN — MONTELUKAST 10 MG: 10 TABLET, FILM COATED ORAL at 09:07

## 2024-07-16 RX ADMIN — AMIODARONE HYDROCHLORIDE 200 MG: 200 TABLET ORAL at 09:07

## 2024-07-16 RX ADMIN — ENOXAPARIN SODIUM 30 MG: 30 INJECTION SUBCUTANEOUS at 04:07

## 2024-07-16 RX ADMIN — ATORVASTATIN CALCIUM 10 MG: 10 TABLET, FILM COATED ORAL at 09:07

## 2024-07-16 RX ADMIN — TRAMADOL HYDROCHLORIDE 50 MG: 50 TABLET, COATED ORAL at 04:07

## 2024-07-16 RX ADMIN — LEVOTHYROXINE SODIUM 25 MCG: 25 TABLET ORAL at 05:07

## 2024-07-16 RX ADMIN — SERTRALINE HYDROCHLORIDE 100 MG: 50 TABLET ORAL at 09:07

## 2024-07-16 RX ADMIN — SODIUM CHLORIDE, PRESERVATIVE FREE 10 ML: 5 INJECTION INTRAVENOUS at 12:07

## 2024-07-16 RX ADMIN — SODIUM CHLORIDE, PRESERVATIVE FREE 10 ML: 5 INJECTION INTRAVENOUS at 05:07

## 2024-07-16 NOTE — PROGRESS NOTES
[facility]  Hospital Medicine Progress Note        Chief Complaint: Inpatient Follow-up for pelvic fracture and right IT fracture     HPI:   73-year-old female who he was transferred from [facility] secondary to pelvic ring fracture. Patient was had multiple falls previously. He was had significant pain as an outpatient. Orthopedics reviewed charts in may not be operable but will give us further recommendations this morning. On arrival she was continued pain but it was okay at rest. States that she has been significantly weak for some time. She reports good bowel movements but having difficulty urinating. Nursing at the bedside place a Osman catheter. Vital signs are stable. No significant issues on labs. She was currently NPO for orthopedic evaluation. Patient was taken for intramedullary nailing of the right femur 7/13.  Returned to the floor in stable condition.  She was doing well. Pain controlled. PT was started. CM working on placement.     Interval Hx:   Patient today awake and comfortable. Sitting up in the chair. Pain controlled. Participating with PT.     Case was discussed with patient's nurse and  on the floor.    Objective/physical exam:  General: In no acute distress, frail  Chest: Clear to auscultation bilaterally  Heart: RRR, +S1, S2, no appreciable murmur  Abdomen: Soft, nontender, BS +  Neurologic: Alert and oriented x4, Cranial nerve II-XII intact, Strength 5/5 in all 4 extremities    VITAL SIGNS: 24 HRS MIN & MAX LAST   Temp  Min: 97 °F (36.1 °C)  Max: 99.1 °F (37.3 °C) 98.2 °F (36.8 °C)   BP  Min: 97/53  Max: 173/67 (!) 112/58   Pulse  Min: 63  Max: 78  70   Resp  Min: 16  Max: 18 16   SpO2  Min: 94 %  Max: 99 % 99 %     I have reviewed the following labs:  Recent Labs   Lab 07/13/24  0516 07/14/24  0400 07/14/24  1952 07/15/24  0339   WBC 11.25 13.51*  --  10.44   RBC 5.03 3.64*  --  3.11*   HGB 13.9 10.1* 9.0* 8.7*   HCT 43.5 31.9* 29.6* 27.9*   MCV 86.5  87.6  --  89.7   MCH 27.6 27.7  --  28.0   MCHC 32.0* 31.7*  --  31.2*   RDW 14.7 14.9  --  15.2    356  --  272   MPV 10.4 10.3  --  9.8     Recent Labs   Lab 07/12/24  1604 07/13/24  0516 07/14/24  0401 07/15/24  0339    139 138 138   K 4.8 3.9 4.4 4.5    107 108* 108*   CO2 22* 20* 18* 24   BUN 28.0* 20.6* 30.7* 31.1*   CREATININE 0.93 0.76 1.11* 0.80   CALCIUM 10.1 9.4 8.7 8.4   ALBUMIN 3.7  --   --   --    ALKPHOS 127  --   --   --    ALT 57*  --   --   --    AST 60*  --   --   --    BILITOT 0.5  --   --   --      Microbiology Results (last 7 days)       ** No results found for the last 168 hours. **             See below for Radiology    Assessment/Plan:  Bilateral pelvic ring fracture  Right IT fracture s/p IM nail   Urinary retention: stable   Anemia NC/NC  Coronary artery disease  COPD, without exacerbation   Hypertension   Hypothyroidism   Hyperlipidemia   Depression    Plan:  Patient looks comfortable. Participating with PT  Continue PRN ultram. Dc iv morphine   Hb today 8.7, Probably dilutional. Will dc iv fluids   Current meds reviewed   Crt 0.80, will monitor     Continue strict aspiration, fall and decubitus precautions      Continue supportive care     VTE prophylaxis: Lovenox     Patient condition:  Fair    Anticipated discharge and Disposition:   Rehab       All diagnosis and differential diagnosis have been reviewed; assessment and plan has been documented; I have personally reviewed the labs and test results that are presently available; I have reviewed the patients medication list; I have reviewed the consulting providers response and recommendations. I have reviewed or attempted to review medical records based upon their availability    All of the patient's questions have been  addressed and answered. Patient's is agreeable to the above stated plan. I will continue to monitor closely and make adjustments to medical management as needed.    Portions of this note dictated using  EMR integrated voice recognition software, and may be subject to voice recognition errors not corrected at proofreading. Please contact writer for clarification if needed.   _____________________________________________________________________    Malnutrition Status:    Scheduled Med:   amiodarone  200 mg Oral BID    atorvastatin  10 mg Oral QHS    enoxparin  30 mg Subcutaneous Q24H (prophylaxis, 1700)    hydrALAZINE  100 mg Oral TID    levothyroxine  25 mcg Oral Before breakfast    lisinopriL  20 mg Oral BID    metoprolol succinate  25 mg Oral Daily    montelukast  10 mg Oral Daily    mupirocin   Nasal BID    sertraline  100 mg Oral Daily    sodium chloride 0.9%  10 mL Intravenous Q6H      Continuous Infusions:     PRN Meds:    Current Facility-Administered Medications:     ceFAZolin 2 g/50mL Dextrose IVPB, 2 g, Intravenous, On Call Procedure    docusate sodium, 100 mg, Oral, BID PRN    hydrALAZINE, 10 mg, Intravenous, Q4H PRN    morphine, 2 mg, Intravenous, Q4H PRN    ondansetron, 4 mg, Intravenous, Q6H PRN    prochlorperazine, 5 mg, Intravenous, Q6H PRN    Flushing PICC/Midline Protocol, , , Until Discontinued **AND** sodium chloride 0.9%, 10 mL, Intravenous, Q6H **AND** sodium chloride 0.9%, 10 mL, Intravenous, PRN    traMADoL, 50 mg, Oral, Q6H PRN     Radiology:  I have personally reviewed the following imaging and agree with the radiologist.     X-Ray Femur 2 View Right  Narrative: EXAMINATION:  XR FEMUR 2 VIEW RIGHT    CLINICAL HISTORY:  psot op;    TECHNIQUE:  Two views of the right femur    COMPARISON:  Radiography 07/08/2024    FINDINGS:  Internal fixation of the proximal femur.  Femoral alignment is anatomic.  Impression: Right femur fixation.    Electronically signed by: Mejia Patel  Date:    07/14/2024  Time:    09:22      Stanley Yuen MD  Department of Hospital Medicine   [facility]   07/16/2024

## 2024-07-16 NOTE — PT/OT/SLP PROGRESS
Occupational Therapy   Treatment    Name: Cierra Main  MRN: [id number]    Recommendations:     Recommended therapy intensity at discharge: Moderate Intensity Therapy   Discharge Equipment Recommendations:  to be determined by next level of care  Barriers to discharge:       Assessment:     Cierra Main is a 73 y.o. female with a medical diagnosis of bilateral pelvic ring fracture and a nondisplaced right intertrochanteric femur fracture. S/p IM nail RLE. She is to be WBAT BLE's. Performance deficits affecting function are weakness, impaired endurance, impaired self care skills, impaired functional mobility, gait instability, impaired balance, pain, decreased safety awareness, decreased lower extremity function, visual deficits. Patient c/o increased pain in R hip however is motivated to participate in therapy.    Rehab Prognosis:  Good; patient would benefit from acute skilled OT services to address these deficits and reach maximum level of function.       Plan:     Patient to be seen 5 x/week to address the above listed problems via self-care/home management, therapeutic activities, therapeutic exercises  Plan of Care Expires: 08/15/24  Plan of Care Reviewed with: patient    Subjective     Pain/Comfort:  Location - Side 1: Right  Location 1: hip  Pain Addressed 1: Distraction, Reposition    Objective:     Communicated with: RN prior to session.  Patient found supine with oxygen, peripheral IV upon OT entry to room.    General Precautions: Standard, fall, vision impaired   Orthopedic Precautions:RLE weight bearing as tolerated  Braces: N/A  Respiratory Status: Nasal cannula, flow 4 L/min     Occupational Performance:     Bed Mobility:    Patient completed Scooting/Bridging with minimum assistance and extended time to complete task.  Patient completed Supine to Sit with moderate assistance     Functional Mobility/Transfers:  Patient completed Sit <> Stand Transfer with moderate assistance  with  rolling walker    Patient completed Bed <> Chair Transfer using Step Transfer technique with moderate assistance with rolling walker  Patient completed Toilet Transfer Step Transfer technique with moderate assistance with  rolling walker with BSC  Functional Mobility: Pt ambulated to BSC and chair with Min A and RW. V/TC required to manage walker.    Activities of Daily Living:  Grooming: stand by assistance to complete oral hygiene and to wash her face.  Upper Body Dressing: moderate assistance to don and doff gown.      Therapeutic Positioning    OT interventions performed during the course of today's session in an effort to prevent and/or reduce acquired pressure injuries:   Education was provided on benefits of and recommendations for therapeutic positioning      [facility] 6 Click ADL:      Patient Education:  Patient provided with verbal education education regarding OT role/goals/POC and fall prevention.  Understanding was verbalized.      Patient left up in chair with all lines intact and call button in reach.    GOALS:   Multidisciplinary Problems       Occupational Therapy Goals          Problem: Occupational Therapy    Goal Priority Disciplines Outcome Interventions   Occupational Therapy Goal     OT, PT/OT Progressing    Description: Goals to be met by: 8/15/24     Patient will increase functional independence with ADLs by performing:    UE Dressing with Modified [place].  LE Dressing with Modified [place].  Grooming while standing at sink with Modified [place].  Toileting from toilet with Modified [place] for hygiene and clothing management.   Toilet transfer to toilet with Modified [place].                         Time Tracking:     OT Date of Treatment: 07/16/24  OT Start Time: 0840  OT Stop Time: 0918  OT Total Time (min): 38 min    Billable Minutes:Self Care/Home Management 38    OT/MARY: MARY     Number of MARY visits since last OT visit: 1    7/16/2024

## 2024-07-16 NOTE — PT/OT/SLP PROGRESS
Physical Therapy Treatment    Patient Name:  Cierra Main   MRN:  [id number]    Recommendations:     Discharge Recommendations: Moderate Intensity Therapy  Discharge Equipment Recommendations: to be determined by next level of care  Barriers to discharge: Decreased caregiver support    Assessment:     Cierra Main is a 73 y.o. female admitted with a medical diagnosis of Closed 2-part intertrochanteric fracture of proximal femur, right, initial encounter.  She presents with the following impairments/functional limitations: weakness, impaired endurance, decreased ROM, orthopedic precautions, impaired functional mobility, gait instability, decreased safety awareness, impaired balance, pain .    Rehab Prognosis: Fair; patient would benefit from acute skilled PT services to address these deficits and reach maximum level of function.    Recent Surgery: Procedure(s) (LRB):  INSERTION, INTRAMEDULLARY BRANDON, FEMUR (Right) 3 Days Post-Op    Plan:     During this hospitalization, patient to be seen 6 x/week to address the identified rehab impairments via gait training, therapeutic activities, therapeutic exercises, neuromuscular re-education and progress toward the following goals:    Plan of Care Expires:  08/14/24    Subjective     Chief Complaint: rt thigh pain   Patient/Family Comments/goals: to have decreased pain   Pain/Comfort:  Pain Rating 1: 9/10  Location - Side 1: Right  Location 1: thigh  Pain Addressed 1: Nurse notified (pt refused cold pack)      Objective:     Communicated with nursing  prior to session.  Patient found HOB elevated with peripheral IV, Other (comments) (pt in bed) upon PT entry to room.     General Precautions: Standard, fall, vision impaired  Orthopedic Precautions: RLE weight bearing as tolerated, LLE weight bearing as tolerated  Braces: N/A  Respiratory Status: Room air    Treatment & Education:  Pt c/o pain from position assisted to repositioning and few rtle slow movements for range pt  refused to cont secondary to pain     Patient left  HOB elevated turned to rt side   with call button in reach, nursing  notified, and nursing notified for pain meds pt requesting . Pt refusing much activity secondary to pain also refused cold pack to assist in decreasing pain . Pt positioned for comfort with use of wedge  ..    GOALS:   Multidisciplinary Problems       Physical Therapy Goals          Problem: Physical Therapy    Goal Priority Disciplines Outcome Goal Variances Interventions   Physical Therapy Goal     PT, PT/OT Progressing     Description: Goals to be met by: 24     Patient will increase functional independence with mobility by performin. Supine to sit with Set-up G[place]  2. Sit to supine with Set-up [place]  3. Sit to stand transfer with Stand-by Assistance  4. Bed to chair transfer with Stand-by Assistance using Rolling Walker  5. Gait  x 100 feet with Stand-by Assistance using Rolling Walker.                          Time Tracking:     PT Received On: 24  PT Start Time: 1330     PT Stop Time: 1345  PT Total Time (min): 15 min     Billable Minutes: Therapeutic Activity 15min    Treatment Type: Treatment  PT/PTA: PTA     Number of PTA visits since last PT visit: 2     2024

## 2024-07-16 NOTE — NURSING
Nurses Note -- 4 Eyes      7/15/24   7:10 pm      Skin assessed during: Q Shift Change      [] No Altered Skin Integrity Present    []Prevention Measures Documented      [x] Yes- Altered Skin Integrity Present or Discovered- surgical site   [] LDA Added if Not in Epic (Describe Wound)   [] New Altered Skin Integrity was Present on Admit and Documented in LDA   [] Wound Image Taken    Wound Care Consulted? No    Attending Nurse:  Laith Cloud RN/Staff Member:   Danny

## 2024-07-16 NOTE — CARE UPDATE
[id number] Spoke with pt's son, Aly re placement for his mother. Informed him that Dr Green recommend rehab, [facility] for his mother. He is OK for his mother to go to [facility]. If ally denies rehab, he would like her to go to [facility] since she has been there before. FOC obtained. Referral sent to [facility] thru care port

## 2024-07-17 LAB
BASOPHILS # BLD AUTO: 0.1 X10(3)/MCL
BASOPHILS NFR BLD AUTO: 1 %
EOSINOPHIL # BLD AUTO: 0.7 X10(3)/MCL (ref 0–0.9)
EOSINOPHIL NFR BLD AUTO: 7.2 %
ERYTHROCYTE [DISTWIDTH] IN BLOOD BY AUTOMATED COUNT: 15.4 % (ref 11.5–17)
HCT VFR BLD AUTO: 27.4 % (ref 37–47)
HGB BLD-MCNC: 8.4 G/DL (ref 12–16)
IMM GRANULOCYTES # BLD AUTO: 0.12 X10(3)/MCL (ref 0–0.04)
IMM GRANULOCYTES NFR BLD AUTO: 1.2 %
LYMPHOCYTES # BLD AUTO: 1.54 X10(3)/MCL (ref 0.6–4.6)
LYMPHOCYTES NFR BLD AUTO: 15.7 %
MCH RBC QN AUTO: 28.2 PG (ref 27–31)
MCHC RBC AUTO-ENTMCNC: 30.7 G/DL (ref 33–36)
MCV RBC AUTO: 91.9 FL (ref 80–94)
MONOCYTES # BLD AUTO: 0.91 X10(3)/MCL (ref 0.1–1.3)
MONOCYTES NFR BLD AUTO: 9.3 %
NEUTROPHILS # BLD AUTO: 6.42 X10(3)/MCL (ref 2.1–9.2)
NEUTROPHILS NFR BLD AUTO: 65.6 %
NRBC BLD AUTO-RTO: 0 %
PLATELET # BLD AUTO: 274 X10(3)/MCL (ref 130–400)
PMV BLD AUTO: 10.8 FL (ref 7.4–10.4)
RBC # BLD AUTO: 2.98 X10(6)/MCL (ref 4.2–5.4)
WBC # BLD AUTO: 9.79 X10(3)/MCL (ref 4.5–11.5)

## 2024-07-17 PROCEDURE — 25000003 PHARM REV CODE 250: Performed by: HOSPITALIST

## 2024-07-17 PROCEDURE — 97530 THERAPEUTIC ACTIVITIES: CPT | Mod: CQ

## 2024-07-17 PROCEDURE — 63600175 PHARM REV CODE 636 W HCPCS: Performed by: HOSPITALIST

## 2024-07-17 PROCEDURE — 94760 N-INVAS EAR/PLS OXIMETRY 1: CPT

## 2024-07-17 PROCEDURE — 97110 THERAPEUTIC EXERCISES: CPT | Mod: CQ

## 2024-07-17 PROCEDURE — 36415 COLL VENOUS BLD VENIPUNCTURE: CPT | Performed by: INTERNAL MEDICINE

## 2024-07-17 PROCEDURE — 85025 COMPLETE CBC W/AUTO DIFF WBC: CPT | Performed by: INTERNAL MEDICINE

## 2024-07-17 PROCEDURE — 27000221 HC OXYGEN, UP TO 24 HOURS

## 2024-07-17 PROCEDURE — A4216 STERILE WATER/SALINE, 10 ML: HCPCS | Performed by: HOSPITALIST

## 2024-07-17 PROCEDURE — 63600175 PHARM REV CODE 636 W HCPCS: Performed by: NURSE PRACTITIONER

## 2024-07-17 PROCEDURE — 11000001 HC ACUTE MED/SURG PRIVATE ROOM

## 2024-07-17 RX ADMIN — DOCUSATE SODIUM 100 MG: 100 CAPSULE, LIQUID FILLED ORAL at 05:07

## 2024-07-17 RX ADMIN — MORPHINE SULFATE 2 MG: 4 INJECTION INTRAVENOUS at 08:07

## 2024-07-17 RX ADMIN — SODIUM CHLORIDE, PRESERVATIVE FREE 10 ML: 5 INJECTION INTRAVENOUS at 12:07

## 2024-07-17 RX ADMIN — ENOXAPARIN SODIUM 30 MG: 30 INJECTION SUBCUTANEOUS at 04:07

## 2024-07-17 RX ADMIN — SERTRALINE HYDROCHLORIDE 100 MG: 50 TABLET ORAL at 09:07

## 2024-07-17 RX ADMIN — AMIODARONE HYDROCHLORIDE 200 MG: 200 TABLET ORAL at 08:07

## 2024-07-17 RX ADMIN — ONDANSETRON 4 MG: 2 INJECTION INTRAMUSCULAR; INTRAVENOUS at 09:07

## 2024-07-17 RX ADMIN — ATORVASTATIN CALCIUM 10 MG: 10 TABLET, FILM COATED ORAL at 08:07

## 2024-07-17 RX ADMIN — LEVOTHYROXINE SODIUM 25 MCG: 25 TABLET ORAL at 05:07

## 2024-07-17 RX ADMIN — HYDRALAZINE HYDROCHLORIDE 100 MG: 50 TABLET ORAL at 08:07

## 2024-07-17 RX ADMIN — LISINOPRIL 20 MG: 20 TABLET ORAL at 08:07

## 2024-07-17 RX ADMIN — TRAMADOL HYDROCHLORIDE 50 MG: 50 TABLET, COATED ORAL at 03:07

## 2024-07-17 RX ADMIN — MUPIROCIN: 20 OINTMENT TOPICAL at 09:07

## 2024-07-17 RX ADMIN — TRAMADOL HYDROCHLORIDE 50 MG: 50 TABLET, COATED ORAL at 10:07

## 2024-07-17 RX ADMIN — MONTELUKAST 10 MG: 10 TABLET, FILM COATED ORAL at 09:07

## 2024-07-17 RX ADMIN — MORPHINE SULFATE 2 MG: 4 INJECTION INTRAVENOUS at 02:07

## 2024-07-17 RX ADMIN — SODIUM CHLORIDE, PRESERVATIVE FREE 10 ML: 5 INJECTION INTRAVENOUS at 05:07

## 2024-07-17 RX ADMIN — TRAMADOL HYDROCHLORIDE 50 MG: 50 TABLET, COATED ORAL at 04:07

## 2024-07-17 RX ADMIN — HYDRALAZINE HYDROCHLORIDE 100 MG: 50 TABLET ORAL at 04:07

## 2024-07-17 RX ADMIN — SODIUM CHLORIDE, PRESERVATIVE FREE 10 ML: 5 INJECTION INTRAVENOUS at 06:07

## 2024-07-17 RX ADMIN — MUPIROCIN: 20 OINTMENT TOPICAL at 08:07

## 2024-07-17 RX ADMIN — SODIUM CHLORIDE, PRESERVATIVE FREE 10 ML: 5 INJECTION INTRAVENOUS at 11:07

## 2024-07-17 NOTE — PLAN OF CARE
PASSR completed and given to April SSC to call in [facility] and send referral to [facility]. I did speak to pt and update on above and she agreed on referral to [facility].

## 2024-07-17 NOTE — CARE UPDATE
[id number] Rec a text from Tsefany with [facility] reporting ally would like a peer to peer. When I sent the referral to [facility] I had asked Dr Green that if ally would want a peer to peer would he want to do it. He reported no since he has never won a peer to peer with ally. Informed Stefany with [facility] and [facility] CARLSON today.  I did inform Pasc[facility] that pt's son would like his mother to go to [facility] since she has been there before if insurance denies her rehab.

## 2024-07-17 NOTE — PT/OT/SLP PROGRESS
Attempted session this AM. Pt politely declining to participate 2/2 pain and just having a bath. Will follow up as schedule permits.

## 2024-07-17 NOTE — PT/OT/SLP PROGRESS
Physical Therapy Treatment    Patient Name:  Cierra Main   MRN:  [id number]    Recommendations:     Discharge Recommendations: Moderate Intensity Therapy  Discharge Equipment Recommendations: to be determined by next level of care  Barriers to discharge: Decreased caregiver support    Assessment:     Cierra Main is a 73 y.o. female admitted with a medical diagnosis of Closed 2-part intertrochanteric fracture of proximal femur, right, initial encounter.  She presents with the following impairments/functional limitations: weakness, impaired sensation, impaired endurance, impaired self care skills, impaired functional mobility, gait instability, impaired balance, decreased lower extremity function, decreased safety awareness, pain, decreased ROM, impaired coordination, impaired fine motor, edema, impaired muscle length, orthopedic precautions .    Rehab Prognosis: Fair; patient would benefit from acute skilled PT services to address these deficits and reach maximum level of function.    Recent Surgery: Procedure(s) (LRB):  INSERTION, INTRAMEDULLARY BRANDON, FEMUR (Right) 4 Days Post-Op    Plan:     During this hospitalization, patient to be seen 6 x/week to address the identified rehab impairments via gait training, therapeutic activities, therapeutic exercises, neuromuscular re-education and progress toward the following goals:    Plan of Care Expires:  08/14/24    Subjective     Chief Complaint: pain rtle   Patient/Family Comments/goals: to have less pain and move better  Pain/Comfort:  Pain Rating 1: 8/10  Location - Side 1: Right  Location 1: leg  Pain Addressed 1: Nurse notified (in with pain meds)      Objective:     Communicated with nursing  prior to session.  Patient found HOB elevated with peripheral IV, telemetry, oxygen, Other (comments) (pt in bed) upon PT entry to room.     General Precautions: Standard, fall  Orthopedic Precautions: RLE weight bearing as tolerated, LLE weight bearing as  tolerated  Braces: N/A  Respiratory Status: Nasal cannula, flow 2 L/min     Functional Mobility:  Bed Mobility:     Scooting: moderate assistance and much increased time slow movements secondary to pain   Supine to Sit: moderate assistance and increased time use of gt belt for self assist of rtle   Sit to Supine: moderate assistance and increased time assist with rtle and touching of lle for positioning into bed   Transfers:     Sit to Stand:  moderate assistance with rolling walker  Toilet Transfer: moderate assistance with  rolling walker  using  Stand Pivot and increased time bed<>bsc for + void pt with slow transition rtle wbat lle wbat   Treatment & Education:  Pt performed BLE exercises very slow movements with mod a with rtle slr one set 10reps with persuasion for 10 reps     Patient left HOB elevated with call button in reach and pt regina tx pt requires increased time with activities slow transition . Pt has difficulty at times with visual limitations requiring vc for objects. Pt would benefit from cont therapies to improve functional independence   ..    GOALS:   Multidisciplinary Problems       Physical Therapy Goals          Problem: Physical Therapy    Goal Priority Disciplines Outcome Goal Variances Interventions   Physical Therapy Goal     PT, PT/OT Progressing     Description: Goals to be met by: 24     Patient will increase functional independence with mobility by performin. Supine to sit with Set-up Frederick  2. Sit to supine with Set-up [place]  3. Sit to stand transfer with Stand-by Assistance  4. Bed to chair transfer with Stand-by Assistance using Rolling Walker  5. Gait  x 100 feet with Stand-by Assistance using Rolling Walker.                          Time Tracking:     PT Received On: 24  PT Start Time: 1405     PT Stop Time: 1435  PT Total Time (min): 30 min     Billable Minutes: Therapeutic Activity 15min and Therapeutic Exercise 15min    Treatment Type:  Treatment  PT/PTA: PTA     Number of PTA visits since last PT visit: 3     07/17/2024

## 2024-07-17 NOTE — PROGRESS NOTES
[facility]  Hospital Medicine Progress Note        Chief Complaint: Inpatient Follow-up for pelvic fracture and right IT fracture     HPI:   73-year-old female who he was transferred from [facility] secondary to pelvic ring fracture. Patient was had multiple falls previously. He was had significant pain as an outpatient. Orthopedics reviewed charts in may not be operable but will give us further recommendations this morning. On arrival she was continued pain but it was okay at rest. States that she has been significantly weak for some time. She reports good bowel movements but having difficulty urinating. Nursing at the bedside place a Osman catheter. Vital signs are stable. No significant issues on labs. She was currently NPO for orthopedic evaluation. Patient was taken for intramedullary nailing of the right femur 7/13.  Returned to the floor in stable condition.  She was doing well. Pain controlled. PT was started. CM working on placement.     Interval Hx:   Patient today awake and comfortable. Feeling tired today. Has some pain but controlled with po prn pain meds. Encouraged to participate with PT.     Case was discussed with patient's nurse and  on the floor.    Objective/physical exam:  General: In no acute distress, frail  Chest: Clear to auscultation bilaterally  Heart: RRR, +S1, S2, no appreciable murmur  Abdomen: Soft, nontender, BS +  Neurologic: Alert and oriented x4, Cranial nerve II-XII intact, Strength 5/5 in all 4 extremities    VITAL SIGNS: 24 HRS MIN & MAX LAST   Temp  Min: 97.8 °F (36.6 °C)  Max: 98.2 °F (36.8 °C) 98.1 °F (36.7 °C)   BP  Min: 94/53  Max: 153/65 (!) 153/65   Pulse  Min: 67  Max: 80  73   Resp  Min: 14  Max: 20 20   SpO2  Min: 78 %  Max: 98 % 98 %     I have reviewed the following labs:  Recent Labs   Lab 07/14/24  0400 07/14/24  1952 07/15/24  0339 07/17/24  0416   WBC 13.51*  --  10.44 9.79   RBC 3.64*  --  3.11* 2.98*   HGB 10.1* 9.0* 8.7*  8.4*   HCT 31.9* 29.6* 27.9* 27.4*   MCV 87.6  --  89.7 91.9   MCH 27.7  --  28.0 28.2   MCHC 31.7*  --  31.2* 30.7*   RDW 14.9  --  15.2 15.4     --  272 274   MPV 10.3  --  9.8 10.8*     Recent Labs   Lab 07/12/24  1604 07/13/24  0516 07/14/24  0401 07/15/24  0339    139 138 138   K 4.8 3.9 4.4 4.5    107 108* 108*   CO2 22* 20* 18* 24   BUN 28.0* 20.6* 30.7* 31.1*   CREATININE 0.93 0.76 1.11* 0.80   CALCIUM 10.1 9.4 8.7 8.4   ALBUMIN 3.7  --   --   --    ALKPHOS 127  --   --   --    ALT 57*  --   --   --    AST 60*  --   --   --    BILITOT 0.5  --   --   --      Microbiology Results (last 7 days)       ** No results found for the last 168 hours. **             See below for Radiology    Assessment/Plan:  Bilateral pelvic ring fracture  Right IT fracture s/p IM nail   Urinary retention: stable   Anemia NC/NC  Coronary artery disease  COPD, without exacerbation   Hypertension   Hypothyroidism   Hyperlipidemia   Depression    Plan:  Patient doing well. Pain meds helping  Encouraged to participate with PT  Continue PRN ultram.   Hb today 8.4. Vitals stable   Current meds reviewed     Continue strict aspiration, fall and decubitus precautions      Continue supportive care     VTE prophylaxis: Lovenox     Patient condition:  Fair    Anticipated discharge and Disposition:   SNF      All diagnosis and differential diagnosis have been reviewed; assessment and plan has been documented; I have personally reviewed the labs and test results that are presently available; I have reviewed the patients medication list; I have reviewed the consulting providers response and recommendations. I have reviewed or attempted to review medical records based upon their availability    All of the patient's questions have been  addressed and answered. Patient's is agreeable to the above stated plan. I will continue to monitor closely and make adjustments to medical management as needed.    Portions of this note dictated  using EMR integrated voice recognition software, and may be subject to voice recognition errors not corrected at proofreading. Please contact writer for clarification if needed.   _____________________________________________________________________    Malnutrition Status:    Scheduled Med:   amiodarone  200 mg Oral BID    atorvastatin  10 mg Oral QHS    enoxparin  30 mg Subcutaneous Q24H (prophylaxis, 1700)    hydrALAZINE  100 mg Oral TID    levothyroxine  25 mcg Oral Before breakfast    lisinopriL  20 mg Oral BID    metoprolol succinate  25 mg Oral Daily    montelukast  10 mg Oral Daily    mupirocin   Nasal BID    sertraline  100 mg Oral Daily    sodium chloride 0.9%  10 mL Intravenous Q6H      Continuous Infusions:     PRN Meds:    Current Facility-Administered Medications:     ceFAZolin 2 g/50mL Dextrose IVPB, 2 g, Intravenous, On Call Procedure    docusate sodium, 100 mg, Oral, BID PRN    hydrALAZINE, 10 mg, Intravenous, Q4H PRN    morphine, 2 mg, Intravenous, Q4H PRN    ondansetron, 4 mg, Intravenous, Q6H PRN    prochlorperazine, 5 mg, Intravenous, Q6H PRN    Flushing PICC/Midline Protocol, , , Until Discontinued **AND** sodium chloride 0.9%, 10 mL, Intravenous, Q6H **AND** sodium chloride 0.9%, 10 mL, Intravenous, PRN    traMADoL, 50 mg, Oral, Q6H PRN     Radiology:  I have personally reviewed the following imaging and agree with the radiologist.     X-Ray Femur 2 View Right  Narrative: EXAMINATION:  XR FEMUR 2 VIEW RIGHT    CLINICAL HISTORY:  psot op;    TECHNIQUE:  Two views of the right femur    COMPARISON:  Radiography 07/08/2024    FINDINGS:  Internal fixation of the proximal femur.  Femoral alignment is anatomic.  Impression: Right femur fixation.    Electronically signed by: Mejia Patel  Date:    07/14/2024  Time:    09:22      Stanley Yuen MD  Department of Hospital Medicine   [facility]   07/17/2024

## 2024-07-17 NOTE — NURSING
Nurses Note -- 4 Eyes      7/17/2024   0645      Skin assessed during: Q Shift Change      [x] No Altered Skin Integrity Present    []Prevention Measures Documented    Bruising near surgical incision documented      [] Yes- Altered Skin Integrity Present or Discovered   [] LDA Added if Not in Epic (Describe Wound)   [] New Altered Skin Integrity was Present on Admit and Documented in LDA   [] Wound Image Taken    Wound Care Consulted? No    Attending Nurse:  Estelle Cloud RN/Staff Member:   Laith

## 2024-07-18 LAB
BASOPHILS # BLD AUTO: 0.1 X10(3)/MCL
BASOPHILS NFR BLD AUTO: 1 %
EOSINOPHIL # BLD AUTO: 0.51 X10(3)/MCL (ref 0–0.9)
EOSINOPHIL NFR BLD AUTO: 5.1 %
ERYTHROCYTE [DISTWIDTH] IN BLOOD BY AUTOMATED COUNT: 15.3 % (ref 11.5–17)
HCT VFR BLD AUTO: 28.1 % (ref 37–47)
HGB BLD-MCNC: 8.6 G/DL (ref 12–16)
IMM GRANULOCYTES # BLD AUTO: 0.21 X10(3)/MCL (ref 0–0.04)
IMM GRANULOCYTES NFR BLD AUTO: 2.1 %
LYMPHOCYTES # BLD AUTO: 1.37 X10(3)/MCL (ref 0.6–4.6)
LYMPHOCYTES NFR BLD AUTO: 13.8 %
MCH RBC QN AUTO: 27.9 PG (ref 27–31)
MCHC RBC AUTO-ENTMCNC: 30.6 G/DL (ref 33–36)
MCV RBC AUTO: 91.2 FL (ref 80–94)
MONOCYTES # BLD AUTO: 0.86 X10(3)/MCL (ref 0.1–1.3)
MONOCYTES NFR BLD AUTO: 8.7 %
NEUTROPHILS # BLD AUTO: 6.87 X10(3)/MCL (ref 2.1–9.2)
NEUTROPHILS NFR BLD AUTO: 69.3 %
NRBC BLD AUTO-RTO: 0 %
PLATELET # BLD AUTO: 352 X10(3)/MCL (ref 130–400)
PMV BLD AUTO: 9.9 FL (ref 7.4–10.4)
RBC # BLD AUTO: 3.08 X10(6)/MCL (ref 4.2–5.4)
WBC # BLD AUTO: 9.92 X10(3)/MCL (ref 4.5–11.5)

## 2024-07-18 PROCEDURE — 85025 COMPLETE CBC W/AUTO DIFF WBC: CPT | Performed by: INTERNAL MEDICINE

## 2024-07-18 PROCEDURE — 25000003 PHARM REV CODE 250: Performed by: INTERNAL MEDICINE

## 2024-07-18 PROCEDURE — 97116 GAIT TRAINING THERAPY: CPT | Mod: CQ

## 2024-07-18 PROCEDURE — 63600175 PHARM REV CODE 636 W HCPCS: Performed by: HOSPITALIST

## 2024-07-18 PROCEDURE — A4216 STERILE WATER/SALINE, 10 ML: HCPCS | Performed by: HOSPITALIST

## 2024-07-18 PROCEDURE — 97535 SELF CARE MNGMENT TRAINING: CPT | Mod: CO

## 2024-07-18 PROCEDURE — 11000001 HC ACUTE MED/SURG PRIVATE ROOM

## 2024-07-18 PROCEDURE — 25000003 PHARM REV CODE 250: Performed by: HOSPITALIST

## 2024-07-18 PROCEDURE — 36415 COLL VENOUS BLD VENIPUNCTURE: CPT | Performed by: INTERNAL MEDICINE

## 2024-07-18 RX ORDER — METOPROLOL SUCCINATE 50 MG/1
50 TABLET, EXTENDED RELEASE ORAL DAILY
Status: DISCONTINUED | OUTPATIENT
Start: 2024-07-19 | End: 2024-07-22 | Stop reason: HOSPADM

## 2024-07-18 RX ORDER — BENZONATATE 100 MG/1
100 CAPSULE ORAL 3 TIMES DAILY PRN
Status: DISCONTINUED | OUTPATIENT
Start: 2024-07-18 | End: 2024-07-22 | Stop reason: HOSPADM

## 2024-07-18 RX ORDER — ZOLPIDEM TARTRATE 5 MG/1
5 TABLET ORAL NIGHTLY PRN
Status: DISCONTINUED | OUTPATIENT
Start: 2024-07-18 | End: 2024-07-19

## 2024-07-18 RX ORDER — HYDROCODONE BITARTRATE AND ACETAMINOPHEN 5; 325 MG/1; MG/1
1 TABLET ORAL EVERY 6 HOURS PRN
Status: DISCONTINUED | OUTPATIENT
Start: 2024-07-18 | End: 2024-07-22 | Stop reason: HOSPADM

## 2024-07-18 RX ADMIN — SODIUM CHLORIDE, PRESERVATIVE FREE 10 ML: 5 INJECTION INTRAVENOUS at 11:07

## 2024-07-18 RX ADMIN — TRAMADOL HYDROCHLORIDE 50 MG: 50 TABLET, COATED ORAL at 12:07

## 2024-07-18 RX ADMIN — SERTRALINE HYDROCHLORIDE 100 MG: 50 TABLET ORAL at 10:07

## 2024-07-18 RX ADMIN — SODIUM CHLORIDE, PRESERVATIVE FREE 10 ML: 5 INJECTION INTRAVENOUS at 06:07

## 2024-07-18 RX ADMIN — HYDROCODONE BITARTRATE AND ACETAMINOPHEN 1 TABLET: 5; 325 TABLET ORAL at 03:07

## 2024-07-18 RX ADMIN — ATORVASTATIN CALCIUM 10 MG: 10 TABLET, FILM COATED ORAL at 08:07

## 2024-07-18 RX ADMIN — LEVOTHYROXINE SODIUM 25 MCG: 25 TABLET ORAL at 06:07

## 2024-07-18 RX ADMIN — TRAMADOL HYDROCHLORIDE 50 MG: 50 TABLET, COATED ORAL at 10:07

## 2024-07-18 RX ADMIN — AMIODARONE HYDROCHLORIDE 200 MG: 200 TABLET ORAL at 08:07

## 2024-07-18 RX ADMIN — TRAMADOL HYDROCHLORIDE 50 MG: 50 TABLET, COATED ORAL at 06:07

## 2024-07-18 RX ADMIN — LISINOPRIL 20 MG: 20 TABLET ORAL at 08:07

## 2024-07-18 RX ADMIN — DOCUSATE SODIUM 100 MG: 100 CAPSULE, LIQUID FILLED ORAL at 11:07

## 2024-07-18 RX ADMIN — MONTELUKAST 10 MG: 10 TABLET, FILM COATED ORAL at 10:07

## 2024-07-18 RX ADMIN — ENOXAPARIN SODIUM 30 MG: 30 INJECTION SUBCUTANEOUS at 05:07

## 2024-07-18 RX ADMIN — HYDROCODONE BITARTRATE AND ACETAMINOPHEN 1 TABLET: 5; 325 TABLET ORAL at 11:07

## 2024-07-18 RX ADMIN — MUPIROCIN: 20 OINTMENT TOPICAL at 10:07

## 2024-07-18 RX ADMIN — SODIUM CHLORIDE, PRESERVATIVE FREE 10 ML: 5 INJECTION INTRAVENOUS at 05:07

## 2024-07-18 RX ADMIN — HYDRALAZINE HYDROCHLORIDE 100 MG: 50 TABLET ORAL at 08:07

## 2024-07-18 RX ADMIN — BENZONATATE 100 MG: 100 CAPSULE ORAL at 11:07

## 2024-07-18 RX ADMIN — MUPIROCIN: 20 OINTMENT TOPICAL at 08:07

## 2024-07-18 NOTE — PLAN OF CARE
[facility] sent referral and clinicals to [facility] via [facility]. Stefany with [facility] came see the patient yesterday and she was clinically accepted.      [facility] will submit to [facility] this morning. Locet process was complete and we're awaiting PASSAR/142 and auth appvl.

## 2024-07-18 NOTE — NURSING
Nurses Note -- 4 Eyes      7/18/2024   0645      Skin assessed during: Q Shift Change      [x] No Altered Skin Integrity Present    []Prevention Measures Documented    Bruising on hips from fall    [] Yes- Altered Skin Integrity Present or Discovered   [] LDA Added if Not in Epic (Describe Wound)   [] New Altered Skin Integrity was Present on Admit and Documented in LDA   [] Wound Image Taken    Wound Care Consulted? No    Attending Nurse:  Estelle Cloud RN/Staff Member:   Yrn

## 2024-07-18 NOTE — NURSING
Nurses Note -- 4 Eyes      7/17/2024   9:25 PM      Skin assessed during: Q Shift Change      [x] No Altered Skin Integrity Present    []Prevention Measures Documented      [] Yes- Altered Skin Integrity Present or Discovered   [] LDA Added if Not in Epic (Describe Wound)   [] New Altered Skin Integrity was Present on Admit and Documented in LDA   [] Wound Image Taken    Wound Care Consulted? No    Attending Nurse:  Nubia Cloud RN/Staff Member:   Estelle

## 2024-07-18 NOTE — PLAN OF CARE
Called [facility], spoke to Dawson to call in Psych eval d/t level 2 trigger for snf placement.

## 2024-07-18 NOTE — PROGRESS NOTES
[place]  Hospital Medicine Progress Note        Chief Complaint: Inpatient Follow-up for pelvic fracture and right IT fracture     HPI:   73-year-old female who he was transferred from [facility] secondary to pelvic ring fracture. Patient was had multiple falls previously. He was had significant pain as an outpatient. Orthopedics reviewed charts in may not be operable but will give us further recommendations this morning. On arrival she was continued pain but it was okay at rest. States that she has been significantly weak for some time. She reports good bowel movements but having difficulty urinating. Nursing at the bedside place a Osman catheter. Vital signs are stable. No significant issues on labs. She was currently NPO for orthopedic evaluation. Patient was taken for intramedullary nailing of the right femur 7/13.  Returned to the floor in stable condition.  She was doing well. Pain controlled. PT was started. CM working on placement.     Interval Hx:   Patient today awake and comfortable. Did not sleep well. Pain controlled. Encouraged to ambulate as tolerated.     Case was discussed with patient's nurse and  on the floor.    Objective/physical exam:  General: In no acute distress, frail  Chest: Clear to auscultation bilaterally  Heart: RRR, +S1, S2, no appreciable murmur  Abdomen: Soft, nontender, BS +  Neurologic: Alert and oriented x4, Cranial nerve II-XII intact, Strength 5/5 in all 4 extremities    VITAL SIGNS: 24 HRS MIN & MAX LAST   Temp  Min: 98 °F (36.7 °C)  Max: 98.2 °F (36.8 °C) 98.2 °F (36.8 °C)   BP  Min: 90/47  Max: 167/64 (!) 90/47   Pulse  Min: 69  Max: 85  75   Resp  Min: 16  Max: 20 16   SpO2  Min: 90 %  Max: 96 % (!) 90 %     I have reviewed the following labs:  Recent Labs   Lab 07/15/24  0339 07/17/24  0416 07/18/24  0357   WBC 10.44 9.79 9.92   RBC 3.11* 2.98* 3.08*   HGB 8.7* 8.4* 8.6*   HCT 27.9* 27.4* 28.1*   MCV 89.7 91.9 91.2   MCH 28.0 28.2  27.9   MCHC 31.2* 30.7* 30.6*   RDW 15.2 15.4 15.3    274 352   MPV 9.8 10.8* 9.9     Recent Labs   Lab 07/12/24  1604 07/13/24  0516 07/14/24  0401 07/15/24  0339    139 138 138   K 4.8 3.9 4.4 4.5    107 108* 108*   CO2 22* 20* 18* 24   BUN 28.0* 20.6* 30.7* 31.1*   CREATININE 0.93 0.76 1.11* 0.80   CALCIUM 10.1 9.4 8.7 8.4   ALBUMIN 3.7  --   --   --    ALKPHOS 127  --   --   --    ALT 57*  --   --   --    AST 60*  --   --   --    BILITOT 0.5  --   --   --      Microbiology Results (last 7 days)       ** No results found for the last 168 hours. **             See below for Radiology    Assessment/Plan:  Bilateral pelvic ring fracture  Right IT fracture s/p IM nail   Urinary retention: stable   Anemia NC/NC  Coronary artery disease  COPD, without exacerbation   Hypertension   Hypothyroidism   Hyperlipidemia   Depression    Plan:  Patient looks comfortable. Needing pain meds often.   Encouraged to participate with PT  Continue PRN ultram.   Hb today 8.6 Vitals stable   Current meds reviewed     Added ambien 5 mg at bedtime     Continue strict aspiration, fall and decubitus precautions      Continue supportive care     VTE prophylaxis: Lovenox     Patient condition:  Fair    Anticipated discharge and Disposition:   SNF      All diagnosis and differential diagnosis have been reviewed; assessment and plan has been documented; I have personally reviewed the labs and test results that are presently available; I have reviewed the patients medication list; I have reviewed the consulting providers response and recommendations. I have reviewed or attempted to review medical records based upon their availability    All of the patient's questions have been  addressed and answered. Patient's is agreeable to the above stated plan. I will continue to monitor closely and make adjustments to medical management as needed.    Portions of this note dictated using EMR integrated voice recognition software, and may  be subject to voice recognition errors not corrected at proofreading. Please contact writer for clarification if needed.   _____________________________________________________________________    Malnutrition Status:    Scheduled Med:   amiodarone  200 mg Oral BID    atorvastatin  10 mg Oral QHS    enoxparin  30 mg Subcutaneous Q24H (prophylaxis, 1700)    hydrALAZINE  100 mg Oral TID    levothyroxine  25 mcg Oral Before breakfast    lisinopriL  20 mg Oral BID    [START ON 7/19/2024] metoprolol succinate  50 mg Oral Daily    montelukast  10 mg Oral Daily    mupirocin   Nasal BID    sertraline  100 mg Oral Daily    sodium chloride 0.9%  10 mL Intravenous Q6H      Continuous Infusions:     PRN Meds:    Current Facility-Administered Medications:     ceFAZolin 2 g/50mL Dextrose IVPB, 2 g, Intravenous, On Call Procedure    docusate sodium, 100 mg, Oral, BID PRN    morphine, 2 mg, Intravenous, Q4H PRN    ondansetron, 4 mg, Intravenous, Q6H PRN    prochlorperazine, 5 mg, Intravenous, Q6H PRN    Flushing PICC/Midline Protocol, , , Until Discontinued **AND** sodium chloride 0.9%, 10 mL, Intravenous, Q6H **AND** sodium chloride 0.9%, 10 mL, Intravenous, PRN    traMADoL, 50 mg, Oral, Q6H PRN     Radiology:  I have personally reviewed the following imaging and agree with the radiologist.     X-Ray Chest 1 View  Narrative: EXAMINATION:  XR CHEST 1 VIEW    CLINICAL HISTORY:  snf placement;    TECHNIQUE:  Frontal view(s) of the chest.    COMPARISON:  Radiography 01/20/2024    FINDINGS:  Generalized interstitial prominence and small pleural effusions versus pleural scarring similar to January exam.  No pneumothorax appreciated.  Stable cardiac silhouette.  Impression: No acute pulmonary process identified.    Electronically signed by: Mejia Patel  Date:    07/17/2024  Time:    16:24      Stanley Yuen MD  Department of Hospital Medicine   [facility]   07/18/2024

## 2024-07-18 NOTE — PROGRESS NOTES
Inpatient Nutrition Assessment    Admit Date: 7/12/2024   Total duration of encounter: 6 days   Patient Age: 73 y.o.    Nutrition Recommendation/Prescription     Continue Regular diet as tolerated.  Add Boost VHC daily as tolerated (provides 530 kcal and 22 g pro per serving).  Consider MVI or Fe supplementation.  Monitor wt, labs, and po intake.    Communication of Recommendations: reviewed with patient    Nutrition Assessment     Malnutrition Assessment/Nutrition-Focused Physical Exam    Malnutrition Context: chronic illness (07/18/24 1253)  Malnutrition Level: moderate (07/18/24 1253)  Energy Intake (Malnutrition):  (Does not meet criteria) (07/18/24 1253)  Weight Loss (Malnutrition):  (Does not meet criteria) (07/18/24 1253)  Subcutaneous Fat (Malnutrition): moderate depletion (07/18/24 1253)           Muscle Mass (Malnutrition): moderate depletion (07/18/24 1253)                          Fluid Accumulation (Malnutrition):  (Does not meet criteria) (07/18/24 1253)  Hand  Strength, Left (Malnutrition):  (Does not meet criteria) (07/18/24 1253)  Hand  Strength, Right (Malnutrition):  (Does not meet criteria) (07/18/24 1253)  A minimum of two characteristics is recommended for diagnosis of either severe or non-severe malnutrition.    Chart Review    Reason Seen: length of stay    Malnutrition Screening Tool Results   Have you recently lost weight without trying?: No  Have you been eating poorly because of a decreased appetite?: No   MST Score: 0   Diagnosis:  Bilateral pelvic ring fracture  Right IT fracture s/p IM nail   Urinary retention: stable   Anemia NC/NC  Coronary artery disease  COPD, without exacerbation   Hypertension   Hypothyroidism   Hyperlipidemia   Depression    Relevant Medical History: n/a    Scheduled Medications:  amiodarone, 200 mg, BID  atorvastatin, 10 mg, QHS  enoxparin, 30 mg, Q24H (prophylaxis, 1700)  hydrALAZINE, 100 mg, TID  levothyroxine, 25 mcg, Before breakfast  lisinopriL, 20  mg, BID  [START ON 7/19/2024] metoprolol succinate, 50 mg, Daily  montelukast, 10 mg, Daily  mupirocin, , BID  sertraline, 100 mg, Daily  sodium chloride 0.9%, 10 mL, Q6H    Continuous Infusions:   PRN Medications:  benzonatate, 100 mg, TID PRN  ceFAZolin 2 g/50mL Dextrose IVPB, 2 g, On Call Procedure  docusate sodium, 100 mg, BID PRN  HYDROcodone-acetaminophen, 1 tablet, Q6H PRN  ondansetron, 4 mg, Q6H PRN  prochlorperazine, 5 mg, Q6H PRN  sodium chloride 0.9%, 10 mL, PRN  zolpidem, 5 mg, Nightly PRN    Calorie Containing IV Medications: no significant kcals from medications at this time    Recent Labs   Lab 07/12/24  1604 07/13/24  0516 07/14/24  0400 07/14/24  0401 07/14/24  1952 07/15/24  0339 07/17/24  0416 07/18/24  0357    139  --  138  --  138  --   --    K 4.8 3.9  --  4.4  --  4.5  --   --    CALCIUM 10.1 9.4  --  8.7  --  8.4  --   --    CO2 22* 20*  --  18*  --  24  --   --    BUN 28.0* 20.6*  --  30.7*  --  31.1*  --   --    CREATININE 0.93 0.76  --  1.11*  --  0.80  --   --    EGFRNORACEVR >60 >60  --  52  --  >60  --   --    GLUCOSE 102 97  --  142*  --  130*  --   --    BILITOT 0.5  --   --   --   --   --   --   --    ALKPHOS 127  --   --   --   --   --   --   --    ALT 57*  --   --   --   --   --   --   --    AST 60*  --   --   --   --   --   --   --    ALBUMIN 3.7  --   --   --   --   --   --   --    WBC 8.78 11.25 13.51*  --   --  10.44 9.79 9.92   HGB 14.1 13.9 10.1*  --  9.0* 8.7* 8.4* 8.6*   HCT 45.0 43.5 31.9*  --  29.6* 27.9* 27.4* 28.1*     Nutrition Orders:  Diet Adult Regular      Appetite/Oral Intake: fair/50-75% of meals  Factors Affecting Nutritional Intake: decreased appetite and nausea  Social Needs Impacting Access to Food: none identified  Food/Alevism/Cultural Preferences: none reported  Food Allergies: no known food allergies  Last Bowel Movement: 07/11/24  Wound(s):  n/a    Comments    7/18/24: PT reports fair appetite. States she has been trying to make herself eat as  "much as she can while here and enjoys the food, but just is not that hungry. Pt denies issues c/s, states she is having some nausea from the pain meds. Pt denied wt loss. Pt is moderately malnourished. Will add Boost VHC-pt requested she only receive ONS 1x/day.    Anthropometrics    Height: 5' 3" (160 cm),    Last Weight: 38.6 kg (85 lb 1.6 oz) (24 0837), Weight Method: Standard Scale  BMI (Calculated): 15.1  BMI Classification: underweight (BMI less than 18.5)     Ideal Body Weight (IBW), Female: 115 lb     % Ideal Body Weight, Female (lb): 74 %                    Usual Body Weight (UBW), k.45 kg  % Usual Body Weight: 95.63     Usual Weight Provided By: patient    Wt Readings from Last 5 Encounters:   24 38.6 kg (85 lb 1.6 oz)   24 38.6 kg (85 lb)   24 40.5 kg (89 lb 4.6 oz)   24 39 kg (86 lb)   24 38.6 kg (85 lb)     Weight Change(s) Since Admission: No change.  Wt Readings from Last 1 Encounters:   24 0837 38.6 kg (85 lb 1.6 oz)   24 2340 38.6 kg (85 lb)   Admit Weight: 38.6 kg (85 lb) (24 2340), Weight Method: Standard Scale    Estimated Needs    Weight Used For Calorie Calculations: 38.6 kg (85 lb)  Energy Calorie Requirements (kcal): 1526-0274 kcal (35-40 kcal/kg)  Energy Need Method: Kcal/kg  Weight Used For Protein Calculations: 38.6 kg (85 lb)  Protein Requirements: 46-57 g (1.2-1.5 g/kg)  Fluid Requirements (mL): 1349 mL        Enteral Nutrition     Patient not receiving enteral nutrition at this time.    Parenteral Nutrition     Patient not receiving parenteral nutrition support at this time.    Evaluation of Received Nutrient Intake    Calories: not meeting estimated needs  Protein: not meeting estimated needs    Patient Education     Not applicable.    Nutrition Diagnosis       PES: Moderate chronic disease or condition related malnutrition related to chronic illness as evidenced by moderate fat depletion and moderate muscle depletion. " (new)    Nutrition Interventions     Intervention(s): general/healthful diet, commercial beverage, multivitamin/mineral supplement therapy, and collaboration with other providers    Goal: Meet greater than 80% of nutritional needs by follow-up. (new)  Goal: Maintain weight throughout hospitalization. (new)    Nutrition Goals & Monitoring     Dietitian will monitor: food and beverage intake, energy intake, weight, and weight change  Discharge planning: resume home regimen  Nutrition Risk/Follow-Up: moderate (follow-up in 3-5 days)   Please consult if re-assessment needed sooner.

## 2024-07-18 NOTE — PT/OT/SLP PROGRESS
Occupational Therapy   Treatment    Name: Cierra Main  MRN: [id number]    Recommendations:     Recommended therapy intensity at discharge: Moderate Intensity Therapy   Discharge Equipment Recommendations:  to be determined by next level of care  Barriers to discharge:       Assessment:     Cierra Main is a 73 y.o. female with a medical diagnosis of Closed 2-part intertrochanteric fracture of proximal femur, right, initial encounter.  She presents with increased motivation to particpate in therapy . Performance deficits affecting function are weakness, impaired endurance, impaired self care skills, impaired functional mobility, gait instability, impaired balance, pain, decreased safety awareness, decreased lower extremity function, visual deficits. Pt c/o increased R leg pain.    Rehab Prognosis:  Good; patient would benefit from acute skilled OT services to address these deficits and reach maximum level of function.       Plan:     Patient to be seen 5 x/week to address the above listed problems via self-care/home management, therapeutic activities, therapeutic exercises  Plan of Care Expires: 08/15/24  Plan of Care Reviewed with: patient    Subjective     Pain/Comfort:  Location - Side 1: Right  Location 1: leg  Pain Addressed 1: Reposition, Distraction    Objective:     Communicated with: RN prior to session.  Patient found up in chair with peripheral IV, telemetry, pulse ox (continuous) upon OT entry to room.    General Precautions: Standard, fall, vision impaired    Orthopedic Precautions:LLE weight bearing as tolerated, RLE weight bearing as tolerated  Braces: N/A  Respiratory Status: Nasal cannula, flow 1 L/min  Vital Signs: Blood Pressure: 147/79     Occupational Performance:     Bed Mobility:    Patient completed Scooting/Bridging with stand by assistance   Patient completed Sit to Supine with moderate assistance     Functional Mobility/Transfers:  Patient completed Sit <> Stand Transfer with minimum  assistance  with  rolling walker   Patient completed Bed <> Chair Transfer using Step Transfer technique with moderate assistance with rolling walker  Patient completed Toilet Transfer Step Transfer technique with moderate assistance with  hand-held assist    Activities of Daily Living:  Feeding:  set-up A for management of packages/containers 2/2 visual impairment  Toileting: pt able to complete pericare+void seated on BSC with SBA     Therapeutic Positioning    OT interventions performed during the course of today's session in an effort to prevent and/or reduce acquired pressure injuries:   Education was provided on benefits of and recommendations for therapeutic positioning    [facility] 6 Click ADL:      Patient Education:  Patient provided with verbal education education regarding OT role/goals/POC and fall prevention.  Understanding was verbalized.      Patient left supine with all lines intact and call button in reach.    GOALS:   Multidisciplinary Problems       Occupational Therapy Goals          Problem: Occupational Therapy    Goal Priority Disciplines Outcome Interventions   Occupational Therapy Goal     OT, PT/OT Progressing    Description: Goals to be met by: 8/15/24     Patient will increase functional independence with ADLs by performing:    UE Dressing with Modified [place].  LE Dressing with Modified [place].  Grooming while standing at sink with Modified [place].  Toileting from toilet with Modified [place] for hygiene and clothing management.   Toilet transfer to toilet with Modified [place].                         Time Tracking:     OT Date of Treatment: 07/18/24  OT Start Time: 1112  OT Stop Time: 1135  OT Total Time (min): 23 min    Billable Minutes:Self Care/Home Management 23    OT/MARY: MARY     Number of MARY visits since last OT visit: 2    7/18/2024

## 2024-07-18 NOTE — PT/OT/SLP PROGRESS
Physical Therapy Treatment    Patient Name:  Cierra Main   MRN:  [id number]    Recommendations:     Discharge therapy intensity: Moderate Intensity Therapy   Discharge Equipment Recommendations: to be determined by next level of care  Barriers to discharge: Decreased caregiver support and Impaired mobility    Assessment:     Cierra Main is a 73 y.o. female.  She presents with the following impairments/functional limitations: weakness, impaired sensation, impaired endurance, impaired self care skills, impaired functional mobility, gait instability, impaired balance, decreased lower extremity function, decreased safety awareness, pain, decreased ROM, impaired coordination, impaired fine motor, edema, impaired muscle length, orthopedic precautions.    Rehab Prognosis: Good; patient would benefit from acute skilled PT services to address these deficits and reach maximum level of function.    Recent Surgery: Procedure(s) (LRB):  INSERTION, INTRAMEDULLARY BRANDON, FEMUR (Right) 5 Days Post-Op    Plan:     During this hospitalization, patient would benefit from acute PT services 6 x/week to address the identified rehab impairments via gait training, therapeutic activities, therapeutic exercises, neuromuscular re-education and progress toward the following goals:    Plan of Care Expires:  08/14/24    Subjective     Chief Complaint: pain and meds were given prior to treatment    Objective:     Communicated with nurse prior to session.  Patient found supine with peripheral IV, telemetry, oxygen, Other (comments) (pt in bed) upon PT entry to room.     General Precautions: Standard, fall  Orthopedic Precautions: RLE weight bearing as tolerated, LLE weight bearing as tolerated  Braces: N/A  Respiratory Status: Nasal cannula, flow 2 L/min  Blood Pressure:   Skin Integrity: Visible skin intact      Functional Mobility:  Min assist to manage RLE OOB. Min assist STS and transfers  Min assist to BSC to void then gait 10 ft, 15 ft  RW mod assist. Pt need some persuasion to ambulate and mobilize.    Education Provided:  Role and goals of PT, transfer training, bed mobility, gait training, balance training, safety awareness, assistive device, strengthening exercises, and importance of participating in PT to return to PLOF.     Patient left up in chair with all lines intact and call button in reach    GOALS:   Multidisciplinary Problems       Physical Therapy Goals          Problem: Physical Therapy    Goal Priority Disciplines Outcome Goal Variances Interventions   Physical Therapy Goal     PT, PT/OT Progressing     Description: Goals to be met by: 24     Patient will increase functional independence with mobility by performin. Supine to sit with Set-up [place]  2. Sit to supine with Set-up [place]  3. Sit to stand transfer with Stand-by Assistance  4. Bed to chair transfer with Stand-by Assistance using Rolling Walker  5. Gait  x 100 feet with Stand-by Assistance using Rolling Walker.                          Time Tracking:       Billable Minutes: Gait Training 25    Treatment Type: Treatment  PT/PTA: PTA     Number of PTA visits since last PT visit: 4     2024

## 2024-07-19 LAB
ALBUMIN SERPL-MCNC: 2.5 G/DL (ref 3.4–4.8)
ALBUMIN/GLOB SERPL: 0.8 RATIO (ref 1.1–2)
ALP SERPL-CCNC: 176 UNIT/L (ref 40–150)
ALT SERPL-CCNC: 47 UNIT/L (ref 0–55)
ANION GAP SERPL CALC-SCNC: 8 MEQ/L
AST SERPL-CCNC: 59 UNIT/L (ref 5–34)
BASOPHILS # BLD AUTO: 0.11 X10(3)/MCL
BASOPHILS NFR BLD AUTO: 0.9 %
BILIRUB SERPL-MCNC: 1 MG/DL
BUN SERPL-MCNC: 16.9 MG/DL (ref 9.8–20.1)
CALCIUM SERPL-MCNC: 8.7 MG/DL (ref 8.4–10.2)
CHLORIDE SERPL-SCNC: 105 MMOL/L (ref 98–107)
CO2 SERPL-SCNC: 23 MMOL/L (ref 23–31)
CREAT SERPL-MCNC: 0.6 MG/DL (ref 0.55–1.02)
CREAT/UREA NIT SERPL: 28
EOSINOPHIL # BLD AUTO: 0.37 X10(3)/MCL (ref 0–0.9)
EOSINOPHIL NFR BLD AUTO: 3 %
ERYTHROCYTE [DISTWIDTH] IN BLOOD BY AUTOMATED COUNT: 15.5 % (ref 11.5–17)
GFR SERPLBLD CREATININE-BSD FMLA CKD-EPI: >60 ML/MIN/1.73/M2
GLOBULIN SER-MCNC: 3 GM/DL (ref 2.4–3.5)
GLUCOSE SERPL-MCNC: 86 MG/DL (ref 82–115)
HCT VFR BLD AUTO: 30.6 % (ref 37–47)
HGB BLD-MCNC: 9.3 G/DL (ref 12–16)
IMM GRANULOCYTES # BLD AUTO: 0.3 X10(3)/MCL (ref 0–0.04)
IMM GRANULOCYTES NFR BLD AUTO: 2.4 %
LYMPHOCYTES # BLD AUTO: 1.8 X10(3)/MCL (ref 0.6–4.6)
LYMPHOCYTES NFR BLD AUTO: 14.5 %
MCH RBC QN AUTO: 28.1 PG (ref 27–31)
MCHC RBC AUTO-ENTMCNC: 30.4 G/DL (ref 33–36)
MCV RBC AUTO: 92.4 FL (ref 80–94)
MONOCYTES # BLD AUTO: 1.04 X10(3)/MCL (ref 0.1–1.3)
MONOCYTES NFR BLD AUTO: 8.4 %
NEUTROPHILS # BLD AUTO: 8.78 X10(3)/MCL (ref 2.1–9.2)
NEUTROPHILS NFR BLD AUTO: 70.8 %
NRBC BLD AUTO-RTO: 0 %
PLATELET # BLD AUTO: 379 X10(3)/MCL (ref 130–400)
PMV BLD AUTO: 9.7 FL (ref 7.4–10.4)
POTASSIUM SERPL-SCNC: 4 MMOL/L (ref 3.5–5.1)
PROT SERPL-MCNC: 5.5 GM/DL (ref 5.8–7.6)
RBC # BLD AUTO: 3.31 X10(6)/MCL (ref 4.2–5.4)
SODIUM SERPL-SCNC: 136 MMOL/L (ref 136–145)
WBC # BLD AUTO: 12.4 X10(3)/MCL (ref 4.5–11.5)

## 2024-07-19 PROCEDURE — 25000003 PHARM REV CODE 250: Performed by: HOSPITALIST

## 2024-07-19 PROCEDURE — 97535 SELF CARE MNGMENT TRAINING: CPT | Mod: CO

## 2024-07-19 PROCEDURE — 11000001 HC ACUTE MED/SURG PRIVATE ROOM

## 2024-07-19 PROCEDURE — 27000221 HC OXYGEN, UP TO 24 HOURS

## 2024-07-19 PROCEDURE — 25000003 PHARM REV CODE 250

## 2024-07-19 PROCEDURE — 94760 N-INVAS EAR/PLS OXIMETRY 1: CPT

## 2024-07-19 PROCEDURE — 36415 COLL VENOUS BLD VENIPUNCTURE: CPT | Performed by: INTERNAL MEDICINE

## 2024-07-19 PROCEDURE — 25000003 PHARM REV CODE 250: Performed by: INTERNAL MEDICINE

## 2024-07-19 PROCEDURE — A4216 STERILE WATER/SALINE, 10 ML: HCPCS | Performed by: HOSPITALIST

## 2024-07-19 PROCEDURE — 63600175 PHARM REV CODE 636 W HCPCS: Performed by: HOSPITALIST

## 2024-07-19 PROCEDURE — 97164 PT RE-EVAL EST PLAN CARE: CPT

## 2024-07-19 PROCEDURE — 85025 COMPLETE CBC W/AUTO DIFF WBC: CPT | Performed by: INTERNAL MEDICINE

## 2024-07-19 PROCEDURE — 80053 COMPREHEN METABOLIC PANEL: CPT | Performed by: INTERNAL MEDICINE

## 2024-07-19 RX ORDER — TRAZODONE HYDROCHLORIDE 50 MG/1
50 TABLET ORAL NIGHTLY PRN
Status: DISCONTINUED | OUTPATIENT
Start: 2024-07-19 | End: 2024-07-22 | Stop reason: HOSPADM

## 2024-07-19 RX ADMIN — MONTELUKAST 10 MG: 10 TABLET, FILM COATED ORAL at 08:07

## 2024-07-19 RX ADMIN — HYDRALAZINE HYDROCHLORIDE 100 MG: 50 TABLET ORAL at 09:07

## 2024-07-19 RX ADMIN — ATORVASTATIN CALCIUM 10 MG: 10 TABLET, FILM COATED ORAL at 09:07

## 2024-07-19 RX ADMIN — SODIUM CHLORIDE, PRESERVATIVE FREE 10 ML: 5 INJECTION INTRAVENOUS at 05:07

## 2024-07-19 RX ADMIN — LEVOTHYROXINE SODIUM 25 MCG: 25 TABLET ORAL at 05:07

## 2024-07-19 RX ADMIN — METOPROLOL SUCCINATE 50 MG: 50 TABLET, EXTENDED RELEASE ORAL at 08:07

## 2024-07-19 RX ADMIN — LISINOPRIL 20 MG: 20 TABLET ORAL at 08:07

## 2024-07-19 RX ADMIN — ZOLPIDEM TARTRATE 5 MG: 5 TABLET ORAL at 02:07

## 2024-07-19 RX ADMIN — TRAZODONE HYDROCHLORIDE 50 MG: 50 TABLET ORAL at 09:07

## 2024-07-19 RX ADMIN — AMIODARONE HYDROCHLORIDE 200 MG: 200 TABLET ORAL at 09:07

## 2024-07-19 RX ADMIN — HYDRALAZINE HYDROCHLORIDE 100 MG: 50 TABLET ORAL at 08:07

## 2024-07-19 RX ADMIN — SERTRALINE HYDROCHLORIDE 100 MG: 50 TABLET ORAL at 08:07

## 2024-07-19 RX ADMIN — ENOXAPARIN SODIUM 30 MG: 30 INJECTION SUBCUTANEOUS at 03:07

## 2024-07-19 RX ADMIN — SODIUM CHLORIDE, PRESERVATIVE FREE 10 ML: 5 INJECTION INTRAVENOUS at 06:07

## 2024-07-19 RX ADMIN — LISINOPRIL 20 MG: 20 TABLET ORAL at 09:07

## 2024-07-19 RX ADMIN — HYDRALAZINE HYDROCHLORIDE 100 MG: 50 TABLET ORAL at 03:07

## 2024-07-19 RX ADMIN — SODIUM CHLORIDE, PRESERVATIVE FREE 10 ML: 5 INJECTION INTRAVENOUS at 11:07

## 2024-07-19 RX ADMIN — AMIODARONE HYDROCHLORIDE 200 MG: 200 TABLET ORAL at 08:07

## 2024-07-19 NOTE — CONSULTS
"7/19/2024  Cierra Main   1950   [id number]            Psychiatry Evaluation    Date of Admission: 7/12/2024 11:21 PM      Chief Complaint: Psychiatric evaluation due to trigger of level 2.     SUBJECTIVE:   History of Present Illness:   Cierra Main is a 73 y.o. female with a past medical history that includes acute MI, anxiety, COPD, CAD, depression, HLD, HTN, and hypothyroidism who presented to [facility] on 07/12/24 as a transfer from [facility] secondary to pelvic ring fracture. Has a history of depression and anxiety and psychiatry consulted for evaluation due to pending placement.    Seen at the bedside where she is pleasant and cooperative. Drowsy with impaired attention and requires questions to be reasked at times. Reports that depression has been well-controlled on current medications and denies feeling depressed in the last month. Reports mild anxiety in the past month but reports that this is well-controlled on current medications as well and does not feel that anxiety is interfering with her life. Denies issues with sleep/appetite, denies feelings of guilt, denies loss of interest in normal activities, and denies suicidal ideations. No evidence of psychosis. Goal-directed thought process without delusional ideations. Denies homicidal ideations, hallucinations, or paranoia. Oriented to person, "[place]", and month/year. Reports location as "[facility]" and is unable to provide the date. Displays cognitive impairments in attention and recent/remote memory.        Past Psychiatric History:   Previous Psychiatric Hospitalizations: Yes, [facility] [place] 2023  Previous Medication Trials: sertraline, trazodone, ativan  Previous Suicide Attempts: Denies. Overdose on Ativan approximately a year ago  Outpatient psychiatrist: None    Current Medications:   Home Psychiatric Meds: Sertraline 100mg PO QD; Trazodone 50mg PO QHS    Past Medical/Surgical History:   Past Medical History:   Diagnosis Date    Acute " MI     Anxiety     Cardiac disease     COPD (chronic obstructive pulmonary disease)     Coronary artery disease     Depression     Hyperlipidemia     Hypertension     Hypothyroidism     Other specified noninfective gastroenteritis and colitis     Unspecified macular degeneration      Past Surgical History:   Procedure Laterality Date    CORONARY ANGIOGRAPHY N/A 2/1/2021    Procedure: ANGIOGRAM, CORONARY ARTERY;  Surgeon: Devon Medeiros MD;  Location: [facility] CATH LAB;  Service: Cardiology;  Laterality: N/A;    CORONARY ANGIOPLASTY WITH STENT PLACEMENT      INTRAMEDULLARY RODDING OF FEMUR Right 7/13/2024    Procedure: INSERTION, INTRAMEDULLARY BRANDON, FEMUR;  Surgeon: Joselito Montiel DO;  Location: [facility] OR;  Service: Orthopedics;  Laterality: Right;  supine hana table c arm short nail synthes    LEFT HEART CATHETERIZATION Left 2/1/2021    Procedure: CATHETERIZATION, HEART, LEFT;  Surgeon: Devon Medeiros MD;  Location: [facility] CATH LAB;  Service: Cardiology;  Laterality: Left;         Family Psychiatric History:   Aunt- Unknown mental illness     Allergies:   Review of patient's allergies indicates:   Allergen Reactions    Amlodipine      Leg weakness    Losartan        Substance Abuse History:   Tobacco: Former  Alcohol: Denies  Illicit Substances: Denies  Treatment: Denies        Scheduled Meds:    amiodarone  200 mg Oral BID    atorvastatin  10 mg Oral QHS    enoxparin  30 mg Subcutaneous Q24H (prophylaxis, 1700)    hydrALAZINE  100 mg Oral TID    levothyroxine  25 mcg Oral Before breakfast    lisinopriL  20 mg Oral BID    metoprolol succinate  50 mg Oral Daily    montelukast  10 mg Oral Daily    sertraline  100 mg Oral Daily    sodium chloride 0.9%  10 mL Intravenous Q6H      PRN Meds:   Current Facility-Administered Medications:     benzonatate, 100 mg, Oral, TID PRN    ceFAZolin 2 g/50mL Dextrose IVPB, 2 g, Intravenous, On Call Procedure    docusate sodium, 100 mg, Oral, BID PRN    HYDROcodone-acetaminophen, 1 tablet,  Oral, Q6H PRN    ondansetron, 4 mg, Intravenous, Q6H PRN    prochlorperazine, 5 mg, Intravenous, Q6H PRN    Flushing PICC/Midline Protocol, , , Until Discontinued **AND** sodium chloride 0.9%, 10 mL, Intravenous, Q6H **AND** sodium chloride 0.9%, 10 mL, Intravenous, PRN    zolpidem, 5 mg, Oral, Nightly PRN   Psychotherapeutics (From admission, onward)      Start     Stop Route Frequency Ordered    07/18/24 1134  zolpidem tablet 5 mg         -- Oral Nightly PRN 07/18/24 1035    07/13/24 0900  sertraline tablet 100 mg         -- Oral Daily 07/13/24 0720              Social History:  Housing Status: Lives with son  Relationship Status/Sexual Orientation:    Children: 2  Education: Three years college   Employment Status/Info: Retired    history: Denies  History of physical/sexual abuse: Denies   Access to gun: Denies       Legal History:   Past Charges/Incarcerations: Denies   Pending charges: Denies      OBJECTIVE:       Vitals   Vitals:    07/19/24 0843   BP: (!) 129/59   Pulse:    Resp:    Temp:         Labs/Imaging/Studies:   Recent Results (from the past 48 hour(s))   CBC with Differential    Collection Time: 07/18/24  3:57 AM   Result Value Ref Range    WBC 9.92 4.50 - 11.50 x10(3)/mcL    RBC 3.08 (L) 4.20 - 5.40 x10(6)/mcL    Hgb 8.6 (L) 12.0 - 16.0 g/dL    Hct 28.1 (L) 37.0 - 47.0 %    MCV 91.2 80.0 - 94.0 fL    MCH 27.9 27.0 - 31.0 pg    MCHC 30.6 (L) 33.0 - 36.0 g/dL    RDW 15.3 11.5 - 17.0 %    Platelet 352 130 - 400 x10(3)/mcL    MPV 9.9 7.4 - 10.4 fL    Neut % 69.3 %    Lymph % 13.8 %    Mono % 8.7 %    Eos % 5.1 %    Basophil % 1.0 %    Lymph # 1.37 0.6 - 4.6 x10(3)/mcL    Neut # 6.87 2.1 - 9.2 x10(3)/mcL    Mono # 0.86 0.1 - 1.3 x10(3)/mcL    Eos # 0.51 0 - 0.9 x10(3)/mcL    Baso # 0.10 <=0.2 x10(3)/mcL    IG# 0.21 (H) 0 - 0.04 x10(3)/mcL    IG% 2.1 %    NRBC% 0.0 %   Comprehensive Metabolic Panel    Collection Time: 07/19/24  9:15 AM   Result Value Ref Range    Sodium 136 136 - 145 mmol/L  "   Potassium 4.0 3.5 - 5.1 mmol/L    Chloride 105 98 - 107 mmol/L    CO2 23 23 - 31 mmol/L    Glucose 86 82 - 115 mg/dL    Blood Urea Nitrogen 16.9 9.8 - 20.1 mg/dL    Creatinine 0.60 0.55 - 1.02 mg/dL    Calcium 8.7 8.4 - 10.2 mg/dL    Protein Total 5.5 (L) 5.8 - 7.6 gm/dL    Albumin 2.5 (L) 3.4 - 4.8 g/dL    Globulin 3.0 2.4 - 3.5 gm/dL    Albumin/Globulin Ratio 0.8 (L) 1.1 - 2.0 ratio    Bilirubin Total 1.0 <=1.5 mg/dL     (H) 40 - 150 unit/L    ALT 47 0 - 55 unit/L    AST 59 (H) 5 - 34 unit/L    eGFR >60 mL/min/1.73/m2    Anion Gap 8.0 mEq/L    BUN/Creatinine Ratio 28    CBC with Differential    Collection Time: 07/19/24  9:15 AM   Result Value Ref Range    WBC 12.40 (H) 4.50 - 11.50 x10(3)/mcL    RBC 3.31 (L) 4.20 - 5.40 x10(6)/mcL    Hgb 9.3 (L) 12.0 - 16.0 g/dL    Hct 30.6 (L) 37.0 - 47.0 %    MCV 92.4 80.0 - 94.0 fL    MCH 28.1 27.0 - 31.0 pg    MCHC 30.4 (L) 33.0 - 36.0 g/dL    RDW 15.5 11.5 - 17.0 %    Platelet 379 130 - 400 x10(3)/mcL    MPV 9.7 7.4 - 10.4 fL    Neut % 70.8 %    Lymph % 14.5 %    Mono % 8.4 %    Eos % 3.0 %    Basophil % 0.9 %    Lymph # 1.80 0.6 - 4.6 x10(3)/mcL    Neut # 8.78 2.1 - 9.2 x10(3)/mcL    Mono # 1.04 0.1 - 1.3 x10(3)/mcL    Eos # 0.37 0 - 0.9 x10(3)/mcL    Baso # 0.11 <=0.2 x10(3)/mcL    IG# 0.30 (H) 0 - 0.04 x10(3)/mcL    IG% 2.4 %    NRBC% 0.0 %      No results found for: "PHENYTOIN", "PHENOBARB", "VALPROATE", "CBMZ"        Psychiatric Mental Status Exam:  General Appearance: appears stated age, dressed in hospital garb, lying in bed, in no acute distress  Arousal: drowsy  Behavior: cooperative, polite, under good behavioral control  Movements and Motor Activity: no tics, no tremors, no akathisia, no dystonia, no evidence of tardive dyskinesia  Orientation: oriented to person only, oriented to year, oriented to month  Speech: slowed, soft  Mood: Euthymic  Affect: constricted  Thought Process: linear, goal-directed  Associations: intact, no loosening of " associations  Thought Content and Perceptions: no suicidal or homicidal ideation, no auditory or visual hallucinations, no paranoid ideation, no ideas of reference, no evidence of delusions or psychosis  Recent and Remote Memory: recent memory impaired, remote memory impaired, registers 3/3 objects, recalls 1/3 objects at 5 minutes; per interview/observation with patient  Attention and Concentration: impaired; per interview/observation with patient  Fund of Knowledge: vocabulary appropriate, unable to identify the ; based on history, vocabulary, fund of knowledge, syntax, grammar, and content  Insight: adequate; based on understanding of severity of illness and HPI  Judgment: adequate; based on patient's behavior and HPI      ASSESSMENT/PLAN:   Diagnoses:  MOOD DISORDERS; Major Depressive Disorder, Recurrent: Unspecified (F33.9)     Past Medical History:   Diagnosis Date    Acute MI     Anxiety     Cardiac disease     COPD (chronic obstructive pulmonary disease)     Coronary artery disease     Depression     Hyperlipidemia     Hypertension     Hypothyroidism     Other specified noninfective gastroenteritis and colitis     Unspecified macular degeneration           Problem lists and Management Plans:  Medication Management  Continue sertraline 100mg PO QD  Discontinue zolpidem  Trazodone 50mg PO QHS PRN insomnia  PEC not recommended at this time. Not currently an imminent threat to self or others, nor gravely disabled due to mental illness.  Recommend outpatient work-up for major neurocognitive disorder  Will sign-off; please reconsult as needed.    Quentin Barreto

## 2024-07-19 NOTE — PT/OT/SLP PROGRESS
Occupational Therapy   Treatment    Name: Cierra Main  MRN: [id number]  Admitting Diagnosis:  Closed 2-part intertrochanteric fracture of proximal femur, right, initial encounter  6 Days Post-Op    Recommendations:     Recommended therapy intensity at discharge: Moderate Intensity Therapy   Discharge Equipment Recommendations:  to be determined by next level of care  Barriers to discharge:       Assessment:     Cierra Main is a 73 y.o. female with a medical diagnosis of  Closed 2-part intertrochanteric fracture of proximal femur, right, initial encounter. Performance deficits affecting function are weakness, impaired endurance, impaired self care skills, impaired functional mobility, gait instability, impaired balance, pain, decreased safety awareness, decreased lower extremity function, visual deficits.     Pt presents with some confusion however is oriented to name, place, and city. Pt required VC to manage walker and would have to repeat a question or command occasionally. Pt c/o right leg pain and stated that she is scared of using a walker because that is how she fell the first time.     Rehab Prognosis:  Good; patient would benefit from acute skilled OT services to address these deficits and reach maximum level of function.       Plan:     Patient to be seen 5 x/week to address the above listed problems via self-care/home management, therapeutic activities, therapeutic exercises  Plan of Care Expires: 08/15/24  Plan of Care Reviewed with: patient    Subjective     Pain/Comfort:  Location - Side 1: Right  Location 1: leg    Objective:     Communicated with: RN prior to session.  Patient found supine with peripheral IV, pulse ox (continuous), telemetry upon OT entry to room.    General Precautions: Standard, fall, vision impaired    Orthopedic Precautions:LLE weight bearing as tolerated, RLE weight bearing as tolerated  Braces: N/A  Respiratory Status: Nasal cannula, flow 3 L/min     Occupational  Performance:     Bed Mobility:    Patient completed Scooting/Bridging with moderate assistance  Patient completed Supine to Sit with minimum assistance     Functional Mobility/Transfers:  Patient completed Sit <> Stand Transfer with minimum assistance  with  rolling walker   Patient completed Bed <> Chair Transfer using Step Transfer technique with moderate assistance with rolling walker  Patient completed Toilet Transfer Step Transfer technique with moderate assistance with  rolling walker  Functional Mobility: Pt ambulated to BSC and chair with Mod A and RW. No LOB noted however demo'd posterior lean.    Activities of Daily Living:  Feeding:  set up A to eat breakfast. Assistance needed to locate food on plate 2/2 vision impairment \  Toileting: Min A to complete pericare+void on BSC. Pt attempted to wipe with gown and VC to use a wet wipe.    Therapeutic Positioning    OT interventions performed during the course of today's session in an effort to prevent and/or reduce acquired pressure injuries:   Education was provided on benefits of and recommendations for therapeutic positioning    [facility] 6 Click ADL:      Patient Education:  Patient provided with verbal education education regarding OT role/goals/POC.  Understanding was verbalized.      Patient left up in chair with call button in reach and lab present.    GOALS:   Multidisciplinary Problems       Occupational Therapy Goals          Problem: Occupational Therapy    Goal Priority Disciplines Outcome Interventions   Occupational Therapy Goal     OT, PT/OT Progressing    Description: Goals to be met by: 8/15/24     Patient will increase functional independence with ADLs by performing:    UE Dressing with Modified [place].  LE Dressing with Modified [place].  Grooming while standing at sink with Modified [place].  Toileting from toilet with Modified [place] for hygiene and clothing management.   Toilet transfer to toilet with Modified  [place].                         Time Tracking:     OT Date of Treatment: 07/19/24  OT Start Time: 0830  OT Stop Time: 0910  OT Total Time (min): 40 min    Billable Minutes:Self Care/Home Management 40    OT/MARY: MARY     Number of MARY visits since last OT visit: 3    7/19/2024

## 2024-07-19 NOTE — PROGRESS NOTES
"[facility] Med Surg  Orthopedics  Progress Note    Patient Name: Cierra Main  MRN: [id number]  Admission Date: 7/12/2024  Hospital Length of Stay: 7 days  Attending Provider: Teresa Richey MD  Primary Care Provider: Maegan, Primary Doctor    Subjective:     Principal Problem:Closed 2-part intertrochanteric fracture of proximal femur, right, initial encounter    Principal Orthopedic Problem: 6 Days Post-Op   IMN R IT; non-op pelvic injury     Interval History: Resting this morning. Comfortable. States she got out of bed twice with therapy yesterday.     Review of patient's allergies indicates:   Allergen Reactions    Amlodipine      Leg weakness    Losartan        Current Facility-Administered Medications   Medication    amiodarone tablet 200 mg    atorvastatin tablet 10 mg    benzonatate capsule 100 mg    cefazolin (ANCEF) 2 gram in dextrose 5% 50 mL IVPB (premix)    docusate sodium capsule 100 mg    enoxaparin injection 30 mg    hydrALAZINE tablet 100 mg    HYDROcodone-acetaminophen 5-325 mg per tablet 1 tablet    levothyroxine tablet 25 mcg    lisinopriL tablet 20 mg    metoprolol succinate (TOPROL-XL) 24 hr tablet 50 mg    montelukast tablet 10 mg    ondansetron injection 4 mg    prochlorperazine injection Soln 5 mg    sertraline tablet 100 mg    sodium chloride 0.9% flush 10 mL    And    sodium chloride 0.9% flush 10 mL    zolpidem tablet 5 mg     Objective:     Vital Signs (Most Recent):  Temp: 99.3 °F (37.4 °C) (07/19/24 0745)  Pulse: 76 (07/19/24 0745)  Resp: 16 (07/19/24 0745)  BP: (!) 129/59 (07/19/24 0843)  SpO2: (!) 93 % (07/19/24 0745) Vital Signs (24h Range):  Temp:  [97.6 °F (36.4 °C)-99.3 °F (37.4 °C)] 99.3 °F (37.4 °C)  Pulse:  [72-83] 76  Resp:  [16-18] 16  SpO2:  [76 %-94 %] 93 %  BP: (129-173)/(58-76) 129/59     Weight: 38.6 kg (85 lb 1.6 oz)  Height: 5' 3" (160 cm)  Body mass index is 15.07 kg/m².      Intake/Output Summary (Last 24 hours) at 7/19/2024 1018  Last data " filed at 7/19/2024 0520  Gross per 24 hour   Intake 820 ml   Output 300 ml   Net 520 ml       Physical Exam:   General the patient is alert andin acute distress elderly, frail    Constitutional: Vital signs are examined and stable.  Resp: No signs of labored breathing                      RLE: -Skin: Dressing CDI without drainage         -MSK: : +EHL/FHL, Gastroc/Tib anterior Strength 5/5           -Neuro:  Sensation intact to light touch         -CV: Capillary refill is less than 2 seconds. DP/PT pulses  2/4. Compartments soft and compressible.      Diagnostic Findings:     Significant Labs: CBC:   Recent Labs   Lab 07/18/24  0357 07/19/24  0915   WBC 9.92 12.40*   HGB 8.6* 9.3*   HCT 28.1* 30.6*    379     All pertinent labs within the past 24 hours have been reviewed.    Significant Imaging: I have reviewed all pertinent imaging results/findings.     Assessment/Plan:     Active Diagnoses:    Diagnosis Date Noted POA    PRINCIPAL PROBLEM:  Closed 2-part intertrochanteric fracture of proximal femur, right, initial encounter [S72.141A] 07/13/2024 Yes    Closed fracture of right pelvis [S32.9XXA] 07/13/2024 Yes      Problems Resolved During this Admission:   POD #6 IMN R IT; non-op pelvic injury   ABLA; expected and stable   Diet: As tolerated  Pain: multimodal PRN  Lovenox for DVT ppx during stay and x 30 days at discharge. Okay to resume previous anticoags if present  Activity: WBAT RLE; ROMAT.  Dressing changes daily. May leave open to air if no drainage. Keep clean and dry. No ointments or creams.   Follow up with Dr. Montiel/Xochilt Goldman in 3 weeks for wound check or repeat x rays. Please call with questions or concerns. Ortho stable for DC     The above findings, diagnostics, and treatment plan were discussed with Dr Montiel who is in agreement with the plan of care except as stated in additional documentation.      Shweta England, ADITIP   Orthopedic Trauma Surgery  [facility]

## 2024-07-19 NOTE — PT/OT/SLP RE-EVAL
Physical Therapy Re-Evaluation    Patient Name:  Cierra Main   MRN:  [id number]    Recommendations:     Discharge therapy intensity: Moderate Intensity Therapy   Discharge Equipment Recommendations:  (tbd)   Barriers to discharge: None    Assessment:     Cierra Main is a 73 y.o. female admitted with a medical diagnosis of Closed 2-part intertrochanteric fracture of proximal femur, right, .  She presents with the following impairments/functional limitations: weakness, impaired endurance, impaired self care skills, impaired functional mobility, gait instability, impaired balance     Rehab Prognosis: Good; patient would benefit from acute skilled PT services to address these deficits and reach maximum level of function.    Recent Surgery: Procedure(s) (LRB):  INSERTION, INTRAMEDULLARY BRANDON, FEMUR (Right) 6 Days Post-Op    Plan:     During this hospitalization, patient would benefit from acute PT services 6 x/week to address the identified rehab impairments via gait training, therapeutic activities and progress toward the following goals:    Plan of Care Expires:  08/14/24    PT/PTA conference to discuss PT POC and patient's progression towards goals held with Jaime Soriano PTA.     Subjective     Chief Complaint:   Patient/Family Comments/goals:   Pain/Comfort:       Patients cultural, spiritual, Nondenominational conflicts given the current situation: no    Objective:     Communicated with nurse prior to session.  Patient found supine with peripheral IV, telemetry, oxygen, Other (comments) (pt in bed)  upon PT entry to room.    General Precautions: Standard, fall  Orthopedic Precautions:LLE weight bearing as tolerated, RLE weight bearing as tolerated   Braces: N/A  Respiratory Status: Nasal cannula, flow 1 L/min  Blood Pressure:       Exams:  Skin integrity:       Functional Mobility:  Bed Mobility:     Scooting: maximal assistance  Supine to Sit: moderate assistance  Sit to Supine: moderate assistance  Transfers:      Sit to Stand:  moderate assistance with rolling walker  Bed to Chair: moderate assistance with  rolling walker  using  Step Transfer  Gait: pt ambulated with rw 25ft with jesus alberto      AM-PAC 6 CLICK MOBILITY  Total Score:        Treatment & Education:      Patient provided with verbal education education regarding PT role/goals/POC.  Understanding was verbalized.     Patient left supine with all lines intact and call button in reach.    GOALS:   Multidisciplinary Problems       Physical Therapy Goals          Problem: Physical Therapy    Goal Priority Disciplines Outcome Goal Variances Interventions   Physical Therapy Goal     PT, PT/OT Progressing     Description: Goals to be met by: 24     Patient will increase functional independence with mobility by performin. Supine to sit with Set-up [place]  2. Sit to supine with Set-up [place]  3. Sit to stand transfer with Stand-by Assistance  4. Bed to chair transfer with Stand-by Assistance using Rolling Walker  5. Gait  x 100 feet with Stand-by Assistance using Rolling Walker.                          History:     Past Medical History:   Diagnosis Date    Acute MI     Anxiety     Cardiac disease     COPD (chronic obstructive pulmonary disease)     Coronary artery disease     Depression     Hyperlipidemia     Hypertension     Hypothyroidism     Other specified noninfective gastroenteritis and colitis     Unspecified macular degeneration        Past Surgical History:   Procedure Laterality Date    CORONARY ANGIOGRAPHY N/A 2021    Procedure: ANGIOGRAM, CORONARY ARTERY;  Surgeon: Devon Medeiros MD;  Location: [facility] CATH LAB;  Service: Cardiology;  Laterality: N/A;    CORONARY ANGIOPLASTY WITH STENT PLACEMENT      INTRAMEDULLARY RODDING OF FEMUR Right 2024    Procedure: INSERTION, INTRAMEDULLARY BRANDON, FEMUR;  Surgeon: Joselito Montiel DO;  Location: [facility];  Service: Orthopedics;  Laterality: Right;  supine hana table c arm short nail synthes     LEFT HEART CATHETERIZATION Left 2/1/2021    Procedure: CATHETERIZATION, HEART, LEFT;  Surgeon: Devon Medeiros MD;  Location: [facility] CATH LAB;  Service: Cardiology;  Laterality: Left;       Time Tracking:     PT Received On:    PT Start Time: 1400     PT Stop Time: 1420  PT Total Time (min): 20 min     Billable Minutes: Re-eval 20 07/19/2024

## 2024-07-19 NOTE — PLAN OF CARE
[facility] sent clinical updates and PT/OT notes to [facility] via [facility] fax. Triggered Level 2 awaiting PASSAR/142 appvl.

## 2024-07-19 NOTE — PROGRESS NOTES
[facility] Hospital Medicine Progress Note        Chief Complaint: Inpatient Follow-up    HPI:   73-year-old female with significant history of CAD, COPD, anxiety, depression, HTN, HLD, hypothyroidism, macular degeneration with partial blindness presented to the ED after multiple falls, significant pelvic pain.  Patient endorsed progressive generalized weakness in the recent past.  Patient initially presented to outlying facility and was transferred to our hospital for orthopedic services given imaging findings showing right inferior pubic rami/left pubic symphysis fracture, right sacral fracture and nondisplaced acetabular fracture.  Orthopedics evaluated and the patient was taken to OR for intramedullary nailing for femur fracture on 07/13, decided to manage pelvic fracture nonoperatively.  Surgery in consideration in case pain is not adequately controlled.  Therapy services consulted.  Recommending skilled nursing facility placement, case management consulted for the same.  Pain adequately controlled  Interval Hx:   Patient seen at bedside, comfortably sitting in the couch, high communication is very minimal.  No acute events overnight, repeat her pain is under control, she is afebrile and hemodynamically stable    Objective/physical exam:  General: In no acute distress, afebrile  Chest: Clear to auscultation bilaterally  Heart: S1, S2, no appreciable murmur  Abdomen: Soft, nontender, BS +  MSK: Warm, no lower extremity edema, no clubbing or cyanosis  Neurologic:  She is slightly lethargic, but arousable, partially Blind, her communication is limited  VITAL SIGNS: 24 HRS MIN & MAX LAST   Temp  Min: 97.6 °F (36.4 °C)  Max: 99.3 °F (37.4 °C) 99.3 °F (37.4 °C)   BP  Min: 129/59  Max: 173/64 (!) 129/59   Pulse  Min: 72  Max: 83  76   Resp  Min: 16  Max: 18 16   SpO2  Min: 76 %  Max: 94 % (!) 93 %       Recent Labs   Lab 07/18/24  0357   WBC 9.92   RBC 3.08*   HGB 8.6*   HCT 28.1*   MCV  91.2   MCH 27.9   MCHC 30.6*   RDW 15.3      MPV 9.9         Recent Labs   Lab 07/12/24  1604 07/13/24  0516 07/15/24  0339      < > 138   K 4.8   < > 4.5      < > 108*   CO2 22*   < > 24   BUN 28.0*   < > 31.1*   CREATININE 0.93   < > 0.80   CALCIUM 10.1   < > 8.4   ALBUMIN 3.7  --   --    ALKPHOS 127  --   --    ALT 57*  --   --    AST 60*  --   --    BILITOT 0.5  --   --     < > = values in this interval not displayed.          Microbiology Results (last 7 days)       ** No results found for the last 168 hours. **             Scheduled Med:   amiodarone  200 mg Oral BID    atorvastatin  10 mg Oral QHS    enoxparin  30 mg Subcutaneous Q24H (prophylaxis, 1700)    hydrALAZINE  100 mg Oral TID    levothyroxine  25 mcg Oral Before breakfast    lisinopriL  20 mg Oral BID    metoprolol succinate  50 mg Oral Daily    montelukast  10 mg Oral Daily    sertraline  100 mg Oral Daily    sodium chloride 0.9%  10 mL Intravenous Q6H          Assessment/Plan:    Recurrent falls-mechanical   Nondisplaced femoral acetabular fracture status post intramedullary nailing 7/13   Pelvic fracture-right pubic rami, left pubic symphysis, right sacral, managed nonoperatively  Fracture related pain-improved  Sirs with mild leukocytosis  History of CAD   COPD with no acute exacerbation   Severe physical deconditioning   Anxiety/depression   Essential HTN-stable   HLD   Hypothyroidism   Macular degeneration/partial blindness   Prophylaxis    I ordered CT head-within normal limits   Discussed with nursing staff   Mentation is actually at her baseline, she does not communicate much   Orthopedics signed off, recommending outpatient follow-up in 3 weeks   Continue multimodal pain control   Noted mild leukocytosis, in case of worsening consider septic workup   Continue home meds-amiodarone, she is not on any anticoagulation and no known history of PAF though   Continue other home meds-statin, hydralazine, lisinopril, Toprol-XL    Continue Zoloft, montelukast, levothyroxine   DVT prophylaxis-subQ Lovenox    Medically stable for skilled nursing facility placement, discussed with case management  Triggered level 2      Teresa Richey MD   07/19/2024

## 2024-07-19 NOTE — NURSING
Nurses Note -- 4 Eyes      7/18/2024   7:04 PM      Skin assessed during: Q Shift Change      [x] No Altered Skin Integrity Present    []Prevention Measures Documented      [] Yes- Altered Skin Integrity Present or Discovered   [] LDA Added if Not in Epic (Describe Wound)   [] New Altered Skin Integrity was Present on Admit and Documented in LDA   [] Wound Image Taken    Bruising on hips from fall      Wound Care Consulted? No    Attending Nurse:  Nubia Cloud RN/Staff Member:  NELIDA Bethea

## 2024-07-20 LAB
ALBUMIN SERPL-MCNC: 2.2 G/DL (ref 3.4–4.8)
ALBUMIN/GLOB SERPL: 0.8 RATIO (ref 1.1–2)
ALP SERPL-CCNC: 172 UNIT/L (ref 40–150)
ALT SERPL-CCNC: 48 UNIT/L (ref 0–55)
ANION GAP SERPL CALC-SCNC: 10 MEQ/L
ANISOCYTOSIS BLD QL SMEAR: ABNORMAL
AST SERPL-CCNC: 55 UNIT/L (ref 5–34)
BASOPHILS # BLD AUTO: 0.12 X10(3)/MCL
BASOPHILS NFR BLD AUTO: 1.1 %
BILIRUB SERPL-MCNC: 0.6 MG/DL
BUN SERPL-MCNC: 17.3 MG/DL (ref 9.8–20.1)
CALCIUM SERPL-MCNC: 8.4 MG/DL (ref 8.4–10.2)
CHLORIDE SERPL-SCNC: 107 MMOL/L (ref 98–107)
CO2 SERPL-SCNC: 23 MMOL/L (ref 23–31)
CREAT SERPL-MCNC: 0.62 MG/DL (ref 0.55–1.02)
CREAT/UREA NIT SERPL: 28
EOSINOPHIL # BLD AUTO: 0.35 X10(3)/MCL (ref 0–0.9)
EOSINOPHIL NFR BLD AUTO: 3.2 %
ERYTHROCYTE [DISTWIDTH] IN BLOOD BY AUTOMATED COUNT: 16 % (ref 11.5–17)
GFR SERPLBLD CREATININE-BSD FMLA CKD-EPI: >60 ML/MIN/1.73/M2
GLOBULIN SER-MCNC: 2.7 GM/DL (ref 2.4–3.5)
GLUCOSE SERPL-MCNC: 99 MG/DL (ref 82–115)
HCT VFR BLD AUTO: 29.6 % (ref 37–47)
HGB BLD-MCNC: 9.3 G/DL (ref 12–16)
IMM GRANULOCYTES # BLD AUTO: 0.44 X10(3)/MCL (ref 0–0.04)
IMM GRANULOCYTES NFR BLD AUTO: 4.1 %
LYMPHOCYTES # BLD AUTO: 1.78 X10(3)/MCL (ref 0.6–4.6)
LYMPHOCYTES NFR BLD AUTO: 16.4 %
MACROCYTES BLD QL SMEAR: ABNORMAL
MCH RBC QN AUTO: 28 PG (ref 27–31)
MCHC RBC AUTO-ENTMCNC: 31.4 G/DL (ref 33–36)
MCV RBC AUTO: 89.2 FL (ref 80–94)
MONOCYTES # BLD AUTO: 1 X10(3)/MCL (ref 0.1–1.3)
MONOCYTES NFR BLD AUTO: 9.2 %
NEUTROPHILS # BLD AUTO: 7.14 X10(3)/MCL (ref 2.1–9.2)
NEUTROPHILS NFR BLD AUTO: 66 %
NRBC BLD AUTO-RTO: 0 %
PLATELET # BLD AUTO: 312 X10(3)/MCL (ref 130–400)
PLATELET # BLD EST: ABNORMAL 10*3/UL
PLATELETS.RETICULATED NFR BLD AUTO: 7 % (ref 0.9–11.2)
PMV BLD AUTO: 10.8 FL (ref 7.4–10.4)
POTASSIUM SERPL-SCNC: 3.9 MMOL/L (ref 3.5–5.1)
PROT SERPL-MCNC: 4.9 GM/DL (ref 5.8–7.6)
RBC # BLD AUTO: 3.32 X10(6)/MCL (ref 4.2–5.4)
RBC MORPH BLD: ABNORMAL
SODIUM SERPL-SCNC: 140 MMOL/L (ref 136–145)
WBC # BLD AUTO: 10.83 X10(3)/MCL (ref 4.5–11.5)

## 2024-07-20 PROCEDURE — 25000003 PHARM REV CODE 250: Performed by: HOSPITALIST

## 2024-07-20 PROCEDURE — 36415 COLL VENOUS BLD VENIPUNCTURE: CPT | Performed by: INTERNAL MEDICINE

## 2024-07-20 PROCEDURE — 97530 THERAPEUTIC ACTIVITIES: CPT | Mod: CQ

## 2024-07-20 PROCEDURE — 25000003 PHARM REV CODE 250

## 2024-07-20 PROCEDURE — 85025 COMPLETE CBC W/AUTO DIFF WBC: CPT | Performed by: INTERNAL MEDICINE

## 2024-07-20 PROCEDURE — 97530 THERAPEUTIC ACTIVITIES: CPT | Mod: CO

## 2024-07-20 PROCEDURE — 25000003 PHARM REV CODE 250: Performed by: INTERNAL MEDICINE

## 2024-07-20 PROCEDURE — A4216 STERILE WATER/SALINE, 10 ML: HCPCS | Performed by: HOSPITALIST

## 2024-07-20 PROCEDURE — 80053 COMPREHEN METABOLIC PANEL: CPT | Performed by: INTERNAL MEDICINE

## 2024-07-20 PROCEDURE — 11000001 HC ACUTE MED/SURG PRIVATE ROOM

## 2024-07-20 PROCEDURE — 63600175 PHARM REV CODE 636 W HCPCS: Performed by: HOSPITALIST

## 2024-07-20 PROCEDURE — 97116 GAIT TRAINING THERAPY: CPT | Mod: CQ

## 2024-07-20 PROCEDURE — 94760 N-INVAS EAR/PLS OXIMETRY 1: CPT

## 2024-07-20 PROCEDURE — 27000221 HC OXYGEN, UP TO 24 HOURS

## 2024-07-20 RX ADMIN — AMIODARONE HYDROCHLORIDE 200 MG: 200 TABLET ORAL at 08:07

## 2024-07-20 RX ADMIN — SODIUM CHLORIDE, PRESERVATIVE FREE 10 ML: 5 INJECTION INTRAVENOUS at 09:07

## 2024-07-20 RX ADMIN — SODIUM CHLORIDE, PRESERVATIVE FREE 10 ML: 5 INJECTION INTRAVENOUS at 05:07

## 2024-07-20 RX ADMIN — HYDRALAZINE HYDROCHLORIDE 100 MG: 50 TABLET ORAL at 09:07

## 2024-07-20 RX ADMIN — LISINOPRIL 20 MG: 20 TABLET ORAL at 08:07

## 2024-07-20 RX ADMIN — BENZONATATE 100 MG: 100 CAPSULE ORAL at 02:07

## 2024-07-20 RX ADMIN — SODIUM CHLORIDE, PRESERVATIVE FREE 10 ML: 5 INJECTION INTRAVENOUS at 12:07

## 2024-07-20 RX ADMIN — TRAZODONE HYDROCHLORIDE 50 MG: 50 TABLET ORAL at 08:07

## 2024-07-20 RX ADMIN — LISINOPRIL 20 MG: 20 TABLET ORAL at 09:07

## 2024-07-20 RX ADMIN — MONTELUKAST 10 MG: 10 TABLET, FILM COATED ORAL at 09:07

## 2024-07-20 RX ADMIN — ATORVASTATIN CALCIUM 10 MG: 10 TABLET, FILM COATED ORAL at 08:07

## 2024-07-20 RX ADMIN — METOPROLOL SUCCINATE 50 MG: 50 TABLET, EXTENDED RELEASE ORAL at 09:07

## 2024-07-20 RX ADMIN — SODIUM CHLORIDE, PRESERVATIVE FREE 10 ML: 5 INJECTION INTRAVENOUS at 11:07

## 2024-07-20 RX ADMIN — ENOXAPARIN SODIUM 30 MG: 30 INJECTION SUBCUTANEOUS at 04:07

## 2024-07-20 RX ADMIN — HYDRALAZINE HYDROCHLORIDE 100 MG: 50 TABLET ORAL at 02:07

## 2024-07-20 RX ADMIN — SERTRALINE HYDROCHLORIDE 100 MG: 50 TABLET ORAL at 09:07

## 2024-07-20 RX ADMIN — AMIODARONE HYDROCHLORIDE 200 MG: 200 TABLET ORAL at 09:07

## 2024-07-20 NOTE — PHYSICIAN QUERY
Please clarify the nutritional diagnosis associated with the clinical findings:  Moderate Protein Calorie Malnutrition

## 2024-07-20 NOTE — PT/OT/SLP PROGRESS
Physical Therapy Treatment    Patient Name:  Cierra Main   MRN:  [id number]    Recommendations:     Discharge therapy intensity: Moderate Intensity Therapy   Discharge Equipment Recommendations: to be determined by next level of care  Barriers to discharge: Impaired mobility    Assessment:     Cierra Main is a 73 y.o. female admitted with a medical diagnosis of Closed 2-part intertrochanteric fracture of proximal right femur, IMN R IT, non op pelvic injury .  She presents with the following impairments/functional limitations: weakness, gait instability, impaired endurance, impaired balance, decreased lower extremity function, decreased ROM, pain, impaired self care skills, impaired functional mobility.    Rehab Prognosis: Good; patient would benefit from acute skilled PT services to address these deficits and reach maximum level of function.    Recent Surgery: Procedure(s) (LRB):  INSERTION, INTRAMEDULLARY BRADNON, FEMUR (Right) 7 Days Post-Op    Plan:     During this hospitalization, patient would benefit from acute PT services 6 x/week to address the identified rehab impairments via gait training, therapeutic activities, therapeutic exercises and progress toward the following goals:    Plan of Care Expires:  08/14/24    Subjective     Chief Complaint: R leg pain; fatigue and interrupted naps  Patient/Family Comments/goals: to get better  Pain/Comfort:  Pain Rating 1: other (see comments) (R leg pain; did not rate)  Pain Addressed 1: Reposition, Pre-medicate for activity, Distraction      Objective:     Communicated with nurse prior to session.  Patient found HOB elevated with pulse ox (continuous), telemetry, oxygen upon PT entry to room.     General Precautions: Standard, fall  Orthopedic Precautions:  (WBAT YAMILEX LE's)  Braces: N/A  Respiratory Status: Nasal cannula, flow 2 L/min; SPO2 89-90% with activity  Blood Pressure:   Post transfers: 171/56, HR 73      Functional Mobility:  Bed Mobility:    Supine to sit  with min assist for LE progression, mod assist at trunk; increased time to complete  Transfers:    Sit<->stand with min assist/min assist x 2 intermittently; frequently requested to rest prior to transition 2/2 pain  Gait:   ~8' with RW min assist. Encouragement to continue activity. Cues for sequencing and to utilize Ue's to offload RLE in stance phase. Limited by pain and SOB.     Therapeutic Activities/Exercises:      Education:  Patient provided with verbal education education regarding PT role/goals/POC, fall prevention, and safety awareness.  Understanding was verbalized.     Patient left up in chair with all lines intact, call button in reach, and nurse notified    GOALS:   Multidisciplinary Problems       Physical Therapy Goals          Problem: Physical Therapy    Goal Priority Disciplines Outcome Goal Variances Interventions   Physical Therapy Goal     PT, PT/OT Progressing     Description: Goals to be met by: 24     Patient will increase functional independence with mobility by performin. Supine to sit with Set-up [place]  2. Sit to supine with Set-up [place]  3. Sit to stand transfer with Stand-by Assistance  4. Bed to chair transfer with Stand-by Assistance using Rolling Walker  5. Gait  x 100 feet with Stand-by Assistance using Rolling Walker.                          Time Tracking:     PT Received On: 24  PT Start Time: 1100     PT Stop Time: 1125  PT Total Time (min): 25 min     Billable Minutes: Gait Training 1 unit and Therapeutic Activity 1 unit    Treatment Type: Treatment  PT/PTA: PTA     Number of PTA visits since last PT visit: 2024

## 2024-07-20 NOTE — PROGRESS NOTES
[facility]  Hospital Medicine Progress Note          Chief complaint: Follow-up for femoral acetabular fracture    Hospital course:  73-year-old female with CAD, COPD, hypertension, hyperlipidemia who was hospitalized on 07/12/2024 from an outlying facility after she was found to have a right inferior pubic rami/left pubic symphysis fracture, right sacral fracture, nondisplaced acetabular fracture.  She underwent intramedullary nailing of the left femur fracture on 07/13 while the pelvic fracture was managed non operatively.  Postoperatively, she is pending a subacute level of care    Today: Vital signs stable, afebrile, wean off supplemental oxygenation which is 2 L. the patient did have a mild leukocytosis 1 day prior which has since resolved.  Stable electrolytes, renal failure, stable H&H.  Tolerating oral food/medications.  Medication list reviewed.  Lovenox for DVT prophylaxis.  She is complaining of a persistent cough for about 2-1/2 weeks.  Denies any fever, chills.  Denies any productive pertussis.  She did have a one-view chest x-ray few days prior which was unremarkable.      Objective/physical exam:  General: In no acute distress, afebrile  Chest: Clear to auscultation bilaterally, no wheezing noted  Heart: RRR, +S1, S2, no appreciable murmur  Abdomen: Soft, nontender, BS +  MSK: Warm, no lower extremity edema, no clubbing or cyanosis  Neurologic: Alert and oriented x4, Cranial nerve II-XII intact, Strength 5/5 in all 4 extremities    VITAL SIGNS: 24 HRS MIN & MAX LAST   Temp  Min: 97.7 °F (36.5 °C)  Max: 98.8 °F (37.1 °C) 97.7 °F (36.5 °C)   BP  Min: 125/61  Max: 191/74 (!) 145/65   Pulse  Min: 61  Max: 73  61   Resp  Min: 16  Max: 18 18   SpO2  Min: 92 %  Max: 97 % (!) 94 %     I have reviewed the following labs:  Recent Labs   Lab 07/18/24  0357 07/19/24  0915 07/20/24  0405   WBC 9.92 12.40* 10.83   RBC 3.08* 3.31* 3.32*   HGB 8.6* 9.3* 9.3*   HCT 28.1* 30.6* 29.6*   MCV  91.2 92.4 89.2   MCH 27.9 28.1 28.0   MCHC 30.6* 30.4* 31.4*   RDW 15.3 15.5 16.0    379 312   MPV 9.9 9.7 10.8*     Recent Labs   Lab 07/15/24  0339 07/19/24  0915 07/20/24  0405    136 140   K 4.5 4.0 3.9   * 105 107   CO2 24 23 23   BUN 31.1* 16.9 17.3   CREATININE 0.80 0.60 0.62   CALCIUM 8.4 8.7 8.4   ALBUMIN  --  2.5* 2.2*   ALKPHOS  --  176* 172*   ALT  --  47 48   AST  --  59* 55*   BILITOT  --  1.0 0.6     Microbiology Results (last 7 days)       ** No results found for the last 168 hours. **             See below for Radiology    Assessment/Plan:  Recurrent falls-mechanical   Nondisplaced femoral acetabular fracture status post intramedullary nailing 7/13   Pelvic fracture-right pubic rami, left pubic symphysis, right sacral, managed nonoperatively  Fracture related pain-improved  Sirs with mild leukocytosis  History of CAD   COPD with no acute exacerbation   Acute cough  Severe physical deconditioning   Anxiety/depression   Essential HTN-stable   HLD   Hypothyroidism   Macular degeneration/partial blindness   Prophylaxis    There was a concern about some mentation changes where a CT scan of the head was unremarkable, however, she was deemed to be at her baseline.  Continue with multimodal pain control, current anti hypertensive regimen.  Remains on dvt prophyl with lovenox.  PT/OT with lovenox.  Pending a subacute LOC.    Given the persistent cough and the initial unremarkable imaging on one view chest x-ray, we will consider a two-view.  If that is still unrevealing, may consider a CT scan.  Pursue supportive care with anti pertussis.    All diagnosis and differential diagnosis have been reviewed; assessment and plan has been documented; I have personally reviewed the labs and test results that are presently available; I have reviewed the patients medication list; I have reviewed the consulting providers response and recommendations. I have reviewed or attempted to review medical  records based upon their availability    All of the patient's questions have been  addressed and answered. Patient's is agreeable to the above stated plan. I will continue to monitor closely and make adjustments to medical management as needed.    Portions of this note dictated using EMR integrated voice recognition software, and may be subject to voice recognition errors not corrected at proofreading. Please contact writer for clarification if needed.   _____________________________________________________________________    Malnutrition Status:    Scheduled Med:   amiodarone  200 mg Oral BID    atorvastatin  10 mg Oral QHS    enoxparin  30 mg Subcutaneous Q24H (prophylaxis, 1700)    hydrALAZINE  100 mg Oral TID    levothyroxine  25 mcg Oral Before breakfast    lisinopriL  20 mg Oral BID    metoprolol succinate  50 mg Oral Daily    montelukast  10 mg Oral Daily    sertraline  100 mg Oral Daily    sodium chloride 0.9%  10 mL Intravenous Q6H      Continuous Infusions:     PRN Meds:    Current Facility-Administered Medications:     benzonatate, 100 mg, Oral, TID PRN    ceFAZolin 2 g/50mL Dextrose IVPB, 2 g, Intravenous, On Call Procedure    docusate sodium, 100 mg, Oral, BID PRN    HYDROcodone-acetaminophen, 1 tablet, Oral, Q6H PRN    ondansetron, 4 mg, Intravenous, Q6H PRN    prochlorperazine, 5 mg, Intravenous, Q6H PRN    Flushing PICC/Midline Protocol, , , Until Discontinued **AND** sodium chloride 0.9%, 10 mL, Intravenous, Q6H **AND** sodium chloride 0.9%, 10 mL, Intravenous, PRN    traZODone, 50 mg, Oral, Nightly PRN     Radiology:  I have personally reviewed the following imaging and agree with the radiologist.     CT Head Without Contrast  EXAMINATION  CT HEAD WITHOUT CONTRAST    CLINICAL HISTORY  Mental status change, unknown cause;    TECHNIQUE  Axial non-contrast CT images of the head were acquired and multiplanar reconstructions accomplished by a CT technologist at a separate workstation, pushed to PACS for  physician review.    COMPARISON  11 January 2024    FINDINGS  Images were reviewed in subdural, brain, soft tissue, and bone windows.    Exam quality: Motion/streak artifact limits assessment of the posterior fossa.    Hemorrhage: No evidence of acute hyperattenuating blood products.    Parenchyma: There is similar diffuse bilateral supratentorial white matter hypoattenuation, typical of chronic microvascular changes. No discrete mass, mass effect, or CT evidence of acute large vascular territory insult. Gray-white differentiation is preserved. Chronic post infarct changes of encephalomalacia again appreciated at the left MARY and PCA territories.    Midline shift: None.    CSF spaces: Proportional appearance of ventricular and sulcal enlargement. No hydrocephalus. No masses or fluid collections.    Vasculature: No focally hyperdense artery. Scattered carotid siphon calcifications are present. No abnormal densities within the dural sinuses.    Other findings: No abnormalities of the scalp or subjacent osseous structures. Mastoids are well aerated. No focal abnormality of the sella. No acute abnormality of the visualized maxillofacial structures appreciated.  As before, there is extensive opacification of the left paranasal cavities.    IMPRESSION  1. No acute intracranial abnormality.  2. Age-related atrophy and chronic sequela of white matter microvascular disease.  3. Similar findings suggestive of severe chronic left sinusitis.  4. Additional chronic secondary details discussed above, stable in comparison to the January 2024 CT appearance    RADIATION DOSE  Automated tube current modulation, weight-based exposure dosing, and/or iterative reconstruction technique utilized to reach lowest reasonably achievable exposure rate.    DLP: 2023 mGy*cm    Electronically signed by: Rufino Gillis  Date:    07/19/2024  Time:    12:39      Joo Matute MD  Department of Hospital Medicine   [facility]  [place]   07/20/2024

## 2024-07-20 NOTE — PT/OT/SLP PROGRESS
"Occupational Therapy   Treatment    Name: Cierra Main  MRN: [id number]  Admitting Diagnosis:  Closed 2-part intertrochanteric fracture of proximal femur, right, initial encounter  7 Days Post-Op    Recommendations:     Recommended therapy intensity at discharge: Moderate Intensity Therapy   Discharge Equipment Recommendations:  to be determined by next level of care  Barriers to discharge:       Assessment:     Cierra Main is a 73 y.o. female with a medical diagnosis of Closed 2-part intertrochanteric fracture of proximal femur, right, initial encounter.  She presents with . Performance deficits affecting function are weakness, gait instability, impaired balance, pain, decreased safety awareness, impaired functional mobility, impaired self care skills, impaired endurance, decreased coordination, decreased lower extremity function.     Rehab Prognosis:  Good; patient would benefit from acute skilled OT services to address these deficits and reach maximum level of function.       Plan:     Patient to be seen 5 x/week to address the above listed problems via self-care/home management, therapeutic activities, therapeutic exercises  Plan of Care Expires: 08/15/24  Plan of Care Reviewed with: patient    Subjective     Pain/Comfort:  Pain Rating 1: 8/10  Location - Side 1: Right  Location - Orientation 1: lower  Location 1: hip    Objective:     Communicated with: RN prior to session.  Patient found supine with pulse ox (continuous), telemetry, oxygen upon OT entry to room.    General Precautions: Standard, fall    Orthopedic Precautions:RLE weight bearing as tolerated, LLE weight bearing as tolerated  Braces: N/A  Respiratory Status: Nasal cannula, flow 2 L/min  Vital Signs:      S: "I guess I'll try."    O:Pt sup>EOB c Mod A. Pt req min A for assisting to walk LE to EOB and mod A for righting torso to sit upright. Pt completed EOB>BSC t/f c RW req min A x 2 for max safety (pt at times experiencing anxiety and " desirous to sit prematurely). Pt BSC>chair c min Ax2. Pt assisted with lunch tray, req CHAO and verbal cues for location of items on tray 2/2 vision deficits.     A:Pt pleasant and motivated, req extended time for task completion 2/2 pain in RLE.    P:cont c current OT POC      [facility] 6 Click ADL: 15      GOALS:   Multidisciplinary Problems       Occupational Therapy Goals          Problem: Occupational Therapy    Goal Priority Disciplines Outcome Interventions   Occupational Therapy Goal     OT, PT/OT Progressing    Description: Goals to be met by: 8/15/24     Patient will increase functional independence with ADLs by performing:    UE Dressing with Modified Sac.  LE Dressing with Modified Sac.  Grooming while standing at sink with Modified Sac.  Toileting from toilet with Modified Sac for hygiene and clothing management.   Toilet transfer to toilet with Modified Sac.                         Time Tracking:     OT Date of Treatment:    OT Start Time: 1100  OT Stop Time: 1124  OT Total Time (min): 24 min    Billable Minutes:    OT/MARY: MARY     Number of MARY visits since last OT visit: 4    7/20/2024

## 2024-07-21 LAB
ALBUMIN SERPL-MCNC: 2.2 G/DL (ref 3.4–4.8)
ALBUMIN/GLOB SERPL: 1 RATIO (ref 1.1–2)
ALP SERPL-CCNC: 168 UNIT/L (ref 40–150)
ALT SERPL-CCNC: 41 UNIT/L (ref 0–55)
ANION GAP SERPL CALC-SCNC: 3 MEQ/L
AST SERPL-CCNC: 38 UNIT/L (ref 5–34)
BASOPHILS # BLD AUTO: 0.08 X10(3)/MCL
BASOPHILS NFR BLD AUTO: 0.9 %
BILIRUB SERPL-MCNC: 0.6 MG/DL
BUN SERPL-MCNC: 17.2 MG/DL (ref 9.8–20.1)
CALCIUM SERPL-MCNC: 8.1 MG/DL (ref 8.4–10.2)
CHLORIDE SERPL-SCNC: 110 MMOL/L (ref 98–107)
CO2 SERPL-SCNC: 27 MMOL/L (ref 23–31)
CREAT SERPL-MCNC: 0.65 MG/DL (ref 0.55–1.02)
CREAT/UREA NIT SERPL: 26
EOSINOPHIL # BLD AUTO: 0.29 X10(3)/MCL (ref 0–0.9)
EOSINOPHIL NFR BLD AUTO: 3.3 %
ERYTHROCYTE [DISTWIDTH] IN BLOOD BY AUTOMATED COUNT: 16.4 % (ref 11.5–17)
GFR SERPLBLD CREATININE-BSD FMLA CKD-EPI: >60 ML/MIN/1.73/M2
GLOBULIN SER-MCNC: 2.1 GM/DL (ref 2.4–3.5)
GLUCOSE SERPL-MCNC: 104 MG/DL (ref 82–115)
HCT VFR BLD AUTO: 29.5 % (ref 37–47)
HGB BLD-MCNC: 9 G/DL (ref 12–16)
IMM GRANULOCYTES # BLD AUTO: 0.24 X10(3)/MCL (ref 0–0.04)
IMM GRANULOCYTES NFR BLD AUTO: 2.7 %
LYMPHOCYTES # BLD AUTO: 1.29 X10(3)/MCL (ref 0.6–4.6)
LYMPHOCYTES NFR BLD AUTO: 14.6 %
MCH RBC QN AUTO: 28.4 PG (ref 27–31)
MCHC RBC AUTO-ENTMCNC: 30.5 G/DL (ref 33–36)
MCV RBC AUTO: 93.1 FL (ref 80–94)
MONOCYTES # BLD AUTO: 0.8 X10(3)/MCL (ref 0.1–1.3)
MONOCYTES NFR BLD AUTO: 9 %
NEUTROPHILS # BLD AUTO: 6.15 X10(3)/MCL (ref 2.1–9.2)
NEUTROPHILS NFR BLD AUTO: 69.5 %
NRBC BLD AUTO-RTO: 0 %
PLATELET # BLD AUTO: 385 X10(3)/MCL (ref 130–400)
PMV BLD AUTO: 9.9 FL (ref 7.4–10.4)
POTASSIUM SERPL-SCNC: 4.1 MMOL/L (ref 3.5–5.1)
PROT SERPL-MCNC: 4.3 GM/DL (ref 5.8–7.6)
RBC # BLD AUTO: 3.17 X10(6)/MCL (ref 4.2–5.4)
SODIUM SERPL-SCNC: 140 MMOL/L (ref 136–145)
WBC # BLD AUTO: 8.85 X10(3)/MCL (ref 4.5–11.5)

## 2024-07-21 PROCEDURE — 25000003 PHARM REV CODE 250: Performed by: HOSPITALIST

## 2024-07-21 PROCEDURE — 63600175 PHARM REV CODE 636 W HCPCS: Performed by: HOSPITALIST

## 2024-07-21 PROCEDURE — A4216 STERILE WATER/SALINE, 10 ML: HCPCS | Performed by: HOSPITALIST

## 2024-07-21 PROCEDURE — 80053 COMPREHEN METABOLIC PANEL: CPT | Performed by: HOSPITALIST

## 2024-07-21 PROCEDURE — 25000003 PHARM REV CODE 250

## 2024-07-21 PROCEDURE — 36415 COLL VENOUS BLD VENIPUNCTURE: CPT | Performed by: HOSPITALIST

## 2024-07-21 PROCEDURE — 25000003 PHARM REV CODE 250: Performed by: INTERNAL MEDICINE

## 2024-07-21 PROCEDURE — 27000221 HC OXYGEN, UP TO 24 HOURS

## 2024-07-21 PROCEDURE — 11000001 HC ACUTE MED/SURG PRIVATE ROOM

## 2024-07-21 PROCEDURE — 85025 COMPLETE CBC W/AUTO DIFF WBC: CPT | Performed by: HOSPITALIST

## 2024-07-21 RX ADMIN — LISINOPRIL 20 MG: 20 TABLET ORAL at 08:07

## 2024-07-21 RX ADMIN — DOCUSATE SODIUM 100 MG: 100 CAPSULE, LIQUID FILLED ORAL at 08:07

## 2024-07-21 RX ADMIN — LISINOPRIL 20 MG: 20 TABLET ORAL at 09:07

## 2024-07-21 RX ADMIN — SODIUM CHLORIDE, PRESERVATIVE FREE 10 ML: 5 INJECTION INTRAVENOUS at 05:07

## 2024-07-21 RX ADMIN — ATORVASTATIN CALCIUM 10 MG: 10 TABLET, FILM COATED ORAL at 08:07

## 2024-07-21 RX ADMIN — METOPROLOL SUCCINATE 50 MG: 50 TABLET, EXTENDED RELEASE ORAL at 09:07

## 2024-07-21 RX ADMIN — TRAZODONE HYDROCHLORIDE 50 MG: 50 TABLET ORAL at 08:07

## 2024-07-21 RX ADMIN — AMIODARONE HYDROCHLORIDE 200 MG: 200 TABLET ORAL at 09:07

## 2024-07-21 RX ADMIN — BENZONATATE 100 MG: 100 CAPSULE ORAL at 12:07

## 2024-07-21 RX ADMIN — HYDRALAZINE HYDROCHLORIDE 100 MG: 50 TABLET ORAL at 09:07

## 2024-07-21 RX ADMIN — MONTELUKAST 10 MG: 10 TABLET, FILM COATED ORAL at 09:07

## 2024-07-21 RX ADMIN — HYDRALAZINE HYDROCHLORIDE 100 MG: 50 TABLET ORAL at 03:07

## 2024-07-21 RX ADMIN — ENOXAPARIN SODIUM 30 MG: 30 INJECTION SUBCUTANEOUS at 04:07

## 2024-07-21 RX ADMIN — SODIUM CHLORIDE, PRESERVATIVE FREE 10 ML: 5 INJECTION INTRAVENOUS at 06:07

## 2024-07-21 RX ADMIN — SERTRALINE HYDROCHLORIDE 100 MG: 50 TABLET ORAL at 09:07

## 2024-07-21 RX ADMIN — SODIUM CHLORIDE, PRESERVATIVE FREE 10 ML: 5 INJECTION INTRAVENOUS at 12:07

## 2024-07-21 RX ADMIN — SODIUM CHLORIDE, PRESERVATIVE FREE 10 ML: 5 INJECTION INTRAVENOUS at 11:07

## 2024-07-21 RX ADMIN — BENZONATATE 100 MG: 100 CAPSULE ORAL at 08:07

## 2024-07-21 RX ADMIN — LEVOTHYROXINE SODIUM 25 MCG: 25 TABLET ORAL at 05:07

## 2024-07-21 RX ADMIN — AMIODARONE HYDROCHLORIDE 200 MG: 200 TABLET ORAL at 08:07

## 2024-07-21 NOTE — PROGRESS NOTES
[facility] - 8th Floor [facility] Medicine  Progress Note    Patient Name: Cierra Main  MRN: [id number]  Patient Class: IP- Inpatient   Admission Date: 7/12/2024  Length of Stay: 9 days  Attending Physician: Teresa Richey MD  Primary Care Provider: Maegan, Primary Doctor        Subjective:     Principal Problem:Closed 2-part intertrochanteric fracture of proximal femur, right, initial encounter        HPI:  73-year-old female with significant history of CAD, COPD, anxiety, depression, HTN, HLD, hypothyroidism, macular degeneration with partial blindness presented to the ED after multiple falls, significant pelvic pain.  Patient endorsed progressive generalized weakness in the recent past.  Patient initially presented to outlying facility and was transferred to our hospital for orthopedic services given imaging findings showing right inferior pubic rami/left pubic symphysis fracture, right sacral fracture and nondisplaced acetabular fracture.  Orthopedics evaluated and the patient was taken to OR for intramedullary nailing for femur fracture on 07/13, decided to manage pelvic fracture nonoperatively.  Surgery in consideration in case pain is not adequately controlled.  Therapy services consulted.  Recommending skilled nursing facility placement, case management consulted for the same.  Pain adequately controlled     Overview/Hospital Course:  7/21/24-NO issues today.  Pain is better.  Patient is confused though.  Waiting for placement.    Interval History:     Review of Systems   Unable to perform ROS: Dementia     Objective:     Vital Signs (Most Recent):  Temp: 98 °F (36.7 °C) (07/21/24 1128)  Pulse: (!) 56 (07/21/24 1128)  Resp: 20 (07/21/24 1128)  BP: (!) 133/54 (07/21/24 1128)  SpO2: 97 % (07/21/24 1128) Vital Signs (24h Range):  Temp:  [97.6 °F (36.4 °C)-98.5 °F (36.9 °C)] 98 °F (36.7 °C)  Pulse:  [56-68] 56  Resp:  [16-20] 20  SpO2:  [95 %-98 %] 97 %  BP: (115-161)/(54-82) 133/54      Weight: 38.6 kg (85 lb 1.6 oz)  Body mass index is 15.07 kg/m².    Intake/Output Summary (Last 24 hours) at 7/21/2024 1321  Last data filed at 7/21/2024 1233  Gross per 24 hour   Intake 740 ml   Output 650 ml   Net 90 ml         Physical Exam  Constitutional:       General: She is awake.      Appearance: She is underweight.   HENT:      Head: Normocephalic and atraumatic.      Nose: Nose normal.      Mouth/Throat:      Mouth: Mucous membranes are moist.      Pharynx: Oropharynx is clear.   Eyes:      Extraocular Movements: Extraocular movements intact.      Conjunctiva/sclera: Conjunctivae normal.      Pupils: Pupils are equal, round, and reactive to light.   Cardiovascular:      Rate and Rhythm: Normal rate and regular rhythm.      Pulses: Normal pulses.      Heart sounds: Normal heart sounds.   Pulmonary:      Effort: Pulmonary effort is normal.      Breath sounds: Normal breath sounds.   Abdominal:      General: Bowel sounds are normal.      Palpations: Abdomen is soft.   Musculoskeletal:         General: Tenderness present. Normal range of motion.      Cervical back: Normal range of motion and neck supple.   Skin:     General: Skin is warm.      Capillary Refill: Capillary refill takes 2 to 3 seconds.   Neurological:      General: No focal deficit present.      Mental Status: She is easily aroused.   Psychiatric:         Attention and Perception: She is inattentive.         Mood and Affect: Affect is labile.         Speech: Speech is delayed.         Behavior: Behavior is slowed.         Cognition and Memory: Cognition is impaired. Memory is impaired.             Significant Labs: All pertinent labs within the past 24 hours have been reviewed.  BMP:   Recent Labs   Lab 07/21/24  0443      K 4.1   *   CO2 27   BUN 17.2   CREATININE 0.65   CALCIUM 8.1*     CBC:   Recent Labs   Lab 07/20/24  0405 07/21/24  0443   WBC 10.83 8.85   HGB 9.3* 9.0*   HCT 29.6* 29.5*    385     CMP:   Recent Labs  "  Lab 07/20/24  0405 07/21/24  0443    140   K 3.9 4.1    110*   CO2 23 27   BUN 17.3 17.2   CREATININE 0.62 0.65   CALCIUM 8.4 8.1*   ALBUMIN 2.2* 2.2*   BILITOT 0.6 0.6   ALKPHOS 172* 168*   AST 55* 38*   ALT 48 41     Magnesium: No results for input(s): "MG" in the last 48 hours.    Significant Imaging: I have reviewed all pertinent imaging results/findings within the past 24 hours.    Assessment/Plan:      * Closed 2-part intertrochanteric fracture of proximal femur, right, initial encounter  Therapy  Pain control  Placement?      Closed fracture of right pelvis  therapy      Moderate malnutrition  Nutrition consulted. Most recent weight and BMI monitored-     Measurements:  Wt Readings from Last 1 Encounters:   07/13/24 38.6 kg (85 lb 1.6 oz)   Body mass index is 15.07 kg/m².    Patient has been screened and assessed by RD.    Malnutrition Type:  Context: chronic illness  Level: moderate    Malnutrition Characteristic Summary:  Weight Loss (Malnutrition):  (Does not meet criteria)  Energy Intake (Malnutrition):  (Does not meet criteria)  Subcutaneous Fat (Malnutrition): moderate depletion  Muscle Mass (Malnutrition): moderate depletion  Fluid Accumulation (Malnutrition):  (Does not meet criteria)  Hand  Strength, Left (Malnutrition):  (Does not meet criteria)  Hand  Strength, Right (Malnutrition):  (Does not meet criteria)    Interventions/Recommendations (treatment strategy):         Essential hypertension  Chronic, controlled. Latest blood pressure and vitals reviewed-     Temp:  [97.6 °F (36.4 °C)-98.5 °F (36.9 °C)]   Pulse:  [56-68]   Resp:  [16-20]   BP: (115-161)/(54-82)   SpO2:  [95 %-98 %] .   Home meds for hypertension were reviewed and noted below.   Hypertension Medications               hydrALAZINE (APRESOLINE) 100 MG tablet Take 1 tablet (100 mg total) by mouth 3 (three) times daily.    lisinopriL (PRINIVIL,ZESTRIL) 20 MG tablet Take 1 tablet (20 mg total) by mouth 2 (two) times " daily.    metoprolol succinate (TOPROL-XL) 50 MG 24 hr tablet Take 25 mg by mouth once daily.    nitroGLYCERIN 0.4 MG/DOSE TL SPRY (NITROLINGUAL) 400 mcg/spray spray nitroglycerin 400 mcg/spray translingual aerosol   at onset of chest pain may take up to three sprays every 5min during a 15 min period            While in the hospital, will manage blood pressure as follows; Continue home antihypertensive regimen    Will utilize p.r.n. blood pressure medication only if patient's blood pressure greater than 140/90 and she develops symptoms such as worsening chest pain or shortness of breath.      VTE Risk Mitigation (From admission, onward)           Ordered     enoxaparin injection 30 mg  Every 24 hours         07/14/24 1023                DVT prophylaxis  Pain control  Therapy  OOB  ?placement    Discharge Planning   SUMMER:      Code Status: Prior   Is the patient medically ready for discharge?:     Reason for patient still in hospital (select all that apply): Patient trending condition, Treatment, Consult recommendations, PT / OT recommendations, and Pending disposition  Discharge Plan A: Other (swing bed)                  Arnold Gonsalez MD  Department of Hospital Medicine   [facility] - 8th Floor Med Surg

## 2024-07-21 NOTE — NURSING
Nurses Note -- 4 Eyes      7/21/2024   3:37 PM      Skin assessed during: Q Shift Change      [x] No Altered Skin Integrity Present    []Prevention Measures Documented      [] Yes- Altered Skin Integrity Present or Discovered   [] LDA Added if Not in Epic (Describe Wound)   [] New Altered Skin Integrity was Present on Admit and Documented in LDA   [] Wound Image Taken    Wound Care Consulted? No    Attending Nurse:  Waldo Cloud RN/Staff Member:   Lolis

## 2024-07-21 NOTE — ASSESSMENT & PLAN NOTE
Nutrition consulted. Most recent weight and BMI monitored-     Measurements:  Wt Readings from Last 1 Encounters:   07/13/24 38.6 kg (85 lb 1.6 oz)   Body mass index is 15.07 kg/m².    Patient has been screened and assessed by RD.    Malnutrition Type:  Context: chronic illness  Level: moderate    Malnutrition Characteristic Summary:  Weight Loss (Malnutrition):  (Does not meet criteria)  Energy Intake (Malnutrition):  (Does not meet criteria)  Subcutaneous Fat (Malnutrition): moderate depletion  Muscle Mass (Malnutrition): moderate depletion  Fluid Accumulation (Malnutrition):  (Does not meet criteria)  Hand  Strength, Left (Malnutrition):  (Does not meet criteria)  Hand  Strength, Right (Malnutrition):  (Does not meet criteria)    Interventions/Recommendations (treatment strategy):

## 2024-07-21 NOTE — PLAN OF CARE
Problem: Adult Inpatient Plan of Care  Goal: Plan of Care Review  Outcome: Progressing  Goal: Patient-Specific Goal (Individualized)  Outcome: Progressing  Goal: Absence of Hospital-Acquired Illness or Injury  Outcome: Progressing  Goal: Optimal Comfort and Wellbeing  Outcome: Progressing  Goal: Readiness for Transition of Care  Outcome: Progressing     Problem: Wound  Goal: Optimal Coping  Outcome: Progressing  Goal: Optimal Functional Ability  Outcome: Progressing  Goal: Absence of Infection Signs and Symptoms  Outcome: Progressing  Goal: Improved Oral Intake  Outcome: Progressing  Goal: Optimal Pain Control and Function  Outcome: Progressing  Goal: Skin Health and Integrity  Outcome: Progressing  Goal: Optimal Wound Healing  Outcome: Progressing     Problem: Skin Injury Risk Increased  Goal: Skin Health and Integrity  Outcome: Progressing     Problem: Fall Injury Risk  Goal: Absence of Fall and Fall-Related Injury  Outcome: Progressing     Problem: Infection  Goal: Absence of Infection Signs and Symptoms  Outcome: Progressing      --- continued ASA 81 mg QD  --- Continue plavix 75 mg QD  --- Continue high dose statins, atorvastatin 80 mg QD  --- patient went to cath lab, but no intervention was preformed as patent LM and RCA stents 4/3/17

## 2024-07-21 NOTE — HPI
73-year-old female with significant history of CAD, COPD, anxiety, depression, HTN, HLD, hypothyroidism, macular degeneration with partial blindness presented to the ED after multiple falls, significant pelvic pain.  Patient endorsed progressive generalized weakness in the recent past.  Patient initially presented to outlying facility and was transferred to our hospital for orthopedic services given imaging findings showing right inferior pubic rami/left pubic symphysis fracture, right sacral fracture and nondisplaced acetabular fracture.  Orthopedics evaluated and the patient was taken to OR for intramedullary nailing for femur fracture on 07/13, decided to manage pelvic fracture nonoperatively.  Surgery in consideration in case pain is not adequately controlled.  Therapy services consulted.  Recommending skilled nursing facility placement, case management consulted for the same.  Pain adequately controlled

## 2024-07-21 NOTE — SUBJECTIVE & OBJECTIVE
Interval History:     Review of Systems   Unable to perform ROS: Dementia     Objective:     Vital Signs (Most Recent):  Temp: 98 °F (36.7 °C) (07/21/24 1128)  Pulse: (!) 56 (07/21/24 1128)  Resp: 20 (07/21/24 1128)  BP: (!) 133/54 (07/21/24 1128)  SpO2: 97 % (07/21/24 1128) Vital Signs (24h Range):  Temp:  [97.6 °F (36.4 °C)-98.5 °F (36.9 °C)] 98 °F (36.7 °C)  Pulse:  [56-68] 56  Resp:  [16-20] 20  SpO2:  [95 %-98 %] 97 %  BP: (115-161)/(54-82) 133/54     Weight: 38.6 kg (85 lb 1.6 oz)  Body mass index is 15.07 kg/m².    Intake/Output Summary (Last 24 hours) at 7/21/2024 1321  Last data filed at 7/21/2024 1233  Gross per 24 hour   Intake 740 ml   Output 650 ml   Net 90 ml         Physical Exam  Constitutional:       General: She is awake.      Appearance: She is underweight.   HENT:      Head: Normocephalic and atraumatic.      Nose: Nose normal.      Mouth/Throat:      Mouth: Mucous membranes are moist.      Pharynx: Oropharynx is clear.   Eyes:      Extraocular Movements: Extraocular movements intact.      Conjunctiva/sclera: Conjunctivae normal.      Pupils: Pupils are equal, round, and reactive to light.   Cardiovascular:      Rate and Rhythm: Normal rate and regular rhythm.      Pulses: Normal pulses.      Heart sounds: Normal heart sounds.   Pulmonary:      Effort: Pulmonary effort is normal.      Breath sounds: Normal breath sounds.   Abdominal:      General: Bowel sounds are normal.      Palpations: Abdomen is soft.   Musculoskeletal:         General: Tenderness present. Normal range of motion.      Cervical back: Normal range of motion and neck supple.   Skin:     General: Skin is warm.      Capillary Refill: Capillary refill takes 2 to 3 seconds.   Neurological:      General: No focal deficit present.      Mental Status: She is easily aroused.   Psychiatric:         Attention and Perception: She is inattentive.         Mood and Affect: Affect is labile.         Speech: Speech is delayed.         Behavior:  "Behavior is slowed.         Cognition and Memory: Cognition is impaired. Memory is impaired.             Significant Labs: All pertinent labs within the past 24 hours have been reviewed.  BMP:   Recent Labs   Lab 07/21/24  0443      K 4.1   *   CO2 27   BUN 17.2   CREATININE 0.65   CALCIUM 8.1*     CBC:   Recent Labs   Lab 07/20/24 0405 07/21/24  0443   WBC 10.83 8.85   HGB 9.3* 9.0*   HCT 29.6* 29.5*    385     CMP:   Recent Labs   Lab 07/20/24 0405 07/21/24  0443    140   K 3.9 4.1    110*   CO2 23 27   BUN 17.3 17.2   CREATININE 0.62 0.65   CALCIUM 8.4 8.1*   ALBUMIN 2.2* 2.2*   BILITOT 0.6 0.6   ALKPHOS 172* 168*   AST 55* 38*   ALT 48 41     Magnesium: No results for input(s): "MG" in the last 48 hours.    Significant Imaging: I have reviewed all pertinent imaging results/findings within the past 24 hours.  "

## 2024-07-21 NOTE — ASSESSMENT & PLAN NOTE
Chronic, controlled. Latest blood pressure and vitals reviewed-     Temp:  [97.6 °F (36.4 °C)-98.5 °F (36.9 °C)]   Pulse:  [56-68]   Resp:  [16-20]   BP: (115-161)/(54-82)   SpO2:  [95 %-98 %] .   Home meds for hypertension were reviewed and noted below.   Hypertension Medications               hydrALAZINE (APRESOLINE) 100 MG tablet Take 1 tablet (100 mg total) by mouth 3 (three) times daily.    lisinopriL (PRINIVIL,ZESTRIL) 20 MG tablet Take 1 tablet (20 mg total) by mouth 2 (two) times daily.    metoprolol succinate (TOPROL-XL) 50 MG 24 hr tablet Take 25 mg by mouth once daily.    nitroGLYCERIN 0.4 MG/DOSE TL SPRY (NITROLINGUAL) 400 mcg/spray spray nitroglycerin 400 mcg/spray translingual aerosol   at onset of chest pain may take up to three sprays every 5min during a 15 min period            While in the hospital, will manage blood pressure as follows; Continue home antihypertensive regimen    Will utilize p.r.n. blood pressure medication only if patient's blood pressure greater than 140/90 and she develops symptoms such as worsening chest pain or shortness of breath.

## 2024-07-22 VITALS
HEART RATE: 72 BPM | DIASTOLIC BLOOD PRESSURE: 59 MMHG | OXYGEN SATURATION: 79 % | RESPIRATION RATE: 16 BRPM | TEMPERATURE: 98 F | HEIGHT: 63 IN | BODY MASS INDEX: 15.08 KG/M2 | SYSTOLIC BLOOD PRESSURE: 153 MMHG | WEIGHT: 85.13 LBS

## 2024-07-22 PROCEDURE — 97535 SELF CARE MNGMENT TRAINING: CPT

## 2024-07-22 PROCEDURE — A4216 STERILE WATER/SALINE, 10 ML: HCPCS | Performed by: HOSPITALIST

## 2024-07-22 PROCEDURE — 25000003 PHARM REV CODE 250: Performed by: HOSPITALIST

## 2024-07-22 PROCEDURE — 27000221 HC OXYGEN, UP TO 24 HOURS

## 2024-07-22 PROCEDURE — 25000003 PHARM REV CODE 250: Performed by: INTERNAL MEDICINE

## 2024-07-22 RX ORDER — METOPROLOL SUCCINATE 50 MG/1
50 TABLET, EXTENDED RELEASE ORAL DAILY
Qty: 90 TABLET | Refills: 3 | Status: SHIPPED | OUTPATIENT
Start: 2024-07-23 | End: 2025-07-23

## 2024-07-22 RX ORDER — MONTELUKAST SODIUM 10 MG/1
10 TABLET ORAL DAILY
Qty: 30 TABLET | Refills: 0 | Status: SHIPPED | OUTPATIENT
Start: 2024-07-23 | End: 2024-08-22

## 2024-07-22 RX ORDER — LEVOTHYROXINE SODIUM 25 UG/1
25 TABLET ORAL
Qty: 30 TABLET | Refills: 11 | Status: SHIPPED | OUTPATIENT
Start: 2024-07-23 | End: 2025-07-23

## 2024-07-22 RX ORDER — TRAZODONE HYDROCHLORIDE 50 MG/1
50 TABLET ORAL NIGHTLY PRN
Qty: 30 TABLET | Refills: 2 | Status: SHIPPED | OUTPATIENT
Start: 2024-07-22 | End: 2024-10-20

## 2024-07-22 RX ORDER — HYDROCODONE BITARTRATE AND ACETAMINOPHEN 5; 325 MG/1; MG/1
1 TABLET ORAL EVERY 6 HOURS PRN
Qty: 28 TABLET | Refills: 0 | Status: SHIPPED | OUTPATIENT
Start: 2024-07-22 | End: 2024-07-29

## 2024-07-22 RX ORDER — ENOXAPARIN SODIUM 100 MG/ML
30 INJECTION SUBCUTANEOUS EVERY 24 HOURS
Qty: 9 ML | Refills: 0 | Status: SHIPPED | OUTPATIENT
Start: 2024-07-22 | End: 2024-08-21

## 2024-07-22 RX ORDER — ASPIRIN 81 MG/1
81 TABLET ORAL 2 TIMES DAILY
Qty: 60 TABLET | Refills: 0 | Status: CANCELLED | OUTPATIENT
Start: 2024-07-22 | End: 2024-08-21

## 2024-07-22 RX ADMIN — SERTRALINE HYDROCHLORIDE 100 MG: 50 TABLET ORAL at 09:07

## 2024-07-22 RX ADMIN — HYDRALAZINE HYDROCHLORIDE 100 MG: 50 TABLET ORAL at 09:07

## 2024-07-22 RX ADMIN — MONTELUKAST 10 MG: 10 TABLET, FILM COATED ORAL at 09:07

## 2024-07-22 RX ADMIN — METOPROLOL SUCCINATE 50 MG: 50 TABLET, EXTENDED RELEASE ORAL at 09:07

## 2024-07-22 RX ADMIN — AMIODARONE HYDROCHLORIDE 200 MG: 200 TABLET ORAL at 09:07

## 2024-07-22 RX ADMIN — LEVOTHYROXINE SODIUM 25 MCG: 25 TABLET ORAL at 05:07

## 2024-07-22 RX ADMIN — BENZONATATE 100 MG: 100 CAPSULE ORAL at 09:07

## 2024-07-22 RX ADMIN — SODIUM CHLORIDE, PRESERVATIVE FREE 10 ML: 5 INJECTION INTRAVENOUS at 12:07

## 2024-07-22 RX ADMIN — LISINOPRIL 20 MG: 20 TABLET ORAL at 09:07

## 2024-07-22 RX ADMIN — SODIUM CHLORIDE, PRESERVATIVE FREE 10 ML: 5 INJECTION INTRAVENOUS at 05:07

## 2024-07-22 NOTE — PLAN OF CARE
SS[facility] uploaded discharge orders/AVS clinicals and PASSAR 142 to [employer] via [employer]. Ste[place] [employer] [place] notified and report information/packet was given to nurse.

## 2024-07-22 NOTE — PT/OT/SLP PROGRESS
Occupational Therapy   Treatment    Name: Cierra Main  MRN: [id number]  Admitting Diagnosis:  Closed 2-part intertrochanteric fracture of proximal femur, right, initial encounter  9 Days Post-Op    Recommendations:     Recommended therapy intensity at discharge: Moderate Intensity Therapy   Discharge Equipment Recommendations:  to be determined by next level of care  Barriers to discharge:  None    Assessment:     Cierra Main is a 73 y.o. female with a medical diagnosis of Closed 2-part intertrochanteric fracture of proximal femur, right, initial encounter.  She presents with the following performance deficits affecting function are weakness, pain, impaired balance, gait instability, impaired functional mobility, decreased safety awareness, decreased lower extremity function, impaired self care skills, impaired endurance.     Rehab Prognosis:  Good; patient would benefit from acute skilled OT services to address these deficits and reach maximum level of function.       Plan:     Patient to be seen 5 x/week to address the above listed problems via self-care/home management, therapeutic activities, therapeutic exercises  Plan of Care Expires: 08/15/24  Plan of Care Reviewed with: patient    Subjective     Pain/Comfort:  Pain Rating 1: other (see comments) (no pain at rest; increased pain with mobility, did not rate)  Pain Addressed 1: Reposition, Distraction, Cessation of Activity    Objective:     Communicated with: RN prior to session.  Patient found up in chair with telemetry, [employer] upon OT entry to room.    General Precautions: Standard, fall    Orthopedic Precautions:RLE weight bearing as tolerated, LLE weight bearing as tolerated  Braces: N/A  Respiratory Status: Room air     Occupational Performance:     Functional Mobility/Transfers:  Patient completed Sit <> Stand Transfer with minimum assistance x2 with rolling walker, completed 4 trials. Towards end of session pt required increased assist due to  fatigue and pain.   Functional Mobility: mod A to ambulate 2 steps x2 using RW, maximal encouragement required. Pt sat between trials due to pain    Activities of Daily Living:  Grooming: minimum assistance sitting at sink for oral care, required assist for set up due to decreased vision    Therapeutic Positioning    OT interventions performed during the course of today's session in an effort to prevent and/or reduce acquired pressure injuries:   Education was provided on benefits of and recommendations for therapeutic positioning    Skin assessment: visible skin intact    Patient Education:  Patient provided with verbal education education regarding OT role/goals/POC, fall prevention, and safety awareness.  Understanding was verbalized, however additional teaching warranted.    Patient left up in chair with all lines intact and call button in reach.    GOALS:   Multidisciplinary Problems       Occupational Therapy Goals          Problem: Occupational Therapy    Goal Priority Disciplines Outcome Interventions   Occupational Therapy Goal     OT, PT/OT Progressing    Description: Goals to be met by: 8/15/24     Patient will increase functional independence with ADLs by performing:    UE Dressing with Modified [place].  LE Dressing with Modified [place].  Grooming while standing at sink with Modified [place].  Toileting from toilet with Modified [place] for hygiene and clothing management.   Toilet transfer to toilet with Modified [place].                         Time Tracking:     OT Date of Treatment: 07/22/24  OT Start Time: 1030  OT Stop Time: 1049  OT Total Time (min): 19 min    Billable Minutes:Self Care/Home Management 19 mins    OT/MARY: OT     Number of MARY visits since last OT visit: 5 7/22/2024

## 2024-07-22 NOTE — DISCHARGE SUMMARY
[facility] Medicine Discharge Summary    Admit Date: 7/12/2024  Discharge Date and Time: 7/22/202410:18 AM  Admitting Physician:  Team  Discharging Physician: Qing Carpio MD.  Primary Care Physician: Maegan, Primary Doctor  Consults: {consultation: 14126}    Discharge Diagnoses:  ***    Hospital Course:   ***  Pt was seen and examined on the day of discharge  Vitals:  VITAL SIGNS: 24 HRS MIN & MAX LAST   Temp  Min: 97.3 °F (36.3 °C)  Max: 98.6 °F (37 °C) 98.1 °F (36.7 °C)   BP  Min: 126/55  Max: 176/69 (!) 150/64   Pulse  Min: 56  Max: 70  63   Resp  Min: 16  Max: 20 16   SpO2  Min: 90 %  Max: 97 % 95 %       Physical Exam:  ***    Procedures Performed: No admission procedures for hospital encounter.     Significant Diagnostic Studies: See Full reports for all details    Recent Labs   Lab 07/19/24 0915 07/20/24 0405 07/21/24  0443   WBC 12.40* 10.83 8.85   RBC 3.31* 3.32* 3.17*   HGB 9.3* 9.3* 9.0*   HCT 30.6* 29.6* 29.5*   MCV 92.4 89.2 93.1   MCH 28.1 28.0 28.4   MCHC 30.4* 31.4* 30.5*   RDW 15.5 16.0 16.4    312 385   MPV 9.7 10.8* 9.9       Recent Labs   Lab 07/19/24 0915 07/20/24 0405 07/21/24  0443    140 140   K 4.0 3.9 4.1    107 110*   CO2 23 23 27   BUN 16.9 17.3 17.2   CREATININE 0.60 0.62 0.65   CALCIUM 8.7 8.4 8.1*   ALBUMIN 2.5* 2.2* 2.2*   ALKPHOS 176* 172* 168*   ALT 47 48 41   AST 59* 55* 38*   BILITOT 1.0 0.6 0.6        Microbiology Results (last 7 days)       ** No results found for the last 168 hours. **             X-Ray Chest PA And Lateral  Narrative: EXAMINATION:  XR CHEST PA AND LATERAL    CLINICAL HISTORY:  fever;    TECHNIQUE:  Two-view    COMPARISON:  July 17, 2024.    FINDINGS:  Cardiopericardial silhouette is within normal limits.  There are bilateral small pleural and basilar atelectasis.  Chronic interstitial emphysematous changes of lungs without exclusion of slight congestive process.  There is no focally dense  consolidation.  No pneumothorax..  Impression: Bilateral small pleural effusions.  No pulmonary consolidation identified.    Electronically signed by: Yusuf Fleming  Date:    07/20/2024  Time:    13:01         Medication List        START taking these medications      enoxaparin 30 mg/0.3 mL Syrg  Commonly known as: LOVENOX  Inject 0.3 mLs (30 mg total) into the skin Q24H (prophylaxis, 1700).     HYDROcodone-acetaminophen 5-325 mg per tablet  Commonly known as: NORCO  Take 1 tablet by mouth every 6 (six) hours as needed for Pain.     traZODone 50 MG tablet  Commonly known as: DESYREL  Take 1 tablet (50 mg total) by mouth nightly as needed for Insomnia.            CHANGE how you take these medications      metoprolol succinate 50 MG 24 hr tablet  Commonly known as: TOPROL-XL  Take 1 tablet (50 mg total) by mouth once daily.  Start taking on: July 23, 2024  What changed: how much to take     montelukast 10 mg tablet  Commonly known as: SINGULAIR  Take 1 tablet (10 mg total) by mouth once daily.  Start taking on: July 23, 2024  What changed: when to take this            CONTINUE taking these medications      amiodarone 200 MG Tab  Commonly known as: PACERONE     atorvastatin 10 MG tablet  Commonly known as: LIPITOR  TAKE 1 TABLET BY MOUTH EVERY DAY IN THE EVENING     docusate sodium 100 MG capsule  Commonly known as: COLACE     hydrALAZINE 100 MG tablet  Commonly known as: APRESOLINE  Take 1 tablet (100 mg total) by mouth 3 (three) times daily.     levothyroxine 25 MCG tablet  Commonly known as: SYNTHROID  Take 1 tablet (25 mcg total) by mouth before breakfast.  Start taking on: July 23, 2024     lisinopriL 20 MG tablet  Commonly known as: PRINIVIL,ZESTRIL  Take 1 tablet (20 mg total) by mouth 2 (two) times daily.     meclizine 25 mg tablet  Commonly known as: ANTIVERT  Take 1 tablet (25 mg total) by mouth 3 (three) times daily as needed for Dizziness.     nitroGLYCERIN 0.4 MG/DOSE TL SPRY 400 mcg/spray spray  Commonly  known as: NITROLINGUAL     polyethylene glycol 17 gram Pwpk  Commonly known as: GLYCOLAX  Take 17 g by mouth 2 (two) times daily as needed for Constipation.     sertraline 100 MG tablet  Commonly known as: ZOLOFT  Take 1 tablet (100 mg total) by mouth once daily.            STOP taking these medications      ketorolac 10 mg tablet  Commonly known as: TORADOL     melatonin 3 mg Cap     traMADoL 50 mg tablet  Commonly known as: ULTRAM               Where to Get Your Medications        [facility] [address]  [address] [address]      Phone: [phone]   enoxaparin 30 mg/0.3 mL Syrg  HYDROcodone-acetaminophen 5-325 mg per tablet  levothyroxine 25 MCG tablet  metoprolol succinate 50 MG 24 hr tablet  montelukast 10 mg tablet  traZODone 50 MG tablet          Explained in detail to the patient about the discharge plan, medications, and follow-up visits. Pt understands and agrees with the treatment plan  Discharge Disposition: Short Term Hospital   Discharged Condition: stable  Diet-   Dietary Orders (From admission, onward)       Start     Ordered    07/18/24 1307  Dietary nutrition supplements Boost Very High Calorie Nutritional Drink - Vanilla; Daily  Continuous        Question Answer Comment   Select PO Supplement: Boost Very High Calorie Nutritional Drink - Vanilla    Frequency: Daily        07/18/24 1306    07/14/24 1011  Diet Adult Regular  Diet effective now         07/14/24 1010                   Medications Per DC med rec  Activities as tolerated   Follow-up Information       [facility] Schedule an appointment as soon as possible for a visit in 3 week(s).    Specialty: Orthopedic Surgery  Why: For wound check and suture/staple removal post op, With Dr Montiel's PA  Contact information:  [address] [facility]  [address]  [facility] [id number] [phone]               No, Primary Doctor Follow up.    Why: pcp f/up in 3-4  guerrero                         For further questions contact hospitalist office    Discharge time 33 minutes    For worsening symptoms, chest pain, shortness of breath, increased abdominal pain, high grade fever, stroke or stroke like symptoms, immediately go to the nearest Emergency Room or call 911 as soon as possible.      Qing Kasper M.D on 7/22/2024. at 10:18 AM.

## 2024-07-22 NOTE — PLAN OF CARE
07/22/24 1328   Final Note   Assessment Type Discharge Planning Assessment   Anticipated Discharge Disposition SNF   Hospital Resources/Appts/Education Provided Appointments scheduled and added to AVS   Post-Acute Status   Post-Acute Authorization Placement   Post-Acute Placement Status Set-up Complete/Auth obtained   Discharge Delays None known at this time     Pt will dc to [facility] for skilled services today

## 2024-07-22 NOTE — PROGRESS NOTES
Inpatient Nutrition Assessment    Admit Date: 7/12/2024   Total duration of encounter: 10 days   Patient Age: 73 y.o.    Nutrition Recommendation/Prescription     Continue Regular diet as tolerated.  Continue Boost VHC daily as tolerated (provides 530 kcal and 22 g pro per serving).  Consider MVI or Fe supplementation.  Monitor wt, labs, and po intake.    Communication of Recommendations: reviewed with nurse    Nutrition Assessment     Malnutrition Assessment/Nutrition-Focused Physical Exam    Malnutrition Context: chronic illness (07/18/24 1253)  Malnutrition Level: moderate (07/18/24 1253)  Energy Intake (Malnutrition):  (Does not meet criteria) (07/18/24 1253)  Weight Loss (Malnutrition):  (Does not meet criteria) (07/18/24 1253)  Subcutaneous Fat (Malnutrition): moderate depletion (07/18/24 1253)           Muscle Mass (Malnutrition): moderate depletion (07/18/24 1253)                          Fluid Accumulation (Malnutrition):  (Does not meet criteria) (07/18/24 1253)  Hand  Strength, Left (Malnutrition):  (Does not meet criteria) (07/18/24 1253)  Hand  Strength, Right (Malnutrition):  (Does not meet criteria) (07/18/24 1253)  A minimum of two characteristics is recommended for diagnosis of either severe or non-severe malnutrition.    Chart Review    Reason Seen: length of stay and follow-up    Malnutrition Screening Tool Results   Have you recently lost weight without trying?: No  Have you been eating poorly because of a decreased appetite?: No   MST Score: 0   Diagnosis:  Bilateral pelvic ring fracture  Right IT fracture s/p IM nail   Urinary retention: stable   Anemia NC/NC  Coronary artery disease  COPD, without exacerbation   Hypertension   Hypothyroidism   Hyperlipidemia   Depression    Relevant Medical History: n/a    Scheduled Medications:  amiodarone, 200 mg, BID  atorvastatin, 10 mg, QHS  enoxparin, 30 mg, Q24H (prophylaxis, 1700)  hydrALAZINE, 100 mg, TID  levothyroxine, 25 mcg, Before  breakfast  lisinopriL, 20 mg, BID  metoprolol succinate, 50 mg, Daily  montelukast, 10 mg, Daily  sertraline, 100 mg, Daily  sodium chloride 0.9%, 10 mL, Q6H    Continuous Infusions:   PRN Medications:  benzonatate, 100 mg, TID PRN  ceFAZolin 2 g/50mL Dextrose IVPB, 2 g, On Call Procedure  docusate sodium, 100 mg, BID PRN  HYDROcodone-acetaminophen, 1 tablet, Q6H PRN  ondansetron, 4 mg, Q6H PRN  prochlorperazine, 5 mg, Q6H PRN  sodium chloride 0.9%, 10 mL, PRN  traZODone, 50 mg, Nightly PRN    Calorie Containing IV Medications: no significant kcals from medications at this time    Recent Labs   Lab 07/17/24  0416 07/18/24  0357 07/19/24  0915 07/20/24  0405 07/21/24  0443   NA  --   --  136 140 140   K  --   --  4.0 3.9 4.1   CALCIUM  --   --  8.7 8.4 8.1*   CO2  --   --  23 23 27   BUN  --   --  16.9 17.3 17.2   CREATININE  --   --  0.60 0.62 0.65   EGFRNORACEVR  --   --  >60 >60 >60   GLUCOSE  --   --  86 99 104   BILITOT  --   --  1.0 0.6 0.6   ALKPHOS  --   --  176* 172* 168*   ALT  --   --  47 48 41   AST  --   --  59* 55* 38*   ALBUMIN  --   --  2.5* 2.2* 2.2*   WBC 9.79 9.92 12.40* 10.83 8.85   HGB 8.4* 8.6* 9.3* 9.3* 9.0*   HCT 27.4* 28.1* 30.6* 29.6* 29.5*     Nutrition Orders:  Diet Adult Regular  Dietary nutrition supplements Boost Very High Calorie Nutritional Drink - Vanilla; Daily    Appetite/Oral Intake: fair/50-75% of meals  Factors Affecting Nutritional Intake: decreased appetite and nausea  Social Needs Impacting Access to Food: none identified  Food/Latter-day/Cultural Preferences: none reported  Food Allergies: no known food allergies  Last Bowel Movement: 07/22/24  Wound(s):  n/a    Comments    7/18/24: PT reports fair appetite. States she has been trying to make herself eat as much as she can while here and enjoys the food, but just is not that hungry. Pt denies issues c/s, states she is having some nausea from the pain meds. Pt denied wt loss. Pt is moderately malnourished. Will add Boost  "[facility]-pt requested she only receive ONS 1x/day.    24: Pt eating ~half of meals per EMR; anticipated d/c today.     Anthropometrics    Height: 5' 3" (160 cm),    Last Weight: 38.6 kg (85 lb 1.6 oz) (24 0837), Weight Method: Standard Scale  BMI (Calculated): 15.1  BMI Classification: underweight (BMI less than 18.5)     Ideal Body Weight (IBW), Female: 115 lb     % Ideal Body Weight, Female (lb): 74 %                    Usual Body Weight (UBW), k.45 kg  % Usual Body Weight: 95.63     Usual Weight Provided By: patient    Wt Readings from Last 5 Encounters:   24 38.6 kg (85 lb 1.6 oz)   24 38.6 kg (85 lb)   24 40.5 kg (89 lb 4.6 oz)   24 39 kg (86 lb)   24 38.6 kg (85 lb)     Weight Change(s) Since Admission: No change.  Wt Readings from Last 1 Encounters:   24 0837 38.6 kg (85 lb 1.6 oz)   24 2340 38.6 kg (85 lb)   Admit Weight: 38.6 kg (85 lb) (24 2340), Weight Method: Standard Scale    Estimated Needs    Weight Used For Calorie Calculations: 38.6 kg (85 lb)  Energy Calorie Requirements (kcal): 0912-9638 kcal (35-40 kcal/kg)  Energy Need Method: Kcal/kg  Weight Used For Protein Calculations: 38.6 kg (85 lb)  Protein Requirements: 46-57 g (1.2-1.5 g/kg)  Fluid Requirements (mL): 1349 mL        Enteral Nutrition     Patient not receiving enteral nutrition at this time.    Parenteral Nutrition     Patient not receiving parenteral nutrition support at this time.    Evaluation of Received Nutrient Intake    Calories: not meeting estimated needs  Protein: not meeting estimated needs    Patient Education     Not applicable.    Nutrition Diagnosis       PES: Moderate chronic disease or condition related malnutrition related to chronic illness as evidenced by moderate fat depletion and moderate muscle depletion. (active)    Nutrition Interventions     Intervention(s): general/healthful diet, commercial beverage, multivitamin/mineral supplement therapy, and " collaboration with other providers    Goal: Meet greater than 80% of nutritional needs by follow-up. (goal progressing)  Goal: Maintain weight throughout hospitalization. (goal progressing)    Nutrition Goals & Monitoring     Dietitian will monitor: food and beverage intake, energy intake, weight, and weight change  Discharge planning: resume home regimen  Nutrition Risk/Follow-Up: moderate (follow-up in 3-5 days)   Please consult if re-assessment needed sooner.

## 2024-07-22 NOTE — NURSING
Nurses Note -- 4 Eyes      7/22/2024   11:05 AM      Skin assessed during: Q Shift Change      [x] No Altered Skin Integrity Present    []Prevention Measures Documented      [] Yes- Altered Skin Integrity Present or Discovered   [] LDA Added if Not in Epic (Describe Wound)   [] New Altered Skin Integrity was Present on Admit and Documented in LDA   [] Wound Image Taken    Wound Care Consulted? No    Attending Nurse:  Rosie Cloud RN/Staff Member:   [place]olis

## 2024-08-05 ENCOUNTER — HOSPITAL ENCOUNTER (OUTPATIENT)
Dept: RADIOLOGY | Facility: CLINIC | Age: 74
Discharge: HOME | End: 2024-08-05
Attending: PHYSICIAN ASSISTANT
Payer: MEDICARE

## 2024-08-05 ENCOUNTER — OFFICE VISIT (OUTPATIENT)
Dept: ORTHOPEDICS | Facility: CLINIC | Age: 74
End: 2024-08-05
Payer: MEDICARE

## 2024-08-05 VITALS
SYSTOLIC BLOOD PRESSURE: 146 MMHG | HEIGHT: 63 IN | RESPIRATION RATE: 18 BRPM | HEART RATE: 63 BPM | BODY MASS INDEX: 15.08 KG/M2 | DIASTOLIC BLOOD PRESSURE: 70 MMHG | WEIGHT: 85.13 LBS

## 2024-08-05 DIAGNOSIS — S72.141A: Primary | ICD-10-CM

## 2024-08-05 DIAGNOSIS — S72.141A: ICD-10-CM

## 2024-08-05 PROCEDURE — 4010F ACE/ARB THERAPY RXD/TAKEN: CPT | Mod: CPTII,,, | Performed by: PHYSICIAN ASSISTANT

## 2024-08-05 PROCEDURE — 3077F SYST BP >= 140 MM HG: CPT | Mod: CPTII,,, | Performed by: PHYSICIAN ASSISTANT

## 2024-08-05 PROCEDURE — 1159F MED LIST DOCD IN RCRD: CPT | Mod: CPTII,,, | Performed by: PHYSICIAN ASSISTANT

## 2024-08-05 PROCEDURE — 73502 X-RAY EXAM HIP UNI 2-3 VIEWS: CPT | Mod: RT,,, | Performed by: PHYSICIAN ASSISTANT

## 2024-08-05 PROCEDURE — 1101F PT FALLS ASSESS-DOCD LE1/YR: CPT | Mod: CPTII,,, | Performed by: PHYSICIAN ASSISTANT

## 2024-08-05 PROCEDURE — 3288F FALL RISK ASSESSMENT DOCD: CPT | Mod: CPTII,,, | Performed by: PHYSICIAN ASSISTANT

## 2024-08-05 PROCEDURE — 99024 POSTOP FOLLOW-UP VISIT: CPT | Mod: ,,, | Performed by: PHYSICIAN ASSISTANT

## 2024-08-05 PROCEDURE — 1157F ADVNC CARE PLAN IN RCRD: CPT | Mod: CPTII,,, | Performed by: PHYSICIAN ASSISTANT

## 2024-08-05 PROCEDURE — 3078F DIAST BP <80 MM HG: CPT | Mod: CPTII,,, | Performed by: PHYSICIAN ASSISTANT

## 2024-08-13 DIAGNOSIS — F41.9 ANXIETY DISORDER, UNSPECIFIED TYPE: Chronic | ICD-10-CM

## 2024-08-13 RX ORDER — SERTRALINE HYDROCHLORIDE 100 MG/1
100 TABLET, FILM COATED ORAL
Qty: 90 TABLET | Refills: 1 | Status: SHIPPED | OUTPATIENT
Start: 2024-08-13

## 2025-02-22 NOTE — HPI
Patient is a 73-year-old white female with a history of emphysema not on home oxygen, CAD status post stents but not on anticoagulation, hypertension and hyperlipidemia who presented via EMS for shortness of breath for last 3 days.  EMS arrived on scene and noted patient to have SpO2 in the mid 70s and thus she was placed on supplemental oxygen at 4 L and improved to the mid 90s.  Patient states she has also had a nonproductive cough but denies any chest pain, fever, chills.  She denies any abdominal pain, dysuria, hematuria. SpO2 on arrival is 84% on room air and thus 2 L nasal cannula in place with SpO2 improvement up to 90s.  X-ray shows concern for possible consolidation in the right lower lobe. Physical exam findings in ER consistent with wheezing and rhonchi thus Decadron and DuoNebs given.  Basic labs otherwise unremarkable.  Blood cultures drawn.  Patient will be admitted and placed on Rocephin and doxy   
yes

## (undated) DEVICE — PACK CATH LAB CUSTOM BR

## (undated) DEVICE — COVER FULLGUARD SHOE HIGH-TOP

## (undated) DEVICE — KIT INTRO VSSL DIL 6FX11CM

## (undated) DEVICE — DRESSING XEROFORM 5X9IN

## (undated) DEVICE — GLOVE PROTEXIS BLUE LATEX 9

## (undated) DEVICE — TAPE SILK 3IN

## (undated) DEVICE — ANGIOTOUCH KIT

## (undated) DEVICE — WIRE BMW 014X190

## (undated) DEVICE — SHEATH INTRODUCER 6FR 11CM

## (undated) DEVICE — Device

## (undated) DEVICE — BIT DRILL QCK-CPLG 4.2MM

## (undated) DEVICE — GLOVE PROTEXIS NEU-THERA SZ6

## (undated) DEVICE — SPONGE COTTON TRAY 4X4IN

## (undated) DEVICE — CATH NC QUANTUM APEX MR 4X20

## (undated) DEVICE — KIT SYR REUSABLE

## (undated) DEVICE — CATH BLLN FG APEX MR 3.00X12MM

## (undated) DEVICE — GLOVE PROTEXIS HYDROGEL SZ9

## (undated) DEVICE — ELECTRODE REM POLYHESIVE II

## (undated) DEVICE — CATH DIAG IMPULSE 6FR FL4

## (undated) DEVICE — GUIDE LAUNCHER 6FR JR 4.0

## (undated) DEVICE — CATH DIAG IMPULSE 6FR FR4

## (undated) DEVICE — KIT MANIFOLD LOW PRESS TUBING

## (undated) DEVICE — COVER TABLE HVY DTY 60X90IN

## (undated) DEVICE — WIRE GUIDE TEFLON 3CM .035 145

## (undated) DEVICE — SUT CTD VICRYL CT-1 27

## (undated) DEVICE — CATH IMPULSE PIGTAIL 6F 110CM

## (undated) DEVICE — OMNIPAQUE 300MG 150ML VIAL

## (undated) DEVICE — CATH IMPULSE 6FR MULTI-PAK

## (undated) DEVICE — STAPLER SKIN PROXIMATE WIDE

## (undated) DEVICE — INFLATOR ENCORE 26 BLLN INFL

## (undated) DEVICE — GLOVE SIGNATURE MICRO LTX 6

## (undated) DEVICE — GUIDE WIRE

## (undated) DEVICE — GOWN SMARTGOWN 3XL XLONG

## (undated) DEVICE — DRAPE IOBAN 2 STERI

## (undated) DEVICE — IMPLANTABLE DEVICE
Type: IMPLANTABLE DEVICE | Site: FEMUR | Status: NON-FUNCTIONAL
Removed: 2024-07-13

## (undated) DEVICE — KIT WATCHDOG HEMSTAS VALVE 8FR

## (undated) DEVICE — APPLICATOR CHLORAPREP ORN 26ML

## (undated) DEVICE — BLADE SURG CARBON STEEL #10

## (undated) DEVICE — PACK ANGIOPLASTY ACCESS PLUS

## (undated) DEVICE — DRESSING TELFA + BARR 4X6IN

## (undated) DEVICE — DRAPE C-ARMOR EQUIPMENT COVER